# Patient Record
Sex: FEMALE | Race: WHITE | NOT HISPANIC OR LATINO | Employment: OTHER | RURAL
[De-identification: names, ages, dates, MRNs, and addresses within clinical notes are randomized per-mention and may not be internally consistent; named-entity substitution may affect disease eponyms.]

---

## 2017-05-16 ENCOUNTER — OFFICE VISIT (OUTPATIENT)
Dept: CARDIOLOGY | Facility: CLINIC | Age: 82
End: 2017-05-16

## 2017-05-16 VITALS
BODY MASS INDEX: 26.57 KG/M2 | WEIGHT: 144.4 LBS | SYSTOLIC BLOOD PRESSURE: 132 MMHG | DIASTOLIC BLOOD PRESSURE: 64 MMHG | HEIGHT: 62 IN | HEART RATE: 53 BPM

## 2017-05-16 DIAGNOSIS — I10 ESSENTIAL HYPERTENSION: Chronic | ICD-10-CM

## 2017-05-16 DIAGNOSIS — I27.20 PULMONARY HYPERTENSION (HCC): Chronic | ICD-10-CM

## 2017-05-16 DIAGNOSIS — R60.0 BILATERAL EDEMA OF LOWER EXTREMITY: ICD-10-CM

## 2017-05-16 DIAGNOSIS — I49.3 PVC'S (PREMATURE VENTRICULAR CONTRACTIONS): Primary | ICD-10-CM

## 2017-05-16 PROCEDURE — 93000 ELECTROCARDIOGRAM COMPLETE: CPT | Performed by: INTERNAL MEDICINE

## 2017-05-16 PROCEDURE — 99213 OFFICE O/P EST LOW 20 MIN: CPT | Performed by: INTERNAL MEDICINE

## 2017-06-12 RX ORDER — BISOPROLOL FUMARATE 5 MG/1
TABLET, FILM COATED ORAL
Qty: 90 TABLET | Refills: 3 | Status: SHIPPED | OUTPATIENT
Start: 2017-06-12 | End: 2018-01-01 | Stop reason: SDUPTHER

## 2017-07-20 ENCOUNTER — TRANSCRIBE ORDERS (OUTPATIENT)
Dept: ADMINISTRATIVE | Facility: HOSPITAL | Age: 82
End: 2017-07-20

## 2017-07-20 DIAGNOSIS — R19.7 DIARRHEA, UNSPECIFIED TYPE: Primary | ICD-10-CM

## 2017-08-02 ENCOUNTER — HOSPITAL ENCOUNTER (OUTPATIENT)
Dept: CT IMAGING | Facility: HOSPITAL | Age: 82
Discharge: HOME | End: 2017-08-02
Admitting: INTERNAL MEDICINE

## 2017-08-02 DIAGNOSIS — R19.7 DIARRHEA, UNSPECIFIED TYPE: ICD-10-CM

## 2017-08-02 PROCEDURE — 63710000001 BARIUM 0.1 % SUSPENSION: Performed by: INTERNAL MEDICINE

## 2017-08-02 PROCEDURE — 74176 CT ABD & PELVIS W/O CONTRAST: CPT

## 2017-08-02 PROCEDURE — 82565 ASSAY OF CREATININE: CPT

## 2017-08-02 PROCEDURE — A9270 NON-COVERED ITEM OR SERVICE: HCPCS | Performed by: INTERNAL MEDICINE

## 2017-08-02 RX ADMIN — BARIUM SULFATE 1350 ML: 1 SUSPENSION ORAL at 16:14

## 2017-08-03 LAB — CREAT BLDA-MCNC: 2.8 MG/DL (ref 0.6–1.3)

## 2017-12-19 ENCOUNTER — OFFICE VISIT (OUTPATIENT)
Dept: CARDIOLOGY | Facility: CLINIC | Age: 82
End: 2017-12-19

## 2017-12-19 VITALS
SYSTOLIC BLOOD PRESSURE: 138 MMHG | HEART RATE: 53 BPM | BODY MASS INDEX: 26.31 KG/M2 | WEIGHT: 143 LBS | DIASTOLIC BLOOD PRESSURE: 78 MMHG | HEIGHT: 62 IN

## 2017-12-19 DIAGNOSIS — I10 ESSENTIAL HYPERTENSION: Primary | Chronic | ICD-10-CM

## 2017-12-19 DIAGNOSIS — I49.3 PVC'S (PREMATURE VENTRICULAR CONTRACTIONS): ICD-10-CM

## 2017-12-19 DIAGNOSIS — I27.20 PULMONARY HYPERTENSION (HCC): Chronic | ICD-10-CM

## 2017-12-19 DIAGNOSIS — I49.1 PREMATURE ATRIAL CONTRACTIONS: ICD-10-CM

## 2017-12-19 PROCEDURE — 99214 OFFICE O/P EST MOD 30 MIN: CPT | Performed by: INTERNAL MEDICINE

## 2017-12-19 PROCEDURE — 93010 ELECTROCARDIOGRAM REPORT: CPT | Performed by: INTERNAL MEDICINE

## 2017-12-19 RX ORDER — DIPHENOXYLATE HYDROCHLORIDE AND ATROPINE SULFATE 2.5; .025 MG/1; MG/1
1 TABLET ORAL 2 TIMES DAILY PRN
COMMUNITY
End: 2019-01-01 | Stop reason: HOSPADM

## 2017-12-19 RX ORDER — CALCITRIOL 0.25 UG/1
0.25 CAPSULE, LIQUID FILLED ORAL DAILY
COMMUNITY

## 2017-12-19 RX ORDER — PROPAFENONE HYDROCHLORIDE 150 MG/1
150 TABLET, COATED ORAL EVERY 12 HOURS
Qty: 180 TABLET | Refills: 1 | Status: SHIPPED | OUTPATIENT
Start: 2017-12-19 | End: 2018-01-01 | Stop reason: SDUPTHER

## 2017-12-19 NOTE — PROGRESS NOTES
[facility] at [facility]  Office visit  Fabiola Pimentel  1934  [phone]    VISIT DATE:  12/19/2017    PCP: Ekaterina Leon, APRN  [address]  [facility] [address]    CC:  Chief Complaint   Patient presents with   • PVC's (premature ventricular contractions)   • Shortness of Breath   • Hypertension       PROBLEM LIST:  1. Palpitations/premature ventricular contractions:  a. Left heart catheterization, Dr. Miguel, 07/10/1995: EF 70% with essentially normal coronary arteries.  b. Event monitor, 08/17/2004: Occasional PACs with brief SVT, frequent PVCs with frequent bigeminy, no pauses. Events seem to correlate with bigeminy.  c. A 2D echocardiogram, 09/21/2004: Mild TR, trace of MR, EF 60%.  2. Premature atrial contractions, postoperative from incisional hernia repair:  a. A 2D echocardiogram 01/03/2008: Mild MR, mild to moderate TR, small pericardial effusion, EF 65%.  3. Hypertension.  4. Possible bradycardia:  a. Patient ordered 24-hour Holter monitor, 04/21/2016.  5. Chronic renal insufficiency status post nephrectomy:  a. Baseline creatinine of 2.0, 09/24/2013.  b. History of right renal cell carcinoma status post right nephrectomy, 2001.   6. Pulmonary hypertension:  a. Echocardiogram shows a normal LV systolic function wall motion, with mildly enlarged right ventricle, moderate TR and an RVSP of 62 mmHg.  b. Echocardiogram by Dr. Schwarz, 04/11/2015: Normal LV systolic function, wall motion with trace MR, moderate TR and moderate to severe pulmonary hypertension with an RVSP of 50 to 55 mmHg.  7. Lower extremity edema:  a. Lower extremity venous duplex, 09/23/2013: No evidence of deep venous thrombosis.   b. Negative VQ scan for pulmonary embolus, findings suggest congestive heart failure, 10/11/2013.  c. No evidence of deep venous thrombosis by bilateral duplex, 03/25/2015.  8. ITP.   9. History of uterine cancer:  a. Initial diagnosis, 2001, status post SALVATORE/BSO.  b. Recurrence  documented 2003, status post radiation therapy and implants.  10. Degenerative joint disease.  11. Recent hip and pelvic fracture.  12. Raynaud’s phenomenon involving the left upper extremity.   13. Diverticular disease.  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  15. Surgical history:  a. Cholecystectomy, 1958.  b. Appendectomy.  c. Cataract surgery with lens implantation.  d. Right nephrectomy, 2001.  e. Colon resection, October 2005, with associated splenectomy.  f. Incisional hernia repair with mesh, 01/02/2008.  g. D and C.  h. Left axilla sympathetectomy secondary to Raynaud’s phenomenon.  i. Splenectomy.  ASSESSMENT:   Diagnosis Plan   1. Essential hypertension     2. Pulmonary hypertension     3. Premature atrial contractions     4. PVC's (premature ventricular contractions)         PLAN:  Symptomatic premature atrial contractions and premature ventricular contractions: Currently well-controlled.  Continue per Pap unknown 225 mg by mouth twice a day    Pulmonary hypertension: Moderate.  Chronic lower extremity edema appears more consistent with venous insufficiency as opposed to chronic cor pulmonale.  Continue clinical follow-up.    Hypertension: Goal less than 130/80 mmHg.  Currently well-controlled.  Continue current medical therapy.    Venous insufficiency: Continue as needed diuresis, we'll likely need scheduled diuretics at least 2-3 times per week with close follow-up of renal function given history of nephrectomy.  She is compliant with compression stockings.  Discussed importance of a low sodium diet.    Subjective  83-year-old female reports stable functional capacity.  She describes generalized fatigue.  Denies chest pain.  Intermittent palpitations.  Blood pressures running less than 140/90 mmHg.  She is compliant with medical therapy.  Has a chronic bilateral lower extremity edema, right greater than left.  Recently worsen with developing Ronald requiring diuresis, also developed a secondary cellulitis  "which was treated with antibiotics.  Currently back to baseline.    PHYSICAL EXAMINATION:  Vitals:    12/19/17 1018   BP: 138/78   BP Location: Right arm   Patient Position: Sitting   Pulse: 53   Weight: 64.9 kg (143 lb)   Height: 156.2 cm (61.5\")     General Appearance:    Alert, cooperative, no distress, appears stated age   Head:    Normocephalic, without obvious abnormality, atraumatic   Eyes:    conjunctiva/corneas clear   Nose:   Nares normal, septum midline, mucosa normal, no drainage   Throat:   Lips, teeth and gums normal   Neck:   Supple, symmetrical, trachea midline, no carotid    bruit or JVD   Lungs:     Clear to auscultation bilaterally, respirations unlabored   Chest Wall:    No tenderness or deformity    Heart:    Regular rate and rhythm, S1 and S2 normal, no murmur, rub   or gallop, normal carotid impulse bilaterally without bruit.   Abdomen:     Soft, non-tender   Extremities:   Extremities normal, atraumatic, no cyanosis, 2-3+ right lower extremity edema, 1+ left lower extremity edema, compression stockings in place    Pulses:   2+ and symmetric all extremities   Skin:   Skin color, texture, turgor normal, no rashes or lesions       Diagnostic Data:    ECG 12 Lead  Date/Time: 12/19/2017 10:31 AM  Performed by: SONJA HEWITT III  Authorized by: SONJA HEWITT III   Previous ECG: no previous ECG available  Rhythm: sinus rhythm  Conduction: 1st degree  Clinical impression: abnormal ECG  Clinical impression comment: Cannot rule out old inferior wall myocardial infarction            No results found for: CHLPL, TRIG, HDL, LDLDIRECT  Lab Results   Component Value Date    GLUCOSE 160 (H) 09/19/2014    BUN 40 (H) 09/19/2014    CREATININE 2.80 (H) 08/02/2017     09/19/2014    K 4.3 09/19/2014     09/19/2014    CO2 22 09/19/2014     No results found for: HGBA1C  Lab Results   Component Value Date    WBC 10.30 09/21/2016    HGB 12.2 09/21/2016    HCT 36.7 09/21/2016     09/21/2016 "       Allergies  Allergies   Allergen Reactions   • Benadryl [Diphenhydramine]      Unknown reaction   • Diltiazem      Edema     • Micardis [Telmisartan]      Hyperkalemia     • Other      DIURETICS, worsening renal insufficiency.         Current Medications    Current Outpatient Prescriptions:   •  bisoprolol (ZEBeta) 5 MG tablet, TAKE 1 TABLET BY MOUTH EVERY NIGHT., Disp: 90 tablet, Rfl: 3  •  calcitriol (ROCALTROL) 0.25 MCG capsule, Take 0.25 mcg by mouth Daily., Disp: , Rfl:   •  Denosumab (PROLIA SC), Inject  under the skin Every 6 (Six) Months., Disp: , Rfl:   •  Diphenoxylate-Atropine (LOMOTIL PO), Take  by mouth Daily As Needed., Disp: , Rfl:   •  doxazosin (CARDURA) 2 MG tablet, Take 2 mg by mouth Daily., Disp: , Rfl:   •  furosemide (LASIX) 40 MG tablet, Take 40 mg by mouth As Needed. Every other day , Disp: , Rfl:   •  propafenone (RHTHYMOL) 150 MG tablet, Take 1 tablet by mouth Every 8 (Eight) Hours. (Patient taking differently: Take 150 mg by mouth 2 (Two) Times a Day.), Disp: 270 tablet, Rfl: 1          ROS  Review of Systems   Cardiovascular: Positive for irregular heartbeat and leg swelling. Negative for chest pain, near-syncope, orthopnea and syncope.   Respiratory: Negative for cough, shortness of breath and snoring.          SOCIAL HX  Social History     Social History   • Marital status:      Spouse name: N/A   • Number of children: N/A   • Years of education: N/A     Occupational History   • Not on file.     Social History Main Topics   • Smoking status: Never Smoker   • Smokeless tobacco: Never Used   • Alcohol use No   • Drug use: No   • Sexual activity: Defer     Other Topics Concern   • Not on file     Social History Narrative       FAMILY HX  Family History   Problem Relation Age of Onset   • Heart attack Mother    • No Known Problems Father              Malachi Maxwell III, MD, formerly [facility]

## 2018-01-01 ENCOUNTER — OFFICE VISIT (OUTPATIENT)
Dept: CARDIOLOGY | Facility: CLINIC | Age: 83
End: 2018-01-01

## 2018-01-01 VITALS
BODY MASS INDEX: 25.32 KG/M2 | WEIGHT: 137.6 LBS | SYSTOLIC BLOOD PRESSURE: 130 MMHG | HEART RATE: 58 BPM | DIASTOLIC BLOOD PRESSURE: 70 MMHG | HEIGHT: 62 IN

## 2018-01-01 DIAGNOSIS — I27.20 PULMONARY HYPERTENSION (HCC): Chronic | ICD-10-CM

## 2018-01-01 DIAGNOSIS — I49.1 PREMATURE ATRIAL CONTRACTIONS: ICD-10-CM

## 2018-01-01 DIAGNOSIS — I49.3 PVC'S (PREMATURE VENTRICULAR CONTRACTIONS): ICD-10-CM

## 2018-01-01 DIAGNOSIS — I10 ESSENTIAL HYPERTENSION: Primary | Chronic | ICD-10-CM

## 2018-01-01 PROCEDURE — 99214 OFFICE O/P EST MOD 30 MIN: CPT | Performed by: INTERNAL MEDICINE

## 2018-01-01 PROCEDURE — 93010 ELECTROCARDIOGRAM REPORT: CPT | Performed by: INTERNAL MEDICINE

## 2018-01-01 RX ORDER — FAMOTIDINE 20 MG/1
20 TABLET, FILM COATED ORAL
Status: CANCELLED | OUTPATIENT
Start: 2018-01-01

## 2018-01-01 RX ORDER — BISOPROLOL FUMARATE 5 MG/1
TABLET, FILM COATED ORAL
Qty: 30 TABLET | Refills: 11 | Status: SHIPPED | OUTPATIENT
Start: 2018-01-01 | End: 2019-01-01 | Stop reason: HOSPADM

## 2018-01-01 RX ORDER — PROPAFENONE HYDROCHLORIDE 150 MG/1
150 TABLET, COATED ORAL EVERY 12 HOURS
Qty: 180 TABLET | Refills: 1 | Status: SHIPPED | OUTPATIENT
Start: 2018-01-01 | End: 2019-01-01 | Stop reason: HOSPADM

## 2018-01-01 RX ORDER — SODIUM CHLORIDE, SODIUM LACTATE, POTASSIUM CHLORIDE, CALCIUM CHLORIDE 600; 310; 30; 20 MG/100ML; MG/100ML; MG/100ML; MG/100ML
9 INJECTION, SOLUTION INTRAVENOUS CONTINUOUS PRN
Status: CANCELLED | OUTPATIENT
Start: 2018-01-01

## 2018-01-01 RX ORDER — PROPAFENONE HYDROCHLORIDE 150 MG/1
150 TABLET, COATED ORAL EVERY 12 HOURS
Qty: 180 TABLET | Refills: 0 | Status: SHIPPED | OUTPATIENT
Start: 2018-01-01 | End: 2018-01-01 | Stop reason: SDUPTHER

## 2018-01-01 RX ORDER — HYDRALAZINE HYDROCHLORIDE 10 MG/1
10 TABLET, FILM COATED ORAL 2 TIMES DAILY
COMMUNITY

## 2018-07-12 NOTE — PROGRESS NOTES
[facility]  Office visit  Fabiola Pimentel  1934  [phone]    VISIT DATE:  12/19/2017    PCP: Ekaterina Leon, APRN  [address]  [facility] [address]    CC:  Chief Complaint   Patient presents with   • PVC's (premature ventricular contractions)   • Palpitations       PROBLEM LIST:  1. Palpitations/premature ventricular contractions:  a. Left heart catheterization, Dr. Miguel, 07/10/1995: EF 70% with essentially normal coronary arteries.  b. Event monitor, 08/17/2004: Occasional PACs with brief SVT, frequent PVCs with frequent bigeminy, no pauses. Events seem to correlate with bigeminy.  c. A 2D echocardiogram, 09/21/2004: Mild TR, trace of MR, EF 60%.  2. Premature atrial contractions, postoperative from incisional hernia repair:  a. A 2D echocardiogram 01/03/2008: Mild MR, mild to moderate TR, small pericardial effusion, EF 65%.  3. Hypertension.  4. Possible bradycardia:  a. Patient ordered 24-hour Holter monitor, 04/21/2016.  5. Chronic renal insufficiency status post nephrectomy:  a. Baseline creatinine of 2.0, 09/24/2013.  b. History of right renal cell carcinoma status post right nephrectomy, 2001.   6. Pulmonary hypertension:  a. Echocardiogram shows a normal LV systolic function wall motion, with mildly enlarged right ventricle, moderate TR and an RVSP of 62 mmHg.  b. Echocardiogram by Dr. Schwarz, 04/11/2015: Normal LV systolic function, wall motion with trace MR, moderate TR and moderate to severe pulmonary hypertension with an RVSP of 50 to 55 mmHg.  7. Lower extremity edema:  a. Lower extremity venous duplex, 09/23/2013: No evidence of deep venous thrombosis.   b. Negative VQ scan for pulmonary embolus, findings suggest congestive heart failure, 10/11/2013.  c. No evidence of deep venous thrombosis by bilateral duplex, 03/25/2015.  8. ITP.   9. History of uterine cancer:  a. Initial diagnosis, 2001, status post SALVATORE/BSO.  b. Recurrence documented 2003, status post  radiation therapy and implants.  10. Degenerative joint disease.  11. Recent hip and pelvic fracture.  12. Raynaud’s phenomenon involving the left upper extremity.   13. Diverticular disease.  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  15. Surgical history:  a. Cholecystectomy, 1958.  b. Appendectomy.  c. Cataract surgery with lens implantation.  d. Right nephrectomy, 2001.  e. Colon resection, October 2005, with associated splenectomy.  f. Incisional hernia repair with mesh, 01/02/2008.  g. D and C.  h. Left axilla sympathetectomy secondary to Raynaud’s phenomenon.  i. Splenectomy.  ASSESSMENT:   Diagnosis Plan   1. Essential hypertension     2. Pulmonary hypertension     3. PVC's (premature ventricular contractions)     4. Premature atrial contractions         PLAN:  Symptomatic premature atrial contractions and premature ventricular contractions: Currently well-controlled.  Continue propafenone 150 mg by mouth twice a day    Pulmonary hypertension: Moderate.  Chronic lower extremity edema appears more consistent with venous insufficiency as opposed to chronic cor pulmonale.  Continue clinical follow-up.    Hypertension: Goal less than 130/80 mmHg.  Currently well-controlled.  Continue current medical therapy.    Venous insufficiency: Continue diuresis as directed by nephrology. history of nephrectomy.  She is compliant with compression stockings.  Discussed importance of a low sodium diet.    Subjective  83-year-old female reports stable functional capacity.  She describes generalized fatigue.  Denies chest pain.  Intermittent palpitations.  Blood pressures running less than 140/90 mmHg.  She is compliant with medical therapy.  Has a chronic bilateral lower extremity edema, right greater than left which has worsened recently. She is now on Lasix every other day. Being followed closely by her nephrologist..      PHYSICAL EXAMINATION:  Vitals:    07/12/18 1356   BP: 130/70   BP Location: Left arm   Patient Position: Sitting  "  Pulse: 58   Weight: 62.4 kg (137 lb 9.6 oz)   Height: 156.2 cm (61.5\")     General Appearance:    Alert, cooperative, no distress, appears stated age   Head:    Normocephalic, without obvious abnormality, atraumatic   Eyes:    conjunctiva/corneas clear   Nose:   Nares normal, septum midline, mucosa normal, no drainage   Throat:   Lips, teeth and gums normal   Neck:   Supple, symmetrical, trachea midline, no carotid    bruit or JVD   Lungs:     Clear to auscultation bilaterally, respirations unlabored   Chest Wall:    No tenderness or deformity    Heart:    Regular rate and rhythm, S1 and S2 normal, no murmur, rub   or gallop, normal carotid impulse bilaterally without bruit.   Abdomen:     Soft, non-tender   Extremities:   Extremities normal, atraumatic, no cyanosis, 2-3+ right lower extremity edema, 1+ left lower extremity edema, compression stockings in place    Pulses:   2+ and symmetric all extremities   Skin:   Skin color, texture, turgor normal, no rashes or lesions       Diagnostic Data:    ECG 12 Lead  Date/Time: 7/12/2018 2:12 PM  Performed by: SONJA HEWITT III  Authorized by: SONJA HEWITT III   Comparison: compared with previous ECG   Rhythm: sinus rhythm  Conduction: 1st degree  Clinical impression: non-specific ECG          No results found for: CHLPL, TRIG, HDL, LDLDIRECT  Lab Results   Component Value Date    GLUCOSE 160 (H) 09/19/2014    BUN 40 (H) 09/19/2014    CREATININE 2.80 (H) 08/02/2017     09/19/2014    K 4.3 09/19/2014     09/19/2014    CO2 22 09/19/2014     No results found for: HGBA1C  Lab Results   Component Value Date    WBC 10.30 09/21/2016    HGB 12.2 09/21/2016    HCT 36.7 09/21/2016     09/21/2016       Allergies  Allergies   Allergen Reactions   • Benadryl [Diphenhydramine]      Unknown reaction   • Diltiazem      Edema     • Micardis [Telmisartan]      Hyperkalemia     • Other      DIURETICS, worsening renal insufficiency.         Current Medications    Current " Outpatient Prescriptions:   •  bisoprolol (ZEBeta) 5 MG tablet, TAKE 1 TABLET BY MOUTH EVERY NIGHT., Disp: 90 tablet, Rfl: 3  •  calcitriol (ROCALTROL) 0.25 MCG capsule, Take 0.25 mcg by mouth Daily., Disp: , Rfl:   •  Denosumab (PROLIA SC), Inject  under the skin Every 6 (Six) Months., Disp: , Rfl:   •  Diphenoxylate-Atropine (LOMOTIL PO), Take  by mouth Daily As Needed., Disp: , Rfl:   •  doxazosin (CARDURA) 2 MG tablet, Take 2 mg by mouth Daily., Disp: , Rfl:   •  furosemide (LASIX) 40 MG tablet, Take 40 mg by mouth Every Other Day. Every other day , Disp: , Rfl:   •  hydrALAZINE (APRESOLINE) 10 MG tablet, Take 10 mg by mouth 2 (Two) Times a Day., Disp: , Rfl:   •  propafenone (RYTHMOL) 150 MG tablet, TAKE 1 TABLET BY MOUTH EVERY 12 (TWELVE) HOURS., Disp: 180 tablet, Rfl: 0          ROS  Review of Systems   Cardiovascular: Positive for irregular heartbeat and leg swelling. Negative for chest pain, near-syncope, orthopnea and syncope.   Respiratory: Negative for cough, shortness of breath and snoring.          SOCIAL HX  Social History     Social History   • Marital status:      Spouse name: N/A   • Number of children: N/A   • Years of education: N/A     Occupational History   • Not on file.     Social History Main Topics   • Smoking status: Never Smoker   • Smokeless tobacco: Never Used   • Alcohol use No   • Drug use: No   • Sexual activity: Defer     Other Topics Concern   • Not on file     Social History Narrative   • No narrative on file       FAMILY HX  Family History   Problem Relation Age of Onset   • Heart attack Mother    • No Known Problems Father              Malachi Maxwell III, MD, [facility]

## 2019-01-01 ENCOUNTER — APPOINTMENT (OUTPATIENT)
Dept: GENERAL RADIOLOGY | Facility: HOSPITAL | Age: 84
End: 2019-01-01

## 2019-01-01 ENCOUNTER — APPOINTMENT (OUTPATIENT)
Dept: MRI IMAGING | Facility: HOSPITAL | Age: 84
End: 2019-01-01

## 2019-01-01 ENCOUNTER — APPOINTMENT (OUTPATIENT)
Dept: NEPHROLOGY | Facility: HOSPITAL | Age: 84
End: 2019-01-01

## 2019-01-01 ENCOUNTER — APPOINTMENT (OUTPATIENT)
Dept: NEPHROLOGY | Facility: HOSPITAL | Age: 84
End: 2019-01-01
Attending: INTERNAL MEDICINE

## 2019-01-01 ENCOUNTER — APPOINTMENT (OUTPATIENT)
Dept: CARDIOLOGY | Facility: HOSPITAL | Age: 84
End: 2019-01-01

## 2019-01-01 ENCOUNTER — HOSPITAL ENCOUNTER (OUTPATIENT)
Facility: HOSPITAL | Age: 84
Discharge: HOME | End: 2019-01-17
Attending: SURGERY | Admitting: SURGERY

## 2019-01-01 ENCOUNTER — HOSPITAL ENCOUNTER (INPATIENT)
Facility: HOSPITAL | Age: 84
LOS: 3 days | End: 2019-03-04
Attending: HOSPITALIST | Admitting: INTERNAL MEDICINE

## 2019-01-01 ENCOUNTER — ANCILLARY PROCEDURE (OUTPATIENT)
Dept: SPEECH THERAPY | Facility: HOSPITAL | Age: 84
End: 2019-01-01

## 2019-01-01 ENCOUNTER — ANESTHESIA (OUTPATIENT)
Dept: PERIOP | Facility: HOSPITAL | Age: 84
End: 2019-01-01

## 2019-01-01 ENCOUNTER — HOSPITAL ENCOUNTER (INPATIENT)
Facility: HOSPITAL | Age: 84
LOS: 15 days | Discharge: NURSING, REHAB OR LONG-TERM CARE | End: 2019-02-15
Attending: HOSPITALIST | Admitting: INTERNAL MEDICINE

## 2019-01-01 ENCOUNTER — ANESTHESIA EVENT (OUTPATIENT)
Dept: PERIOP | Facility: HOSPITAL | Age: 84
End: 2019-01-01

## 2019-01-01 ENCOUNTER — HOSPITAL ENCOUNTER (INPATIENT)
Facility: HOSPITAL | Age: 84
LOS: 8 days | Discharge: NURSING, REHAB OR LONG-TERM CARE | End: 2019-02-27
Attending: HOSPITALIST | Admitting: INTERNAL MEDICINE

## 2019-01-01 ENCOUNTER — OUTSIDE FACILITY SERVICE (OUTPATIENT)
Dept: CARDIOLOGY | Facility: CLINIC | Age: 84
End: 2019-01-01

## 2019-01-01 ENCOUNTER — APPOINTMENT (OUTPATIENT)
Dept: CT IMAGING | Facility: HOSPITAL | Age: 84
End: 2019-01-01

## 2019-01-01 VITALS
SYSTOLIC BLOOD PRESSURE: 128 MMHG | RESPIRATION RATE: 18 BRPM | HEIGHT: 61 IN | TEMPERATURE: 98.1 F | BODY MASS INDEX: 25.11 KG/M2 | WEIGHT: 133 LBS | HEART RATE: 67 BPM | DIASTOLIC BLOOD PRESSURE: 64 MMHG | OXYGEN SATURATION: 97 %

## 2019-01-01 VITALS
BODY MASS INDEX: 24.26 KG/M2 | DIASTOLIC BLOOD PRESSURE: 70 MMHG | WEIGHT: 128.5 LBS | OXYGEN SATURATION: 96 % | TEMPERATURE: 97.4 F | HEIGHT: 61 IN | SYSTOLIC BLOOD PRESSURE: 116 MMHG | RESPIRATION RATE: 16 BRPM | HEART RATE: 69 BPM

## 2019-01-01 VITALS
WEIGHT: 127.87 LBS | RESPIRATION RATE: 18 BRPM | OXYGEN SATURATION: 98 % | SYSTOLIC BLOOD PRESSURE: 108 MMHG | HEIGHT: 61 IN | HEART RATE: 91 BPM | TEMPERATURE: 98.4 F | DIASTOLIC BLOOD PRESSURE: 59 MMHG | BODY MASS INDEX: 24.14 KG/M2

## 2019-01-01 VITALS
HEART RATE: 55 BPM | OXYGEN SATURATION: 95 % | WEIGHT: 134 LBS | SYSTOLIC BLOOD PRESSURE: 108 MMHG | HEIGHT: 63 IN | DIASTOLIC BLOOD PRESSURE: 66 MMHG | RESPIRATION RATE: 16 BRPM | TEMPERATURE: 97.6 F | BODY MASS INDEX: 23.74 KG/M2

## 2019-01-01 DIAGNOSIS — Z78.9 IMPAIRED MOBILITY AND ADLS: ICD-10-CM

## 2019-01-01 DIAGNOSIS — R13.13 PHARYNGEAL DYSPHAGIA: ICD-10-CM

## 2019-01-01 DIAGNOSIS — Z74.09 IMPAIRED FUNCTIONAL MOBILITY, BALANCE, GAIT, AND ENDURANCE: Primary | ICD-10-CM

## 2019-01-01 DIAGNOSIS — Z78.9 IMPAIRED MOBILITY AND ADLS: Primary | ICD-10-CM

## 2019-01-01 DIAGNOSIS — I46.9 PEA (PULSELESS ELECTRICAL ACTIVITY) (HCC): ICD-10-CM

## 2019-01-01 DIAGNOSIS — Z74.09 IMPAIRED FUNCTIONAL MOBILITY, BALANCE, GAIT, AND ENDURANCE: ICD-10-CM

## 2019-01-01 DIAGNOSIS — Z74.09 IMPAIRED MOBILITY AND ADLS: ICD-10-CM

## 2019-01-01 DIAGNOSIS — Z74.09 IMPAIRED MOBILITY AND ADLS: Primary | ICD-10-CM

## 2019-01-01 LAB
ABO + RH BLD: NORMAL
ABO + RH BLD: NORMAL
ABO GROUP BLD: NORMAL
ACANTHOCYTES BLD QL SMEAR: ABNORMAL
ACANTHOCYTES BLD QL SMEAR: ABNORMAL
ALBUMIN SERPL-MCNC: 2.34 G/DL (ref 3.2–4.8)
ALBUMIN SERPL-MCNC: 2.54 G/DL (ref 3.2–4.8)
ALBUMIN SERPL-MCNC: 2.55 G/DL (ref 3.2–4.8)
ALBUMIN SERPL-MCNC: 2.67 G/DL (ref 3.2–4.8)
ALBUMIN SERPL-MCNC: 2.91 G/DL (ref 3.2–4.8)
ALBUMIN SERPL-MCNC: 2.95 G/DL (ref 3.2–4.8)
ALBUMIN SERPL-MCNC: 3.02 G/DL (ref 3.2–4.8)
ALBUMIN SERPL-MCNC: 3.11 G/DL (ref 3.2–4.8)
ALBUMIN SERPL-MCNC: 3.15 G/DL (ref 3.2–4.8)
ALBUMIN SERPL-MCNC: 3.54 G/DL (ref 3.2–4.8)
ALBUMIN SERPL-MCNC: 3.62 G/DL (ref 3.2–4.8)
ALBUMIN SERPL-MCNC: 3.67 G/DL (ref 3.2–4.8)
ALBUMIN SERPL-MCNC: 3.8 G/DL (ref 3.2–4.8)
ALBUMIN SERPL-MCNC: 3.91 G/DL (ref 3.2–4.8)
ALBUMIN SERPL-MCNC: 4.42 G/DL (ref 3.2–4.8)
ALBUMIN SERPL-MCNC: 4.67 G/DL (ref 3.2–4.8)
ALBUMIN/GLOB SERPL: 1.2 G/DL (ref 1.5–2.5)
ALBUMIN/GLOB SERPL: 1.3 G/DL (ref 1.5–2.5)
ALBUMIN/GLOB SERPL: 1.4 G/DL (ref 1.5–2.5)
ALBUMIN/GLOB SERPL: 1.5 G/DL (ref 1.5–2.5)
ALBUMIN/GLOB SERPL: 1.6 G/DL (ref 1.5–2.5)
ALBUMIN/GLOB SERPL: 1.7 G/DL (ref 1.5–2.5)
ALBUMIN/GLOB SERPL: 1.8 G/DL (ref 1.5–2.5)
ALBUMIN/GLOB SERPL: 1.8 G/DL (ref 1.5–2.5)
ALBUMIN/GLOB SERPL: 2.9 G/DL (ref 1.5–2.5)
ALP SERPL-CCNC: 46 U/L (ref 25–100)
ALP SERPL-CCNC: 52 U/L (ref 25–100)
ALP SERPL-CCNC: 53 U/L (ref 25–100)
ALP SERPL-CCNC: 66 U/L (ref 25–100)
ALP SERPL-CCNC: 69 U/L (ref 25–100)
ALP SERPL-CCNC: 70 U/L (ref 25–100)
ALP SERPL-CCNC: 89 U/L (ref 25–100)
ALP SERPL-CCNC: 91 U/L (ref 25–100)
ALP SERPL-CCNC: 99 U/L (ref 25–100)
ALT SERPL W P-5'-P-CCNC: 10 U/L (ref 7–40)
ALT SERPL W P-5'-P-CCNC: 11 U/L (ref 7–40)
ALT SERPL W P-5'-P-CCNC: 12 U/L (ref 7–40)
ALT SERPL W P-5'-P-CCNC: 14 U/L (ref 7–40)
ALT SERPL W P-5'-P-CCNC: 4 U/L (ref 7–40)
ALT SERPL W P-5'-P-CCNC: 7 U/L (ref 7–40)
ALT SERPL W P-5'-P-CCNC: 8 U/L (ref 7–40)
AMORPH URATE CRY URNS QL MICRO: NORMAL /HPF
ANION GAP SERPL CALCULATED.3IONS-SCNC: 10 MMOL/L (ref 3–11)
ANION GAP SERPL CALCULATED.3IONS-SCNC: 11 MMOL/L (ref 3–11)
ANION GAP SERPL CALCULATED.3IONS-SCNC: 12 MMOL/L (ref 3–11)
ANION GAP SERPL CALCULATED.3IONS-SCNC: 13 MMOL/L (ref 3–11)
ANION GAP SERPL CALCULATED.3IONS-SCNC: 14 MMOL/L (ref 3–11)
ANION GAP SERPL CALCULATED.3IONS-SCNC: 15 MMOL/L (ref 3–11)
ANION GAP SERPL CALCULATED.3IONS-SCNC: 16 MMOL/L (ref 3–11)
ANION GAP SERPL CALCULATED.3IONS-SCNC: 9 MMOL/L (ref 3–11)
ANISOCYTOSIS BLD QL: ABNORMAL
ARTICHOKE IGE QN: 25 MG/DL (ref 0–130)
AST SERPL-CCNC: 18 U/L (ref 0–33)
AST SERPL-CCNC: 18 U/L (ref 0–33)
AST SERPL-CCNC: 21 U/L (ref 0–33)
AST SERPL-CCNC: 22 U/L (ref 0–33)
AST SERPL-CCNC: 23 U/L (ref 0–33)
AST SERPL-CCNC: 23 U/L (ref 0–33)
AST SERPL-CCNC: 25 U/L (ref 0–33)
AST SERPL-CCNC: 31 U/L (ref 0–33)
AST SERPL-CCNC: 34 U/L (ref 0–33)
B PARAPERT DNA SPEC QL NAA+PROBE: NOT DETECTED
B PERT DNA SPEC QL NAA+PROBE: NOT DETECTED
BACTERIA SPEC AEROBE CULT: ABNORMAL
BACTERIA SPEC AEROBE CULT: NORMAL
BACTERIA UR QL AUTO: ABNORMAL /HPF
BACTERIA UR QL AUTO: NORMAL /HPF
BASOPHILS # BLD AUTO: 0.03 10*3/MM3 (ref 0–0.2)
BASOPHILS # BLD MANUAL: 0 10*3/MM3 (ref 0–0.2)
BASOPHILS # BLD MANUAL: 0.2 10*3/MM3 (ref 0–0.2)
BASOPHILS NFR BLD AUTO: 0 % (ref 0–1)
BASOPHILS NFR BLD AUTO: 0.3 % (ref 0–1)
BASOPHILS NFR BLD AUTO: 1 % (ref 0–1)
BH BB BLOOD EXPIRATION DATE: NORMAL
BH BB BLOOD EXPIRATION DATE: NORMAL
BH BB BLOOD TYPE BARCODE: 5100
BH BB BLOOD TYPE BARCODE: 5100
BH BB DISPENSE STATUS: NORMAL
BH BB DISPENSE STATUS: NORMAL
BH BB PRODUCT CODE: NORMAL
BH BB PRODUCT CODE: NORMAL
BH BB UNIT NUMBER: NORMAL
BH BB UNIT NUMBER: NORMAL
BH CV ECHO MEAS - AO MAX PG (FULL): 1.9 MMHG
BH CV ECHO MEAS - AO MAX PG: 7 MMHG
BH CV ECHO MEAS - AO ROOT AREA (BSA CORRECTED): 1.9
BH CV ECHO MEAS - AO ROOT AREA: 8.2 CM^2
BH CV ECHO MEAS - AO ROOT DIAM: 3.2 CM
BH CV ECHO MEAS - AO V2 MAX: 132 CM/SEC
BH CV ECHO MEAS - AVA(V,A): 2.4 CM^2
BH CV ECHO MEAS - AVA(V,D): 2.4 CM^2
BH CV ECHO MEAS - BSA(HAYCOCK): 1.7 M^2
BH CV ECHO MEAS - BSA(HAYCOCK): 1.8 M^2
BH CV ECHO MEAS - BSA: 1.7 M^2
BH CV ECHO MEAS - BSA: 1.7 M^2
BH CV ECHO MEAS - BZI_BMI: 24.5 KILOGRAMS/M^2
BH CV ECHO MEAS - BZI_BMI: 24.9 KILOGRAMS/M^2
BH CV ECHO MEAS - BZI_METRIC_HEIGHT: 162.6 CM
BH CV ECHO MEAS - BZI_METRIC_HEIGHT: 165.1 CM
BH CV ECHO MEAS - BZI_METRIC_WEIGHT: 65.8 KG
BH CV ECHO MEAS - BZI_METRIC_WEIGHT: 66.7 KG
BH CV ECHO MEAS - EDV(CUBED): 32.5 ML
BH CV ECHO MEAS - EDV(MOD-SP2): 69 ML
BH CV ECHO MEAS - EDV(MOD-SP4): 99 ML
BH CV ECHO MEAS - EDV(TEICH): 40.6 ML
BH CV ECHO MEAS - EF(CUBED): 64.2 %
BH CV ECHO MEAS - EF(MOD-BP): 54 %
BH CV ECHO MEAS - EF(MOD-SP2): 50.7 %
BH CV ECHO MEAS - EF(MOD-SP4): 60.6 %
BH CV ECHO MEAS - EF(TEICH): 57.1 %
BH CV ECHO MEAS - ESV(CUBED): 11.6 ML
BH CV ECHO MEAS - ESV(MOD-SP2): 34 ML
BH CV ECHO MEAS - ESV(MOD-SP4): 39 ML
BH CV ECHO MEAS - ESV(TEICH): 17.5 ML
BH CV ECHO MEAS - FS: 29 %
BH CV ECHO MEAS - IVS/LVPW: 0.88
BH CV ECHO MEAS - IVSD: 0.6 CM
BH CV ECHO MEAS - LA DIMENSION: 1.9 CM
BH CV ECHO MEAS - LA/AO: 0.58
BH CV ECHO MEAS - LAD MAJOR: 6.6 CM
BH CV ECHO MEAS - LAT PEAK E' VEL: 9.8 CM/SEC
BH CV ECHO MEAS - LATERAL E/E' RATIO: 11
BH CV ECHO MEAS - LV DIASTOLIC VOL/BSA (35-75): 57 ML/M^2
BH CV ECHO MEAS - LV MASS(C)D: 47.7 GRAMS
BH CV ECHO MEAS - LV MASS(C)DI: 27.5 GRAMS/M^2
BH CV ECHO MEAS - LV MAX PG: 5.1 MMHG
BH CV ECHO MEAS - LV MEAN PG: 2.5 MMHG
BH CV ECHO MEAS - LV SYSTOLIC VOL/BSA (12-30): 22.5 ML/M^2
BH CV ECHO MEAS - LV V1 MAX: 113 CM/SEC
BH CV ECHO MEAS - LV V1 MEAN: 74.2 CM/SEC
BH CV ECHO MEAS - LV V1 VTI: 24 CM
BH CV ECHO MEAS - LVIDD: 3.2 CM
BH CV ECHO MEAS - LVIDS: 2.3 CM
BH CV ECHO MEAS - LVLD AP2: 7.2 CM
BH CV ECHO MEAS - LVLD AP4: 7.2 CM
BH CV ECHO MEAS - LVLS AP2: 6.5 CM
BH CV ECHO MEAS - LVLS AP4: 5.9 CM
BH CV ECHO MEAS - LVOT AREA (M): 2.8 CM^2
BH CV ECHO MEAS - LVOT AREA: 2.8 CM^2
BH CV ECHO MEAS - LVOT DIAM: 1.9 CM
BH CV ECHO MEAS - LVPWD: 0.68 CM
BH CV ECHO MEAS - MED PEAK E' VEL: 7.6 CM/SEC
BH CV ECHO MEAS - MEDIAL E/E' RATIO: 14.2
BH CV ECHO MEAS - MV A MAX VEL: 133.3 CM/SEC
BH CV ECHO MEAS - MV DEC TIME: 0.33 SEC
BH CV ECHO MEAS - MV E MAX VEL: 109.1 CM/SEC
BH CV ECHO MEAS - MV E/A: 0.82
BH CV ECHO MEAS - MV MAX PG: 7 MMHG
BH CV ECHO MEAS - MV MEAN PG: 3.3 MMHG
BH CV ECHO MEAS - MV V2 MAX: 132 CM/SEC
BH CV ECHO MEAS - MV V2 MEAN: 87.9 CM/SEC
BH CV ECHO MEAS - MV V2 VTI: 34.1 CM
BH CV ECHO MEAS - MVA(VTI): 2 CM^2
BH CV ECHO MEAS - PA ACC SLOPE: 613.3 CM/SEC^2
BH CV ECHO MEAS - PA ACC TIME: 0.11 SEC
BH CV ECHO MEAS - PA MAX PG: 4.1 MMHG
BH CV ECHO MEAS - PA PR(ACCEL): 31.5 MMHG
BH CV ECHO MEAS - PA V2 MAX: 100.8 CM/SEC
BH CV ECHO MEAS - RAP SYSTOLE: 7 MMHG
BH CV ECHO MEAS - RVSP: 54 MMHG
BH CV ECHO MEAS - SI(CUBED): 12 ML/M^2
BH CV ECHO MEAS - SI(LVOT): 38.7 ML/M^2
BH CV ECHO MEAS - SI(MOD-SP2): 20.2 ML/M^2
BH CV ECHO MEAS - SI(MOD-SP4): 34.6 ML/M^2
BH CV ECHO MEAS - SI(TEICH): 13.4 ML/M^2
BH CV ECHO MEAS - SV(CUBED): 20.8 ML
BH CV ECHO MEAS - SV(LVOT): 67.2 ML
BH CV ECHO MEAS - SV(MOD-SP2): 35 ML
BH CV ECHO MEAS - SV(MOD-SP4): 60 ML
BH CV ECHO MEAS - SV(TEICH): 23.2 ML
BH CV ECHO MEAS - TAPSE (>1.6): 1.9 CM2
BH CV ECHO MEAS - TR MAX PG: 47 MMHG
BH CV ECHO MEAS - TR MAX VEL: 307.4 CM/SEC
BH CV ECHO MEASUREMENTS AVERAGE E/E' RATIO: 12.54
BH CV VAS BP LEFT ARM: NORMAL MMHG
BH CV XLRA - RV BASE: 3 CM
BH CV XLRA - RV LENGTH: 6.5 CM
BH CV XLRA - RV MID: 2.8 CM
BH CV XLRA - TDI S': 14.3 CM/SEC
BILIRUB SERPL-MCNC: 0.3 MG/DL (ref 0.3–1.2)
BILIRUB SERPL-MCNC: 0.3 MG/DL (ref 0.3–1.2)
BILIRUB SERPL-MCNC: 0.4 MG/DL (ref 0.3–1.2)
BILIRUB SERPL-MCNC: 0.5 MG/DL (ref 0.3–1.2)
BILIRUB SERPL-MCNC: 0.5 MG/DL (ref 0.3–1.2)
BILIRUB SERPL-MCNC: 0.8 MG/DL (ref 0.3–1.2)
BILIRUB SERPL-MCNC: 0.9 MG/DL (ref 0.3–1.2)
BILIRUB UR QL STRIP: NEGATIVE
BILIRUB UR QL STRIP: NEGATIVE
BLD GP AB SCN SERPL QL: NEGATIVE
BUN BLD-MCNC: 21 MG/DL (ref 9–23)
BUN BLD-MCNC: 22 MG/DL (ref 9–23)
BUN BLD-MCNC: 25 MG/DL (ref 9–23)
BUN BLD-MCNC: 26 MG/DL (ref 9–23)
BUN BLD-MCNC: 26 MG/DL (ref 9–23)
BUN BLD-MCNC: 31 MG/DL (ref 9–23)
BUN BLD-MCNC: 31 MG/DL (ref 9–23)
BUN BLD-MCNC: 32 MG/DL (ref 9–23)
BUN BLD-MCNC: 37 MG/DL (ref 9–23)
BUN BLD-MCNC: 39 MG/DL (ref 9–23)
BUN BLD-MCNC: 40 MG/DL (ref 9–23)
BUN BLD-MCNC: 41 MG/DL (ref 9–23)
BUN BLD-MCNC: 45 MG/DL (ref 9–23)
BUN BLD-MCNC: 46 MG/DL (ref 9–23)
BUN BLD-MCNC: 46 MG/DL (ref 9–23)
BUN BLD-MCNC: 47 MG/DL (ref 9–23)
BUN BLD-MCNC: 49 MG/DL (ref 9–23)
BUN BLD-MCNC: 52 MG/DL (ref 9–23)
BUN BLD-MCNC: 52 MG/DL (ref 9–23)
BUN BLD-MCNC: 53 MG/DL (ref 9–23)
BUN BLD-MCNC: 57 MG/DL (ref 9–23)
BUN BLD-MCNC: 57 MG/DL (ref 9–23)
BUN/CREAT SERPL: 10 (ref 7–25)
BUN/CREAT SERPL: 10.5 (ref 7–25)
BUN/CREAT SERPL: 10.9 (ref 7–25)
BUN/CREAT SERPL: 10.9 (ref 7–25)
BUN/CREAT SERPL: 11 (ref 7–25)
BUN/CREAT SERPL: 11 (ref 7–25)
BUN/CREAT SERPL: 11.2 (ref 7–25)
BUN/CREAT SERPL: 11.4 (ref 7–25)
BUN/CREAT SERPL: 11.7 (ref 7–25)
BUN/CREAT SERPL: 12.1 (ref 7–25)
BUN/CREAT SERPL: 12.2 (ref 7–25)
BUN/CREAT SERPL: 12.6 (ref 7–25)
BUN/CREAT SERPL: 12.8 (ref 7–25)
BUN/CREAT SERPL: 12.8 (ref 7–25)
BUN/CREAT SERPL: 13.2 (ref 7–25)
BUN/CREAT SERPL: 13.4 (ref 7–25)
BUN/CREAT SERPL: 13.7 (ref 7–25)
BUN/CREAT SERPL: 14.2 (ref 7–25)
BUN/CREAT SERPL: 15 (ref 7–25)
BUN/CREAT SERPL: 9.1 (ref 7–25)
BUN/CREAT SERPL: 9.3 (ref 7–25)
BUN/CREAT SERPL: 9.7 (ref 7–25)
BURR CELLS BLD QL SMEAR: ABNORMAL
C PNEUM DNA NPH QL NAA+NON-PROBE: NOT DETECTED
C3 FRG RBC-MCNC: ABNORMAL
CA-I SERPL ISE-MCNC: 1 MMOL/L (ref 1.12–1.32)
CALCIUM SPEC-SCNC: 6.1 MG/DL (ref 8.7–10.4)
CALCIUM SPEC-SCNC: 6.7 MG/DL (ref 8.7–10.4)
CALCIUM SPEC-SCNC: 6.7 MG/DL (ref 8.7–10.4)
CALCIUM SPEC-SCNC: 6.8 MG/DL (ref 8.7–10.4)
CALCIUM SPEC-SCNC: 6.9 MG/DL (ref 8.7–10.4)
CALCIUM SPEC-SCNC: 7.3 MG/DL (ref 8.7–10.4)
CALCIUM SPEC-SCNC: 7.8 MG/DL (ref 8.7–10.4)
CALCIUM SPEC-SCNC: 7.8 MG/DL (ref 8.7–10.4)
CALCIUM SPEC-SCNC: 8.2 MG/DL (ref 8.7–10.4)
CALCIUM SPEC-SCNC: 8.2 MG/DL (ref 8.7–10.4)
CALCIUM SPEC-SCNC: 8.4 MG/DL (ref 8.7–10.4)
CALCIUM SPEC-SCNC: 8.4 MG/DL (ref 8.7–10.4)
CALCIUM SPEC-SCNC: 8.6 MG/DL (ref 8.7–10.4)
CALCIUM SPEC-SCNC: 8.6 MG/DL (ref 8.7–10.4)
CALCIUM SPEC-SCNC: 8.7 MG/DL (ref 8.7–10.4)
CALCIUM SPEC-SCNC: 8.7 MG/DL (ref 8.7–10.4)
CALCIUM SPEC-SCNC: 8.8 MG/DL (ref 8.7–10.4)
CALCIUM SPEC-SCNC: 8.9 MG/DL (ref 8.7–10.4)
CALCIUM SPEC-SCNC: 9 MG/DL (ref 8.7–10.4)
CALCIUM SPEC-SCNC: 9 MG/DL (ref 8.7–10.4)
CALCIUM SPEC-SCNC: 9.2 MG/DL (ref 8.7–10.4)
CALCIUM SPEC-SCNC: 9.6 MG/DL (ref 8.7–10.4)
CHLORIDE SERPL-SCNC: 103 MMOL/L (ref 99–109)
CHLORIDE SERPL-SCNC: 104 MMOL/L (ref 99–109)
CHLORIDE SERPL-SCNC: 105 MMOL/L (ref 99–109)
CHLORIDE SERPL-SCNC: 105 MMOL/L (ref 99–109)
CHLORIDE SERPL-SCNC: 106 MMOL/L (ref 99–109)
CHLORIDE SERPL-SCNC: 107 MMOL/L (ref 99–109)
CHLORIDE SERPL-SCNC: 108 MMOL/L (ref 99–109)
CHLORIDE SERPL-SCNC: 109 MMOL/L (ref 99–109)
CHLORIDE SERPL-SCNC: 110 MMOL/L (ref 99–109)
CHLORIDE SERPL-SCNC: 110 MMOL/L (ref 99–109)
CHLORIDE SERPL-SCNC: 111 MMOL/L (ref 99–109)
CHLORIDE SERPL-SCNC: 112 MMOL/L (ref 99–109)
CHOLEST SERPL-MCNC: 70 MG/DL (ref 0–200)
CK SERPL-CCNC: 17 U/L (ref 26–174)
CK SERPL-CCNC: 18 U/L (ref 26–174)
CK SERPL-CCNC: 38 U/L (ref 26–174)
CLARITY UR: ABNORMAL
CLARITY UR: ABNORMAL
CO2 SERPL-SCNC: 16 MMOL/L (ref 20–31)
CO2 SERPL-SCNC: 18 MMOL/L (ref 20–31)
CO2 SERPL-SCNC: 18 MMOL/L (ref 20–31)
CO2 SERPL-SCNC: 19 MMOL/L (ref 20–31)
CO2 SERPL-SCNC: 20 MMOL/L (ref 20–31)
CO2 SERPL-SCNC: 20 MMOL/L (ref 20–31)
CO2 SERPL-SCNC: 22 MMOL/L (ref 20–31)
CO2 SERPL-SCNC: 23 MMOL/L (ref 20–31)
CO2 SERPL-SCNC: 23 MMOL/L (ref 20–31)
CO2 SERPL-SCNC: 24 MMOL/L (ref 20–31)
CO2 SERPL-SCNC: 24 MMOL/L (ref 20–31)
CO2 SERPL-SCNC: 25 MMOL/L (ref 20–31)
CO2 SERPL-SCNC: 26 MMOL/L (ref 20–31)
CO2 SERPL-SCNC: 27 MMOL/L (ref 20–31)
COLOR UR: YELLOW
COLOR UR: YELLOW
CREAT BLD-MCNC: 1.97 MG/DL (ref 0.6–1.3)
CREAT BLD-MCNC: 2.32 MG/DL (ref 0.6–1.3)
CREAT BLD-MCNC: 2.37 MG/DL (ref 0.6–1.3)
CREAT BLD-MCNC: 2.38 MG/DL (ref 0.6–1.3)
CREAT BLD-MCNC: 2.66 MG/DL (ref 0.6–1.3)
CREAT BLD-MCNC: 2.68 MG/DL (ref 0.6–1.3)
CREAT BLD-MCNC: 3.11 MG/DL (ref 0.6–1.3)
CREAT BLD-MCNC: 3.23 MG/DL (ref 0.6–1.3)
CREAT BLD-MCNC: 3.29 MG/DL (ref 0.6–1.3)
CREAT BLD-MCNC: 3.41 MG/DL (ref 0.6–1.3)
CREAT BLD-MCNC: 3.52 MG/DL (ref 0.6–1.3)
CREAT BLD-MCNC: 3.57 MG/DL (ref 0.6–1.3)
CREAT BLD-MCNC: 3.64 MG/DL (ref 0.6–1.3)
CREAT BLD-MCNC: 3.69 MG/DL (ref 0.6–1.3)
CREAT BLD-MCNC: 3.72 MG/DL (ref 0.6–1.3)
CREAT BLD-MCNC: 3.79 MG/DL (ref 0.6–1.3)
CREAT BLD-MCNC: 3.79 MG/DL (ref 0.6–1.3)
CREAT BLD-MCNC: 3.83 MG/DL (ref 0.6–1.3)
CREAT BLD-MCNC: 3.88 MG/DL (ref 0.6–1.3)
CREAT BLD-MCNC: 3.91 MG/DL (ref 0.6–1.3)
CREAT BLD-MCNC: 4.18 MG/DL (ref 0.6–1.3)
CREAT BLD-MCNC: 4.45 MG/DL (ref 0.6–1.3)
CROSSMATCH INTERPRETATION: NORMAL
CROSSMATCH INTERPRETATION: NORMAL
CYTOLOGIST CVX/VAG CYTO: NORMAL
D-LACTATE SERPL-SCNC: 0.7 MMOL/L (ref 0.5–2)
D-LACTATE SERPL-SCNC: 0.8 MMOL/L (ref 0.5–2)
D-LACTATE SERPL-SCNC: 1.1 MMOL/L (ref 0.5–2)
D-LACTATE SERPL-SCNC: 1.3 MMOL/L (ref 0.5–2)
D-LACTATE SERPL-SCNC: 1.4 MMOL/L (ref 0.5–2)
D-LACTATE SERPL-SCNC: 1.5 MMOL/L (ref 0.5–2)
D-LACTATE SERPL-SCNC: 1.8 MMOL/L (ref 0.5–2)
DACRYOCYTES BLD QL SMEAR: ABNORMAL
DACRYOCYTES BLD QL SMEAR: ABNORMAL
DEPRECATED RDW RBC AUTO: 53 FL (ref 37–54)
DEPRECATED RDW RBC AUTO: 53.8 FL (ref 37–54)
DEPRECATED RDW RBC AUTO: 54.7 FL (ref 37–54)
DEPRECATED RDW RBC AUTO: 60.3 FL (ref 37–54)
DEPRECATED RDW RBC AUTO: 64.3 FL (ref 37–54)
DEPRECATED RDW RBC AUTO: 67.4 FL (ref 37–54)
DEPRECATED RDW RBC AUTO: 67.6 FL (ref 37–54)
DEPRECATED RDW RBC AUTO: 71.2 FL (ref 37–54)
DEPRECATED RDW RBC AUTO: 71.6 FL (ref 37–54)
DEPRECATED RDW RBC AUTO: 72.9 FL (ref 37–54)
DEPRECATED RDW RBC AUTO: 73.2 FL (ref 37–54)
DEPRECATED RDW RBC AUTO: 73.6 FL (ref 37–54)
DEPRECATED RDW RBC AUTO: 74.9 FL (ref 37–54)
DEPRECATED RDW RBC AUTO: 75.5 FL (ref 37–54)
DEPRECATED RDW RBC AUTO: 77.5 FL (ref 37–54)
DEPRECATED RDW RBC AUTO: 79.5 FL (ref 37–54)
DEPRECATED RDW RBC AUTO: 80.3 FL (ref 37–54)
DEPRECATED RDW RBC AUTO: 85.9 FL (ref 37–54)
DEPRECATED RDW RBC AUTO: 89 FL (ref 37–54)
DEPRECATED RDW RBC AUTO: 90.6 FL (ref 37–54)
DEPRECATED RDW RBC AUTO: 91.5 FL (ref 37–54)
DEPRECATED RDW RBC AUTO: 98.3 FL (ref 37–54)
DEPRECATED RDW RBC AUTO: 98.9 FL (ref 37–54)
ELLIPTOCYTES BLD QL SMEAR: ABNORMAL
EOSINOPHIL # BLD AUTO: 0.66 10*3/MM3 (ref 0–0.3)
EOSINOPHIL # BLD MANUAL: 0 10*3/MM3 (ref 0.1–0.3)
EOSINOPHIL # BLD MANUAL: 0.38 10*3/MM3 (ref 0.1–0.3)
EOSINOPHIL # BLD MANUAL: 0.4 10*3/MM3 (ref 0.1–0.3)
EOSINOPHIL # BLD MANUAL: 0.41 10*3/MM3 (ref 0.1–0.3)
EOSINOPHIL # BLD MANUAL: 0.43 10*3/MM3 (ref 0.1–0.3)
EOSINOPHIL # BLD MANUAL: 0.57 10*3/MM3 (ref 0.1–0.3)
EOSINOPHIL # BLD MANUAL: 0.66 10*3/MM3 (ref 0.1–0.3)
EOSINOPHIL # BLD MANUAL: 0.66 10*3/MM3 (ref 0.1–0.3)
EOSINOPHIL # BLD MANUAL: 0.72 10*3/MM3 (ref 0.1–0.3)
EOSINOPHIL # BLD MANUAL: 0.89 10*3/MM3 (ref 0.1–0.3)
EOSINOPHIL # BLD MANUAL: 1.02 10*3/MM3 (ref 0.1–0.3)
EOSINOPHIL # BLD MANUAL: 1.09 10*3/MM3 (ref 0.1–0.3)
EOSINOPHIL NFR BLD AUTO: 5.7 % (ref 0–3)
EOSINOPHIL NFR BLD MANUAL: 0 % (ref 0–3)
EOSINOPHIL NFR BLD MANUAL: 2 % (ref 0–3)
EOSINOPHIL NFR BLD MANUAL: 3 % (ref 0–3)
EOSINOPHIL NFR BLD MANUAL: 3 % (ref 0–3)
EOSINOPHIL NFR BLD MANUAL: 4 % (ref 0–3)
EOSINOPHIL NFR BLD MANUAL: 5 % (ref 0–3)
EOSINOPHIL NFR BLD MANUAL: 5 % (ref 0–3)
EOSINOPHIL NFR BLD MANUAL: 7 % (ref 0–3)
ERYTHROCYTE [DISTWIDTH] IN BLOOD BY AUTOMATED COUNT: 15 % (ref 11.3–14.5)
ERYTHROCYTE [DISTWIDTH] IN BLOOD BY AUTOMATED COUNT: 15.6 % (ref 11.3–14.5)
ERYTHROCYTE [DISTWIDTH] IN BLOOD BY AUTOMATED COUNT: 16.5 % (ref 11.3–14.5)
ERYTHROCYTE [DISTWIDTH] IN BLOOD BY AUTOMATED COUNT: 17.7 % (ref 11.3–14.5)
ERYTHROCYTE [DISTWIDTH] IN BLOOD BY AUTOMATED COUNT: 20.7 % (ref 11.3–14.5)
ERYTHROCYTE [DISTWIDTH] IN BLOOD BY AUTOMATED COUNT: 21.1 % (ref 11.3–14.5)
ERYTHROCYTE [DISTWIDTH] IN BLOOD BY AUTOMATED COUNT: 21.1 % (ref 11.3–14.5)
ERYTHROCYTE [DISTWIDTH] IN BLOOD BY AUTOMATED COUNT: 21.7 % (ref 11.3–14.5)
ERYTHROCYTE [DISTWIDTH] IN BLOOD BY AUTOMATED COUNT: 21.9 % (ref 11.3–14.5)
ERYTHROCYTE [DISTWIDTH] IN BLOOD BY AUTOMATED COUNT: 22.3 % (ref 11.3–14.5)
ERYTHROCYTE [DISTWIDTH] IN BLOOD BY AUTOMATED COUNT: 22.5 % (ref 11.3–14.5)
ERYTHROCYTE [DISTWIDTH] IN BLOOD BY AUTOMATED COUNT: 22.8 % (ref 11.3–14.5)
ERYTHROCYTE [DISTWIDTH] IN BLOOD BY AUTOMATED COUNT: 22.9 % (ref 11.3–14.5)
ERYTHROCYTE [DISTWIDTH] IN BLOOD BY AUTOMATED COUNT: 23.2 % (ref 11.3–14.5)
ERYTHROCYTE [DISTWIDTH] IN BLOOD BY AUTOMATED COUNT: 23.3 % (ref 11.3–14.5)
ERYTHROCYTE [DISTWIDTH] IN BLOOD BY AUTOMATED COUNT: 23.6 % (ref 11.3–14.5)
ERYTHROCYTE [DISTWIDTH] IN BLOOD BY AUTOMATED COUNT: 24.3 % (ref 11.3–14.5)
ERYTHROCYTE [DISTWIDTH] IN BLOOD BY AUTOMATED COUNT: 24.8 % (ref 11.3–14.5)
ERYTHROCYTE [DISTWIDTH] IN BLOOD BY AUTOMATED COUNT: 25.2 % (ref 11.3–14.5)
ERYTHROCYTE [DISTWIDTH] IN BLOOD BY AUTOMATED COUNT: 25.5 % (ref 11.3–14.5)
ERYTHROCYTE [DISTWIDTH] IN BLOOD BY AUTOMATED COUNT: 26 % (ref 11.3–14.5)
ERYTHROCYTE [DISTWIDTH] IN BLOOD BY AUTOMATED COUNT: 26.4 % (ref 11.3–14.5)
ERYTHROCYTE [DISTWIDTH] IN BLOOD BY AUTOMATED COUNT: 26.8 % (ref 11.3–14.5)
FERRITIN SERPL-MCNC: 270 NG/ML (ref 10–291)
FLUAV H1 2009 PAND RNA NPH QL NAA+PROBE: NOT DETECTED
FLUAV H1 HA GENE NPH QL NAA+PROBE: NOT DETECTED
FLUAV H3 RNA NPH QL NAA+PROBE: NOT DETECTED
FLUAV SUBTYP SPEC NAA+PROBE: NOT DETECTED
FLUBV RNA ISLT QL NAA+PROBE: NOT DETECTED
GFR SERPL CREATININE-BSD FRML MDRD: 10 ML/MIN/1.73
GFR SERPL CREATININE-BSD FRML MDRD: 11 ML/MIN/1.73
GFR SERPL CREATININE-BSD FRML MDRD: 12 ML/MIN/1.73
GFR SERPL CREATININE-BSD FRML MDRD: 13 ML/MIN/1.73
GFR SERPL CREATININE-BSD FRML MDRD: 13 ML/MIN/1.73
GFR SERPL CREATININE-BSD FRML MDRD: 14 ML/MIN/1.73
GFR SERPL CREATININE-BSD FRML MDRD: 14 ML/MIN/1.73
GFR SERPL CREATININE-BSD FRML MDRD: 17 ML/MIN/1.73
GFR SERPL CREATININE-BSD FRML MDRD: 17 ML/MIN/1.73
GFR SERPL CREATININE-BSD FRML MDRD: 19 ML/MIN/1.73
GFR SERPL CREATININE-BSD FRML MDRD: 20 ML/MIN/1.73
GFR SERPL CREATININE-BSD FRML MDRD: 20 ML/MIN/1.73
GFR SERPL CREATININE-BSD FRML MDRD: 24 ML/MIN/1.73
GFR SERPL CREATININE-BSD FRML MDRD: 9 ML/MIN/1.73
GLOBULIN UR ELPH-MCNC: 1.6 GM/DL
GLOBULIN UR ELPH-MCNC: 1.8 GM/DL
GLOBULIN UR ELPH-MCNC: 1.9 GM/DL
GLOBULIN UR ELPH-MCNC: 1.9 GM/DL
GLOBULIN UR ELPH-MCNC: 2 GM/DL
GLOBULIN UR ELPH-MCNC: 2.2 GM/DL
GLOBULIN UR ELPH-MCNC: 2.3 GM/DL
GLOBULIN UR ELPH-MCNC: 2.5 GM/DL
GLOBULIN UR ELPH-MCNC: 2.5 GM/DL
GLUCOSE BLD-MCNC: 100 MG/DL (ref 70–100)
GLUCOSE BLD-MCNC: 104 MG/DL (ref 70–100)
GLUCOSE BLD-MCNC: 107 MG/DL (ref 70–100)
GLUCOSE BLD-MCNC: 111 MG/DL (ref 70–100)
GLUCOSE BLD-MCNC: 121 MG/DL (ref 70–100)
GLUCOSE BLD-MCNC: 127 MG/DL (ref 70–100)
GLUCOSE BLD-MCNC: 56 MG/DL (ref 70–100)
GLUCOSE BLD-MCNC: 58 MG/DL (ref 70–100)
GLUCOSE BLD-MCNC: 78 MG/DL (ref 70–100)
GLUCOSE BLD-MCNC: 78 MG/DL (ref 70–100)
GLUCOSE BLD-MCNC: 82 MG/DL (ref 70–100)
GLUCOSE BLD-MCNC: 84 MG/DL (ref 70–100)
GLUCOSE BLD-MCNC: 84 MG/DL (ref 70–100)
GLUCOSE BLD-MCNC: 86 MG/DL (ref 70–100)
GLUCOSE BLD-MCNC: 87 MG/DL (ref 70–100)
GLUCOSE BLD-MCNC: 88 MG/DL (ref 70–100)
GLUCOSE BLD-MCNC: 89 MG/DL (ref 70–100)
GLUCOSE BLD-MCNC: 90 MG/DL (ref 70–100)
GLUCOSE BLD-MCNC: 99 MG/DL (ref 70–100)
GLUCOSE BLDC GLUCOMTR-MCNC: 103 MG/DL (ref 70–130)
GLUCOSE BLDC GLUCOMTR-MCNC: 202 MG/DL (ref 70–130)
GLUCOSE BLDC GLUCOMTR-MCNC: 68 MG/DL (ref 70–130)
GLUCOSE BLDC GLUCOMTR-MCNC: 81 MG/DL (ref 70–130)
GLUCOSE UR STRIP-MCNC: NEGATIVE MG/DL
GLUCOSE UR STRIP-MCNC: NEGATIVE MG/DL
GRAM STN SPEC: ABNORMAL
GRAM STN SPEC: NORMAL
HADV DNA SPEC NAA+PROBE: NOT DETECTED
HAV IGM SERPL QL IA: NORMAL
HBA1C MFR BLD: 5.6 % (ref 4.8–5.6)
HBV CORE IGM SERPL QL IA: NORMAL
HBV SURFACE AG SERPL QL IA: NORMAL
HCOV 229E RNA SPEC QL NAA+PROBE: NOT DETECTED
HCOV HKU1 RNA SPEC QL NAA+PROBE: NOT DETECTED
HCOV NL63 RNA SPEC QL NAA+PROBE: NOT DETECTED
HCOV OC43 RNA SPEC QL NAA+PROBE: DETECTED
HCT VFR BLD AUTO: 19.7 % (ref 34.5–44)
HCT VFR BLD AUTO: 21.4 % (ref 34.5–44)
HCT VFR BLD AUTO: 22 % (ref 34.5–44)
HCT VFR BLD AUTO: 23.1 % (ref 34.5–44)
HCT VFR BLD AUTO: 23.5 % (ref 34.5–44)
HCT VFR BLD AUTO: 23.5 % (ref 34.5–44)
HCT VFR BLD AUTO: 24.1 % (ref 34.5–44)
HCT VFR BLD AUTO: 24.3 % (ref 34.5–44)
HCT VFR BLD AUTO: 24.3 % (ref 34.5–44)
HCT VFR BLD AUTO: 25.5 % (ref 34.5–44)
HCT VFR BLD AUTO: 26.3 % (ref 34.5–44)
HCT VFR BLD AUTO: 26.4 % (ref 34.5–44)
HCT VFR BLD AUTO: 26.9 % (ref 34.5–44)
HCT VFR BLD AUTO: 28.2 % (ref 34.5–44)
HCT VFR BLD AUTO: 30.9 % (ref 34.5–44)
HCT VFR BLD AUTO: 31 % (ref 34.5–44)
HCT VFR BLD AUTO: 31.3 % (ref 34.5–44)
HCT VFR BLD AUTO: 31.3 % (ref 34.5–44)
HCT VFR BLD AUTO: 32 % (ref 34.5–44)
HCT VFR BLD AUTO: 32.3 % (ref 34.5–44)
HCT VFR BLD AUTO: 32.4 % (ref 34.5–44)
HCT VFR BLD AUTO: 35.1 % (ref 34.5–44)
HCV AB SER DONR QL: NORMAL
HDLC SERPL-MCNC: 18 MG/DL (ref 40–60)
HGB BLD-MCNC: 10 G/DL (ref 11.5–15.5)
HGB BLD-MCNC: 10 G/DL (ref 11.5–15.5)
HGB BLD-MCNC: 10.1 G/DL (ref 11.5–15.5)
HGB BLD-MCNC: 10.3 G/DL (ref 11.5–15.5)
HGB BLD-MCNC: 11.3 G/DL (ref 11.5–15.5)
HGB BLD-MCNC: 6.4 G/DL (ref 11.5–15.5)
HGB BLD-MCNC: 6.9 G/DL (ref 11.5–15.5)
HGB BLD-MCNC: 7.1 G/DL (ref 11.5–15.5)
HGB BLD-MCNC: 7.2 G/DL (ref 11.5–15.5)
HGB BLD-MCNC: 7.3 G/DL (ref 11.5–15.5)
HGB BLD-MCNC: 7.3 G/DL (ref 11.5–15.5)
HGB BLD-MCNC: 7.4 G/DL (ref 11.5–15.5)
HGB BLD-MCNC: 7.6 G/DL (ref 11.5–15.5)
HGB BLD-MCNC: 7.8 G/DL (ref 11.5–15.5)
HGB BLD-MCNC: 7.8 G/DL (ref 11.5–15.5)
HGB BLD-MCNC: 7.9 G/DL (ref 11.5–15.5)
HGB BLD-MCNC: 8 G/DL (ref 11.5–15.5)
HGB BLD-MCNC: 8 G/DL (ref 11.5–15.5)
HGB BLD-MCNC: 8.2 G/DL (ref 11.5–15.5)
HGB BLD-MCNC: 8.2 G/DL (ref 11.5–15.5)
HGB BLD-MCNC: 8.9 G/DL (ref 11.5–15.5)
HGB BLD-MCNC: 9.4 G/DL (ref 11.5–15.5)
HGB BLD-MCNC: 9.9 G/DL (ref 11.5–15.5)
HGB BLD-MCNC: 9.9 G/DL (ref 11.5–15.5)
HGB UR QL STRIP.AUTO: ABNORMAL
HGB UR QL STRIP.AUTO: NEGATIVE
HMPV RNA NPH QL NAA+NON-PROBE: NOT DETECTED
HPIV1 RNA SPEC QL NAA+PROBE: NOT DETECTED
HPIV2 RNA SPEC QL NAA+PROBE: NOT DETECTED
HPIV3 RNA NPH QL NAA+PROBE: NOT DETECTED
HPIV4 P GENE NPH QL NAA+PROBE: NOT DETECTED
HYALINE CASTS UR QL AUTO: ABNORMAL /LPF
HYALINE CASTS UR QL AUTO: NORMAL /LPF
IMM GRANULOCYTES # BLD AUTO: 0.04 10*3/MM3 (ref 0–0.03)
IMM GRANULOCYTES NFR BLD AUTO: 0.3 % (ref 0–0.6)
IRON 24H UR-MRATE: 10 MCG/DL (ref 50–175)
IRON 24H UR-MRATE: 7 MCG/DL (ref 50–175)
IRON SATN MFR SERPL: 11 % (ref 15–50)
IRON SATN MFR SERPL: 4 % (ref 15–50)
KETONES UR QL STRIP: ABNORMAL
KETONES UR QL STRIP: ABNORMAL
LARGE PLATELETS: ABNORMAL
LEFT ATRIUM VOLUME INDEX: 56.5 ML/M^2
LEFT ATRIUM VOLUME: 98 ML
LEUKOCYTE ESTERASE UR QL STRIP.AUTO: ABNORMAL
LEUKOCYTE ESTERASE UR QL STRIP.AUTO: NEGATIVE
LYMPHOCYTES # BLD AUTO: 3.39 10*3/MM3 (ref 0.6–4.8)
LYMPHOCYTES # BLD MANUAL: 0.54 10*3/MM3 (ref 0.6–4.8)
LYMPHOCYTES # BLD MANUAL: 1.04 10*3/MM3 (ref 0.6–4.8)
LYMPHOCYTES # BLD MANUAL: 2.05 10*3/MM3 (ref 0.6–4.8)
LYMPHOCYTES # BLD MANUAL: 2.15 10*3/MM3 (ref 0.6–4.8)
LYMPHOCYTES # BLD MANUAL: 2.17 10*3/MM3 (ref 0.6–4.8)
LYMPHOCYTES # BLD MANUAL: 2.19 10*3/MM3 (ref 0.6–4.8)
LYMPHOCYTES # BLD MANUAL: 2.3 10*3/MM3 (ref 0.6–4.8)
LYMPHOCYTES # BLD MANUAL: 2.39 10*3/MM3 (ref 0.6–4.8)
LYMPHOCYTES # BLD MANUAL: 2.47 10*3/MM3 (ref 0.6–4.8)
LYMPHOCYTES # BLD MANUAL: 2.48 10*3/MM3 (ref 0.6–4.8)
LYMPHOCYTES # BLD MANUAL: 2.48 10*3/MM3 (ref 0.6–4.8)
LYMPHOCYTES # BLD MANUAL: 2.5 10*3/MM3 (ref 0.6–4.8)
LYMPHOCYTES # BLD MANUAL: 2.65 10*3/MM3 (ref 0.6–4.8)
LYMPHOCYTES # BLD MANUAL: 2.79 10*3/MM3 (ref 0.6–4.8)
LYMPHOCYTES # BLD MANUAL: 2.82 10*3/MM3 (ref 0.6–4.8)
LYMPHOCYTES # BLD MANUAL: 4.13 10*3/MM3 (ref 0.6–4.8)
LYMPHOCYTES NFR BLD AUTO: 29.2 % (ref 24–44)
LYMPHOCYTES NFR BLD MANUAL: 10 % (ref 0–12)
LYMPHOCYTES NFR BLD MANUAL: 10 % (ref 24–44)
LYMPHOCYTES NFR BLD MANUAL: 11 % (ref 0–12)
LYMPHOCYTES NFR BLD MANUAL: 12 % (ref 24–44)
LYMPHOCYTES NFR BLD MANUAL: 13 % (ref 0–12)
LYMPHOCYTES NFR BLD MANUAL: 13 % (ref 24–44)
LYMPHOCYTES NFR BLD MANUAL: 14 % (ref 24–44)
LYMPHOCYTES NFR BLD MANUAL: 17 % (ref 24–44)
LYMPHOCYTES NFR BLD MANUAL: 18 % (ref 24–44)
LYMPHOCYTES NFR BLD MANUAL: 2 % (ref 24–44)
LYMPHOCYTES NFR BLD MANUAL: 26 % (ref 24–44)
LYMPHOCYTES NFR BLD MANUAL: 3 % (ref 0–12)
LYMPHOCYTES NFR BLD MANUAL: 4 % (ref 0–12)
LYMPHOCYTES NFR BLD MANUAL: 4 % (ref 24–44)
LYMPHOCYTES NFR BLD MANUAL: 5 % (ref 0–12)
LYMPHOCYTES NFR BLD MANUAL: 6 % (ref 0–12)
LYMPHOCYTES NFR BLD MANUAL: 7 % (ref 0–12)
LYMPHOCYTES NFR BLD MANUAL: 7 % (ref 24–44)
LYMPHOCYTES NFR BLD MANUAL: 9 % (ref 0–12)
LYMPHOCYTES NFR BLD MANUAL: 9 % (ref 0–12)
LYMPHOCYTES NFR BLD MANUAL: 9 % (ref 24–44)
M PNEUMO IGG SER IA-ACNC: NOT DETECTED
MACROCYTES BLD QL SMEAR: ABNORMAL
MAGNESIUM SERPL-MCNC: 1.9 MG/DL (ref 1.3–2.7)
MAGNESIUM SERPL-MCNC: 2.1 MG/DL (ref 1.3–2.7)
MAGNESIUM SERPL-MCNC: 2.6 MG/DL (ref 1.3–2.7)
MAXIMAL PREDICTED HEART RATE: 136 BPM
MAXIMAL PREDICTED HEART RATE: 136 BPM
MCH RBC QN AUTO: 28.2 PG (ref 27–31)
MCH RBC QN AUTO: 28.6 PG (ref 27–31)
MCH RBC QN AUTO: 28.7 PG (ref 27–31)
MCH RBC QN AUTO: 28.8 PG (ref 27–31)
MCH RBC QN AUTO: 28.9 PG (ref 27–31)
MCH RBC QN AUTO: 29 PG (ref 27–31)
MCH RBC QN AUTO: 29.2 PG (ref 27–31)
MCH RBC QN AUTO: 29.4 PG (ref 27–31)
MCH RBC QN AUTO: 29.6 PG (ref 27–31)
MCH RBC QN AUTO: 29.7 PG (ref 27–31)
MCH RBC QN AUTO: 29.8 PG (ref 27–31)
MCH RBC QN AUTO: 30.6 PG (ref 27–31)
MCH RBC QN AUTO: 30.8 PG (ref 27–31)
MCH RBC QN AUTO: 30.9 PG (ref 27–31)
MCH RBC QN AUTO: 31 PG (ref 27–31)
MCH RBC QN AUTO: 31 PG (ref 27–31)
MCH RBC QN AUTO: 31.4 PG (ref 27–31)
MCH RBC QN AUTO: 31.5 PG (ref 27–31)
MCH RBC QN AUTO: 32 PG (ref 27–31)
MCHC RBC AUTO-ENTMCNC: 29 G/DL (ref 32–36)
MCHC RBC AUTO-ENTMCNC: 29.9 G/DL (ref 32–36)
MCHC RBC AUTO-ENTMCNC: 30 G/DL (ref 32–36)
MCHC RBC AUTO-ENTMCNC: 30.4 G/DL (ref 32–36)
MCHC RBC AUTO-ENTMCNC: 30.5 G/DL (ref 32–36)
MCHC RBC AUTO-ENTMCNC: 30.6 G/DL (ref 32–36)
MCHC RBC AUTO-ENTMCNC: 30.6 G/DL (ref 32–36)
MCHC RBC AUTO-ENTMCNC: 30.7 G/DL (ref 32–36)
MCHC RBC AUTO-ENTMCNC: 31.3 G/DL (ref 32–36)
MCHC RBC AUTO-ENTMCNC: 31.4 G/DL (ref 32–36)
MCHC RBC AUTO-ENTMCNC: 31.4 G/DL (ref 32–36)
MCHC RBC AUTO-ENTMCNC: 31.5 G/DL (ref 32–36)
MCHC RBC AUTO-ENTMCNC: 31.6 G/DL (ref 32–36)
MCHC RBC AUTO-ENTMCNC: 31.9 G/DL (ref 32–36)
MCHC RBC AUTO-ENTMCNC: 32.2 G/DL (ref 32–36)
MCHC RBC AUTO-ENTMCNC: 32.2 G/DL (ref 32–36)
MCHC RBC AUTO-ENTMCNC: 32.3 G/DL (ref 32–36)
MCHC RBC AUTO-ENTMCNC: 32.4 G/DL (ref 32–36)
MCHC RBC AUTO-ENTMCNC: 32.5 G/DL (ref 32–36)
MCHC RBC AUTO-ENTMCNC: 32.7 G/DL (ref 32–36)
MCHC RBC AUTO-ENTMCNC: 33.2 G/DL (ref 32–36)
MCV RBC AUTO: 100.5 FL (ref 80–99)
MCV RBC AUTO: 100.9 FL (ref 80–99)
MCV RBC AUTO: 102.3 FL (ref 80–99)
MCV RBC AUTO: 88.3 FL (ref 80–99)
MCV RBC AUTO: 88.3 FL (ref 80–99)
MCV RBC AUTO: 89.6 FL (ref 80–99)
MCV RBC AUTO: 89.9 FL (ref 80–99)
MCV RBC AUTO: 92.3 FL (ref 80–99)
MCV RBC AUTO: 92.4 FL (ref 80–99)
MCV RBC AUTO: 92.4 FL (ref 80–99)
MCV RBC AUTO: 93.5 FL (ref 80–99)
MCV RBC AUTO: 93.6 FL (ref 80–99)
MCV RBC AUTO: 94 FL (ref 80–99)
MCV RBC AUTO: 94.1 FL (ref 80–99)
MCV RBC AUTO: 96.3 FL (ref 80–99)
MCV RBC AUTO: 96.4 FL (ref 80–99)
MCV RBC AUTO: 96.6 FL (ref 80–99)
MCV RBC AUTO: 97.3 FL (ref 80–99)
MCV RBC AUTO: 97.8 FL (ref 80–99)
MCV RBC AUTO: 98.3 FL (ref 80–99)
MCV RBC AUTO: 99.2 FL (ref 80–99)
METAMYELOCYTES NFR BLD MANUAL: 1 % (ref 0–0)
METAMYELOCYTES NFR BLD MANUAL: 2 % (ref 0–0)
METAMYELOCYTES NFR BLD MANUAL: 3 % (ref 0–0)
METAMYELOCYTES NFR BLD MANUAL: 7 % (ref 0–0)
MONOCYTES # BLD AUTO: 0.62 10*3/MM3 (ref 0–1)
MONOCYTES # BLD AUTO: 0.64 10*3/MM3 (ref 0–1)
MONOCYTES # BLD AUTO: 0.8 10*3/MM3 (ref 0–1)
MONOCYTES # BLD AUTO: 1.09 10*3/MM3 (ref 0–1)
MONOCYTES # BLD AUTO: 1.1 10*3/MM3 (ref 0–1)
MONOCYTES # BLD AUTO: 1.11 10*3/MM3 (ref 0–1)
MONOCYTES # BLD AUTO: 1.23 10*3/MM3 (ref 0–1)
MONOCYTES # BLD AUTO: 1.28 10*3/MM3 (ref 0–1)
MONOCYTES # BLD AUTO: 1.29 10*3/MM3 (ref 0–1)
MONOCYTES # BLD AUTO: 1.31 10*3/MM3 (ref 0–1)
MONOCYTES # BLD AUTO: 1.56 10*3/MM3 (ref 0–1)
MONOCYTES # BLD AUTO: 1.79 10*3/MM3 (ref 0–1)
MONOCYTES # BLD AUTO: 1.8 10*3/MM3 (ref 0–1)
MONOCYTES # BLD AUTO: 1.84 10*3/MM3 (ref 0–1)
MONOCYTES # BLD AUTO: 1.9 10*3/MM3 (ref 0–1)
MONOCYTES # BLD AUTO: 2.09 10*3/MM3 (ref 0–1)
MONOCYTES # BLD AUTO: 2.82 10*3/MM3 (ref 0–1)
MONOCYTES NFR BLD AUTO: 9.5 % (ref 0–12)
MUCOUS THREADS URNS QL MICRO: ABNORMAL /HPF
MYELOCYTES NFR BLD MANUAL: 1 % (ref 0–0)
MYELOCYTES NFR BLD MANUAL: 3 % (ref 0–0)
NEUTROPHILS # BLD AUTO: 10.33 10*3/MM3 (ref 1.5–8.3)
NEUTROPHILS # BLD AUTO: 13.5 10*3/MM3 (ref 1.5–8.3)
NEUTROPHILS # BLD AUTO: 13.52 10*3/MM3 (ref 1.5–8.3)
NEUTROPHILS # BLD AUTO: 14.32 10*3/MM3 (ref 1.5–8.3)
NEUTROPHILS # BLD AUTO: 14.57 10*3/MM3 (ref 1.5–8.3)
NEUTROPHILS # BLD AUTO: 15.32 10*3/MM3 (ref 1.5–8.3)
NEUTROPHILS # BLD AUTO: 16.93 10*3/MM3 (ref 1.5–8.3)
NEUTROPHILS # BLD AUTO: 17.19 10*3/MM3 (ref 1.5–8.3)
NEUTROPHILS # BLD AUTO: 17.45 10*3/MM3 (ref 1.5–8.3)
NEUTROPHILS # BLD AUTO: 17.72 10*3/MM3 (ref 1.5–8.3)
NEUTROPHILS # BLD AUTO: 20.92 10*3/MM3 (ref 1.5–8.3)
NEUTROPHILS # BLD AUTO: 23.36 10*3/MM3 (ref 1.5–8.3)
NEUTROPHILS # BLD AUTO: 25.58 10*3/MM3 (ref 1.5–8.3)
NEUTROPHILS # BLD AUTO: 30.03 10*3/MM3 (ref 1.5–8.3)
NEUTROPHILS # BLD AUTO: 6.44 10*3/MM3 (ref 1.5–8.3)
NEUTROPHILS # BLD AUTO: 8.05 10*3/MM3 (ref 1.5–8.3)
NEUTROPHILS # BLD AUTO: 8.63 10*3/MM3 (ref 1.5–8.3)
NEUTROPHILS NFR BLD AUTO: 55.3 % (ref 41–71)
NEUTROPHILS NFR BLD MANUAL: 55 % (ref 41–71)
NEUTROPHILS NFR BLD MANUAL: 56 % (ref 41–71)
NEUTROPHILS NFR BLD MANUAL: 56 % (ref 41–71)
NEUTROPHILS NFR BLD MANUAL: 61 % (ref 41–71)
NEUTROPHILS NFR BLD MANUAL: 64 % (ref 41–71)
NEUTROPHILS NFR BLD MANUAL: 65 % (ref 41–71)
NEUTROPHILS NFR BLD MANUAL: 67 % (ref 41–71)
NEUTROPHILS NFR BLD MANUAL: 68 % (ref 41–71)
NEUTROPHILS NFR BLD MANUAL: 78 % (ref 41–71)
NEUTROPHILS NFR BLD MANUAL: 80 % (ref 41–71)
NEUTROPHILS NFR BLD MANUAL: 81 % (ref 41–71)
NEUTROPHILS NFR BLD MANUAL: 82 % (ref 41–71)
NEUTROPHILS NFR BLD MANUAL: 84 % (ref 41–71)
NEUTROPHILS NFR BLD MANUAL: 92 % (ref 41–71)
NEUTS BAND NFR BLD MANUAL: 1 % (ref 0–5)
NEUTS BAND NFR BLD MANUAL: 10 % (ref 0–5)
NEUTS BAND NFR BLD MANUAL: 2 % (ref 0–5)
NEUTS BAND NFR BLD MANUAL: 3 % (ref 0–5)
NEUTS BAND NFR BLD MANUAL: 3 % (ref 0–5)
NEUTS BAND NFR BLD MANUAL: 4 % (ref 0–5)
NEUTS BAND NFR BLD MANUAL: 5 % (ref 0–5)
NEUTS BAND NFR BLD MANUAL: 6 % (ref 0–5)
NEUTS BAND NFR BLD MANUAL: 7 % (ref 0–5)
NEUTS BAND NFR BLD MANUAL: 9 % (ref 0–5)
NITRITE UR QL STRIP: NEGATIVE
NITRITE UR QL STRIP: NEGATIVE
NRBC SPEC MANUAL: 13 /100 WBC (ref 0–0)
NRBC SPEC MANUAL: 17 /100 WBC (ref 0–0)
NRBC SPEC MANUAL: 18 /100 WBC (ref 0–0)
NRBC SPEC MANUAL: 2 /100 WBC (ref 0–0)
NRBC SPEC MANUAL: 2 /100 WBC (ref 0–0)
NRBC SPEC MANUAL: 3 /100 WBC (ref 0–0)
NRBC SPEC MANUAL: 33 /100 WBC (ref 0–0)
NRBC SPEC MANUAL: 39 /100 WBC (ref 0–0)
NRBC SPEC MANUAL: 4 /100 WBC (ref 0–0)
NRBC SPEC MANUAL: 5 /100 WBC (ref 0–0)
NRBC SPEC MANUAL: 56 /100 WBC (ref 0–0)
NRBC SPEC MANUAL: 6 /100 WBC (ref 0–0)
NRBC SPEC MANUAL: 7 /100 WBC (ref 0–0)
NRBC SPEC MANUAL: 9 /100 WBC (ref 0–0)
PATH INTERP BLD-IMP: NORMAL
PH UR STRIP.AUTO: 5.5 [PH] (ref 5–8)
PH UR STRIP.AUTO: 8 [PH] (ref 5–8)
PHOSPHATE SERPL-MCNC: 1.7 MG/DL (ref 2.4–5.1)
PHOSPHATE SERPL-MCNC: 2 MG/DL (ref 2.4–5.1)
PHOSPHATE SERPL-MCNC: 2.4 MG/DL (ref 2.4–5.1)
PHOSPHATE SERPL-MCNC: 2.6 MG/DL (ref 2.4–5.1)
PHOSPHATE SERPL-MCNC: 3.4 MG/DL (ref 2.4–5.1)
PHOSPHATE SERPL-MCNC: 3.5 MG/DL (ref 2.4–5.1)
PHOSPHATE SERPL-MCNC: 3.6 MG/DL (ref 2.4–5.1)
PHOSPHATE SERPL-MCNC: 4.2 MG/DL (ref 2.4–5.1)
PHOSPHATE SERPL-MCNC: 4.4 MG/DL (ref 2.4–5.1)
PHOSPHATE SERPL-MCNC: 4.6 MG/DL (ref 2.4–5.1)
PHOSPHATE SERPL-MCNC: 5 MG/DL (ref 2.4–5.1)
PHOSPHATE SERPL-MCNC: 5.1 MG/DL (ref 2.4–5.1)
PHOSPHATE SERPL-MCNC: 5.1 MG/DL (ref 2.4–5.1)
PLAT MORPH BLD: NORMAL
PLATELET # BLD AUTO: 107 10*3/MM3 (ref 150–450)
PLATELET # BLD AUTO: 124 10*3/MM3 (ref 150–450)
PLATELET # BLD AUTO: 132 10*3/MM3 (ref 150–450)
PLATELET # BLD AUTO: 140 10*3/MM3 (ref 150–450)
PLATELET # BLD AUTO: 158 10*3/MM3 (ref 150–450)
PLATELET # BLD AUTO: 162 10*3/MM3 (ref 150–450)
PLATELET # BLD AUTO: 175 10*3/MM3 (ref 150–450)
PLATELET # BLD AUTO: 185 10*3/MM3 (ref 150–450)
PLATELET # BLD AUTO: 204 10*3/MM3 (ref 150–450)
PLATELET # BLD AUTO: 213 10*3/MM3 (ref 150–450)
PLATELET # BLD AUTO: 220 10*3/MM3 (ref 150–450)
PLATELET # BLD AUTO: 240 10*3/MM3 (ref 150–450)
PLATELET # BLD AUTO: 263 10*3/MM3 (ref 150–450)
PLATELET # BLD AUTO: 271 10*3/MM3 (ref 150–450)
PLATELET # BLD AUTO: 290 10*3/MM3 (ref 150–450)
PLATELET # BLD AUTO: 299 10*3/MM3 (ref 150–450)
PLATELET # BLD AUTO: 313 10*3/MM3 (ref 150–450)
PLATELET # BLD AUTO: 337 10*3/MM3 (ref 150–450)
PLATELET # BLD AUTO: 346 10*3/MM3 (ref 150–450)
PLATELET # BLD AUTO: 351 10*3/MM3 (ref 150–450)
PLATELET # BLD AUTO: 399 10*3/MM3 (ref 150–450)
PLATELET # BLD AUTO: 403 10*3/MM3 (ref 150–450)
PLATELET # BLD AUTO: 429 10*3/MM3 (ref 150–450)
PMV BLD AUTO: 10 FL (ref 6–12)
PMV BLD AUTO: 10 FL (ref 6–12)
PMV BLD AUTO: 10.1 FL (ref 6–12)
PMV BLD AUTO: 10.2 FL (ref 6–12)
PMV BLD AUTO: 10.3 FL (ref 6–12)
PMV BLD AUTO: 10.4 FL (ref 6–12)
PMV BLD AUTO: 10.4 FL (ref 6–12)
PMV BLD AUTO: 10.5 FL (ref 6–12)
PMV BLD AUTO: 10.6 FL (ref 6–12)
PMV BLD AUTO: 11 FL (ref 6–12)
PMV BLD AUTO: 11.4 FL (ref 6–12)
PMV BLD AUTO: 9.3 FL (ref 6–12)
PMV BLD AUTO: 9.4 FL (ref 6–12)
PMV BLD AUTO: 9.6 FL (ref 6–12)
PMV BLD AUTO: 9.6 FL (ref 6–12)
PMV BLD AUTO: 9.7 FL (ref 6–12)
PMV BLD AUTO: 9.7 FL (ref 6–12)
PMV BLD AUTO: 9.9 FL (ref 6–12)
PMV BLD AUTO: 9.9 FL (ref 6–12)
POLYCHROMASIA BLD QL SMEAR: ABNORMAL
POTASSIUM BLD-SCNC: 3.6 MMOL/L (ref 3.5–5.5)
POTASSIUM BLD-SCNC: 3.7 MMOL/L (ref 3.5–5.5)
POTASSIUM BLD-SCNC: 3.8 MMOL/L (ref 3.5–5.5)
POTASSIUM BLD-SCNC: 3.8 MMOL/L (ref 3.5–5.5)
POTASSIUM BLD-SCNC: 3.9 MMOL/L (ref 3.5–5.5)
POTASSIUM BLD-SCNC: 4 MMOL/L (ref 3.5–5.5)
POTASSIUM BLD-SCNC: 4 MMOL/L (ref 3.5–5.5)
POTASSIUM BLD-SCNC: 4.1 MMOL/L (ref 3.5–5.5)
POTASSIUM BLD-SCNC: 4.2 MMOL/L (ref 3.5–5.5)
POTASSIUM BLD-SCNC: 4.2 MMOL/L (ref 3.5–5.5)
POTASSIUM BLD-SCNC: 4.3 MMOL/L (ref 3.5–5.5)
POTASSIUM BLD-SCNC: 4.3 MMOL/L (ref 3.5–5.5)
POTASSIUM BLD-SCNC: 4.4 MMOL/L (ref 3.5–5.5)
POTASSIUM BLD-SCNC: 4.5 MMOL/L (ref 3.5–5.5)
POTASSIUM BLD-SCNC: 4.6 MMOL/L (ref 3.5–5.5)
POTASSIUM BLD-SCNC: 5.2 MMOL/L (ref 3.5–5.5)
POTASSIUM BLD-SCNC: 5.2 MMOL/L (ref 3.5–5.5)
POTASSIUM BLDA-SCNC: 4.4 MMOL/L (ref 3.5–5.3)
PROCALCITONIN SERPL-MCNC: 0.56 NG/ML
PROCALCITONIN SERPL-MCNC: 0.69 NG/ML
PROCALCITONIN SERPL-MCNC: 0.78 NG/ML
PROCALCITONIN SERPL-MCNC: 1.08 NG/ML
PROCALCITONIN SERPL-MCNC: 1.61 NG/ML
PROCALCITONIN SERPL-MCNC: 1.62 NG/ML
PROCALCITONIN SERPL-MCNC: 1.68 NG/ML
PROMYELOCYTES NFR BLD MANUAL: 2 % (ref 0–0)
PROT SERPL-MCNC: 4.5 G/DL (ref 5.7–8.2)
PROT SERPL-MCNC: 4.6 G/DL (ref 5.7–8.2)
PROT SERPL-MCNC: 4.7 G/DL (ref 5.7–8.2)
PROT SERPL-MCNC: 4.8 G/DL (ref 5.7–8.2)
PROT SERPL-MCNC: 5.5 G/DL (ref 5.7–8.2)
PROT SERPL-MCNC: 6 G/DL (ref 5.7–8.2)
PROT SERPL-MCNC: 6.1 G/DL (ref 5.7–8.2)
PROT SERPL-MCNC: 6.3 G/DL (ref 5.7–8.2)
PROT SERPL-MCNC: 6.9 G/DL (ref 5.7–8.2)
PROT UR QL STRIP: ABNORMAL
PROT UR QL STRIP: ABNORMAL
RBC # BLD AUTO: 2.04 10*6/MM3 (ref 3.89–5.14)
RBC # BLD AUTO: 2.19 10*6/MM3 (ref 3.89–5.14)
RBC # BLD AUTO: 2.22 10*6/MM3 (ref 3.89–5.14)
RBC # BLD AUTO: 2.33 10*6/MM3 (ref 3.89–5.14)
RBC # BLD AUTO: 2.45 10*6/MM3 (ref 3.89–5.14)
RBC # BLD AUTO: 2.5 10*6/MM3 (ref 3.89–5.14)
RBC # BLD AUTO: 2.5 10*6/MM3 (ref 3.89–5.14)
RBC # BLD AUTO: 2.58 10*6/MM3 (ref 3.89–5.14)
RBC # BLD AUTO: 2.6 10*6/MM3 (ref 3.89–5.14)
RBC # BLD AUTO: 2.69 10*6/MM3 (ref 3.89–5.14)
RBC # BLD AUTO: 2.71 10*6/MM3 (ref 3.89–5.14)
RBC # BLD AUTO: 2.73 10*6/MM3 (ref 3.89–5.14)
RBC # BLD AUTO: 2.76 10*6/MM3 (ref 3.89–5.14)
RBC # BLD AUTO: 2.79 10*6/MM3 (ref 3.89–5.14)
RBC # BLD AUTO: 2.87 10*6/MM3 (ref 3.89–5.14)
RBC # BLD AUTO: 3.25 10*6/MM3 (ref 3.89–5.14)
RBC # BLD AUTO: 3.33 10*6/MM3 (ref 3.89–5.14)
RBC # BLD AUTO: 3.39 10*6/MM3 (ref 3.89–5.14)
RBC # BLD AUTO: 3.45 10*6/MM3 (ref 3.89–5.14)
RBC # BLD AUTO: 3.46 10*6/MM3 (ref 3.89–5.14)
RBC # BLD AUTO: 3.5 10*6/MM3 (ref 3.89–5.14)
RBC # BLD AUTO: 3.56 10*6/MM3 (ref 3.89–5.14)
RBC # BLD AUTO: 3.64 10*6/MM3 (ref 3.89–5.14)
RBC # UR: ABNORMAL /HPF
RBC # UR: NORMAL /HPF
RBC MORPH BLD: NORMAL
REF LAB TEST METHOD: ABNORMAL
REF LAB TEST METHOD: NORMAL
RH BLD: POSITIVE
RHINOVIRUS RNA SPEC NAA+PROBE: NOT DETECTED
RSV RNA NPH QL NAA+NON-PROBE: NOT DETECTED
SCAN SLIDE: NORMAL
SCHISTOCYTES BLD QL SMEAR: ABNORMAL
SODIUM BLD-SCNC: 138 MMOL/L (ref 132–146)
SODIUM BLD-SCNC: 139 MMOL/L (ref 132–146)
SODIUM BLD-SCNC: 140 MMOL/L (ref 132–146)
SODIUM BLD-SCNC: 141 MMOL/L (ref 132–146)
SODIUM BLD-SCNC: 141 MMOL/L (ref 132–146)
SODIUM BLD-SCNC: 142 MMOL/L (ref 132–146)
SODIUM BLD-SCNC: 144 MMOL/L (ref 132–146)
SODIUM BLD-SCNC: 146 MMOL/L (ref 132–146)
SODIUM BLD-SCNC: 146 MMOL/L (ref 132–146)
SODIUM BLD-SCNC: 147 MMOL/L (ref 132–146)
SODIUM BLD-SCNC: 147 MMOL/L (ref 132–146)
SP GR UR STRIP: 1.02 (ref 1–1.03)
SP GR UR STRIP: 1.02 (ref 1–1.03)
SQUAMOUS #/AREA URNS HPF: ABNORMAL /HPF
SQUAMOUS #/AREA URNS HPF: NORMAL /HPF
STRESS TARGET HR: 116 BPM
STRESS TARGET HR: 116 BPM
T&S EXPIRATION DATE: NORMAL
TARGETS BLD QL SMEAR: ABNORMAL
TIBC SERPL-MCNC: 168 MCG/DL (ref 250–450)
TIBC SERPL-MCNC: 87 MCG/DL (ref 250–450)
TOXIC GRANULATION: ABNORMAL
TRIGL SERPL-MCNC: 91 MG/DL (ref 0–150)
TROPONIN I SERPL-MCNC: 0.03 NG/ML
TSH SERPL DL<=0.05 MIU/L-ACNC: 1.85 MIU/ML (ref 0.35–5.35)
UNIT  ABO: NORMAL
UNIT  ABO: NORMAL
UNIT  RH: NORMAL
UNIT  RH: NORMAL
UROBILINOGEN UR QL STRIP: ABNORMAL
UROBILINOGEN UR QL STRIP: ABNORMAL
VANCOMYCIN SERPL-MCNC: 14.8 MCG/ML (ref 5–40)
VANCOMYCIN SERPL-MCNC: 18.7 MCG/ML (ref 5–40)
VANCOMYCIN SERPL-MCNC: 22.5 MCG/ML (ref 5–40)
VANCOMYCIN SERPL-MCNC: 30.2 MCG/ML (ref 5–40)
VANCOMYCIN SERPL-MCNC: 58.5 MCG/ML (ref 5–40)
VARIANT LYMPHS NFR BLD MANUAL: 1 % (ref 0–5)
VARIANT LYMPHS NFR BLD MANUAL: 1 % (ref 0–5)
VARIANT LYMPHS NFR BLD MANUAL: 2 % (ref 0–5)
WBC MORPH BLD: NORMAL
WBC NRBC COR # BLD: 11.62 10*3/MM3 (ref 3.5–10.8)
WBC NRBC COR # BLD: 12.78 10*3/MM3 (ref 3.5–10.8)
WBC NRBC COR # BLD: 13.27 10*3/MM3 (ref 3.5–10.8)
WBC NRBC COR # BLD: 13.98 10*3/MM3 (ref 3.5–10.8)
WBC NRBC COR # BLD: 15.9 10*3/MM3 (ref 3.5–10.8)
WBC NRBC COR # BLD: 17 10*3/MM3 (ref 3.5–10.8)
WBC NRBC COR # BLD: 18.13 10*3/MM3 (ref 3.5–10.8)
WBC NRBC COR # BLD: 18.67 10*3/MM3 (ref 3.5–10.8)
WBC NRBC COR # BLD: 19.02 10*3/MM3 (ref 3.5–10.8)
WBC NRBC COR # BLD: 19.04 10*3/MM3 (ref 3.5–10.8)
WBC NRBC COR # BLD: 19.96 10*3/MM3 (ref 3.5–10.8)
WBC NRBC COR # BLD: 20.42 10*3/MM3 (ref 3.5–10.8)
WBC NRBC COR # BLD: 20.46 10*3/MM3 (ref 3.5–10.8)
WBC NRBC COR # BLD: 20.65 10*3/MM3 (ref 3.5–10.8)
WBC NRBC COR # BLD: 21.54 10*3/MM3 (ref 3.5–10.8)
WBC NRBC COR # BLD: 21.7 10*3/MM3 (ref 3.5–10.8)
WBC NRBC COR # BLD: 21.88 10*3/MM3 (ref 3.5–10.8)
WBC NRBC COR # BLD: 24.59 10*3/MM3 (ref 3.5–10.8)
WBC NRBC COR # BLD: 25.51 10*3/MM3 (ref 3.5–10.8)
WBC NRBC COR # BLD: 25.95 10*3/MM3 (ref 3.5–10.8)
WBC NRBC COR # BLD: 26.64 10*3/MM3 (ref 3.5–10.8)
WBC NRBC COR # BLD: 27.21 10*3/MM3 (ref 3.5–10.8)
WBC NRBC COR # BLD: 35.75 10*3/MM3 (ref 3.5–10.8)
WBC UR QL AUTO: ABNORMAL /HPF
WBC UR QL AUTO: NORMAL /HPF
YEAST URNS QL MICRO: ABNORMAL /HPF

## 2019-01-01 PROCEDURE — 80202 ASSAY OF VANCOMYCIN: CPT

## 2019-01-01 PROCEDURE — P9047 ALBUMIN (HUMAN), 25%, 50ML: HCPCS | Performed by: INTERNAL MEDICINE

## 2019-01-01 PROCEDURE — A9270 NON-COVERED ITEM OR SERVICE: HCPCS | Performed by: NURSE PRACTITIONER

## 2019-01-01 PROCEDURE — 84132 ASSAY OF SERUM POTASSIUM: CPT | Performed by: SURGERY

## 2019-01-01 PROCEDURE — A9270 NON-COVERED ITEM OR SERVICE: HCPCS | Performed by: SURGERY

## 2019-01-01 PROCEDURE — 85025 COMPLETE CBC W/AUTO DIFF WBC: CPT | Performed by: INTERNAL MEDICINE

## 2019-01-01 PROCEDURE — 97530 THERAPEUTIC ACTIVITIES: CPT

## 2019-01-01 PROCEDURE — 25010000002 MEROPENEM

## 2019-01-01 PROCEDURE — 97610 LOW FREQUENCY NON-THERMAL US: CPT

## 2019-01-01 PROCEDURE — 82962 GLUCOSE BLOOD TEST: CPT

## 2019-01-01 PROCEDURE — 25010000002 FLUCONAZOLE PER 200 MG: Performed by: INTERNAL MEDICINE

## 2019-01-01 PROCEDURE — 99233 SBSQ HOSP IP/OBS HIGH 50: CPT | Performed by: INTERNAL MEDICINE

## 2019-01-01 PROCEDURE — 63510000001 EPOETIN ALFA PER 1000 UNITS: Performed by: NURSE PRACTITIONER

## 2019-01-01 PROCEDURE — 82550 ASSAY OF CK (CPK): CPT | Performed by: INTERNAL MEDICINE

## 2019-01-01 PROCEDURE — 25010000002 VANCOMYCIN: Performed by: NURSE PRACTITIONER

## 2019-01-01 PROCEDURE — 85027 COMPLETE CBC AUTOMATED: CPT | Performed by: NURSE PRACTITIONER

## 2019-01-01 PROCEDURE — 93325 DOPPLER ECHO COLOR FLOW MAPG: CPT

## 2019-01-01 PROCEDURE — 94799 UNLISTED PULMONARY SVC/PX: CPT

## 2019-01-01 PROCEDURE — 92526 ORAL FUNCTION THERAPY: CPT

## 2019-01-01 PROCEDURE — 85027 COMPLETE CBC AUTOMATED: CPT | Performed by: SURGERY

## 2019-01-01 PROCEDURE — 25010000002 HEPARIN (PORCINE) PER 1000 UNITS: Performed by: NURSE PRACTITIONER

## 2019-01-01 PROCEDURE — 87070 CULTURE OTHR SPECIMN AEROBIC: CPT | Performed by: INTERNAL MEDICINE

## 2019-01-01 PROCEDURE — 80069 RENAL FUNCTION PANEL: CPT | Performed by: INTERNAL MEDICINE

## 2019-01-01 PROCEDURE — 99291 CRITICAL CARE FIRST HOUR: CPT | Performed by: INTERNAL MEDICINE

## 2019-01-01 PROCEDURE — 80053 COMPREHEN METABOLIC PANEL: CPT | Performed by: NURSE PRACTITIONER

## 2019-01-01 PROCEDURE — 63510000001 EPOETIN ALFA PER 1000 UNITS: Performed by: INTERNAL MEDICINE

## 2019-01-01 PROCEDURE — 99239 HOSP IP/OBS DSCHRG MGMT >30: CPT | Performed by: INTERNAL MEDICINE

## 2019-01-01 PROCEDURE — 93005 ELECTROCARDIOGRAM TRACING: CPT | Performed by: INTERNAL MEDICINE

## 2019-01-01 PROCEDURE — 25010000002 ALTEPLASE 2 MG RECONSTITUTED SOLUTION: Performed by: INTERNAL MEDICINE

## 2019-01-01 PROCEDURE — 25010000002 HEPARIN (PORCINE) PER 1000 UNITS: Performed by: INTERNAL MEDICINE

## 2019-01-01 PROCEDURE — 97597 DBRDMT OPN WND 1ST 20 CM/<: CPT

## 2019-01-01 PROCEDURE — 73030 X-RAY EXAM OF SHOULDER: CPT

## 2019-01-01 PROCEDURE — 86920 COMPATIBILITY TEST SPIN: CPT

## 2019-01-01 PROCEDURE — 84145 PROCALCITONIN (PCT): CPT | Performed by: INTERNAL MEDICINE

## 2019-01-01 PROCEDURE — 85007 BL SMEAR W/DIFF WBC COUNT: CPT | Performed by: NURSE PRACTITIONER

## 2019-01-01 PROCEDURE — 97162 PT EVAL MOD COMPLEX 30 MIN: CPT

## 2019-01-01 PROCEDURE — 83735 ASSAY OF MAGNESIUM: CPT | Performed by: NURSE PRACTITIONER

## 2019-01-01 PROCEDURE — 84100 ASSAY OF PHOSPHORUS: CPT | Performed by: INTERNAL MEDICINE

## 2019-01-01 PROCEDURE — 83605 ASSAY OF LACTIC ACID: CPT | Performed by: INTERNAL MEDICINE

## 2019-01-01 PROCEDURE — 85007 BL SMEAR W/DIFF WBC COUNT: CPT | Performed by: INTERNAL MEDICINE

## 2019-01-01 PROCEDURE — 63710000001 FERROUS SULFATE 325 (65 FE) MG TABLET: Performed by: NURSE PRACTITIONER

## 2019-01-01 PROCEDURE — 99232 SBSQ HOSP IP/OBS MODERATE 35: CPT | Performed by: NURSE PRACTITIONER

## 2019-01-01 PROCEDURE — 80053 COMPREHEN METABOLIC PANEL: CPT | Performed by: INTERNAL MEDICINE

## 2019-01-01 PROCEDURE — 93010 ELECTROCARDIOGRAM REPORT: CPT | Performed by: INTERNAL MEDICINE

## 2019-01-01 PROCEDURE — 99223 1ST HOSP IP/OBS HIGH 75: CPT | Performed by: INTERNAL MEDICINE

## 2019-01-01 PROCEDURE — G0378 HOSPITAL OBSERVATION PER HR: HCPCS

## 2019-01-01 PROCEDURE — 97535 SELF CARE MNGMENT TRAINING: CPT

## 2019-01-01 PROCEDURE — 70551 MRI BRAIN STEM W/O DYE: CPT

## 2019-01-01 PROCEDURE — 82330 ASSAY OF CALCIUM: CPT | Performed by: INTERNAL MEDICINE

## 2019-01-01 PROCEDURE — 85014 HEMATOCRIT: CPT | Performed by: INTERNAL MEDICINE

## 2019-01-01 PROCEDURE — 99233 SBSQ HOSP IP/OBS HIGH 50: CPT | Performed by: NURSE PRACTITIONER

## 2019-01-01 PROCEDURE — 97110 THERAPEUTIC EXERCISES: CPT

## 2019-01-01 PROCEDURE — 25010000002 HEPARIN (PORCINE) PER 1000 UNITS: Performed by: SURGERY

## 2019-01-01 PROCEDURE — 71045 X-RAY EXAM CHEST 1 VIEW: CPT

## 2019-01-01 PROCEDURE — 87798 DETECT AGENT NOS DNA AMP: CPT | Performed by: INTERNAL MEDICINE

## 2019-01-01 PROCEDURE — 63710000001 TERAZOSIN 2 MG CAPSULE: Performed by: SURGERY

## 2019-01-01 PROCEDURE — 99232 SBSQ HOSP IP/OBS MODERATE 35: CPT | Performed by: INTERNAL MEDICINE

## 2019-01-01 PROCEDURE — P9016 RBC LEUKOCYTES REDUCED: HCPCS

## 2019-01-01 PROCEDURE — 87186 SC STD MICRODIL/AGAR DIL: CPT | Performed by: INTERNAL MEDICINE

## 2019-01-01 PROCEDURE — 80061 LIPID PANEL: CPT | Performed by: NURSE PRACTITIONER

## 2019-01-01 PROCEDURE — 25010000002 VANCOMYCIN 10 G RECONSTITUTED SOLUTION: Performed by: NURSE PRACTITIONER

## 2019-01-01 PROCEDURE — 84443 ASSAY THYROID STIM HORMONE: CPT | Performed by: NURSE PRACTITIONER

## 2019-01-01 PROCEDURE — 92610 EVALUATE SWALLOWING FUNCTION: CPT

## 2019-01-01 PROCEDURE — 85027 COMPLETE CBC AUTOMATED: CPT | Performed by: INTERNAL MEDICINE

## 2019-01-01 PROCEDURE — 63710000001 CALCITRIOL 0.25 MCG CAPSULE: Performed by: SURGERY

## 2019-01-01 PROCEDURE — 5A1D70Z PERFORMANCE OF URINARY FILTRATION, INTERMITTENT, LESS THAN 6 HOURS PER DAY: ICD-10-PCS | Performed by: INTERNAL MEDICINE

## 2019-01-01 PROCEDURE — 25010000002 ALBUMIN HUMAN 25% PER 50 ML: Performed by: INTERNAL MEDICINE

## 2019-01-01 PROCEDURE — 87040 BLOOD CULTURE FOR BACTERIA: CPT | Performed by: INTERNAL MEDICINE

## 2019-01-01 PROCEDURE — 63710000001 FUROSEMIDE 40 MG TABLET: Performed by: SURGERY

## 2019-01-01 PROCEDURE — 80048 BASIC METABOLIC PNL TOTAL CA: CPT | Performed by: INTERNAL MEDICINE

## 2019-01-01 PROCEDURE — 25010000002 NA FERRIC GLUC CPLX PER 12.5 MG: Performed by: INTERNAL MEDICINE

## 2019-01-01 PROCEDURE — 25010000002 PROPOFOL 1000 MG/ML EMULSION: Performed by: ANESTHESIOLOGY

## 2019-01-01 PROCEDURE — 25010000002 FLUCONAZOLE PER 200 MG

## 2019-01-01 PROCEDURE — 97164 PT RE-EVAL EST PLAN CARE: CPT

## 2019-01-01 PROCEDURE — 25010000002 VANCOMYCIN

## 2019-01-01 PROCEDURE — 25010000002 VANCOMYCIN PER 500 MG

## 2019-01-01 PROCEDURE — 81001 URINALYSIS AUTO W/SCOPE: CPT | Performed by: INTERNAL MEDICINE

## 2019-01-01 PROCEDURE — 0JH63XZ INSERTION OF TUNNELED VASCULAR ACCESS DEVICE INTO CHEST SUBCUTANEOUS TISSUE AND FASCIA, PERCUTANEOUS APPROACH: ICD-10-PCS | Performed by: SURGERY

## 2019-01-01 PROCEDURE — 85025 COMPLETE CBC W/AUTO DIFF WBC: CPT | Performed by: NURSE PRACTITIONER

## 2019-01-01 PROCEDURE — 87086 URINE CULTURE/COLONY COUNT: CPT | Performed by: INTERNAL MEDICINE

## 2019-01-01 PROCEDURE — 85018 HEMOGLOBIN: CPT | Performed by: INTERNAL MEDICINE

## 2019-01-01 PROCEDURE — 87040 BLOOD CULTURE FOR BACTERIA: CPT | Performed by: NURSE PRACTITIONER

## 2019-01-01 PROCEDURE — C1768 GRAFT, VASCULAR: HCPCS | Performed by: SURGERY

## 2019-01-01 PROCEDURE — 87147 CULTURE TYPE IMMUNOLOGIC: CPT | Performed by: INTERNAL MEDICINE

## 2019-01-01 PROCEDURE — 80048 BASIC METABOLIC PNL TOTAL CA: CPT | Performed by: SURGERY

## 2019-01-01 PROCEDURE — 25010000002 PIPERACILLIN SOD-TAZOBACTAM PER 1 G

## 2019-01-01 PROCEDURE — 87205 SMEAR GRAM STAIN: CPT | Performed by: INTERNAL MEDICINE

## 2019-01-01 PROCEDURE — 80074 ACUTE HEPATITIS PANEL: CPT | Performed by: INTERNAL MEDICINE

## 2019-01-01 PROCEDURE — 25010000002 ALTEPLASE 2 MG RECONSTITUTED SOLUTION 1 EACH VIAL: Performed by: INTERNAL MEDICINE

## 2019-01-01 PROCEDURE — 71250 CT THORAX DX C-: CPT

## 2019-01-01 PROCEDURE — 92612 ENDOSCOPY SWALLOW (FEES) VID: CPT

## 2019-01-01 PROCEDURE — 25010000002 PIPERACILLIN SOD-TAZOBACTAM PER 1 G: Performed by: INTERNAL MEDICINE

## 2019-01-01 PROCEDURE — 86900 BLOOD TYPING SEROLOGIC ABO: CPT

## 2019-01-01 PROCEDURE — 25010000002 SULFUR HEXAFLUORIDE MICROSPH 60.7-25 MG RECONSTITUTED SUSPENSION: Performed by: NURSE PRACTITIONER

## 2019-01-01 PROCEDURE — 87631 RESP VIRUS 3-5 TARGETS: CPT | Performed by: INTERNAL MEDICINE

## 2019-01-01 PROCEDURE — 83605 ASSAY OF LACTIC ACID: CPT | Performed by: NURSE PRACTITIONER

## 2019-01-01 PROCEDURE — 93306 TTE W/DOPPLER COMPLETE: CPT | Performed by: INTERNAL MEDICINE

## 2019-01-01 PROCEDURE — 84484 ASSAY OF TROPONIN QUANT: CPT | Performed by: NURSE PRACTITIONER

## 2019-01-01 PROCEDURE — 93325 DOPPLER ECHO COLOR FLOW MAPG: CPT | Performed by: INTERNAL MEDICINE

## 2019-01-01 PROCEDURE — 97162 PT EVAL MOD COMPLEX 30 MIN: CPT | Performed by: PHYSICAL THERAPIST

## 2019-01-01 PROCEDURE — 99220 PR INITIAL OBSERVATION CARE/DAY 70 MINUTES: CPT | Performed by: INTERNAL MEDICINE

## 2019-01-01 PROCEDURE — 74018 RADEX ABDOMEN 1 VIEW: CPT

## 2019-01-01 PROCEDURE — C1750 CATH, HEMODIALYSIS,LONG-TERM: HCPCS | Performed by: SURGERY

## 2019-01-01 PROCEDURE — 73522 X-RAY EXAM HIPS BI 3-4 VIEWS: CPT

## 2019-01-01 PROCEDURE — 93005 ELECTROCARDIOGRAM TRACING: CPT | Performed by: SURGERY

## 2019-01-01 PROCEDURE — 63710000001 FAMOTIDINE 20 MG TABLET: Performed by: ANESTHESIOLOGY

## 2019-01-01 PROCEDURE — 87077 CULTURE AEROBIC IDENTIFY: CPT | Performed by: INTERNAL MEDICINE

## 2019-01-01 PROCEDURE — 63710000001 PROPAFENONE 150 MG TABLET: Performed by: SURGERY

## 2019-01-01 PROCEDURE — 63710000001 BISOPROLOL 5 MG TABLET: Performed by: SURGERY

## 2019-01-01 PROCEDURE — 84100 ASSAY OF PHOSPHORUS: CPT | Performed by: NURSE PRACTITIONER

## 2019-01-01 PROCEDURE — 63710000001 HYDRALAZINE 10 MG TABLET: Performed by: SURGERY

## 2019-01-01 PROCEDURE — 99239 HOSP IP/OBS DSCHRG MGMT >30: CPT | Performed by: NURSE PRACTITIONER

## 2019-01-01 PROCEDURE — C1894 INTRO/SHEATH, NON-LASER: HCPCS | Performed by: SURGERY

## 2019-01-01 PROCEDURE — 83735 ASSAY OF MAGNESIUM: CPT | Performed by: INTERNAL MEDICINE

## 2019-01-01 PROCEDURE — 63710000001 MUPIROCIN 2 % OINTMENT: Performed by: SURGERY

## 2019-01-01 PROCEDURE — 25010000002 ONDANSETRON PER 1 MG: Performed by: NURSE PRACTITIONER

## 2019-01-01 PROCEDURE — 83540 ASSAY OF IRON: CPT | Performed by: INTERNAL MEDICINE

## 2019-01-01 PROCEDURE — 93306 TTE W/DOPPLER COMPLETE: CPT

## 2019-01-01 PROCEDURE — 93308 TTE F-UP OR LMTD: CPT | Performed by: INTERNAL MEDICINE

## 2019-01-01 PROCEDURE — 63710000001 DOCUSATE SODIUM 100 MG CAPSULE: Performed by: NURSE PRACTITIONER

## 2019-01-01 PROCEDURE — 25010000002 CEFTAZIDIME: Performed by: NURSE PRACTITIONER

## 2019-01-01 PROCEDURE — 86901 BLOOD TYPING SEROLOGIC RH(D): CPT | Performed by: NURSE PRACTITIONER

## 2019-01-01 PROCEDURE — 93005 ELECTROCARDIOGRAM TRACING: CPT | Performed by: NURSE PRACTITIONER

## 2019-01-01 PROCEDURE — 83550 IRON BINDING TEST: CPT | Performed by: INTERNAL MEDICINE

## 2019-01-01 PROCEDURE — 87581 M.PNEUMON DNA AMP PROBE: CPT | Performed by: INTERNAL MEDICINE

## 2019-01-01 PROCEDURE — 99232 SBSQ HOSP IP/OBS MODERATE 35: CPT | Performed by: PHYSICIAN ASSISTANT

## 2019-01-01 PROCEDURE — A9270 NON-COVERED ITEM OR SERVICE: HCPCS | Performed by: ANESTHESIOLOGY

## 2019-01-01 PROCEDURE — 25010000002 PROPOFOL 10 MG/ML EMULSION: Performed by: NURSE ANESTHETIST, CERTIFIED REGISTERED

## 2019-01-01 PROCEDURE — 80202 ASSAY OF VANCOMYCIN: CPT | Performed by: INTERNAL MEDICINE

## 2019-01-01 PROCEDURE — 86900 BLOOD TYPING SEROLOGIC ABO: CPT | Performed by: NURSE PRACTITIONER

## 2019-01-01 PROCEDURE — 25010000002 VANCOMYCIN: Performed by: INTERNAL MEDICINE

## 2019-01-01 PROCEDURE — 97530 THERAPEUTIC ACTIVITIES: CPT | Performed by: PHYSICAL THERAPIST

## 2019-01-01 PROCEDURE — 82728 ASSAY OF FERRITIN: CPT | Performed by: INTERNAL MEDICINE

## 2019-01-01 PROCEDURE — 25010000002 CEFTAZIDIME: Performed by: INTERNAL MEDICINE

## 2019-01-01 PROCEDURE — 97165 OT EVAL LOW COMPLEX 30 MIN: CPT

## 2019-01-01 PROCEDURE — 83036 HEMOGLOBIN GLYCOSYLATED A1C: CPT | Performed by: NURSE PRACTITIONER

## 2019-01-01 PROCEDURE — 85025 COMPLETE CBC W/AUTO DIFF WBC: CPT | Performed by: SURGERY

## 2019-01-01 PROCEDURE — 87486 CHLMYD PNEUM DNA AMP PROBE: CPT | Performed by: INTERNAL MEDICINE

## 2019-01-01 PROCEDURE — 85060 BLOOD SMEAR INTERPRETATION: CPT | Performed by: NURSE PRACTITIONER

## 2019-01-01 PROCEDURE — 93308 TTE F-UP OR LMTD: CPT

## 2019-01-01 PROCEDURE — 86850 RBC ANTIBODY SCREEN: CPT | Performed by: NURSE PRACTITIONER

## 2019-01-01 PROCEDURE — 0 DIATRIZOATE MEGLUMINE & SODIUM PER 1 ML

## 2019-01-01 PROCEDURE — 80048 BASIC METABOLIC PNL TOTAL CA: CPT | Performed by: NURSE PRACTITIONER

## 2019-01-01 PROCEDURE — 76000 FLUOROSCOPY <1 HR PHYS/QHP: CPT

## 2019-01-01 PROCEDURE — 87070 CULTURE OTHR SPECIMN AEROBIC: CPT | Performed by: NURSE PRACTITIONER

## 2019-01-01 PROCEDURE — 97166 OT EVAL MOD COMPLEX 45 MIN: CPT

## 2019-01-01 PROCEDURE — 63710000001: Performed by: NURSE PRACTITIONER

## 2019-01-01 PROCEDURE — 84145 PROCALCITONIN (PCT): CPT | Performed by: NURSE PRACTITIONER

## 2019-01-01 PROCEDURE — 02HV33Z INSERTION OF INFUSION DEVICE INTO SUPERIOR VENA CAVA, PERCUTANEOUS APPROACH: ICD-10-PCS | Performed by: SURGERY

## 2019-01-01 PROCEDURE — 80202 ASSAY OF VANCOMYCIN: CPT | Performed by: NURSE PRACTITIONER

## 2019-01-01 PROCEDURE — 25010000002 MEROPENEM: Performed by: INTERNAL MEDICINE

## 2019-01-01 PROCEDURE — 74176 CT ABD & PELVIS W/O CONTRAST: CPT

## 2019-01-01 PROCEDURE — 87205 SMEAR GRAM STAIN: CPT | Performed by: NURSE PRACTITIONER

## 2019-01-01 PROCEDURE — 92950 HEART/LUNG RESUSCITATION CPR: CPT

## 2019-01-01 PROCEDURE — 25010000003 CEFAZOLIN IN DEXTROSE 2-4 GM/100ML-% SOLUTION: Performed by: SURGERY

## 2019-01-01 DEVICE — GRFT VASC COLLGN 5MM 30CM: Type: IMPLANTABLE DEVICE | Site: ARM | Status: FUNCTIONAL

## 2019-01-01 RX ORDER — BISACODYL 10 MG
10 SUPPOSITORY, RECTAL RECTAL DAILY PRN
Status: DISCONTINUED | OUTPATIENT
Start: 2019-01-01 | End: 2019-01-01 | Stop reason: HOSPADM

## 2019-01-01 RX ORDER — SODIUM CHLORIDE 9 MG/ML
INJECTION, SOLUTION INTRAVENOUS AS NEEDED
Status: DISCONTINUED | OUTPATIENT
Start: 2019-01-01 | End: 2019-01-01 | Stop reason: HOSPADM

## 2019-01-01 RX ORDER — VANCOMYCIN HYDROCHLORIDE 1 G/200ML
1000 INJECTION, SOLUTION INTRAVENOUS ONCE
Status: COMPLETED | OUTPATIENT
Start: 2019-01-01 | End: 2019-01-01

## 2019-01-01 RX ORDER — ALBUMIN (HUMAN) 12.5 G/50ML
12.5 SOLUTION INTRAVENOUS AS NEEDED
Status: DISPENSED | OUTPATIENT
Start: 2019-01-01 | End: 2019-01-01

## 2019-01-01 RX ORDER — SENNA AND DOCUSATE SODIUM 50; 8.6 MG/1; MG/1
2 TABLET, FILM COATED ORAL 2 TIMES DAILY
Status: DISCONTINUED | OUTPATIENT
Start: 2019-01-01 | End: 2019-01-01 | Stop reason: HOSPADM

## 2019-01-01 RX ORDER — ONDANSETRON 2 MG/ML
4 INJECTION INTRAMUSCULAR; INTRAVENOUS EVERY 6 HOURS PRN
Status: DISCONTINUED | OUTPATIENT
Start: 2019-01-01 | End: 2019-01-01 | Stop reason: HOSPADM

## 2019-01-01 RX ORDER — MEPERIDINE HYDROCHLORIDE 25 MG/ML
12.5 INJECTION INTRAMUSCULAR; INTRAVENOUS; SUBCUTANEOUS
Status: DISCONTINUED | OUTPATIENT
Start: 2019-01-01 | End: 2019-01-01 | Stop reason: HOSPADM

## 2019-01-01 RX ORDER — ACETAMINOPHEN 325 MG/1
650 TABLET ORAL EVERY 6 HOURS PRN
Status: DISCONTINUED | OUTPATIENT
Start: 2019-01-01 | End: 2019-01-01 | Stop reason: HOSPADM

## 2019-01-01 RX ORDER — FLUCONAZOLE 2 MG/ML
200 INJECTION, SOLUTION INTRAVENOUS DAILY
Status: DISCONTINUED | OUTPATIENT
Start: 2019-01-01 | End: 2019-01-01

## 2019-01-01 RX ORDER — MIDODRINE HYDROCHLORIDE 5 MG/1
10 TABLET ORAL
Status: DISCONTINUED | OUTPATIENT
Start: 2019-01-01 | End: 2019-01-01 | Stop reason: HOSPADM

## 2019-01-01 RX ORDER — WATER 1000 ML/1000ML
INJECTION, SOLUTION INTRAVENOUS
Status: DISPENSED
Start: 2019-01-01 | End: 2019-01-01

## 2019-01-01 RX ORDER — ALBUMIN (HUMAN) 12.5 G/50ML
25 SOLUTION INTRAVENOUS AS NEEDED
Status: DISPENSED | OUTPATIENT
Start: 2019-01-01 | End: 2019-01-01

## 2019-01-01 RX ORDER — HYDROCODONE BITARTRATE AND ACETAMINOPHEN 5; 325 MG/1; MG/1
1 TABLET ORAL EVERY 6 HOURS PRN
Status: DISCONTINUED | OUTPATIENT
Start: 2019-01-01 | End: 2019-01-01 | Stop reason: HOSPADM

## 2019-01-01 RX ORDER — ALBUMIN (HUMAN) 12.5 G/50ML
12.5 SOLUTION INTRAVENOUS AS NEEDED
Status: CANCELLED | OUTPATIENT
Start: 2019-01-01 | End: 2019-01-01

## 2019-01-01 RX ORDER — PROMETHAZINE HYDROCHLORIDE 25 MG/ML
6.25 INJECTION, SOLUTION INTRAMUSCULAR; INTRAVENOUS ONCE AS NEEDED
Status: DISCONTINUED | OUTPATIENT
Start: 2019-01-01 | End: 2019-01-01 | Stop reason: HOSPADM

## 2019-01-01 RX ORDER — PROPAFENONE HYDROCHLORIDE 150 MG/1
150 TABLET, COATED ORAL EVERY 12 HOURS
Status: DISCONTINUED | OUTPATIENT
Start: 2019-01-01 | End: 2019-01-01 | Stop reason: HOSPADM

## 2019-01-01 RX ORDER — ALBUMIN (HUMAN) 12.5 G/50ML
12.5 SOLUTION INTRAVENOUS AS NEEDED
Status: ACTIVE | OUTPATIENT
Start: 2019-01-01 | End: 2019-01-01

## 2019-01-01 RX ORDER — CALCITRIOL 0.25 UG/1
0.25 CAPSULE, LIQUID FILLED ORAL DAILY
Status: DISCONTINUED | OUTPATIENT
Start: 2019-01-01 | End: 2019-01-01 | Stop reason: HOSPADM

## 2019-01-01 RX ORDER — CALCITRIOL 0.25 UG/1
0.25 CAPSULE, LIQUID FILLED ORAL DAILY
Status: DISCONTINUED | OUTPATIENT
Start: 2019-01-01 | End: 2019-01-01

## 2019-01-01 RX ORDER — MAGNESIUM OXIDE 400 MG/1
400 TABLET ORAL DAILY
Qty: 10 TABLET | Refills: 0
Start: 2019-01-01 | End: 2019-01-01 | Stop reason: HOSPADM

## 2019-01-01 RX ORDER — HEPARIN SODIUM 5000 [USP'U]/ML
INJECTION, SOLUTION INTRAVENOUS; SUBCUTANEOUS AS NEEDED
Status: DISCONTINUED | OUTPATIENT
Start: 2019-01-01 | End: 2019-01-01 | Stop reason: HOSPADM

## 2019-01-01 RX ORDER — ALBUMIN (HUMAN) 12.5 G/50ML
12.5 SOLUTION INTRAVENOUS AS NEEDED
Status: DISCONTINUED | OUTPATIENT
Start: 2019-01-01 | End: 2019-01-01

## 2019-01-01 RX ORDER — FOLIC ACID/VIT B COMPLEX AND C 0.8 MG
1 TABLET ORAL DAILY
Qty: 30 TABLET
Start: 2019-01-01

## 2019-01-01 RX ORDER — ALBUMIN (HUMAN) 12.5 G/50ML
25 SOLUTION INTRAVENOUS AS NEEDED
Status: ACTIVE | OUTPATIENT
Start: 2019-01-01 | End: 2019-01-01

## 2019-01-01 RX ORDER — ALBUMIN (HUMAN) 12.5 G/50ML
25 SOLUTION INTRAVENOUS AS NEEDED
Status: DISCONTINUED | OUTPATIENT
Start: 2019-01-01 | End: 2019-01-01 | Stop reason: HOSPADM

## 2019-01-01 RX ORDER — ACETAMINOPHEN 325 MG/1
650 TABLET ORAL EVERY 4 HOURS PRN
Status: DISCONTINUED | OUTPATIENT
Start: 2019-01-01 | End: 2019-01-01 | Stop reason: HOSPADM

## 2019-01-01 RX ORDER — FUROSEMIDE 40 MG/1
40 TABLET ORAL EVERY OTHER DAY
Status: DISCONTINUED | OUTPATIENT
Start: 2019-01-01 | End: 2019-01-01 | Stop reason: HOSPADM

## 2019-01-01 RX ORDER — FAMOTIDINE 20 MG/1
20 TABLET, FILM COATED ORAL 2 TIMES DAILY PRN
Start: 2019-01-01

## 2019-01-01 RX ORDER — MIDODRINE HYDROCHLORIDE 5 MG/1
5 TABLET ORAL
Status: DISCONTINUED | OUTPATIENT
Start: 2019-01-01 | End: 2019-01-01

## 2019-01-01 RX ORDER — SODIUM CHLORIDE 0.9 % (FLUSH) 0.9 %
3 SYRINGE (ML) INJECTION EVERY 12 HOURS SCHEDULED
Status: DISCONTINUED | OUTPATIENT
Start: 2019-01-01 | End: 2019-01-01 | Stop reason: HOSPADM

## 2019-01-01 RX ORDER — BISOPROLOL FUMARATE 5 MG/1
5 TABLET, FILM COATED ORAL NIGHTLY
Status: DISCONTINUED | OUTPATIENT
Start: 2019-01-01 | End: 2019-01-01

## 2019-01-01 RX ORDER — BISACODYL 10 MG
10 SUPPOSITORY, RECTAL RECTAL DAILY
Start: 2019-01-01

## 2019-01-01 RX ORDER — PANTOPRAZOLE SODIUM 40 MG/1
40 TABLET, DELAYED RELEASE ORAL
Status: DISCONTINUED | OUTPATIENT
Start: 2019-01-01 | End: 2019-01-01 | Stop reason: HOSPADM

## 2019-01-01 RX ORDER — CASTOR OIL AND BALSAM, PERU 788; 87 MG/G; MG/G
OINTMENT TOPICAL EVERY 12 HOURS SCHEDULED
Status: DISCONTINUED | OUTPATIENT
Start: 2019-01-01 | End: 2019-01-01 | Stop reason: HOSPADM

## 2019-01-01 RX ORDER — SODIUM CHLORIDE 0.9 % (FLUSH) 0.9 %
3-10 SYRINGE (ML) INJECTION AS NEEDED
Status: DISCONTINUED | OUTPATIENT
Start: 2019-01-01 | End: 2019-01-01 | Stop reason: HOSPADM

## 2019-01-01 RX ORDER — PROPAFENONE HYDROCHLORIDE 150 MG/1
150 TABLET, COATED ORAL EVERY 12 HOURS
Status: DISCONTINUED | OUTPATIENT
Start: 2019-01-01 | End: 2019-01-01

## 2019-01-01 RX ORDER — TERAZOSIN 2 MG/1
2 CAPSULE ORAL NIGHTLY
Status: DISCONTINUED | OUTPATIENT
Start: 2019-01-01 | End: 2019-01-01 | Stop reason: HOSPADM

## 2019-01-01 RX ORDER — FOLIC ACID/VIT B COMPLEX AND C 0.8 MG
1 TABLET ORAL DAILY
Status: DISCONTINUED | OUTPATIENT
Start: 2019-01-01 | End: 2019-01-01 | Stop reason: HOSPADM

## 2019-01-01 RX ORDER — FLUCONAZOLE 200 MG/1
200 TABLET ORAL EVERY 24 HOURS
Status: DISCONTINUED | OUTPATIENT
Start: 2019-01-01 | End: 2019-01-01

## 2019-01-01 RX ORDER — ONDANSETRON 2 MG/ML
4 INJECTION INTRAMUSCULAR; INTRAVENOUS ONCE AS NEEDED
Status: DISCONTINUED | OUTPATIENT
Start: 2019-01-01 | End: 2019-01-01 | Stop reason: HOSPADM

## 2019-01-01 RX ORDER — SENNOSIDES 8.6 MG
1 TABLET ORAL 2 TIMES DAILY
Status: DISCONTINUED | OUTPATIENT
Start: 2019-01-01 | End: 2019-01-01 | Stop reason: HOSPADM

## 2019-01-01 RX ORDER — FLUCONAZOLE 200 MG/1
200 TABLET ORAL EVERY 24 HOURS
Status: DISCONTINUED | OUTPATIENT
Start: 2019-01-01 | End: 2019-01-01 | Stop reason: HOSPADM

## 2019-01-01 RX ORDER — FENTANYL CITRATE 50 UG/ML
50 INJECTION, SOLUTION INTRAMUSCULAR; INTRAVENOUS
Status: DISCONTINUED | OUTPATIENT
Start: 2019-01-01 | End: 2019-01-01 | Stop reason: HOSPADM

## 2019-01-01 RX ORDER — PROPOFOL 10 MG/ML
VIAL (ML) INTRAVENOUS AS NEEDED
Status: DISCONTINUED | OUTPATIENT
Start: 2019-01-01 | End: 2019-01-01 | Stop reason: SURG

## 2019-01-01 RX ORDER — DOCUSATE SODIUM 100 MG/1
100 CAPSULE, LIQUID FILLED ORAL 2 TIMES DAILY
Status: DISCONTINUED | OUTPATIENT
Start: 2019-01-01 | End: 2019-01-01 | Stop reason: HOSPADM

## 2019-01-01 RX ORDER — SODIUM CHLORIDE, SODIUM LACTATE, POTASSIUM CHLORIDE, CALCIUM CHLORIDE 600; 310; 30; 20 MG/100ML; MG/100ML; MG/100ML; MG/100ML
9 INJECTION, SOLUTION INTRAVENOUS CONTINUOUS
Status: CANCELLED | OUTPATIENT
Start: 2019-01-01

## 2019-01-01 RX ORDER — HEPARIN SODIUM 5000 [USP'U]/ML
5000 INJECTION, SOLUTION INTRAVENOUS; SUBCUTANEOUS EVERY 12 HOURS SCHEDULED
Status: DISCONTINUED | OUTPATIENT
Start: 2019-01-01 | End: 2019-01-01 | Stop reason: HOSPADM

## 2019-01-01 RX ORDER — FLUCONAZOLE 200 MG/1
200 TABLET ORAL EVERY 24 HOURS
Status: COMPLETED | OUTPATIENT
Start: 2019-01-01 | End: 2019-01-01

## 2019-01-01 RX ORDER — BISACODYL 5 MG/1
5 TABLET, DELAYED RELEASE ORAL DAILY PRN
Status: DISCONTINUED | OUTPATIENT
Start: 2019-01-01 | End: 2019-01-01 | Stop reason: HOSPADM

## 2019-01-01 RX ORDER — BISOPROLOL FUMARATE 5 MG/1
2.5 TABLET, FILM COATED ORAL NIGHTLY
Start: 2019-01-01

## 2019-01-01 RX ORDER — PROPOFOL 10 MG/ML
VIAL (ML) INTRAVENOUS CONTINUOUS PRN
Status: DISCONTINUED | OUTPATIENT
Start: 2019-01-01 | End: 2019-01-01 | Stop reason: SURG

## 2019-01-01 RX ORDER — BACITRACIN ZINC 500 [USP'U]/G
OINTMENT TOPICAL AS NEEDED
Status: DISCONTINUED | OUTPATIENT
Start: 2019-01-01 | End: 2019-01-01 | Stop reason: HOSPADM

## 2019-01-01 RX ORDER — PROMETHAZINE HYDROCHLORIDE 25 MG/1
25 SUPPOSITORY RECTAL ONCE AS NEEDED
Status: DISCONTINUED | OUTPATIENT
Start: 2019-01-01 | End: 2019-01-01 | Stop reason: HOSPADM

## 2019-01-01 RX ORDER — FAMOTIDINE 10 MG/ML
20 INJECTION, SOLUTION INTRAVENOUS ONCE
Status: CANCELLED | OUTPATIENT
Start: 2019-01-01 | End: 2019-01-01

## 2019-01-01 RX ORDER — DEXTROSE MONOHYDRATE 25 G/50ML
25 INJECTION, SOLUTION INTRAVENOUS
Status: DISCONTINUED | OUTPATIENT
Start: 2019-01-01 | End: 2019-01-01 | Stop reason: HOSPADM

## 2019-01-01 RX ORDER — LIDOCAINE HYDROCHLORIDE 10 MG/ML
0.5 INJECTION, SOLUTION EPIDURAL; INFILTRATION; INTRACAUDAL; PERINEURAL ONCE AS NEEDED
Status: DISCONTINUED | OUTPATIENT
Start: 2019-01-01 | End: 2019-01-01 | Stop reason: HOSPADM

## 2019-01-01 RX ORDER — SODIUM CHLORIDE 0.9 % (FLUSH) 0.9 %
1-10 SYRINGE (ML) INJECTION AS NEEDED
Status: DISCONTINUED | OUTPATIENT
Start: 2019-01-01 | End: 2019-01-01 | Stop reason: HOSPADM

## 2019-01-01 RX ORDER — PANTOPRAZOLE SODIUM 40 MG/10ML
40 INJECTION, POWDER, LYOPHILIZED, FOR SOLUTION INTRAVENOUS EVERY 12 HOURS SCHEDULED
Status: DISCONTINUED | OUTPATIENT
Start: 2019-01-01 | End: 2019-01-01

## 2019-01-01 RX ORDER — HYDROMORPHONE HYDROCHLORIDE 1 MG/ML
0.5 INJECTION, SOLUTION INTRAMUSCULAR; INTRAVENOUS; SUBCUTANEOUS
Status: DISCONTINUED | OUTPATIENT
Start: 2019-01-01 | End: 2019-01-01 | Stop reason: HOSPADM

## 2019-01-01 RX ORDER — FERROUS SULFATE 325(65) MG
325 TABLET ORAL
Status: DISCONTINUED | OUTPATIENT
Start: 2019-01-01 | End: 2019-01-01 | Stop reason: HOSPADM

## 2019-01-01 RX ORDER — PROMETHAZINE HYDROCHLORIDE 25 MG/1
25 TABLET ORAL ONCE AS NEEDED
Status: DISCONTINUED | OUTPATIENT
Start: 2019-01-01 | End: 2019-01-01 | Stop reason: HOSPADM

## 2019-01-01 RX ORDER — CEFAZOLIN SODIUM 2 G/100ML
2 INJECTION, SOLUTION INTRAVENOUS ONCE
Status: COMPLETED | OUTPATIENT
Start: 2019-01-01 | End: 2019-01-01

## 2019-01-01 RX ORDER — AMINO ACIDS/PROTEIN HYDROLYS 15G-100/30
1 LIQUID (ML) ORAL 2 TIMES DAILY
Status: DISCONTINUED | OUTPATIENT
Start: 2019-01-01 | End: 2019-01-01 | Stop reason: HOSPADM

## 2019-01-01 RX ORDER — FLUCONAZOLE 2 MG/ML
200 INJECTION, SOLUTION INTRAVENOUS ONCE
Status: COMPLETED | OUTPATIENT
Start: 2019-01-01 | End: 2019-01-01

## 2019-01-01 RX ORDER — HYDROCODONE BITARTRATE AND ACETAMINOPHEN 5; 325 MG/1; MG/1
1 TABLET ORAL EVERY 4 HOURS PRN
Status: DISCONTINUED | OUTPATIENT
Start: 2019-01-01 | End: 2019-01-01 | Stop reason: HOSPADM

## 2019-01-01 RX ORDER — TRAMADOL HYDROCHLORIDE 50 MG/1
50 TABLET ORAL EVERY 8 HOURS PRN
Qty: 15 TABLET | Refills: 0 | Status: SHIPPED | OUTPATIENT
Start: 2019-01-01 | End: 2019-01-01

## 2019-01-01 RX ORDER — TRAMADOL HYDROCHLORIDE 50 MG/1
50 TABLET ORAL EVERY 8 HOURS PRN
Status: DISCONTINUED | OUTPATIENT
Start: 2019-01-01 | End: 2019-01-01 | Stop reason: HOSPADM

## 2019-01-01 RX ORDER — ACETAMINOPHEN 160 MG/5ML
650 SOLUTION ORAL EVERY 4 HOURS PRN
Status: DISCONTINUED | OUTPATIENT
Start: 2019-01-01 | End: 2019-01-01 | Stop reason: HOSPADM

## 2019-01-01 RX ORDER — ACETAMINOPHEN 325 MG/1
650 TABLET ORAL EVERY 6 HOURS PRN
Start: 2019-01-01

## 2019-01-01 RX ORDER — FAMOTIDINE 20 MG/1
20 TABLET, FILM COATED ORAL ONCE
Status: COMPLETED | OUTPATIENT
Start: 2019-01-01 | End: 2019-01-01

## 2019-01-01 RX ORDER — LIDOCAINE HYDROCHLORIDE 10 MG/ML
0.5 INJECTION, SOLUTION EPIDURAL; INFILTRATION; INTRACAUDAL; PERINEURAL ONCE AS NEEDED
Status: COMPLETED | OUTPATIENT
Start: 2019-01-01 | End: 2019-01-01

## 2019-01-01 RX ORDER — LIDOCAINE HYDROCHLORIDE 10 MG/ML
INJECTION, SOLUTION INFILTRATION; PERINEURAL AS NEEDED
Status: DISCONTINUED | OUTPATIENT
Start: 2019-01-01 | End: 2019-01-01 | Stop reason: SURG

## 2019-01-01 RX ORDER — HYDRALAZINE HYDROCHLORIDE 10 MG/1
10 TABLET, FILM COATED ORAL 2 TIMES DAILY
Status: DISCONTINUED | OUTPATIENT
Start: 2019-01-01 | End: 2019-01-01 | Stop reason: HOSPADM

## 2019-01-01 RX ORDER — FLUCONAZOLE 200 MG/1
200 TABLET ORAL EVERY 24 HOURS
Qty: 4 TABLET | Refills: 0
Start: 2019-01-01 | End: 2019-01-01 | Stop reason: HOSPADM

## 2019-01-01 RX ORDER — HYDRALAZINE HYDROCHLORIDE 25 MG/1
25 TABLET, FILM COATED ORAL EVERY 8 HOURS SCHEDULED
Status: DISCONTINUED | OUTPATIENT
Start: 2019-01-01 | End: 2019-01-01 | Stop reason: HOSPADM

## 2019-01-01 RX ORDER — LACTULOSE 10 G/15ML
30 SOLUTION ORAL 2 TIMES DAILY
Status: DISCONTINUED | OUTPATIENT
Start: 2019-01-01 | End: 2019-01-01

## 2019-01-01 RX ORDER — BISOPROLOL FUMARATE 5 MG/1
2.5 TABLET, FILM COATED ORAL NIGHTLY
Status: DISCONTINUED | OUTPATIENT
Start: 2019-01-01 | End: 2019-01-01 | Stop reason: HOSPADM

## 2019-01-01 RX ORDER — BISACODYL 10 MG
10 SUPPOSITORY, RECTAL RECTAL DAILY
Status: DISCONTINUED | OUTPATIENT
Start: 2019-01-01 | End: 2019-01-01 | Stop reason: HOSPADM

## 2019-01-01 RX ORDER — FAMOTIDINE 20 MG/1
20 TABLET, FILM COATED ORAL 2 TIMES DAILY PRN
Status: DISCONTINUED | OUTPATIENT
Start: 2019-01-01 | End: 2019-01-01 | Stop reason: HOSPADM

## 2019-01-01 RX ORDER — BISOPROLOL FUMARATE 5 MG/1
5 TABLET, FILM COATED ORAL NIGHTLY
Status: DISCONTINUED | OUTPATIENT
Start: 2019-01-01 | End: 2019-01-01 | Stop reason: HOSPADM

## 2019-01-01 RX ORDER — WATER 1000 ML/1000ML
INJECTION, SOLUTION INTRAVENOUS
Status: COMPLETED
Start: 2019-01-01 | End: 2019-01-01

## 2019-01-01 RX ORDER — AMINO ACIDS/PROTEIN HYDROLYS 15G-100/30
1 LIQUID (ML) ORAL 2 TIMES DAILY
Start: 2019-01-01

## 2019-01-01 RX ORDER — HEPARIN SODIUM 5000 [USP'U]/ML
5000 INJECTION, SOLUTION INTRAVENOUS; SUBCUTANEOUS EVERY 8 HOURS SCHEDULED
Status: DISCONTINUED | OUTPATIENT
Start: 2019-01-01 | End: 2019-01-01 | Stop reason: HOSPADM

## 2019-01-01 RX ORDER — ONDANSETRON 4 MG/1
4 TABLET, FILM COATED ORAL EVERY 6 HOURS PRN
Status: DISCONTINUED | OUTPATIENT
Start: 2019-01-01 | End: 2019-01-01 | Stop reason: HOSPADM

## 2019-01-01 RX ORDER — FERROUS SULFATE 325(65) MG
325 TABLET ORAL
Start: 2019-01-01

## 2019-01-01 RX ORDER — SODIUM CHLORIDE 9 MG/ML
9 INJECTION, SOLUTION INTRAVENOUS CONTINUOUS
Status: DISCONTINUED | OUTPATIENT
Start: 2019-01-01 | End: 2019-01-01 | Stop reason: HOSPADM

## 2019-01-01 RX ORDER — DOCUSATE SODIUM 100 MG/1
100 CAPSULE, LIQUID FILLED ORAL 2 TIMES DAILY
Status: DISCONTINUED | OUTPATIENT
Start: 2019-01-01 | End: 2019-01-01

## 2019-01-01 RX ORDER — FOLIC ACID/VIT B COMPLEX AND C 0.8 MG
1 TABLET ORAL DAILY
Status: DISCONTINUED | OUTPATIENT
Start: 2019-01-01 | End: 2019-01-01

## 2019-01-01 RX ORDER — DIPHENOXYLATE HYDROCHLORIDE AND ATROPINE SULFATE 2.5; .025 MG/1; MG/1
1 TABLET ORAL 4 TIMES DAILY PRN
Status: DISCONTINUED | OUTPATIENT
Start: 2019-01-01 | End: 2019-01-01 | Stop reason: HOSPADM

## 2019-01-01 RX ORDER — HEPARIN SODIUM 1000 [USP'U]/ML
1900 INJECTION, SOLUTION INTRAVENOUS; SUBCUTANEOUS AS NEEDED
Status: DISCONTINUED | OUTPATIENT
Start: 2019-01-01 | End: 2019-01-01 | Stop reason: HOSPADM

## 2019-01-01 RX ORDER — SENNA AND DOCUSATE SODIUM 50; 8.6 MG/1; MG/1
2 TABLET, FILM COATED ORAL 2 TIMES DAILY
Start: 2019-01-01

## 2019-01-01 RX ADMIN — SODIUM CHLORIDE, PRESERVATIVE FREE 3 ML: 5 INJECTION INTRAVENOUS at 08:19

## 2019-01-01 RX ADMIN — SODIUM CHLORIDE, PRESERVATIVE FREE 3 ML: 5 INJECTION INTRAVENOUS at 20:22

## 2019-01-01 RX ADMIN — CALCITRIOL 0.25 MCG: 0.25 CAPSULE ORAL at 18:12

## 2019-01-01 RX ADMIN — SENNOSIDES 8.6 MG: 8.6 TABLET, FILM COATED ORAL at 20:07

## 2019-01-01 RX ADMIN — ACETAMINOPHEN 650 MG: 325 TABLET ORAL at 09:14

## 2019-01-01 RX ADMIN — MEROPENEM 500 MG: 500 INJECTION, POWDER, FOR SOLUTION INTRAVENOUS at 14:41

## 2019-01-01 RX ADMIN — Medication: at 09:14

## 2019-01-01 RX ADMIN — FLUCONAZOLE 200 MG: 200 TABLET ORAL at 12:14

## 2019-01-01 RX ADMIN — Medication 325 MG: at 17:13

## 2019-01-01 RX ADMIN — Medication 1 TABLET: at 14:40

## 2019-01-01 RX ADMIN — FUROSEMIDE 40 MG: 40 TABLET ORAL at 09:27

## 2019-01-01 RX ADMIN — TAZOBACTAM SODIUM AND PIPERACILLIN SODIUM 3.38 G: 375; 3 INJECTION, SOLUTION INTRAVENOUS at 21:10

## 2019-01-01 RX ADMIN — Medication 325 MG: at 13:34

## 2019-01-01 RX ADMIN — SODIUM CHLORIDE, PRESERVATIVE FREE 3 ML: 5 INJECTION INTRAVENOUS at 08:54

## 2019-01-01 RX ADMIN — HEPARIN SODIUM 5000 UNITS: 5000 INJECTION INTRAVENOUS; SUBCUTANEOUS at 08:44

## 2019-01-01 RX ADMIN — ALBUMIN (HUMAN) 25 G: 12.5 SOLUTION INTRAVENOUS at 08:05

## 2019-01-01 RX ADMIN — Medication 1 TABLET: at 08:44

## 2019-01-01 RX ADMIN — Medication 5 MG: at 21:49

## 2019-01-01 RX ADMIN — ACETAMINOPHEN 650 MG: 325 TABLET ORAL at 21:15

## 2019-01-01 RX ADMIN — HEPARIN SODIUM 5000 UNITS: 5000 INJECTION INTRAVENOUS; SUBCUTANEOUS at 06:12

## 2019-01-01 RX ADMIN — DOCUSATE SODIUM 100 MG: 100 CAPSULE, LIQUID FILLED ORAL at 20:07

## 2019-01-01 RX ADMIN — SODIUM CHLORIDE, PRESERVATIVE FREE 3 ML: 5 INJECTION INTRAVENOUS at 20:39

## 2019-01-01 RX ADMIN — HEPARIN SODIUM 5000 UNITS: 5000 INJECTION INTRAVENOUS; SUBCUTANEOUS at 14:09

## 2019-01-01 RX ADMIN — HEPARIN SODIUM 5000 UNITS: 5000 INJECTION INTRAVENOUS; SUBCUTANEOUS at 05:56

## 2019-01-01 RX ADMIN — CEFTAZIDIME 1 G: 1 INJECTION, POWDER, FOR SOLUTION INTRAMUSCULAR; INTRAVENOUS at 15:33

## 2019-01-01 RX ADMIN — Medication 1 PACKET: at 21:38

## 2019-01-01 RX ADMIN — Medication 325 MG: at 14:40

## 2019-01-01 RX ADMIN — FLUCONAZOLE 200 MG: 200 INJECTION, SOLUTION INTRAVENOUS at 11:54

## 2019-01-01 RX ADMIN — SODIUM CHLORIDE, PRESERVATIVE FREE 3 ML: 5 INJECTION INTRAVENOUS at 21:30

## 2019-01-01 RX ADMIN — Medication: at 21:47

## 2019-01-01 RX ADMIN — HEPARIN SODIUM 5000 UNITS: 5000 INJECTION INTRAVENOUS; SUBCUTANEOUS at 21:12

## 2019-01-01 RX ADMIN — HYDROCODONE BITARTRATE AND ACETAMINOPHEN 1 TABLET: 5; 325 TABLET ORAL at 04:27

## 2019-01-01 RX ADMIN — ACETAMINOPHEN 650 MG: 325 TABLET ORAL at 01:22

## 2019-01-01 RX ADMIN — CALCITRIOL 0.25 MCG: 0.25 CAPSULE ORAL at 08:44

## 2019-01-01 RX ADMIN — SENNOSIDES 8.6 MG: 8.6 TABLET, FILM COATED ORAL at 21:12

## 2019-01-01 RX ADMIN — HEPARIN SODIUM 5000 UNITS: 5000 INJECTION INTRAVENOUS; SUBCUTANEOUS at 23:17

## 2019-01-01 RX ADMIN — Medication 325 MG: at 10:26

## 2019-01-01 RX ADMIN — SODIUM CHLORIDE, PRESERVATIVE FREE 3 ML: 5 INJECTION INTRAVENOUS at 20:07

## 2019-01-01 RX ADMIN — Medication: at 17:29

## 2019-01-01 RX ADMIN — TAZOBACTAM SODIUM AND PIPERACILLIN SODIUM 3.38 G: 375; 3 INJECTION, SOLUTION INTRAVENOUS at 20:07

## 2019-01-01 RX ADMIN — SODIUM CHLORIDE 250 ML: 9 INJECTION, SOLUTION INTRAVENOUS at 12:09

## 2019-01-01 RX ADMIN — Medication: at 21:06

## 2019-01-01 RX ADMIN — Medication: at 21:13

## 2019-01-01 RX ADMIN — HEPARIN SODIUM 5000 UNITS: 5000 INJECTION INTRAVENOUS; SUBCUTANEOUS at 13:52

## 2019-01-01 RX ADMIN — BISOPROLOL FUMARATE 2.5 MG: 5 TABLET ORAL at 20:31

## 2019-01-01 RX ADMIN — BISOPROLOL FUMARATE 2.5 MG: 5 TABLET ORAL at 21:12

## 2019-01-01 RX ADMIN — CALCITRIOL 0.25 MCG: 0.25 CAPSULE ORAL at 08:11

## 2019-01-01 RX ADMIN — CASTOR OIL AND BALSAM, PERU: 788; 87 OINTMENT TOPICAL at 09:46

## 2019-01-01 RX ADMIN — FLUCONAZOLE 200 MG: 200 INJECTION, SOLUTION INTRAVENOUS at 12:10

## 2019-01-01 RX ADMIN — SODIUM CHLORIDE 500 ML: 9 INJECTION, SOLUTION INTRAVENOUS at 23:54

## 2019-01-01 RX ADMIN — ONDANSETRON 4 MG: 2 INJECTION INTRAMUSCULAR; INTRAVENOUS at 20:36

## 2019-01-01 RX ADMIN — Medication 400 MG: at 08:44

## 2019-01-01 RX ADMIN — Medication 325 MG: at 09:01

## 2019-01-01 RX ADMIN — Medication: at 12:00

## 2019-01-01 RX ADMIN — Medication: at 20:35

## 2019-01-01 RX ADMIN — HEPARIN SODIUM 5000 UNITS: 5000 INJECTION INTRAVENOUS; SUBCUTANEOUS at 21:33

## 2019-01-01 RX ADMIN — Medication 5 MG: at 20:07

## 2019-01-01 RX ADMIN — PROPOFOL 20 MG: 10 INJECTION, EMULSION INTRAVENOUS at 13:15

## 2019-01-01 RX ADMIN — Medication: at 19:00

## 2019-01-01 RX ADMIN — TAZOBACTAM SODIUM AND PIPERACILLIN SODIUM 3.38 G: 375; 3 INJECTION, SOLUTION INTRAVENOUS at 20:26

## 2019-01-01 RX ADMIN — PANTOPRAZOLE SODIUM 40 MG: 40 INJECTION, POWDER, FOR SOLUTION INTRAVENOUS at 08:18

## 2019-01-01 RX ADMIN — Medication 5 MG: at 20:40

## 2019-01-01 RX ADMIN — ALTEPLASE: 2.2 INJECTION, POWDER, LYOPHILIZED, FOR SOLUTION INTRAVENOUS at 06:33

## 2019-01-01 RX ADMIN — HEPARIN SODIUM 5000 UNITS: 5000 INJECTION INTRAVENOUS; SUBCUTANEOUS at 09:14

## 2019-01-01 RX ADMIN — PANTOPRAZOLE SODIUM 40 MG: 40 TABLET, DELAYED RELEASE ORAL at 05:56

## 2019-01-01 RX ADMIN — HEPARIN SODIUM 5000 UNITS: 5000 INJECTION INTRAVENOUS; SUBCUTANEOUS at 08:14

## 2019-01-01 RX ADMIN — CASTOR OIL AND BALSAM, PERU: 788; 87 OINTMENT TOPICAL at 08:36

## 2019-01-01 RX ADMIN — HEPARIN SODIUM 5000 UNITS: 5000 INJECTION INTRAVENOUS; SUBCUTANEOUS at 06:08

## 2019-01-01 RX ADMIN — ALTEPLASE: 2.2 INJECTION, POWDER, LYOPHILIZED, FOR SOLUTION INTRAVENOUS at 09:42

## 2019-01-01 RX ADMIN — Medication 400 MG: at 09:02

## 2019-01-01 RX ADMIN — DOCUSATE SODIUM 100 MG: 100 CAPSULE, LIQUID FILLED ORAL at 20:44

## 2019-01-01 RX ADMIN — Medication 325 MG: at 20:45

## 2019-01-01 RX ADMIN — HYDRALAZINE HYDROCHLORIDE 10 MG: 10 TABLET ORAL at 20:27

## 2019-01-01 RX ADMIN — DOCUSATE SODIUM 100 MG: 100 CAPSULE, LIQUID FILLED ORAL at 09:09

## 2019-01-01 RX ADMIN — MEROPENEM 500 MG: 500 INJECTION, POWDER, FOR SOLUTION INTRAVENOUS at 12:08

## 2019-01-01 RX ADMIN — FLUCONAZOLE 200 MG: 200 TABLET ORAL at 08:44

## 2019-01-01 RX ADMIN — SENNOSIDES 8.6 MG: 8.6 TABLET, FILM COATED ORAL at 21:27

## 2019-01-01 RX ADMIN — HEPARIN SODIUM 5000 UNITS: 5000 INJECTION INTRAVENOUS; SUBCUTANEOUS at 22:08

## 2019-01-01 RX ADMIN — HEPARIN SODIUM 5000 UNITS: 5000 INJECTION INTRAVENOUS; SUBCUTANEOUS at 20:38

## 2019-01-01 RX ADMIN — HYDRALAZINE HYDROCHLORIDE 25 MG: 25 TABLET ORAL at 05:40

## 2019-01-01 RX ADMIN — SODIUM CHLORIDE 125 MG: 9 INJECTION, SOLUTION INTRAVENOUS at 09:05

## 2019-01-01 RX ADMIN — Medication 1 PACKET: at 20:41

## 2019-01-01 RX ADMIN — SODIUM CHLORIDE, PRESERVATIVE FREE 3 ML: 5 INJECTION INTRAVENOUS at 08:45

## 2019-01-01 RX ADMIN — HEPARIN SODIUM 5000 UNITS: 5000 INJECTION INTRAVENOUS; SUBCUTANEOUS at 22:01

## 2019-01-01 RX ADMIN — ALBUMIN (HUMAN) 12.5 G: 12.5 SOLUTION INTRAVENOUS at 11:51

## 2019-01-01 RX ADMIN — HYDRALAZINE HYDROCHLORIDE 10 MG: 10 TABLET ORAL at 20:31

## 2019-01-01 RX ADMIN — FLUCONAZOLE 200 MG: 200 INJECTION, SOLUTION INTRAVENOUS at 12:22

## 2019-01-01 RX ADMIN — SODIUM CHLORIDE, PRESERVATIVE FREE 3 ML: 5 INJECTION INTRAVENOUS at 21:28

## 2019-01-01 RX ADMIN — Medication: at 20:39

## 2019-01-01 RX ADMIN — CASTOR OIL AND BALSAM, PERU: 788; 87 OINTMENT TOPICAL at 22:01

## 2019-01-01 RX ADMIN — WATER: 1 INJECTION INTRAMUSCULAR; INTRAVENOUS; SUBCUTANEOUS at 09:18

## 2019-01-01 RX ADMIN — CALCITRIOL 0.25 MCG: 0.25 CAPSULE ORAL at 08:16

## 2019-01-01 RX ADMIN — SENNOSIDES 8.6 MG: 8.6 TABLET, FILM COATED ORAL at 12:21

## 2019-01-01 RX ADMIN — HEPARIN SODIUM 5000 UNITS: 5000 INJECTION INTRAVENOUS; SUBCUTANEOUS at 20:26

## 2019-01-01 RX ADMIN — FLUCONAZOLE 200 MG: 200 TABLET ORAL at 10:26

## 2019-01-01 RX ADMIN — ACETAMINOPHEN 650 MG: 325 TABLET ORAL at 20:16

## 2019-01-01 RX ADMIN — Medication 325 MG: at 13:14

## 2019-01-01 RX ADMIN — VANCOMYCIN HYDROCHLORIDE 1750 MG: 10 INJECTION, POWDER, LYOPHILIZED, FOR SOLUTION INTRAVENOUS at 18:12

## 2019-01-01 RX ADMIN — Medication 325 MG: at 17:23

## 2019-01-01 RX ADMIN — BISOPROLOL FUMARATE 5 MG: 5 TABLET ORAL at 20:07

## 2019-01-01 RX ADMIN — SODIUM CHLORIDE 9 ML/HR: 9 INJECTION, SOLUTION INTRAVENOUS at 11:05

## 2019-01-01 RX ADMIN — ALTEPLASE: 2.2 INJECTION, POWDER, LYOPHILIZED, FOR SOLUTION INTRAVENOUS at 10:47

## 2019-01-01 RX ADMIN — HEPARIN SODIUM 1900 UNITS: 1000 INJECTION, SOLUTION INTRAVENOUS; SUBCUTANEOUS at 10:20

## 2019-01-01 RX ADMIN — HEPARIN SODIUM 5000 UNITS: 5000 INJECTION INTRAVENOUS; SUBCUTANEOUS at 21:13

## 2019-01-01 RX ADMIN — CASTOR OIL AND BALSAM, PERU: 788; 87 OINTMENT TOPICAL at 20:16

## 2019-01-01 RX ADMIN — CASTOR OIL AND BALSAM, PERU: 788; 87 OINTMENT TOPICAL at 08:18

## 2019-01-01 RX ADMIN — SODIUM CHLORIDE, PRESERVATIVE FREE 3 ML: 5 INJECTION INTRAVENOUS at 12:22

## 2019-01-01 RX ADMIN — Medication 1 PACKET: at 16:00

## 2019-01-01 RX ADMIN — ALBUMIN HUMAN 50 G: 0.25 SOLUTION INTRAVENOUS at 06:48

## 2019-01-01 RX ADMIN — FLUCONAZOLE 200 MG: 200 TABLET ORAL at 09:07

## 2019-01-01 RX ADMIN — CASTOR OIL AND BALSAM, PERU: 788; 87 OINTMENT TOPICAL at 11:16

## 2019-01-01 RX ADMIN — ALBUMIN HUMAN 50 G: 0.25 SOLUTION INTRAVENOUS at 06:50

## 2019-01-01 RX ADMIN — HYDRALAZINE HYDROCHLORIDE 25 MG: 25 TABLET ORAL at 17:02

## 2019-01-01 RX ADMIN — SODIUM CHLORIDE, PRESERVATIVE FREE 3 ML: 5 INJECTION INTRAVENOUS at 09:07

## 2019-01-01 RX ADMIN — Medication 325 MG: at 17:30

## 2019-01-01 RX ADMIN — ERYTHROPOIETIN 10000 UNITS: 10000 INJECTION, SOLUTION INTRAVENOUS; SUBCUTANEOUS at 10:40

## 2019-01-01 RX ADMIN — ALTEPLASE: 2.2 INJECTION, POWDER, LYOPHILIZED, FOR SOLUTION INTRAVENOUS at 10:13

## 2019-01-01 RX ADMIN — HYDRALAZINE HYDROCHLORIDE 10 MG: 10 TABLET ORAL at 13:14

## 2019-01-01 RX ADMIN — ALTEPLASE: 2.2 INJECTION, POWDER, LYOPHILIZED, FOR SOLUTION INTRAVENOUS at 06:34

## 2019-01-01 RX ADMIN — DOCUSATE SODIUM 100 MG: 100 CAPSULE, LIQUID FILLED ORAL at 10:14

## 2019-01-01 RX ADMIN — Medication 400 MG: at 10:26

## 2019-01-01 RX ADMIN — SODIUM CHLORIDE, PRESERVATIVE FREE 3 ML: 5 INJECTION INTRAVENOUS at 09:31

## 2019-01-01 RX ADMIN — CASTOR OIL AND BALSAM, PERU: 788; 87 OINTMENT TOPICAL at 09:53

## 2019-01-01 RX ADMIN — MEROPENEM 500 MG: 500 INJECTION, POWDER, FOR SOLUTION INTRAVENOUS at 15:26

## 2019-01-01 RX ADMIN — HEPARIN SODIUM 5000 UNITS: 5000 INJECTION INTRAVENOUS; SUBCUTANEOUS at 05:40

## 2019-01-01 RX ADMIN — Medication: at 20:54

## 2019-01-01 RX ADMIN — CASTOR OIL AND BALSAM, PERU: 788; 87 OINTMENT TOPICAL at 20:10

## 2019-01-01 RX ADMIN — CASTOR OIL AND BALSAM, PERU: 788; 87 OINTMENT TOPICAL at 08:27

## 2019-01-01 RX ADMIN — BISOPROLOL FUMARATE 5 MG: 5 TABLET ORAL at 20:24

## 2019-01-01 RX ADMIN — CASTOR OIL AND BALSAM, PERU: 788; 87 OINTMENT TOPICAL at 11:59

## 2019-01-01 RX ADMIN — ALBUMIN (HUMAN) 50 G: 12.5 SOLUTION INTRAVENOUS at 08:02

## 2019-01-01 RX ADMIN — SODIUM CHLORIDE, PRESERVATIVE FREE 3 ML: 5 INJECTION INTRAVENOUS at 20:13

## 2019-01-01 RX ADMIN — VANCOMYCIN HYDROCHLORIDE 1000 MG: 1 INJECTION, SOLUTION INTRAVENOUS at 13:49

## 2019-01-01 RX ADMIN — Medication: at 10:29

## 2019-01-01 RX ADMIN — CASTOR OIL AND BALSAM, PERU: 788; 87 OINTMENT TOPICAL at 22:29

## 2019-01-01 RX ADMIN — CALCITRIOL 0.25 MCG: 0.25 CAPSULE ORAL at 08:13

## 2019-01-01 RX ADMIN — CASTOR OIL AND BALSAM, PERU: 788; 87 OINTMENT TOPICAL at 08:28

## 2019-01-01 RX ADMIN — Medication 5 MG: at 21:33

## 2019-01-01 RX ADMIN — VANCOMYCIN HYDROCHLORIDE 500 MG: 500 INJECTION, POWDER, LYOPHILIZED, FOR SOLUTION INTRAVENOUS at 14:19

## 2019-01-01 RX ADMIN — Medication 5 MG: at 20:00

## 2019-01-01 RX ADMIN — PANTOPRAZOLE SODIUM 40 MG: 40 INJECTION, POWDER, FOR SOLUTION INTRAVENOUS at 08:22

## 2019-01-01 RX ADMIN — PROPOFOL 20 MG: 10 INJECTION, EMULSION INTRAVENOUS at 12:37

## 2019-01-01 RX ADMIN — Medication 325 MG: at 09:09

## 2019-01-01 RX ADMIN — Medication 325 MG: at 13:22

## 2019-01-01 RX ADMIN — Medication 5 MG: at 20:16

## 2019-01-01 RX ADMIN — HEPARIN SODIUM 5000 UNITS: 5000 INJECTION INTRAVENOUS; SUBCUTANEOUS at 14:41

## 2019-01-01 RX ADMIN — Medication: at 14:23

## 2019-01-01 RX ADMIN — HEPARIN SODIUM 5000 UNITS: 5000 INJECTION INTRAVENOUS; SUBCUTANEOUS at 17:02

## 2019-01-01 RX ADMIN — PANTOPRAZOLE SODIUM 40 MG: 40 TABLET, DELAYED RELEASE ORAL at 06:12

## 2019-01-01 RX ADMIN — WATER: 1 INJECTION INTRAMUSCULAR; INTRAVENOUS; SUBCUTANEOUS at 10:05

## 2019-01-01 RX ADMIN — HYDRALAZINE HYDROCHLORIDE 25 MG: 25 TABLET ORAL at 21:12

## 2019-01-01 RX ADMIN — FLUCONAZOLE 200 MG: 200 TABLET ORAL at 12:09

## 2019-01-01 RX ADMIN — BISACODYL 10 MG: 10 SUPPOSITORY RECTAL at 17:23

## 2019-01-01 RX ADMIN — SODIUM CHLORIDE, PRESERVATIVE FREE 3 ML: 5 INJECTION INTRAVENOUS at 21:05

## 2019-01-01 RX ADMIN — CASTOR OIL AND BALSAM, PERU: 788; 87 OINTMENT TOPICAL at 09:30

## 2019-01-01 RX ADMIN — HYDRALAZINE HYDROCHLORIDE 10 MG: 10 TABLET ORAL at 20:35

## 2019-01-01 RX ADMIN — SODIUM CHLORIDE, PRESERVATIVE FREE 3 ML: 5 INJECTION INTRAVENOUS at 21:11

## 2019-01-01 RX ADMIN — HEPARIN SODIUM 5000 UNITS: 5000 INJECTION INTRAVENOUS; SUBCUTANEOUS at 10:26

## 2019-01-01 RX ADMIN — SODIUM CHLORIDE, PRESERVATIVE FREE 3 ML: 5 INJECTION INTRAVENOUS at 08:17

## 2019-01-01 RX ADMIN — HEPARIN SODIUM 5000 UNITS: 5000 INJECTION INTRAVENOUS; SUBCUTANEOUS at 21:52

## 2019-01-01 RX ADMIN — SENNOSIDES 8.6 MG: 8.6 TABLET, FILM COATED ORAL at 08:22

## 2019-01-01 RX ADMIN — HYDRALAZINE HYDROCHLORIDE 10 MG: 10 TABLET ORAL at 21:38

## 2019-01-01 RX ADMIN — CALCITRIOL 0.25 MCG: 0.25 CAPSULE ORAL at 10:22

## 2019-01-01 RX ADMIN — MEROPENEM 500 MG: 500 INJECTION, POWDER, FOR SOLUTION INTRAVENOUS at 14:30

## 2019-01-01 RX ADMIN — SODIUM CHLORIDE, PRESERVATIVE FREE 3 ML: 5 INJECTION INTRAVENOUS at 09:21

## 2019-01-01 RX ADMIN — CASTOR OIL AND BALSAM, PERU: 788; 87 OINTMENT TOPICAL at 19:36

## 2019-01-01 RX ADMIN — VANCOMYCIN HYDROCHLORIDE 500 MG: 500 INJECTION, POWDER, LYOPHILIZED, FOR SOLUTION INTRAVENOUS at 09:20

## 2019-01-01 RX ADMIN — HYDRALAZINE HYDROCHLORIDE 10 MG: 10 TABLET ORAL at 22:50

## 2019-01-01 RX ADMIN — HYDRALAZINE HYDROCHLORIDE 25 MG: 25 TABLET ORAL at 14:59

## 2019-01-01 RX ADMIN — TAZOBACTAM SODIUM AND PIPERACILLIN SODIUM 3.38 G: 375; 3 INJECTION, SOLUTION INTRAVENOUS at 08:48

## 2019-01-01 RX ADMIN — FLUCONAZOLE 200 MG: 200 INJECTION, SOLUTION INTRAVENOUS at 10:52

## 2019-01-01 RX ADMIN — BISOPROLOL FUMARATE 5 MG: 5 TABLET ORAL at 20:21

## 2019-01-01 RX ADMIN — HEPARIN SODIUM 5000 UNITS: 5000 INJECTION INTRAVENOUS; SUBCUTANEOUS at 21:22

## 2019-01-01 RX ADMIN — HYDRALAZINE HYDROCHLORIDE 25 MG: 25 TABLET ORAL at 13:56

## 2019-01-01 RX ADMIN — FLUCONAZOLE 200 MG: 200 TABLET ORAL at 10:11

## 2019-01-01 RX ADMIN — CEFTAZIDIME 1 G: 1 INJECTION, POWDER, FOR SOLUTION INTRAMUSCULAR; INTRAVENOUS at 13:34

## 2019-01-01 RX ADMIN — PROPAFENONE HYDROCHLORIDE 150 MG: 150 TABLET, FILM COATED ORAL at 21:10

## 2019-01-01 RX ADMIN — PROPOFOL 20 MG: 10 INJECTION, EMULSION INTRAVENOUS at 12:22

## 2019-01-01 RX ADMIN — ALBUMIN HUMAN 25 G: 0.25 SOLUTION INTRAVENOUS at 10:59

## 2019-01-01 RX ADMIN — CALCITRIOL 0.25 MCG: 0.25 CAPSULE ORAL at 09:02

## 2019-01-01 RX ADMIN — Medication 1 TABLET: at 10:26

## 2019-01-01 RX ADMIN — CALCITRIOL 0.25 MCG: 0.25 CAPSULE ORAL at 11:15

## 2019-01-01 RX ADMIN — ERYTHROPOIETIN 10000 UNITS: 10000 INJECTION, SOLUTION INTRAVENOUS; SUBCUTANEOUS at 09:41

## 2019-01-01 RX ADMIN — CASTOR OIL AND BALSAM, PERU: 788; 87 OINTMENT TOPICAL at 20:02

## 2019-01-01 RX ADMIN — HYDRALAZINE HYDROCHLORIDE 10 MG: 10 TABLET ORAL at 21:13

## 2019-01-01 RX ADMIN — ALBUMIN (HUMAN) 12.5 G: 12.5 SOLUTION INTRAVENOUS at 10:53

## 2019-01-01 RX ADMIN — Medication 325 MG: at 17:25

## 2019-01-01 RX ADMIN — MEROPENEM 500 MG: 500 INJECTION, POWDER, FOR SOLUTION INTRAVENOUS at 14:46

## 2019-01-01 RX ADMIN — ALTEPLASE: 2.2 INJECTION, POWDER, LYOPHILIZED, FOR SOLUTION INTRAVENOUS at 10:45

## 2019-01-01 RX ADMIN — ALBUMIN (HUMAN) 25 G: 12.5 SOLUTION INTRAVENOUS at 10:00

## 2019-01-01 RX ADMIN — HYDRALAZINE HYDROCHLORIDE 10 MG: 10 TABLET ORAL at 08:13

## 2019-01-01 RX ADMIN — BISOPROLOL FUMARATE 2.5 MG: 5 TABLET ORAL at 22:45

## 2019-01-01 RX ADMIN — HEPARIN SODIUM 5000 UNITS: 5000 INJECTION INTRAVENOUS; SUBCUTANEOUS at 09:41

## 2019-01-01 RX ADMIN — HEPARIN SODIUM 5000 UNITS: 5000 INJECTION INTRAVENOUS; SUBCUTANEOUS at 14:45

## 2019-01-01 RX ADMIN — ALBUMIN (HUMAN) 25 G: 12.5 SOLUTION INTRAVENOUS at 08:07

## 2019-01-01 RX ADMIN — Medication: at 20:12

## 2019-01-01 RX ADMIN — SODIUM CHLORIDE, PRESERVATIVE FREE 3 ML: 5 INJECTION INTRAVENOUS at 20:27

## 2019-01-01 RX ADMIN — Medication 325 MG: at 10:11

## 2019-01-01 RX ADMIN — HEPARIN SODIUM 5000 UNITS: 5000 INJECTION INTRAVENOUS; SUBCUTANEOUS at 13:48

## 2019-01-01 RX ADMIN — DOCUSATE SODIUM 100 MG: 100 CAPSULE, LIQUID FILLED ORAL at 20:16

## 2019-01-01 RX ADMIN — WATER 10 ML: 1 INJECTION INTRAMUSCULAR; INTRAVENOUS; SUBCUTANEOUS at 08:54

## 2019-01-01 RX ADMIN — CALCITRIOL 0.25 MCG: 0.25 CAPSULE ORAL at 12:21

## 2019-01-01 RX ADMIN — HYDRALAZINE HYDROCHLORIDE 25 MG: 25 TABLET ORAL at 23:00

## 2019-01-01 RX ADMIN — Medication 5 MG: at 00:41

## 2019-01-01 RX ADMIN — CASTOR OIL AND BALSAM, PERU: 788; 87 OINTMENT TOPICAL at 23:34

## 2019-01-01 RX ADMIN — BISOPROLOL FUMARATE 2.5 MG: 5 TABLET ORAL at 20:26

## 2019-01-01 RX ADMIN — SODIUM CHLORIDE, PRESERVATIVE FREE 3 ML: 5 INJECTION INTRAVENOUS at 20:47

## 2019-01-01 RX ADMIN — SODIUM CHLORIDE, PRESERVATIVE FREE 3 ML: 5 INJECTION INTRAVENOUS at 20:06

## 2019-01-01 RX ADMIN — CEFTAZIDIME 1 G: 1 INJECTION, POWDER, FOR SOLUTION INTRAMUSCULAR; INTRAVENOUS at 14:25

## 2019-01-01 RX ADMIN — Medication: at 08:16

## 2019-01-01 RX ADMIN — FLUCONAZOLE 200 MG: 200 TABLET ORAL at 08:13

## 2019-01-01 RX ADMIN — CASTOR OIL AND BALSAM, PERU: 788; 87 OINTMENT TOPICAL at 20:00

## 2019-01-01 RX ADMIN — SENNOSIDES 8.6 MG: 8.6 TABLET, FILM COATED ORAL at 08:05

## 2019-01-01 RX ADMIN — PROPOFOL 20 MG: 10 INJECTION, EMULSION INTRAVENOUS at 12:45

## 2019-01-01 RX ADMIN — SODIUM CHLORIDE, PRESERVATIVE FREE 3 ML: 5 INJECTION INTRAVENOUS at 20:01

## 2019-01-01 RX ADMIN — TAZOBACTAM SODIUM AND PIPERACILLIN SODIUM 3.38 G: 375; 3 INJECTION, SOLUTION INTRAVENOUS at 10:13

## 2019-01-01 RX ADMIN — CALCITRIOL 0.25 MCG: 0.25 CAPSULE ORAL at 08:49

## 2019-01-01 RX ADMIN — Medication 325 MG: at 08:13

## 2019-01-01 RX ADMIN — SODIUM CHLORIDE, PRESERVATIVE FREE 3 ML: 5 INJECTION INTRAVENOUS at 20:24

## 2019-01-01 RX ADMIN — FLUCONAZOLE 200 MG: 200 TABLET ORAL at 13:34

## 2019-01-01 RX ADMIN — Medication 1 TABLET: at 09:02

## 2019-01-01 RX ADMIN — PANTOPRAZOLE SODIUM 40 MG: 40 TABLET, DELAYED RELEASE ORAL at 05:40

## 2019-01-01 RX ADMIN — DIATRIZOATE MEGLUMINE AND DIATRIZOATE SODIUM 15 ML: 660; 100 LIQUID ORAL; RECTAL at 19:17

## 2019-01-01 RX ADMIN — Medication 325 MG: at 12:38

## 2019-01-01 RX ADMIN — Medication 5 MG: at 20:44

## 2019-01-01 RX ADMIN — HYDRALAZINE HYDROCHLORIDE 10 MG: 10 TABLET ORAL at 21:10

## 2019-01-01 RX ADMIN — MIDODRINE HYDROCHLORIDE 5 MG: 5 TABLET ORAL at 06:07

## 2019-01-01 RX ADMIN — TAZOBACTAM SODIUM AND PIPERACILLIN SODIUM 3.38 G: 375; 3 INJECTION, SOLUTION INTRAVENOUS at 20:21

## 2019-01-01 RX ADMIN — Medication: at 21:33

## 2019-01-01 RX ADMIN — PANTOPRAZOLE SODIUM 40 MG: 40 INJECTION, POWDER, FOR SOLUTION INTRAVENOUS at 20:03

## 2019-01-01 RX ADMIN — Medication 5 MG: at 20:32

## 2019-01-01 RX ADMIN — CASTOR OIL AND BALSAM, PERU: 788; 87 OINTMENT TOPICAL at 12:21

## 2019-01-01 RX ADMIN — HEPARIN SODIUM 5000 UNITS: 5000 INJECTION INTRAVENOUS; SUBCUTANEOUS at 22:45

## 2019-01-01 RX ADMIN — SODIUM CHLORIDE, PRESERVATIVE FREE 3 ML: 5 INJECTION INTRAVENOUS at 09:10

## 2019-01-01 RX ADMIN — WATER: 1 INJECTION INTRAMUSCULAR; INTRAVENOUS; SUBCUTANEOUS at 09:19

## 2019-01-01 RX ADMIN — FLUCONAZOLE 200 MG: 200 INJECTION, SOLUTION INTRAVENOUS at 13:48

## 2019-01-01 RX ADMIN — TAZOBACTAM SODIUM AND PIPERACILLIN SODIUM 3.38 G: 375; 3 INJECTION, SOLUTION INTRAVENOUS at 10:04

## 2019-01-01 RX ADMIN — Medication 325 MG: at 17:24

## 2019-01-01 RX ADMIN — WATER: 1 INJECTION INTRAMUSCULAR; INTRAVENOUS; SUBCUTANEOUS at 10:04

## 2019-01-01 RX ADMIN — ERYTHROPOIETIN 10000 UNITS: 10000 INJECTION, SOLUTION INTRAVENOUS; SUBCUTANEOUS at 09:18

## 2019-01-01 RX ADMIN — HYDRALAZINE HYDROCHLORIDE 10 MG: 10 TABLET ORAL at 14:24

## 2019-01-01 RX ADMIN — ERYTHROPOIETIN 10000 UNITS: 10000 INJECTION, SOLUTION INTRAVENOUS; SUBCUTANEOUS at 10:27

## 2019-01-01 RX ADMIN — CALCITRIOL 0.25 MCG: 0.25 CAPSULE ORAL at 10:11

## 2019-01-01 RX ADMIN — SODIUM CHLORIDE, PRESERVATIVE FREE 3 ML: 5 INJECTION INTRAVENOUS at 08:36

## 2019-01-01 RX ADMIN — SODIUM CHLORIDE, PRESERVATIVE FREE 3 ML: 5 INJECTION INTRAVENOUS at 08:58

## 2019-01-01 RX ADMIN — SODIUM CHLORIDE, PRESERVATIVE FREE 3 ML: 5 INJECTION INTRAVENOUS at 20:16

## 2019-01-01 RX ADMIN — SENNOSIDES 8.6 MG: 8.6 TABLET, FILM COATED ORAL at 20:47

## 2019-01-01 RX ADMIN — CALCITRIOL 0.25 MCG: 0.25 CAPSULE ORAL at 08:22

## 2019-01-01 RX ADMIN — ALTEPLASE: 2.2 INJECTION, POWDER, LYOPHILIZED, FOR SOLUTION INTRAVENOUS at 12:48

## 2019-01-01 RX ADMIN — BISOPROLOL FUMARATE 2.5 MG: 5 TABLET ORAL at 21:33

## 2019-01-01 RX ADMIN — HEPARIN SODIUM 5000 UNITS: 5000 INJECTION INTRAVENOUS; SUBCUTANEOUS at 10:10

## 2019-01-01 RX ADMIN — TERAZOSIN HYDROCHLORIDE 2 MG: 2 CAPSULE ORAL at 21:10

## 2019-01-01 RX ADMIN — SODIUM BICARBONATE 75 ML/HR: 84 INJECTION, SOLUTION INTRAVENOUS at 15:31

## 2019-01-01 RX ADMIN — Medication 5 MG: at 20:51

## 2019-01-01 RX ADMIN — CEFTAZIDIME 1 G: 1 INJECTION, POWDER, FOR SOLUTION INTRAMUSCULAR; INTRAVENOUS at 13:16

## 2019-01-01 RX ADMIN — Medication 300 ML: at 14:33

## 2019-01-01 RX ADMIN — Medication 5 MG: at 23:08

## 2019-01-01 RX ADMIN — HEPARIN SODIUM 1900 UNITS: 1000 INJECTION, SOLUTION INTRAVENOUS; SUBCUTANEOUS at 10:29

## 2019-01-01 RX ADMIN — CALCITRIOL 0.25 MCG: 0.25 CAPSULE ORAL at 21:21

## 2019-01-01 RX ADMIN — CALCITRIOL 0.25 MCG: 0.25 CAPSULE ORAL at 09:07

## 2019-01-01 RX ADMIN — HEPARIN SODIUM 5000 UNITS: 5000 INJECTION INTRAVENOUS; SUBCUTANEOUS at 20:39

## 2019-01-01 RX ADMIN — TAZOBACTAM SODIUM AND PIPERACILLIN SODIUM 3.38 G: 375; 3 INJECTION, SOLUTION INTRAVENOUS at 08:57

## 2019-01-01 RX ADMIN — SODIUM CHLORIDE, PRESERVATIVE FREE 3 ML: 5 INJECTION INTRAVENOUS at 20:40

## 2019-01-01 RX ADMIN — Medication: at 13:35

## 2019-01-01 RX ADMIN — BISOPROLOL FUMARATE 5 MG: 5 TABLET ORAL at 21:10

## 2019-01-01 RX ADMIN — ERYTHROPOIETIN 10000 UNITS: 10000 INJECTION, SOLUTION INTRAVENOUS; SUBCUTANEOUS at 09:38

## 2019-01-01 RX ADMIN — LIDOCAINE HYDROCHLORIDE 50 MG: 10 INJECTION, SOLUTION INFILTRATION; PERINEURAL at 12:22

## 2019-01-01 RX ADMIN — HEPARIN SODIUM 5000 UNITS: 5000 INJECTION INTRAVENOUS; SUBCUTANEOUS at 08:28

## 2019-01-01 RX ADMIN — SODIUM CHLORIDE, PRESERVATIVE FREE 3 ML: 5 INJECTION INTRAVENOUS at 21:48

## 2019-01-01 RX ADMIN — SENNOSIDES 8.6 MG: 8.6 TABLET, FILM COATED ORAL at 08:34

## 2019-01-01 RX ADMIN — ALBUMIN (HUMAN) 12.5 G: 12.5 SOLUTION INTRAVENOUS at 11:31

## 2019-01-01 RX ADMIN — CASTOR OIL AND BALSAM, PERU: 788; 87 OINTMENT TOPICAL at 21:47

## 2019-01-01 RX ADMIN — SODIUM CHLORIDE 125 MG: 9 INJECTION, SOLUTION INTRAVENOUS at 13:12

## 2019-01-01 RX ADMIN — Medication: at 09:03

## 2019-01-01 RX ADMIN — BISOPROLOL FUMARATE 5 MG: 5 TABLET ORAL at 20:02

## 2019-01-01 RX ADMIN — Medication 1 TABLET: at 08:13

## 2019-01-01 RX ADMIN — BISOPROLOL FUMARATE 5 MG: 5 TABLET ORAL at 20:16

## 2019-01-01 RX ADMIN — HEPARIN SODIUM 5000 UNITS: 5000 INJECTION INTRAVENOUS; SUBCUTANEOUS at 09:02

## 2019-01-01 RX ADMIN — CASTOR OIL AND BALSAM, PERU: 788; 87 OINTMENT TOPICAL at 10:23

## 2019-01-01 RX ADMIN — ACETAMINOPHEN 650 MG: 325 TABLET ORAL at 14:40

## 2019-01-01 RX ADMIN — CALCITRIOL 0.25 MCG: 0.25 CAPSULE ORAL at 09:27

## 2019-01-01 RX ADMIN — BISOPROLOL FUMARATE 5 MG: 5 TABLET ORAL at 20:47

## 2019-01-01 RX ADMIN — SULFUR HEXAFLUORIDE 2 ML: KIT at 09:30

## 2019-01-01 RX ADMIN — ALTEPLASE: 2.2 INJECTION, POWDER, LYOPHILIZED, FOR SOLUTION INTRAVENOUS at 12:47

## 2019-01-01 RX ADMIN — FLUCONAZOLE 200 MG: 200 TABLET ORAL at 11:15

## 2019-01-01 RX ADMIN — HYDRALAZINE HYDROCHLORIDE 25 MG: 25 TABLET ORAL at 21:14

## 2019-01-01 RX ADMIN — HEPARIN SODIUM 5000 UNITS: 5000 INJECTION INTRAVENOUS; SUBCUTANEOUS at 20:16

## 2019-01-01 RX ADMIN — ALBUMIN HUMAN 25 G: 0.25 SOLUTION INTRAVENOUS at 08:32

## 2019-01-01 RX ADMIN — TAZOBACTAM SODIUM AND PIPERACILLIN SODIUM 3.38 G: 375; 3 INJECTION, SOLUTION INTRAVENOUS at 20:16

## 2019-01-01 RX ADMIN — MUPIROCIN: 20 OINTMENT TOPICAL at 11:30

## 2019-01-01 RX ADMIN — Medication: at 22:19

## 2019-01-01 RX ADMIN — FLUCONAZOLE 200 MG: 200 TABLET ORAL at 09:01

## 2019-01-01 RX ADMIN — Medication 1 PACKET: at 09:02

## 2019-01-01 RX ADMIN — CASTOR OIL AND BALSAM, PERU: 788; 87 OINTMENT TOPICAL at 08:17

## 2019-01-01 RX ADMIN — POTASSIUM & SODIUM PHOSPHATES POWDER PACK 280-160-250 MG 1 PACKET: 280-160-250 PACK at 12:15

## 2019-01-01 RX ADMIN — HEPARIN SODIUM 5000 UNITS: 5000 INJECTION INTRAVENOUS; SUBCUTANEOUS at 09:09

## 2019-01-01 RX ADMIN — ERYTHROPOIETIN 10000 UNITS: 10000 INJECTION, SOLUTION INTRAVENOUS; SUBCUTANEOUS at 10:02

## 2019-01-01 RX ADMIN — Medication 325 MG: at 19:17

## 2019-01-01 RX ADMIN — HEPARIN SODIUM 5000 UNITS: 5000 INJECTION INTRAVENOUS; SUBCUTANEOUS at 13:56

## 2019-01-01 RX ADMIN — HEPARIN SODIUM 5000 UNITS: 5000 INJECTION INTRAVENOUS; SUBCUTANEOUS at 20:32

## 2019-01-01 RX ADMIN — HEPARIN SODIUM 5000 UNITS: 5000 INJECTION INTRAVENOUS; SUBCUTANEOUS at 21:04

## 2019-01-01 RX ADMIN — HEPARIN SODIUM 5000 UNITS: 5000 INJECTION INTRAVENOUS; SUBCUTANEOUS at 14:52

## 2019-01-01 RX ADMIN — HYDRALAZINE HYDROCHLORIDE 25 MG: 25 TABLET ORAL at 15:11

## 2019-01-01 RX ADMIN — Medication: at 20:41

## 2019-01-01 RX ADMIN — HEPARIN SODIUM 5000 UNITS: 5000 INJECTION INTRAVENOUS; SUBCUTANEOUS at 21:46

## 2019-01-01 RX ADMIN — VANCOMYCIN HYDROCHLORIDE 500 MG: 500 INJECTION, POWDER, LYOPHILIZED, FOR SOLUTION INTRAVENOUS at 14:30

## 2019-01-01 RX ADMIN — HEPARIN SODIUM 5000 UNITS: 5000 INJECTION INTRAVENOUS; SUBCUTANEOUS at 05:51

## 2019-01-01 RX ADMIN — HEPARIN SODIUM 5000 UNITS: 5000 INJECTION INTRAVENOUS; SUBCUTANEOUS at 21:27

## 2019-01-01 RX ADMIN — Medication: at 06:22

## 2019-01-01 RX ADMIN — Medication: at 21:30

## 2019-01-01 RX ADMIN — SODIUM CHLORIDE, PRESERVATIVE FREE 3 ML: 5 INJECTION INTRAVENOUS at 21:14

## 2019-01-01 RX ADMIN — MEROPENEM 500 MG: 500 INJECTION, POWDER, FOR SOLUTION INTRAVENOUS at 15:30

## 2019-01-01 RX ADMIN — DEXTROSE MONOHYDRATE 25 ML: 25 INJECTION, SOLUTION INTRAVENOUS at 08:02

## 2019-01-01 RX ADMIN — HEPARIN SODIUM 5000 UNITS: 5000 INJECTION INTRAVENOUS; SUBCUTANEOUS at 09:07

## 2019-01-01 RX ADMIN — CASTOR OIL AND BALSAM, PERU: 788; 87 OINTMENT TOPICAL at 20:24

## 2019-01-01 RX ADMIN — ALBUMIN HUMAN 50 G: 0.25 SOLUTION INTRAVENOUS at 07:50

## 2019-01-01 RX ADMIN — ALBUMIN (HUMAN) 25 G: 12.5 SOLUTION INTRAVENOUS at 10:06

## 2019-01-01 RX ADMIN — BISOPROLOL FUMARATE 2.5 MG: 5 TABLET ORAL at 20:36

## 2019-01-01 RX ADMIN — CASTOR OIL AND BALSAM, PERU: 788; 87 OINTMENT TOPICAL at 20:12

## 2019-01-01 RX ADMIN — HEPARIN SODIUM 5000 UNITS: 5000 INJECTION INTRAVENOUS; SUBCUTANEOUS at 15:10

## 2019-01-01 RX ADMIN — VANCOMYCIN HYDROCHLORIDE 500 MG: 500 INJECTION, POWDER, LYOPHILIZED, FOR SOLUTION INTRAVENOUS at 15:12

## 2019-01-01 RX ADMIN — ERYTHROPOIETIN 10000 UNITS: 10000 INJECTION, SOLUTION INTRAVENOUS; SUBCUTANEOUS at 21:33

## 2019-01-01 RX ADMIN — ALBUMIN HUMAN 50 G: 0.25 SOLUTION INTRAVENOUS at 07:15

## 2019-01-01 RX ADMIN — CALCITRIOL 0.25 MCG: 0.25 CAPSULE ORAL at 09:09

## 2019-01-01 RX ADMIN — MIDODRINE HYDROCHLORIDE 5 MG: 5 TABLET ORAL at 17:25

## 2019-01-01 RX ADMIN — CALCITRIOL 0.25 MCG: 0.25 CAPSULE ORAL at 09:30

## 2019-01-01 RX ADMIN — Medication: at 10:22

## 2019-01-01 RX ADMIN — FAMOTIDINE 20 MG: 20 TABLET, FILM COATED ORAL at 08:39

## 2019-01-01 RX ADMIN — CASTOR OIL AND BALSAM, PERU: 788; 87 OINTMENT TOPICAL at 21:11

## 2019-01-01 RX ADMIN — CASTOR OIL AND BALSAM, PERU: 788; 87 OINTMENT TOPICAL at 08:52

## 2019-01-01 RX ADMIN — BISOPROLOL FUMARATE 2.5 MG: 5 TABLET ORAL at 21:27

## 2019-01-01 RX ADMIN — HEPARIN SODIUM 5000 UNITS: 5000 INJECTION INTRAVENOUS; SUBCUTANEOUS at 07:27

## 2019-01-01 RX ADMIN — FAMOTIDINE 20 MG: 20 TABLET ORAL at 11:07

## 2019-01-01 RX ADMIN — MIDODRINE HYDROCHLORIDE 5 MG: 5 TABLET ORAL at 17:24

## 2019-01-01 RX ADMIN — Medication 1 TABLET: at 10:10

## 2019-01-01 RX ADMIN — PANTOPRAZOLE SODIUM 40 MG: 40 TABLET, DELAYED RELEASE ORAL at 06:21

## 2019-01-01 RX ADMIN — CASTOR OIL AND BALSAM, PERU: 788; 87 OINTMENT TOPICAL at 10:14

## 2019-01-01 RX ADMIN — PANTOPRAZOLE SODIUM 40 MG: 40 INJECTION, POWDER, FOR SOLUTION INTRAVENOUS at 22:01

## 2019-01-01 RX ADMIN — MEROPENEM 500 MG: 500 INJECTION, POWDER, FOR SOLUTION INTRAVENOUS at 19:24

## 2019-01-01 RX ADMIN — PROPOFOL 20 MG: 10 INJECTION, EMULSION INTRAVENOUS at 12:33

## 2019-01-01 RX ADMIN — BISOPROLOL FUMARATE 5 MG: 5 TABLET ORAL at 21:29

## 2019-01-01 RX ADMIN — HEPARIN SODIUM 5000 UNITS: 5000 INJECTION INTRAVENOUS; SUBCUTANEOUS at 20:44

## 2019-01-01 RX ADMIN — Medication: at 08:40

## 2019-01-01 RX ADMIN — CASTOR OIL AND BALSAM, PERU: 788; 87 OINTMENT TOPICAL at 20:07

## 2019-01-01 RX ADMIN — CALCITRIOL 0.25 MCG: 0.25 CAPSULE ORAL at 08:18

## 2019-01-01 RX ADMIN — Medication 400 MG: at 09:07

## 2019-01-01 RX ADMIN — Medication: at 20:40

## 2019-01-01 RX ADMIN — ACETAMINOPHEN 650 MG: 325 TABLET ORAL at 10:28

## 2019-01-01 RX ADMIN — Medication 1 TABLET: at 09:09

## 2019-01-01 RX ADMIN — SODIUM CHLORIDE, PRESERVATIVE FREE 3 ML: 5 INJECTION INTRAVENOUS at 22:19

## 2019-01-01 RX ADMIN — BISOPROLOL FUMARATE 2.5 MG: 5 TABLET ORAL at 20:39

## 2019-01-01 RX ADMIN — Medication 1 PACKET: at 08:16

## 2019-01-01 RX ADMIN — ALTEPLASE: 2.2 INJECTION, POWDER, LYOPHILIZED, FOR SOLUTION INTRAVENOUS at 09:39

## 2019-01-01 RX ADMIN — HYDRALAZINE HYDROCHLORIDE 10 MG: 10 TABLET ORAL at 08:40

## 2019-01-01 RX ADMIN — TAZOBACTAM SODIUM AND PIPERACILLIN SODIUM 3.38 G: 375; 3 INJECTION, SOLUTION INTRAVENOUS at 09:52

## 2019-01-01 RX ADMIN — ERYTHROPOIETIN 10000 UNITS: 10000 INJECTION, SOLUTION INTRAVENOUS; SUBCUTANEOUS at 09:37

## 2019-01-01 RX ADMIN — HYDRALAZINE HYDROCHLORIDE 10 MG: 10 TABLET ORAL at 09:02

## 2019-01-01 RX ADMIN — Medication 5 MG: at 21:12

## 2019-01-01 RX ADMIN — SENNOSIDES AND DOCUSATE SODIUM 2 TABLET: 8.6; 5 TABLET ORAL at 20:39

## 2019-01-01 RX ADMIN — ACETAMINOPHEN 650 MG: 325 TABLET ORAL at 13:30

## 2019-01-01 RX ADMIN — PROPOFOL 50 MCG/KG/MIN: 10 INJECTION, EMULSION INTRAVENOUS at 12:30

## 2019-01-01 RX ADMIN — BISOPROLOL FUMARATE 2.5 MG: 5 TABLET ORAL at 20:40

## 2019-01-01 RX ADMIN — ALBUMIN HUMAN 25 G: 0.25 SOLUTION INTRAVENOUS at 08:30

## 2019-01-01 RX ADMIN — ACETAMINOPHEN 650 MG: 325 TABLET ORAL at 16:35

## 2019-01-01 RX ADMIN — PROPOFOL 25 MCG/KG/MIN: 10 INJECTION, EMULSION INTRAVENOUS at 18:05

## 2019-01-01 RX ADMIN — CEFTAZIDIME 1 G: 1 INJECTION, POWDER, FOR SOLUTION INTRAMUSCULAR; INTRAVENOUS at 20:41

## 2019-01-01 RX ADMIN — ALTEPLASE: 2.2 INJECTION, POWDER, LYOPHILIZED, FOR SOLUTION INTRAVENOUS at 13:27

## 2019-01-01 RX ADMIN — ALBUMIN HUMAN 25 G: 0.25 SOLUTION INTRAVENOUS at 09:45

## 2019-01-01 RX ADMIN — VANCOMYCIN HYDROCHLORIDE 500 MG: 500 INJECTION, POWDER, LYOPHILIZED, FOR SOLUTION INTRAVENOUS at 17:39

## 2019-01-01 RX ADMIN — CASTOR OIL AND BALSAM, PERU: 788; 87 OINTMENT TOPICAL at 21:07

## 2019-01-01 RX ADMIN — CASTOR OIL AND BALSAM, PERU: 788; 87 OINTMENT TOPICAL at 21:28

## 2019-01-01 RX ADMIN — ACETAMINOPHEN 650 MG: 325 TABLET ORAL at 21:50

## 2019-01-01 RX ADMIN — Medication 400 MG: at 08:13

## 2019-01-01 RX ADMIN — Medication 325 MG: at 08:44

## 2019-01-01 RX ADMIN — Medication 325 MG: at 12:09

## 2019-01-01 RX ADMIN — Medication: at 17:41

## 2019-01-01 RX ADMIN — PIPERACILLIN SODIUM, TAZOBACTAM SODIUM 2.25 G: 4; .5 INJECTION, POWDER, LYOPHILIZED, FOR SOLUTION INTRAVENOUS at 20:58

## 2019-01-01 RX ADMIN — CALCITRIOL 0.25 MCG: 0.25 CAPSULE ORAL at 08:34

## 2019-01-01 RX ADMIN — FLUCONAZOLE 200 MG: 200 INJECTION, SOLUTION INTRAVENOUS at 11:14

## 2019-01-01 RX ADMIN — SENNOSIDES 8.6 MG: 8.6 TABLET, FILM COATED ORAL at 11:15

## 2019-01-01 RX ADMIN — ERYTHROPOIETIN 10000 UNITS: 10000 INJECTION, SOLUTION INTRAVENOUS; SUBCUTANEOUS at 10:30

## 2019-01-01 RX ADMIN — HYDRALAZINE HYDROCHLORIDE 25 MG: 25 TABLET ORAL at 05:56

## 2019-01-01 RX ADMIN — MEROPENEM 500 MG: 500 INJECTION, POWDER, FOR SOLUTION INTRAVENOUS at 14:54

## 2019-01-01 RX ADMIN — PROPAFENONE HYDROCHLORIDE 150 MG: 150 TABLET, FILM COATED ORAL at 09:26

## 2019-01-01 RX ADMIN — CALCITRIOL 0.25 MCG: 0.25 CAPSULE ORAL at 09:52

## 2019-01-01 RX ADMIN — HYDRALAZINE HYDROCHLORIDE 10 MG: 10 TABLET ORAL at 08:44

## 2019-01-01 RX ADMIN — CASTOR OIL AND BALSAM, PERU: 788; 87 OINTMENT TOPICAL at 22:18

## 2019-01-01 RX ADMIN — SODIUM CHLORIDE: 9 INJECTION, SOLUTION INTRAVENOUS at 12:10

## 2019-01-01 RX ADMIN — CALCITRIOL 0.25 MCG: 0.25 CAPSULE ORAL at 10:14

## 2019-01-01 RX ADMIN — SODIUM CHLORIDE, PRESERVATIVE FREE 3 ML: 5 INJECTION INTRAVENOUS at 08:23

## 2019-01-01 RX ADMIN — HEPARIN SODIUM 5000 UNITS: 5000 INJECTION INTRAVENOUS; SUBCUTANEOUS at 06:21

## 2019-01-01 RX ADMIN — FLUCONAZOLE 200 MG: 200 TABLET ORAL at 14:52

## 2019-01-01 RX ADMIN — Medication: at 05:56

## 2019-01-01 RX ADMIN — PANTOPRAZOLE SODIUM 40 MG: 40 INJECTION, POWDER, FOR SOLUTION INTRAVENOUS at 12:21

## 2019-01-01 RX ADMIN — HYDRALAZINE HYDROCHLORIDE 10 MG: 10 TABLET ORAL at 09:07

## 2019-01-01 RX ADMIN — CASTOR OIL AND BALSAM, PERU: 788; 87 OINTMENT TOPICAL at 08:06

## 2019-01-01 RX ADMIN — ACETAMINOPHEN 650 MG: 325 TABLET ORAL at 20:36

## 2019-01-01 RX ADMIN — HYDRALAZINE HYDROCHLORIDE 25 MG: 25 TABLET ORAL at 08:22

## 2019-01-01 RX ADMIN — MEROPENEM 500 MG: 500 INJECTION, POWDER, FOR SOLUTION INTRAVENOUS at 13:52

## 2019-01-01 RX ADMIN — CASTOR OIL AND BALSAM, PERU: 788; 87 OINTMENT TOPICAL at 08:58

## 2019-01-01 RX ADMIN — CASTOR OIL AND BALSAM, PERU: 788; 87 OINTMENT TOPICAL at 20:47

## 2019-01-01 RX ADMIN — ERYTHROPOIETIN 10000 UNITS: 10000 INJECTION, SOLUTION INTRAVENOUS; SUBCUTANEOUS at 12:54

## 2019-01-01 RX ADMIN — HYDRALAZINE HYDROCHLORIDE 10 MG: 10 TABLET ORAL at 20:40

## 2019-01-01 RX ADMIN — CALCITRIOL 0.25 MCG: 0.25 CAPSULE ORAL at 08:06

## 2019-01-01 RX ADMIN — LIDOCAINE HYDROCHLORIDE 0.5 ML: 10 INJECTION, SOLUTION EPIDURAL; INFILTRATION; INTRACAUDAL; PERINEURAL at 11:05

## 2019-01-01 RX ADMIN — ALTEPLASE: 2.2 INJECTION, POWDER, LYOPHILIZED, FOR SOLUTION INTRAVENOUS at 08:54

## 2019-01-01 RX ADMIN — VANCOMYCIN HYDROCHLORIDE 500 MG: 500 INJECTION, POWDER, LYOPHILIZED, FOR SOLUTION INTRAVENOUS at 21:32

## 2019-01-01 RX ADMIN — ACETAMINOPHEN 650 MG: 325 TABLET ORAL at 06:57

## 2019-01-01 RX ADMIN — Medication 5 MG: at 20:26

## 2019-01-01 RX ADMIN — SODIUM CHLORIDE, PRESERVATIVE FREE 3 ML: 5 INJECTION INTRAVENOUS at 20:59

## 2019-01-01 RX ADMIN — CEFAZOLIN SODIUM 2 G: 2 INJECTION, SOLUTION INTRAVENOUS at 12:14

## 2019-01-01 RX ADMIN — HYDRALAZINE HYDROCHLORIDE 25 MG: 25 TABLET ORAL at 14:08

## 2019-01-16 NOTE — H&P
Pre-Op H&P  Fabiola Pimentel  [id number]  1934    Chief complaint: Chronic renal failure    HPI:    Patient is a 84 y.o.female who presents for dialysis access secondary to stage 4 chronic kidney disease. She agrees to proceed with surgery to include a left upper extremity arteriovenous fistula creation, brachiocephalic shunt with artegraft.    Review of Systems:  General ROS: negative for chills, fever or skin lesions;  No changes since last office visit  Cardiovascular ROS: no chest pain or dyspnea on exertion; +HTN, history of premature atrial and ventricular contractions  Respiratory ROS: no cough, shortness of breath, or wheezing; +pulmonary HTN      Allergies:   Allergies   Allergen Reactions   • Benadryl [Diphenhydramine]      Unknown reaction   • Diltiazem      Edema     • Micardis [Telmisartan]      Hyperkalemia     • Other      DIURETICS, worsening renal insufficiency.         Home Meds:    No current facility-administered medications on file prior to encounter.      Current Outpatient Medications on File Prior to Encounter   Medication Sig Dispense Refill   • bisoprolol (ZEBeta) 5 MG tablet TAKE 1 TABLET BY MOUTH EVERY NIGHT. 30 tablet 11   • doxazosin (CARDURA) 2 MG tablet Take 2 mg by mouth Daily.     • calcitriol (ROCALTROL) 0.25 MCG capsule Take 0.25 mcg by mouth Daily.     • Denosumab (PROLIA SC) Inject  under the skin Every 6 (Six) Months.     • Diphenoxylate-Atropine (LOMOTIL PO) Take  by mouth Daily As Needed.     • furosemide (LASIX) 40 MG tablet Take 40 mg by mouth Every Other Day. Every other day      • hydrALAZINE (APRESOLINE) 10 MG tablet Take 10 mg by mouth 2 (Two) Times a Day.     • propafenone (RYTHMOL) 150 MG tablet Take 1 tablet by mouth Every 12 (Twelve) Hours. 180 tablet 1       PMH:   Past Medical History:   Diagnosis Date   • Chronic ITP (idiopathic thrombocytopenia) (CMS/HCC)    • Chronic renal insufficiency    • CKD (chronic kidney disease)    • Diverticular disease    •  Dizziness     Dizziness with increase of propafenone to t.i.d.; however, palpitations improved, patient wishes to stay on current dose.   • DJD (degenerative joint disease)    • Hip fracture (CMS/HCC)     Recent hip and pelvic fracture.   • History of uterine cancer     Initial diagnosis, 2001, status post SALVATORE/BSO. Recurrence documented 2003, status post radiation therapy and implants.   • Hypertension    • Lower extremity edema     Lower extremity venous duplex, 09/23/2013:  No evidence of deep venous thrombosis. Negative VQ scan for pulmonary embolus, findings suggest congestive heart failure, 10/11/2013.No evidence of deep venous thrombosis by bilateral duplex, 03/25/2015.Mild   • Palpitations    • Pelvic fracture (CMS/HCC)     Recent hip and pelvic fracture.   • Premature atrial contractions     Premature atrial contractions, postoperative from incisional hernia repair:A 2D echocardiogram 01/03/2008:  Mild MR, mild to moderate TR, small pericardial effusion, EF 65%.   • Pulmonary hypertension    • Raynaud's phenomenon (secondary)     Raynaud’s phenomenon involving the left upper extremity.     • Stroke (CMS/HCC)     Right optic nerve stroke.     PSH:    Past Surgical History:   Procedure Laterality Date   • APPENDECTOMY     • CATARACT EXTRACTION WITH INTRAOCULAR LENS IMPLANT      Cataract surgery with lens implantation.   • CHOLECYSTECTOMY  1958   • COLON RESECTION  10/2005    Colon resection, October 2005, with associated splenectomy.   • DILATATION AND CURETTAGE     • INCISIONAL HERNIA REPAIR  01/02/2008    With mesh   • NEPHRECTOMY Right 2001   • OTHER SURGICAL HISTORY      Recurrence documented 2003, status post radiation therapy and implants.   • SPLENECTOMY     • SYMPATHECTOMY Left     Left axilla sympathetectomy secondary to Raynaud’s phenomenon.   • TOTAL ABDOMINAL HYSTERECTOMY WITH SALPINGO OOPHORECTOMY  2001         Social History:   Tobacco:   Social History     Tobacco Use   Smoking Status Never  "Smoker   Smokeless Tobacco Never Used      Alcohol:     Social History     Substance and Sexual Activity   Alcohol Use No       Vitals:  BP (!) 181/80 (BP Location: Right arm, Patient Position: Lying)   Pulse 51   Temp 97.7 °F (36.5 °C) (Temporal)   Resp 18   Ht 158.8 cm (62.5\")   Wt 60.8 kg (134 lb)   SpO2 97%   Breastfeeding? No   BMI 24.12 kg/m²     Physical Exam:  General Appearance:    Alert, cooperative, no distress, appears stated age   Head:    Normocephalic, without obvious abnormality, atraumatic   Lungs:     Clear to auscultation bilaterally, diminished lung sounds, respirations unlabored    Heart:   Regular rate and rhythm, S1 and S2 normal, no murmur, rub    or gallop    Abdomen:    Soft, non-tender, +bowel sounds   Breast Exam:    deferred   Genitalia:    deferred   Extremities:   Extremities normal, atraumatic, no cyanosis or edema   Skin:   Skin color, texture, turgor normal, no rashes or lesions   Neurologic:   Grossly intact   Results Review  I have reviewed the patient's recent clinical results.  *Serum K+ drawn in pre-op pending    Cancer Staging (if applicable)  Cancer Patient: __ yes _X_no __unknown; If yes, clinical stage T:__ N:__M:__, stage group or __N/A    Impression: Stage 4 chronic kidney disease with need for dialysis access    Plan:  1. Left upper extremity arteriovenous fistula creation, brachiocephalic shunt with artegraft  2. Anesthesia aware of elevated BP. Will recheck BP in pre-op and will continue to monitor closely.      Tanesha Leonard, APRN   1/16/2019   11:10 AM  "

## 2019-01-16 NOTE — ANESTHESIA POSTPROCEDURE EVALUATION
Patient: Fabiola Pimentel    Procedure Summary     Date:  01/16/19 Room / Location:   [facility] /  [facility]    Anesthesia Start:  1210 Anesthesia Stop:      Procedure:  LEFT UPPER EXTREMITY ARTERIOVENOUS FISTULA FORMATION, BRACHIO-AXILLARY SHUNT WITH ARTEGRAFT (Left ) Diagnosis:      Surgeon:  Dale Ramirez MD Provider:  Juan Chavez MD    Anesthesia Type:  general with block ASA Status:  4          Anesthesia Type: general with block  Last vitals  BP   (!) 181/80 (01/16/19 1128)   Temp   97.7 °F (36.5 °C) (01/16/19 1128)   Pulse   51 (01/16/19 1128)   Resp   18 (01/16/19 1128)     SpO2   97 % (01/16/19 1128)     Post Anesthesia Care and Evaluation    Patient location during evaluation: PACU  Patient participation: complete - patient participated  Level of consciousness: awake and alert  Pain score: 0  Pain management: adequate  Airway patency: patent  Anesthetic complications: No anesthetic complications  PONV Status: none  Cardiovascular status: hemodynamically stable and acceptable  Respiratory status: nonlabored ventilation, acceptable and nasal cannula  Hydration status: acceptable    Comments: 115/68 53 16 97.8F 100%

## 2019-01-16 NOTE — OP NOTE
General Surgery Operative Note    Fabiola Pimentel  [id number]  1934    Date of Surgery:  1/16/2019 2:40 PM    Pre-op Diagnosis: Chronic Renal failure    Post-op Diagnosis: Chronic Renal failure    Procedure: Arteriovenous fistula creation, brachial axillary with 5 mm Artegraft    Procedure(s):  LEFT UPPER EXTREMITY ARTERIOVENOUS FISTULA FORMATION, BRACHIO-AXILLARY SHUNT WITH ARTEGRAFT    Surgeon: Dale Ramirez MD    Assistant: None    Anesthesia: Monitor Anesthesia Care with left upper extremity regional anesthetic    Fluids: 900 mL of crystalloid    Estimated Blood Loss: Less than 25 mL    Urine Voided: Not recorded     Drains: None    Implant: 5 mm Artegraft    Findings: The brachial artery was small with minimal calcification.                   The axillary outflow vein was adequate.                   There was a noted thrill in the outflow vein.    Complications: None apparent.    History:   84-year-old lady with chronic renal failure requires long-term dialysis access.      I had a discussion regarding the risks and benefits of arteriovenous fistula creation, including but not limited to: bleeding, infection, injury to adjacent viscera (vessels and nerves), permanent neurovascular or neuromuscular deficits, a steal syndrome, non-maturation, chronic pain, need for re-intervention, distal arterial embolization, and medical issues from a cardiopulmonary and deep venous thrombosis standpoint.  All their questions were answered and they wish to proceed.     Procedure:      After informed consent the patient was taken to the operating room and placed in the supine position on the operating table.  Adequate cardiopulmonary monitoring was placed by anesthesia, the left upper extremity was prepped and draped in the standard sterile fashion, and a timeout was observed.     A longitudinal incision directly over the brachial artery pulsation above the antecubital crease was created.  I dissected through the  subcutaneous tissues down to the brachial artery which was freed from its vein. The artery was dissected circumferentially from the surrounding tissue and encircled with a vessel loop.  The artery was mildly calcified and of  adequate size for AV fistula creation.  I then created a longitudinal incision over the axillary area.  I dissected down through the subcutaneous elements into the axillary sheath.  The axillary vein was of adequate size for AV fistula creation.  I encircled the axillary vein with a vessel loop.  A subcutaneous tunnel was then created with a [place] tunneler.  Secondary to the arterial size I chose a 5 mm Artegraft.  This was tunneled in standard fashion maintaining orientation.  I began with the venous anastomosis first.  The vein was clamped proximally and distally, this was opened then with an 11 blade scalpel, and extended appropriately with Fofana scissors.  The Artegraft had been cut to an adequate length to allow a tension-free venous anastomosis.  In a running fashion using 6-0 Prolene an end-to-side anastomosis was performed in the standard fashion.  Appropriate de-airing maneuvers were performed, all clamps removed, and the anastomosis was tied down under full systemic venous pressure.  Meticulous hemostasis was obtained.  Then flushed the graft with heparinized saline and clamped this distally.  I turned my attention to the arterial anastomosis at this point, the artery was then clamped proximally and distally, and arteriotomy made with an 11 blade scalpel, and the arteriotomy was extended with Fofana scissors.  In a end to side fashion using a running 6-0 Prolene the anastomosis was performed.  Appropriate de-airing maneuvers were performed, all clamps were removed, and the arterial anastomosis was tied down under full systemic arterial pressure.  Meticulous hemostasis was obtained.  I instilled 5 mL of FloSeal around the arterial and venous anastomosis.  Both anastomoses were then  irrigated until clear using orthopedic irrigation.  I closed the subcutaneous elements with interrupted 3-0 Vicryl.  The skin was reapproximating using a stapler.  Xeroform and covered arms were used to dress the incisions.      All lap and needle counts were correct at the end of the procedure ×2.  The patient was then transferred to the recovery room in stable condition.    Dale Ramirez MD     Date: 1/16/2019  Time: 2:40 PM

## 2019-01-17 NOTE — DISCHARGE SUMMARY
General Surgery Discharge Note (Dr. HEATHER Ramirez)    Edward, NBA Chirinos    Patient Name:  Fabiola Pimentel  Admission Date:  1/16/2019  Discharge Date:  1/7/2019    Diagnosis/Problems:   Chronic renal failure    History & Hospital Course: 84-year-old lady with chronic renal failure requires long-term dialysis access.  She underwent left upper extremity brachial axillary arteriovenous fistula creation with 5 mm Artegraft.  Postoperatively she has done well.  She is moving her arm and her left upper extremity regional anesthetic is wearing off.  Her pain is under control and she is tolerating a regular diet.  She is ready to be discharged home.    Temp:  [97.6 °F (36.4 °C)-98.6 °F (37 °C)] 97.6 °F (36.4 °C)  Heart Rate:  [50-76] 55  Resp:  [16-18] 16  BP: (108-181)/(60-88) 108/66    Physical: The left upper extremity incisions are dressed and clean, there is a good thrill in the fistula.    Assessment:  Doing well may DC home.    Plan:    DC Home  Diet: Regular renal diet as tolerated  Restrictions: No heavy lifting greater than 10 lbs.  May shower/sponge bathe as needed, no tub bathes.  Do not submerge the incisions underwater.    Prescriptions: Tramadol 50 mg tablets #15  May resume all prior home medications.  No current facility-administered medications on file prior to encounter.      Current Outpatient Medications on File Prior to Encounter   Medication Sig Dispense Refill   • bisoprolol (ZEBeta) 5 MG tablet TAKE 1 TABLET BY MOUTH EVERY NIGHT. 30 tablet 11   • calcitriol (ROCALTROL) 0.25 MCG capsule Take 0.25 mcg by mouth Daily.     • Diphenoxylate-Atropine (LOMOTIL PO) Take  by mouth Daily As Needed.     • doxazosin (CARDURA) 2 MG tablet Take 2 mg by mouth Daily.     • hydrALAZINE (APRESOLINE) 10 MG tablet Take 10 mg by mouth 2 (Two) Times a Day.     • propafenone (RYTHMOL) 150 MG tablet Take 1 tablet by mouth Every 12 (Twelve) Hours. 180 tablet 1   • Denosumab (PROLIA SC) Inject  under the skin Every 6  (Six) Months.     • furosemide (LASIX) 40 MG tablet Take 40 mg by mouth As Needed. Every other day           Follow up in 2-3 weeks at [facility], [phone].    Dale Ramirez MD,  1/17/2019 - 8:51 AM

## 2019-01-17 NOTE — PLAN OF CARE
Problem: Patient Care Overview  Goal: Plan of Care Review  Outcome: Ongoing (interventions implemented as appropriate)   01/17/19 0432   Coping/Psychosocial   Plan of Care Reviewed With patient   Plan of Care Review   Progress improving   OTHER   Outcome Summary BP elevated 170s at times. Denies pain. Heavy 2-3 person assist to BSC. Very unsteady. c/o itching. Rested well.        Problem: Fall Risk (Adult)  Goal: Identify Related Risk Factors and Signs and Symptoms  Outcome: Ongoing (interventions implemented as appropriate)    Goal: Absence of Fall  Outcome: Ongoing (interventions implemented as appropriate)      Problem: Wound (Includes Pressure Injury) (Adult)  Goal: Signs and Symptoms of Listed Potential Problems Will be Absent, Minimized or Managed (Wound)  Outcome: Ongoing (interventions implemented as appropriate)

## 2019-01-31 PROBLEM — D63.1 ANEMIA DUE TO CHRONIC KIDNEY DISEASE: Chronic | Status: ACTIVE | Noted: 2019-01-01

## 2019-01-31 PROBLEM — R29.6 FALLS FREQUENTLY: Chronic | Status: ACTIVE | Noted: 2019-01-01

## 2019-01-31 PROBLEM — N18.9 ANEMIA DUE TO CHRONIC KIDNEY DISEASE: Chronic | Status: ACTIVE | Noted: 2019-01-01

## 2019-01-31 PROBLEM — R53.81 DEBILITY: Chronic | Status: ACTIVE | Noted: 2019-01-01

## 2019-01-31 PROBLEM — N17.9 ACUTE RENAL FAILURE (ARF) (HCC): Status: ACTIVE | Noted: 2019-01-01

## 2019-01-31 PROBLEM — D72.829 LEUKOCYTOSIS: Status: ACTIVE | Noted: 2019-01-01

## 2019-01-31 PROBLEM — N18.6 ESRD (END STAGE RENAL DISEASE) (HCC): Status: ACTIVE | Noted: 2019-01-01

## 2019-01-31 PROBLEM — Z85.528 HX OF RENAL CELL CARCINOMA: Status: ACTIVE | Noted: 2019-01-01

## 2019-01-31 PROBLEM — I50.32 CHRONIC DIASTOLIC CHF (CONGESTIVE HEART FAILURE) (HCC): Chronic | Status: ACTIVE | Noted: 2019-01-01

## 2019-02-01 PROBLEM — I46.9 PEA (PULSELESS ELECTRICAL ACTIVITY) (HCC): Status: ACTIVE | Noted: 2019-01-01

## 2019-02-01 PROBLEM — D64.9 ANEMIA: Status: ACTIVE | Noted: 2019-01-01

## 2019-02-01 PROBLEM — K44.9 HIATAL HERNIA: Status: ACTIVE | Noted: 2019-01-01

## 2019-02-01 NOTE — NURSING NOTE
At 0710 pt was unresponsive, dialysis stopped. Code Blue initiated. See code sheet. Dr. Adler notified and Dr. Nicole paged. At 0740, Dr. Nicole at bedside and orders received, 1/2 amp glucose given, EKG done, Chest Xray complete. Report to ICU RN and family notified via MD. PT stable on transfer.

## 2019-02-01 NOTE — PROGRESS NOTES
Discharge Planning Assessment  Psychiatric     Patient Name: Fabiola Pimentel  MRN: [id number]  Today's Date: 2/1/2019    Admit Date: 1/31/2019    Discharge Needs Assessment     Row Name 02/01/19 1526       Living Environment    Lives With  alone    Current Living Arrangements  home/apartment/condo    Primary Care Provided by  self    Provides Primary Care For  no one, unable/limited ability to care for self    Family Caregiver if Needed  child(sadie), adult    Quality of Family Relationships  supportive       Resource/Environmental Concerns    Resource/Environmental Concerns  none    Transportation Concerns  car, none       Transition Planning    Patient/Family Anticipates Transition to  other (see comments) SNF    Patient/Family Anticipated Services at Transition      Transportation Anticipated  family or friend will provide       Discharge Needs Assessment    Readmission Within the Last 30 Days  no previous admission in last 30 days    Concerns to be Addressed  discharge planning;adjustment to diagnosis/illness    Equipment Currently Used at Home  bath bench;cane, quad;walker, rolling    Anticipated Changes Related to Illness  inability to care for self    Equipment Needed After Discharge  none        Discharge Plan     Row Name 02/01/19 1527       Plan    Plan  SNF    Patient/Family in Agreement with Plan  yes    Plan Comments  Spoke with son over the phone.  Patient resides in [facility] and had been living alone.  Prior to admission in [place] patient was independent up until several weeks prior when she fell twice, dislocated her shoulder and became weaker.  Son states he had her PCP see her and admitted her to [facility].  Plan was to transfer to [facility].  Patient has a bath bench, cane and walker.  Spoke with Willie with [facility] at [phone].  She states they are planning on accepting patient and to please fax updates to her on Monday 2/4/19 at  [phone].  Initial referral faxed today.  Consult received to arrange outpatient dialysis with [facility] [place].  Record of dialysis with [facility] in patient's transfer record.  Spoke with Ирина with [facility] at [phone], patient is not current with them.  Ирина suggested CM call [facility] at [phone].  Spoke with Rani and states that patient is not current with them but they do inpatient dialysis for [place].  Spoke with Stephanie with [facility] at [phone] and started process.  Stephanie to fax checklist to CM at [phone]. Stephanie states that coordinator will be contacting CM regarding chair time.  CM will continue to follow.        Destination      No service coordination in this encounter.      Durable Medical Equipment      No service coordination in this encounter.      Dialysis/Infusion      No service coordination in this encounter.      Home Medical Care      No service coordination in this encounter.      Community Resources      No service coordination in this encounter.          Demographic Summary     Row Name 02/01/19 1526       General Information    Admission Type  inpatient    Arrived From  hospital transferred from [facility]    Referral Source  admission list    Reason for Consult  discharge planning    Preferred Language  English       Contact Information    Permission Granted to Share Info With          Functional Status    No documentation.       Psychosocial    No documentation.       Abuse/Neglect    No documentation.       Legal    No documentation.       Substance Abuse    No documentation.       Patient Forms    No documentation.           Thea Singleton RN

## 2019-02-01 NOTE — PLAN OF CARE
Problem: Patient Care Overview  Goal: Plan of Care Review  Outcome: Ongoing (interventions implemented as appropriate)   02/01/19 0798   Coping/Psychosocial   Plan of Care Reviewed With patient;daughter;family   Plan of Care Review   Progress no change   OTHER   Outcome Summary Received pt s/p Code (PEA). Pt alert and oriented. Pt follows commands. Complains of mild back pain. DTI on coccyx and left heel. To OR for tunneled catheter by [place]. Code Status no vent. 1UPRBC       Problem: Fall Risk (Adult)  Goal: Identify Related Risk Factors and Signs and Symptoms  Outcome: Ongoing (interventions implemented as appropriate)    Goal: Absence of Fall  Outcome: Ongoing (interventions implemented as appropriate)      Problem: Skin Injury Risk (Adult)  Goal: Identify Related Risk Factors and Signs and Symptoms  Outcome: Outcome(s) achieved Date Met: 02/01/19    Goal: Skin Health and Integrity  Outcome: Ongoing (interventions implemented as appropriate)      Problem: Wound (Includes Pressure Injury) (Adult)  Goal: Signs and Symptoms of Listed Potential Problems Will be Absent, Minimized or Managed (Wound)  Outcome: Ongoing (interventions implemented as appropriate)

## 2019-02-01 NOTE — PROGRESS NOTES
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       S/p PEA (Pulseless electrical activity) (CMS/HCC)  Skin: unstageable pressure ulcer to coccyx  Anemia   Huge hiatal hernia with intrathoracic stomach    Applicable PMH:  ESRD       (1/16/19) Temporary dialysis cath placed  S/p right nephrectomy   Hx of renal cell carcinoma  Uterine cancer  S/p SALVATORE, BSO  ITP (idiopathic thrombocytopenic purpura)  Pulmonary hypertension (CMS/HCC)  PVC's (On Propafanone)  Anemia due to chronic kidney disease  Debility  Falls frequently  Chronic diastolic CHF (congestive heart failure) (CMS/HCC)  Colectomy     Reported/Observed/Food/Nutrition Related History:         Plans for tunneled dailysis catheter today. Pt on oxygen mask at time of RD visit, pt states she has not been eating well, cannot quantify time or amount she has been consuming, states.\"I eat when I get hungry\". RD interview ended due to visit by cardiologist. Intensivist states plans to keep NPO for now due to abdomen appearance per chest XR today. KUB ordered.       Anthropometrics     Height: 163.8 cm (64.5\")  Last filed wt: Weight: 67 kg (147 lb 9.6 oz) (02/01/19 0300)  Weight Method: Bed scale    BMI: BMI (Calculated): 25  Overweight: 25.0-29.9kg/m2     Ideal Body Weight (IBW) (kg): 56.15  Admission wt:  Method obtained:    Last 15 Recorded Weights  Weight Weight (kg) Weight (lbs) Weight Method   2/1/2019 66.951 kg 147 lb 9.6 oz -   1/31/2019 69.99 kg 154 lb 4.8 oz Bed scale   1/16/2019 60.782 kg 134 lb Stated   7/12/2018 62.415 kg 137 lb " 9.6 oz -   12/19/2017 64.864 kg 143 lb -   5/16/2017 65.499 kg 144 lb 6.4 oz -   10/21/2016 69.174 kg 152 lb 8 oz -   9/21/2016 68.04 kg 150 lb -       Weight Change   UBW: 150lb   Weight change: 3 lb    % wt change:  2%  Time frame of weight loss: unknown amount of time      Labs reviewed     Results from last 7 days   Lab Units 02/01/19  0640 02/01/19  0441 01/31/19  1758   GLUCOSE mg/dL  --  56* 78   BUN mg/dL  --  57* 47*   CREATININE mg/dL  --  3.79* 3.57*   SODIUM mmol/L  --  140 139   CHLORIDE mmol/L  --  107 107   POTASSIUM mmol/L  --  4.6 4.4   PHOSPHORUS mg/dL  --  5.1 4.4   MAGNESIUM mg/dL 1.9  --  1.9   ALT (SGPT) U/L  --   --  10     Results from last 7 days   Lab Units 02/01/19  0441 01/31/19  1758   ALBUMIN g/dL 2.34* 2.67*   CHOLESTEROL mg/dL 70  --    TRIGLYCERIDES mg/dL 91  --          Results from last 7 days   Lab Units 02/01/19  0745   GLUCOSE mg/dL 68*     Lab Results   Lab Value Date/Time    HGBA1C 5.60 01/31/2019 1758         Medications reviewed   Pertinent:  ABX      Intake/Ouptut 24 hrs (7:00AM - 6:59 AM)     Intake & Output (last day)       01/31 0701 - 02/01 0700 02/01 0701 - 02/02 0700    IV Piggyback 100 50    Total Intake(mL/kg) 100 (1.5) 50 (0.7)    Urine (mL/kg/hr) 150     Total Output 150     Net -50 +50                Current Nutrition Prescription     PO: NPO Diet           Nutrition Diagnosis     2/1  Problem Inadequate oral intake   Etiology GI status/procedures   Signs/Symptoms NPO r/t abdominal appearance via XR chest today, NPO planned for tunneled dialysis catheter      Problem Increased kcal and protein needs   Etiology Skin integrity    Signs/Symptoms Unstageable pressure ulcer to coccyx      Nutrition Intervention   1.  Follow treatment progress, Care plan reviewed  2. Additional nutrition intervention recommendations per clinical status     Goal:   General: Nutrition support treatment  PO: Advace diet as medically feasible/appropriate      Monitoring/Evaluation:   Per  protocol, I&O, GI status      Will Continue to follow per protocol      Anna Macario RDN, LD  Time Spent: 30min

## 2019-02-01 NOTE — PROGRESS NOTES
"  CRITICAL CARE ADMISSION NOTE    Chief Complaint     PEA (Pulseless electrical activity) (CMS/[facility])    History of Present Illness     84-year-old white female moved to the intensive care unit this morning following a brief PEA arrest in dialysis.  Apparently soon after initiating hemodialysis, she became pulseless.  She apparently had a \"disorganized\" rhythm and received about 3 minutes of CPR before resumption of a pulse and rhythm.  The patient did not require intubation.    She's currently awake and alert.  Not requiring any vasopressor support  She is not complaining of chest pain.    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  She's been dialyzed through a temporary dialysis catheter and had a AV fistula placed earlier this year    Problem List, Surgical History, Family, Social History, and ROS     Patient Active Problem List   Diagnosis   • ITP (idiopathic thrombocytopenic purpura)   • Palpitations   • Premature atrial contractions   • Hypertension   • Chronic renal insufficiency   • Pulmonary hypertension (CMS/HCC)   • DJD (degenerative joint disease)   • Raynaud's phenomenon (secondary)   • Diverticular disease   • CKD (chronic kidney disease) stage 5, GFR less than 15 ml/min (CMS/HCC)   • PVC's (On Propafanone)   • Leukocytosis   • Anemia due to chronic kidney disease   • Debility   • Hx of renal cell carcinoma   • Chronic diastolic CHF (congestive heart failure) (CMS/HCC)   • Falls frequently   • Acute renal failure (ARF) (CMS/HCC)   • PEA (Pulseless electrical activity) (CMS/[facility])   • Anemia   • Huge hiatal hernia with intrathoracic stomach     Past Surgical History:   Procedure Laterality Date   • APPENDECTOMY     • ARTERIOVENOUS FISTULA/SHUNT SURGERY Left 1/16/2019    Procedure: LEFT UPPER EXTREMITY ARTERIOVENOUS FISTULA FORMATION, BRACHIO-AXILLARY SHUNT WITH ARTEGRAFT;  Surgeon: Dale Ramirez MD;  Location: "  [facility];  Service: Vascular   • CATARACT EXTRACTION WITH INTRAOCULAR LENS IMPLANT      Cataract surgery with lens implantation.   • CHOLECYSTECTOMY  1958   • COLON RESECTION  10/2005    Colon resection, October 2005, with associated splenectomy.   • DILATATION AND CURETTAGE     • INCISIONAL HERNIA REPAIR  01/02/2008    With mesh   • NEPHRECTOMY Right 2001   • OTHER SURGICAL HISTORY      Recurrence documented 2003, status post radiation therapy and implants.   • SPLENECTOMY     • SYMPATHECTOMY Left     Left axilla sympathetectomy secondary to Raynaud’s phenomenon.   • TOTAL ABDOMINAL HYSTERECTOMY WITH SALPINGO OOPHORECTOMY  2001       Allergies   Allergen Reactions   • Benadryl [Diphenhydramine]      Unknown reaction   • Diltiazem      Edema     • Micardis [Telmisartan]      Hyperkalemia     • Other      DIURETICS, worsening renal insufficiency.       No current facility-administered medications on file prior to encounter.      Current Outpatient Medications on File Prior to Encounter   Medication Sig   • bisoprolol (ZEBeta) 5 MG tablet TAKE 1 TABLET BY MOUTH EVERY NIGHT.   • calcitriol (ROCALTROL) 0.25 MCG capsule Take 0.25 mcg by mouth Daily.   • Denosumab (PROLIA SC) Inject  under the skin Every 6 (Six) Months.   • Diphenoxylate-Atropine (LOMOTIL PO) Take  by mouth Daily As Needed.   • doxazosin (CARDURA) 2 MG tablet Take 2 mg by mouth Daily.   • furosemide (LASIX) 40 MG tablet Take 40 mg by mouth As Needed. Every other day    • hydrALAZINE (APRESOLINE) 10 MG tablet Take 10 mg by mouth 2 (Two) Times a Day.   • propafenone (RYTHMOL) 150 MG tablet Take 1 tablet by mouth Every 12 (Twelve) Hours.     MEDICATION LIST AND ALLERGIES REVIEWED.    Family History   Problem Relation Age of Onset   • Heart attack Mother    • No Known Problems Father      Social History     Tobacco Use   • Smoking status: Never Smoker   • Smokeless tobacco: Never Used   Substance Use Topics   • Alcohol use: No   • Drug use: No     Social  "History     Social History Narrative   • Not on file     FAMILY AND SOCIAL HISTORY REVIEWED.    Review of Systems  AS ABOVE OR ALL OTHER SYSTEMS REVIEWED AND ARE NEGATIVE.    Physical Exam and Clinical Information   /59   Pulse 67   Temp 98 °F (36.7 °C) (Axillary)   Resp 19   Ht 163.8 cm (64.5\")   Wt 67 kg (147 lb 9.6 oz)   SpO2 93%   BMI 24.94 kg/m²   Physical Exam:   GENERAL: Awake, no distress   HEENT: No adenopathy or thyromegaly.  Dialysis catheter site without erythema or drainage   LUNGS: Basilar crackles, no wheezes   HEART: Irregular rate and rhythm with a grade 2/6 systolic murmur   GI: Soft, nontender   EXTREMITIES: Trace edema.  Palpable pulses.  Good thrill from AV fistula   NEURO/PSYCH: Awake and alert.  Follows simple commands.  No overt deficit    Results from last 7 days   Lab Units 02/01/19  0441 01/31/19  1758   WBC 10*3/mm3 21.88* 25.51*   HEMOGLOBIN g/dL 6.9* 7.1*   PLATELETS 10*3/mm3 175 185     Results from last 7 days   Lab Units 02/01/19  0640 02/01/19  0441 01/31/19  1758   SODIUM mmol/L  --  140 139   POTASSIUM mmol/L  --  4.6 4.4   CO2 mmol/L  --  19.0* 20.0   BUN mg/dL  --  57* 47*   CREATININE mg/dL  --  3.79* 3.57*   MAGNESIUM mg/dL 1.9  --  1.9   PHOSPHORUS mg/dL  --  5.1 4.4   GLUCOSE mg/dL  --  56* 78     Estimated Creatinine Clearance: 11.7 mL/min (A) (by C-G formula based on SCr of 3.79 mg/dL (H)).  Results from last 7 days   Lab Units 01/31/19  1758   HEMOGLOBIN A1C % 5.60         Lab Results   Component Value Date    LACTATE 0.8 01/31/2019        IMAGES: Chest x-ray reveals elevation of the right hemidiaphragm and a huge hiatal hernia/dilated stomach.  Cardiomegaly    I reviewed the patient's results and images.     Assesment     Active Hospital Problems    Diagnosis   • **PEA (Pulseless electrical activity) (CMS/HCC)     S/p CPR 2/1/19 morning     • Huge hiatal hernia with intrathoracic stomach   • PVC's (On Propafanone)   • Pulmonary hypertension (CMS/HCC)     " a. Echocardiogram shows a normal LV systolic function wall motion, with mildly enlarged right ventricle, moderate TR and an RVSP of 62 mmHg.  b. Echocardiogram by Dr. Schwarz, 04/11/2015: Normal LV systolic function, wall motion with trace MR, moderate TR and moderate to severe pulmonary hypertension with an RVSP of 50 to 55 mmHg.       • CKD (chronic kidney disease) stage 5, GFR less than 15 ml/min (CMS/[facility])   • Anemia   • Leukocytosis   • Anemia due to chronic kidney disease   • Debility   • Hx of renal cell carcinoma   • Chronic diastolic CHF (congestive heart failure) (CMS/[facility])   • Falls frequently   • Acute renal failure (ARF) (CMS/[facility])   • ITP (idiopathic thrombocytopenic purpura)     Plan/Recommendations     Monitor in the intensive care unit  Consult cardiology.  Consider discontinuing propafenone  Serial cardiac enzymes  Transthoracic echocardiogram  Continue empiric vancomycin  Next dialysis in the intensive care unit    Talked with patient and her family about resuscitation. She DOES NOT want intubation or vent support in the event of a respiratory arrest but WOULD WANT to be shocked at least once for a cardiac arrest.    Critical Care time spent in direct patient care: 35 minutes (excluding procedure time, if applicable) including high complexity decision making to assess, manipulate, and support vital organ system failure in this individual who has impairment of one or more vital organ systems such that there is a high probability of imminent or life threatening deterioration in the patient’s condition.    UMBERTO Stephens MD  Pulmonary and Critical Care Medicine     CC: Otilio Smith DO

## 2019-02-01 NOTE — PLAN OF CARE
Problem: Patient Care Overview  Goal: Plan of Care Review  Outcome: Ongoing (interventions implemented as appropriate)   02/01/19 1152   Coping/Psychosocial   Plan of Care Reviewed With patient   Plan of Care Review   Progress no change   OTHER   Outcome Summary Patient consult for pressure injury to coccyx (POA)-area appears as DTPI that is possibly opening-serosanguineous drainage noted on dressing-Venelex ordered-PT Wound Care consulted for MIST therapy. Patient also has small DTPI on left heel (0.5 cm x 0.5 cm)-should resolve with off-loading-black heel boots in place-foam dressing applied to heel. Right heel pink/slight red/boggy but blanching. Patient on IsoLibrium bed. All interventions in place and implemented-WOC will continue to follow. Patient will need specialty bed upon leaving ICU. Please contact WOC for questions or concerns.        Problem: Wound (Includes Pressure Injury) (Adult)  Goal: Signs and Symptoms of Listed Potential Problems Will be Absent, Minimized or Managed (Wound)  Outcome: Ongoing (interventions implemented as appropriate)

## 2019-02-01 NOTE — DISCHARGE PLACEMENT REQUEST
"Fabiola Pimentel (84 y.o. Female)   Please call back to [phone].  Thea Singleton RN  PT/OT notes to be faxed when available.    Date of Birth Social Security Number Address Home Phone MRN    [id number]  [id number]  [place] [address] [phone] [id number]    Mormonism Marital Status          Nazarene        Admission Date Admission Type Admitting Provider Attending Provider Department, Room/Bed    1/31/19 [facility] Thompson, John Randall, MD [facility] 2A ICU, N212/1    Discharge Date Discharge Disposition Discharge Destination                       Attending Provider:  Malachi Stephens MD    Allergies:  Benadryl [Diphenhydramine], Diltiazem, Micardis [Telmisartan], Other    Isolation:  None   Infection:  None   Code Status:  CPR    Ht:  163.8 cm (64.5\")   Wt:  63.4 kg (139 lb 12.4 oz)    Admission Cmt:  None   Principal Problem:  PEA (Pulseless electrical activity) (CMS/HCC) [I46.9] More...                 Active Insurance as of 1/31/2019     Primary Coverage     Payor Plan Insurance Group Employer/Plan Group    [place] [employer] MED[place]ARE [place]ACEMENT [place] [id number]     Payor Plan Address Payor Plan Phone Number Payor Plan Fax Number Effective Dates    [address] [address]   1/1/2016 - None Entered    [facility] [id number]       Subscriber Name Subscriber Birth Date Member ID       FABIOLA PIMENTEL 1934 [id number]                 Emergency Contacts      (Rel.) Home Phone Work Phone Mobile Phone    Ang Pimentel (Son) [phone]            Insurance Information                [employer] REPL[employer]EMENT/[employer] Phone:     Subscriber: Fabiola Pimentel Subscriber#: [id number]    Group#: [id number] Precert#: [id number]          Problem List           Codes Noted - Resolved       Hospital    * (Principal) PEA (Pulseless electrical activity) (CMS/HCC) ICD-10-CM: I46.9  ICD-9-CM: 427.5 2/1/2019 - " Present    Anemia ICD-10-CM: D64.9  ICD-9-CM: 285.9 2/1/2019 - Present    Huge hiatal hernia with intrathoracic stomach ICD-10-CM: K44.9  ICD-9-CM: 553.3 2/1/2019 - Present    Leukocytosis ICD-10-CM: D72.829  ICD-9-CM: 288.60 1/31/2019 - Present    Anemia due to chronic kidney disease (Chronic) ICD-10-CM: N18.9, D63.1  ICD-9-CM: 285.21 1/31/2019 - Present    Debility (Chronic) ICD-10-CM: R53.81  ICD-9-CM: 799.3 1/31/2019 - Present    Hx of renal cell carcinoma ICD-10-CM: Z85.528  ICD-9-CM: V10.52 1/31/2019 - Present    Chronic diastolic CHF (congestive heart failure) (CMS/[facility]) (Chronic) ICD-10-CM: I50.32  ICD-9-CM: 428.32, 428.0 1/31/2019 - Present    Falls frequently (Chronic) ICD-10-CM: R29.6  ICD-9-CM: V15.88 1/31/2019 - Present    Acute renal failure (ARF) (CMS/[facility]) ICD-10-CM: N17.9  ICD-9-CM: 584.9 1/31/2019 - Present    PVC's (On Propafanone) ICD-10-CM: I49.3  ICD-9-CM: 427.69 5/16/2017 - Present    Pulmonary hypertension (CMS/[facility]) (Chronic) ICD-10-CM: I27.20  ICD-9-CM: 416.8 Unknown - Present    CKD (chronic kidney disease) stage 5, GFR less than 15 ml/min (CMS/[facility]) ICD-10-CM: N18.5  ICD-9-CM: 585.5 Unknown - Present    ITP (idiopathic thrombocytopenic purpura) (Chronic) ICD-10-CM: D69.3  ICD-9-CM: 287.31 9/21/2016 - Present       Non-Hospital    Palpitations ICD-10-CM: R00.2  ICD-9-CM: 785.1 Unknown - Present    Premature atrial contractions ICD-10-CM: I49.1  ICD-9-CM: 427.61 Unknown - Present    Hypertension (Chronic) ICD-10-CM: I10  ICD-9-CM: 401.9 Unknown - Present    Chronic renal insufficiency (Chronic) ICD-10-CM: N18.9  ICD-9-CM: 585.9 Unknown - Present    DJD (degenerative joint disease) (Chronic) ICD-10-CM: M19.90  ICD-9-CM: 715.90 Unknown - Present    Raynaud's phenomenon (secondary) (Chronic) ICD-10-CM: I73.00  ICD-9-CM: 443.0 Unknown - Present    Diverticular disease (Chronic) ICD-10-CM: K57.90  ICD-9-CM: 562.10 Unknown - Present          Hospital Medications (active)       Dose Frequency Start  End    albumin human 25 % IV SOLN 25 g 25 g As Needed 2/1/2019 2/2/2019    Sig - Route: Infuse 100 mL into a venous catheter As Needed (Hypotension During Dialysis). - Intravenous    bisoprolol (ZEBeta) tablet 5 mg 5 mg Nightly 1/31/2019     Sig - Route: Take 1 tablet by mouth Every Night. - Oral    calcitriol (ROCALTROL) capsule 0.25 mcg 0.25 mcg Daily 1/31/2019     Sig - Route: Take 1 capsule by mouth Daily. - Oral    castor oil-balsam peru (VENELEX) ointment  Every 12 Hours Scheduled 2/1/2019     Sig - Route: Apply  topically to the appropriate area as directed Every 12 (Twelve) Hours. - Topical    Cosign for Ordering: Accepted by Malachi Stephens MD on 2/1/2019  3:41 PM    dextrose (D50W) 25 g/ 50mL Intravenous Solution 25 mL 25 mL Every 1 Hour PRN 2/1/2019     Sig - Route: Infuse 25 mL into a venous catheter Every 1 (One) Hour As Needed for Low Blood Sugar. - Intravenous    Pharmacy to dose vancomycin  Continuous PRN 1/31/2019 2/6/2019    Sig - Route: Continuous As Needed for Consult. - Does not apply    piperacillin-tazobactam (ZOSYN) 3.375 g in iso-osmotic dextrose 50 ml (premix) 3.375 g Every 12 Hours Scheduled 2/1/2019 2/6/2019    Sig - Route: Infuse 50 mL into a venous catheter Every 12 (Twelve) Hours. - Intravenous    sodium chloride 0.9 % flush 3 mL 3 mL Every 12 Hours Scheduled 1/31/2019     Sig - Route: Infuse 3 mL into a venous catheter Every 12 (Twelve) Hours. - Intravenous    sodium chloride 0.9 % flush 3-10 mL 3-10 mL As Needed 1/31/2019     Sig - Route: Infuse 3-10 mL into a venous catheter As Needed for Line Care. - Intravenous    Sulfur Hexafluoride Microsph 60.7-25 MG reconstituted suspension 2 mL 2 mL Once 2/1/2019 2/1/2019    Sig - Route: Infuse 2 mL into a venous catheter 1 (One) Time. - Intravenous    vancomycin (dosing per levels)  Daily 2/1/2019 2/8/2019    Sig - Route: Daily. - Does not apply    vancomycin 1750 mg/500 mL 0.9% NS IVPB ([facility]) 25 mg/kg × 70 kg Once 1/31/2019 1/31/2019     Sig - Route: Infuse 500 mL into a venous catheter 1 (One) Time. - Intravenous    Pharmacy Consult - Pharmacy to dose (Discontinued)  Continuous PRN 1/31/2019 2/1/2019    Sig - Route: Continuous As Needed for Consult. - Does not apply    piperacillin-tazobactam (ZOSYN) 2.25 g in sodium chloride 0.9 % 100 mL IVPB (Discontinued) 2.25 g Every 6 Hours 1/31/2019 1/31/2019    Sig - Route: Infuse 2.25 g into a venous catheter Every 6 (Six) Hours. - Intravenous    Notes to Pharmacy: Please renally dose    Reason for Discontinue: Duplicate order    piperacillin-tazobactam (ZOSYN) 2.25 g in sodium chloride 0.9 % 100 mL IVPB (Discontinued) 2.25 g Every 12 Hours 1/31/2019 2/1/2019    Sig - Route: Infuse 2.25 g into a venous catheter Every 12 (Twelve) Hours. - Intravenous    piperacillin-tazobactam (ZOSYN) in iso-osmotic dextrose IVPB 2.25 g (premix) (Discontinued) 2.25 g Every 12 Hours 1/31/2019 1/31/2019    Sig - Route: Infuse 50 mL into a venous catheter Every 12 (Twelve) Hours. - Intravenous    Reason for Discontinue: Duplicate order    propafenone (RYTHMOL) tablet 150 mg (Discontinued) 150 mg Every 12 Hours 1/31/2019 2/1/2019    Sig - Route: Take 1 tablet by mouth Every 12 (Twelve) Hours. - Oral             Physician Progress Notes (last 24 hours) (Notes from 1/31/2019  3:43 PM through 2/1/2019  3:43 PM)      Isaac Proctor MD at 2/1/2019 12:48 PM             LOS: 1 day    Patient Care Team:  Otilio Smith DO as PCP - General (Family Medicine)        Subjective     Interval History:     Patient had an episode of PEA earlier this morning on dialysis. She is now in ICU on venti mask.     Review of Systems:    Review of systems could not be obtained due to  patient nonverbal.    Objective     Vital Sign Min/Max for last 24 hours  Temp  Min: 97.4 °F (36.3 °C)  Max: 98.2 °F (36.8 °C)   BP  Min: 73/35  Max: 132/75   Pulse  Min: 65  Max: 77   Resp  Min: 16  Max: 26   SpO2  Min: 92 %  Max: 96 %   No Data Recorded  "  Weight  Min: 63.4 kg (139 lb 12.4 oz)  Max: 70 kg (154 lb 4.8 oz)     Flowsheet Rows      First Filed Value   Admission Height  163.8 cm (64.5\") Documented at 02/01/2019 0300   Admission Weight  70 kg (154 lb 4.8 oz) Documented at 01/31/2019 1607          I/O this shift:  In: 234.4 [Blood:156.7; IV Piggyback:77.7]  Out: -   I/O last 3 completed shifts:  In: 100 [IV Piggyback:100]  Out: 150 [Urine:150]    Physical Exam:     General Appearance:    Alert, cooperative, in no acute distress   Head:    Normocephalic, without obvious abnormality, atraumatic               Neck:   No adenopathy, supple, trachea midline, no thyromegaly, no     carotid bruit, no JVD       Lungs:     Diminished air entry,respirations regular, even and                   unlabored    Heart:    Regular rhythm and normal rate, normal S1 and S2, no            murmur, no gallop, no rub, no click       Abdomen:     Normal bowel sounds, no masses, no organomegaly, soft        non-tender, non-distended, no guarding, no rebound                 tenderness       Extremities:   Moves all extremities well, no edema, no cyanosis, no              redness               Neurologic:   No focal deficit noted grossly.        WBC WBC   Date Value Ref Range Status   02/01/2019 21.88 (H) 3.50 - 10.80 10*3/mm3 Final     Comment:     WBC corrected for presence of NRBC's   01/31/2019 25.51 (H) 3.50 - 10.80 10*3/mm3 Final     Comment:     WBC corrected for presence of NRBC's      HGB Hemoglobin   Date Value Ref Range Status   02/01/2019 6.9 (L) 11.5 - 15.5 g/dL Final   01/31/2019 7.1 (L) 11.5 - 15.5 g/dL Final      HCT Hematocrit   Date Value Ref Range Status   02/01/2019 22.0 (L) 34.5 - 44.0 % Final   01/31/2019 21.4 (L) 34.5 - 44.0 % Final      Platlets No results found for: LABPLAT   MCV MCV   Date Value Ref Range Status   02/01/2019 100.5 (H) 80.0 - 99.0 fL Final   01/31/2019 96.4 80.0 - 99.0 fL Final          Sodium Sodium   Date Value Ref Range Status   02/01/2019 " 140 132 - 146 mmol/L Final   01/31/2019 139 132 - 146 mmol/L Final      Potassium Potassium   Date Value Ref Range Status   02/01/2019 4.6 3.5 - 5.5 mmol/L Final   01/31/2019 4.4 3.5 - 5.5 mmol/L Final      Chloride Chloride   Date Value Ref Range Status   02/01/2019 107 99 - 109 mmol/L Final   01/31/2019 107 99 - 109 mmol/L Final      CO2 CO2   Date Value Ref Range Status   02/01/2019 19.0 (L) 20.0 - 31.0 mmol/L Final   01/31/2019 20.0 20.0 - 31.0 mmol/L Final      BUN BUN   Date Value Ref Range Status   02/01/2019 57 (H) 9 - 23 mg/dL Final   01/31/2019 47 (H) 9 - 23 mg/dL Final      Creatinine Creatinine   Date Value Ref Range Status   02/01/2019 3.79 (H) 0.60 - 1.30 mg/dL Final   01/31/2019 3.57 (H) 0.60 - 1.30 mg/dL Final      Calcium Calcium   Date Value Ref Range Status   02/01/2019 6.1 (L) 8.7 - 10.4 mg/dL Final   01/31/2019 6.7 (L) 8.7 - 10.4 mg/dL Final      PO4 No results found for: CAPO4   Albumin Albumin   Date Value Ref Range Status   02/01/2019 2.34 (L) 3.20 - 4.80 g/dL Final   01/31/2019 2.67 (L) 3.20 - 4.80 g/dL Final      Magnesium Magnesium   Date Value Ref Range Status   02/01/2019 1.9 1.3 - 2.7 mg/dL Final   01/31/2019 1.9 1.3 - 2.7 mg/dL Final      Uric Acid No results found for: URICACID        Results Review:     I reviewed the patient's new clinical results.      bisoprolol 5 mg Oral Nightly   calcitriol 0.25 mcg Oral Daily   piperacillin-tazobactam 3.375 g Intravenous Q12H   sodium chloride 3 mL Intravenous Q12H   vancomycin (dosing per levels)  Does not apply Daily       Pharmacy Consult - Pharmacy to dose    Pharmacy to dose vancomycin        Medication Review:     Assessment/Plan       PEA (Pulseless electrical activity) (CMS/[facility])    ITP (idiopathic thrombocytopenic purpura)    Pulmonary hypertension (CMS/HCC)    CKD (chronic kidney disease) stage 5, GFR less than 15 ml/min (CMS/HCC)    PVC's (On Propafanone)    Leukocytosis    Anemia due to chronic kidney disease    Debility    Hx of  "renal cell carcinoma    Chronic diastolic CHF (congestive heart failure) (CMS/HCC)    Falls frequently    Acute renal failure (ARF) (CMS/HCC)    Anemia    Huge hiatal hernia with intrathoracic stomach      1- 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   4- Anemia of chronic disease.   5- Dialysis access -needs tunneled catheter. AVF 2 week old.     Plan:  - HD terminated in the morning. Will hold HD today.   - HD tomorrow.   - Vascular surgery consult for tunneled catheter.   - Renal diet  - Epo with HD.   - Agree with blood transfusion     Discussed with MAXWELL Proctor MD  02/01/19  12:49 PM        Electronically signed by Isaac Proctor MD at 2/1/2019 12:53 PM     Malachi Stephens MD at 2/1/2019 11:44 AM            CRITICAL CARE ADMISSION NOTE    Chief Complaint     PEA (Pulseless electrical activity) (CMS/HCC)    History of Present Illness     84-year-old white female moved to the intensive care unit this morning following a brief PEA arrest in dialysis.  Apparently soon after initiating hemodialysis, she became pulseless.  She apparently had a \"disorganized\" rhythm and received about 3 minutes of CPR before resumption of a pulse and rhythm.  The patient did not require intubation.    She's currently awake and alert.  Not requiring any vasopressor support  She is not complaining of chest pain.    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  She's been dialyzed through a temporary dialysis catheter and had a AV fistula placed earlier this year    Problem List, Surgical History, Family, Social History, and ROS     Patient Active Problem List   Diagnosis   • ITP (idiopathic thrombocytopenic purpura)   • Palpitations   • Premature atrial contractions   • Hypertension   • Chronic renal insufficiency   • Pulmonary hypertension (CMS/HCC)   • DJD " (degenerative joint disease)   • Raynaud's phenomenon (secondary)   • Diverticular disease   • CKD (chronic kidney disease) stage 5, GFR less than 15 ml/min (CMS/[facility])   • PVC's (On Propafanone)   • Leukocytosis   • Anemia due to chronic kidney disease   • Debility   • Hx of renal cell carcinoma   • Chronic diastolic CHF (congestive heart failure) (CMS/[facility])   • Falls frequently   • Acute renal failure (ARF) (CMS/[facility])   • PEA (Pulseless electrical activity) (CMS/[facility])   • Anemia   • Huge hiatal hernia with intrathoracic stomach     Past Surgical History:   Procedure Laterality Date   • APPENDECTOMY     • ARTERIOVENOUS FISTULA/SHUNT SURGERY Left 1/16/2019    Procedure: LEFT UPPER EXTREMITY ARTERIOVENOUS FISTULA FORMATION, BRACHIO-AXILLARY SHUNT WITH ARTEGRAFT;  Surgeon: Dale Ramirez MD;  Location: [facility];  Service: Vascular   • CATARACT EXTRACTION WITH INTRAOCULAR LENS IMPLANT      Cataract surgery with lens implantation.   • CHOLECYSTECTOMY  1958   • COLON RESECTION  10/2005    Colon resection, October 2005, with associated splenectomy.   • DILATATION AND CURETTAGE     • INCISIONAL HERNIA REPAIR  01/02/2008    With mesh   • NEPHRECTOMY Right 2001   • OTHER SURGICAL HISTORY      Recurrence documented 2003, status post radiation therapy and implants.   • SPLENECTOMY     • SYMPATHECTOMY Left     Left axilla sympathetectomy secondary to Raynaud’s phenomenon.   • TOTAL ABDOMINAL HYSTERECTOMY WITH SALPINGO OOPHORECTOMY  2001       Allergies   Allergen Reactions   • Benadryl [Diphenhydramine]      Unknown reaction   • Diltiazem      Edema     • Micardis [Telmisartan]      Hyperkalemia     • Other      DIURETICS, worsening renal insufficiency.       No current facility-administered medications on file prior to encounter.      Current Outpatient Medications on File Prior to Encounter   Medication Sig   • bisoprolol (ZEBeta) 5 MG tablet TAKE 1 TABLET BY MOUTH EVERY NIGHT.   • calcitriol (ROCALTROL) 0.25 MCG capsule  "Take 0.25 mcg by mouth Daily.   • Denosumab (PROLIA SC) Inject  under the skin Every 6 (Six) Months.   • Diphenoxylate-Atropine (LOMOTIL PO) Take  by mouth Daily As Needed.   • doxazosin (CARDURA) 2 MG tablet Take 2 mg by mouth Daily.   • furosemide (LASIX) 40 MG tablet Take 40 mg by mouth As Needed. Every other day    • hydrALAZINE (APRESOLINE) 10 MG tablet Take 10 mg by mouth 2 (Two) Times a Day.   • propafenone (RYTHMOL) 150 MG tablet Take 1 tablet by mouth Every 12 (Twelve) Hours.     MEDICATION LIST AND ALLERGIES REVIEWED.    Family History   Problem Relation Age of Onset   • Heart attack Mother    • No Known Problems Father      Social History     Tobacco Use   • Smoking status: Never Smoker   • Smokeless tobacco: Never Used   Substance Use Topics   • Alcohol use: No   • Drug use: No     Social History     Social History Narrative   • Not on file     FAMILY AND SOCIAL HISTORY REVIEWED.    Review of Systems  AS ABOVE OR ALL OTHER SYSTEMS REVIEWED AND ARE NEGATIVE.    Physical Exam and Clinical Information   /59   Pulse 67   Temp 98 °F (36.7 °C) (Axillary)   Resp 19   Ht 163.8 cm (64.5\")   Wt 67 kg (147 lb 9.6 oz)   SpO2 93%   BMI 24.94 kg/m²    Physical Exam:   GENERAL: Awake, no distress   HEENT: No adenopathy or thyromegaly.  Dialysis catheter site without erythema or drainage   LUNGS: Basilar crackles, no wheezes   HEART: Irregular rate and rhythm with a grade 2/6 systolic murmur   GI: Soft, nontender   EXTREMITIES: Trace edema.  Palpable pulses.  Good thrill from AV fistula   NEURO/PSYCH: Awake and alert.  Follows simple commands.  No overt deficit    Results from last 7 days   Lab Units 02/01/19  0441 01/31/19  1758   WBC 10*3/mm3 21.88* 25.51*   HEMOGLOBIN g/dL 6.9* 7.1*   PLATELETS 10*3/mm3 175 185     Results from last 7 days   Lab Units 02/01/19  0640 02/01/19  0441 01/31/19  1758   SODIUM mmol/L  --  140 139   POTASSIUM mmol/L  --  4.6 4.4   CO2 mmol/L  --  19.0* 20.0   BUN mg/dL  --  57* " 47*   CREATININE mg/dL  --  3.79* 3.57*   MAGNESIUM mg/dL 1.9  --  1.9   PHOSPHORUS mg/dL  --  5.1 4.4   GLUCOSE mg/dL  --  56* 78     Estimated Creatinine Clearance: 11.7 mL/min (A) (by C-G formula based on SCr of 3.79 mg/dL (H)).  Results from last 7 days   Lab Units 01/31/19  1758   HEMOGLOBIN A1C % 5.60         Lab Results   Component Value Date    LACTATE 0.8 01/31/2019        IMAGES: Chest x-ray reveals elevation of the right hemidiaphragm and a huge hiatal hernia/dilated stomach.  Cardiomegaly    I reviewed the patient's results and images.     Assesment     Active Hospital Problems    Diagnosis   • **PEA (Pulseless electrical activity) (CMS/[facility])     S/p CPR 2/1/19 morning     • Huge hiatal hernia with intrathoracic stomach   • PVC's (On Propafanone)   • Pulmonary hypertension (CMS/[facility])     a. Echocardiogram shows a normal LV systolic function wall motion, with mildly enlarged right ventricle, moderate TR and an RVSP of 62 mmHg.  b. Echocardiogram by Dr. Schwarz, 04/11/2015: Normal LV systolic function, wall motion with trace MR, moderate TR and moderate to severe pulmonary hypertension with an RVSP of 50 to 55 mmHg.       • CKD (chronic kidney disease) stage 5, GFR less than 15 ml/min (CMS/[facility])   • Anemia   • Leukocytosis   • Anemia due to chronic kidney disease   • Debility   • Hx of renal cell carcinoma   • Chronic diastolic CHF (congestive heart failure) (CMS/[facility])   • Falls frequently   • Acute renal failure (ARF) (CMS/[facility])   • ITP (idiopathic thrombocytopenic purpura)     Plan/Recommendations     Monitor in the intensive care unit  Consult cardiology.  Consider discontinuing propafenone  Serial cardiac enzymes  Transthoracic echocardiogram  Continue empiric vancomycin  Next dialysis in the intensive care unit    Critical Care time spent in direct patient care: 35 minutes (excluding procedure time, if applicable) including high complexity decision making to assess, manipulate, and support vital  organ system failure in this individual who has impairment of one or more vital organ systems such that there is a high probability of imminent or life threatening deterioration in the patient’s condition.    UMBERTO Stephens MD  Pulmonary and Critical Care Medicine     CC: Otilio Smith DO    Electronically signed by Malachi Stephens MD at 2019 11:47 AM     Raymond Nicole MD at 2019  7:59 AM              [facility] Medicine Services  PROGRESS NOTE    Patient Name: Fabiola Pimentel  : 1934  MRN: [id number]    Date of Admission: 2019  Length of Stay: 1  Primary Care Physician: Otilio Smith DO    Subjective   Subjective     CC:  Esrd, pea code    HPI:  Currently patient had oxygen mask, alert, oriented x 3. Good bilateral breath sounds and normal respiratory effort. Repeat sbp 120's. Denies dyspnea or pain which is quite surprising    Review of Systems  No headache    Otherwise ROS is negative except as mentioned in the HPI.    Objective   Objective     Vital Signs:   Temp:  [97.4 °F (36.3 °C)-98.2 °F (36.8 °C)] 98 °F (36.7 °C)  Heart Rate:  [68-72] 69  Resp:  [16] 16  BP: ()/(35-71) 73/35        Physical Exam:  Alert, frail, oriented to person, year, month, place  Oxygen mask in place, normal respiratory effort  Rrr, distant  Slightly decreased air movement bilaterally at bases, otherwise clear and normal effort  abd soft, nontender  No cce  No extremity rash    Results Reviewed:  I have personally reviewed current lab, radiology, and data and agree.    Results from last 7 days   Lab Units 19  0441 19  1758   WBC 10*3/mm3 21.88* 25.51*   HEMOGLOBIN g/dL 6.9* 7.1*   HEMATOCRIT % 22.0* 21.4*   PLATELETS 10*3/mm3 175 185     Results from last 7 days   Lab Units 19  0441 19  1758   SODIUM mmol/L 140 139   POTASSIUM mmol/L 4.6 4.4   CHLORIDE mmol/L 107 107   CO2 mmol/L 19.0* 20.0   BUN mg/dL 57* 47*   CREATININE mg/dL  3.79* 3.57*   GLUCOSE mg/dL 56* 78   CALCIUM mg/dL 6.1* 6.7*   ALT (SGPT) U/L  --  10   AST (SGOT) U/L  --  31     Estimated Creatinine Clearance: 11.7 mL/min (A) (by C-G formula based on SCr of 3.79 mg/dL (H)).    No results found for: BNP    Microbiology Results Abnormal     None          Imaging Results (last 24 hours)     Procedure Component Value Units Date/Time    XR Shoulder 2+ View Right [[id number]] Updated:  01/31/19 2252    XR Hips Bilateral With or Without Pelvis 3-4 View [[id number]] Updated:  01/31/19 2252    XR Chest 1 View [[id number]] Updated:  01/31/19 2251               I have reviewed the medications:    Current Facility-Administered Medications:   •  albumin human 25 % IV SOLN 25 g, 25 g, Intravenous, PRN, Isaac Proctor MD  •  bisoprolol (ZEBeta) tablet 5 mg, 5 mg, Oral, Nightly, Tammi Brown APRN  •  calcitriol (ROCALTROL) capsule 0.25 mcg, 0.25 mcg, Oral, Daily, Tammi Brown APRN, 0.25 mcg at 01/31/19 1812  •  dextrose (D50W) 25 g/ 50mL Intravenous Solution 25 mL, 25 mL, Intravenous, Q1H PRN, Raymond Nicole MD  •  Pharmacy Consult - Pharmacy to dose, , Does not apply, Continuous PRN, Tammi Brown APRN  •  Pharmacy to dose vancomycin, , Does not apply, Continuous PRN, Tammi Brown APRN  •  piperacillin-tazobactam (ZOSYN) 2.25 g in sodium chloride 0.9 % 100 mL IVPB, 2.25 g, Intravenous, Q12H, Mita Greenberg MD, 2.25 g at 01/31/19 2058  •  propafenone (RYTHMOL) tablet 150 mg, 150 mg, Oral, Q12H, Tammi Brown APRN  •  sodium chloride 0.9 % flush 3 mL, 3 mL, Intravenous, Q12H, Tammi Brown APRN, 3 mL at 01/31/19 2059  •  sodium chloride 0.9 % flush 3-10 mL, 3-10 mL, Intravenous, PRN, Tammi Brown APRN  •  vancomycin (dosing per levels), , Does not apply, Daily, Tammi Brown APRN      Assessment/Plan   Assessment / Plan     Active Hospital Problems    Diagnosis Date Noted   • Leukocytosis [D72.829] 01/31/2019   • Anemia due to chronic kidney disease [N18.9, D63.1]  01/31/2019   • Debility [R53.81] 01/31/2019   • Hx of renal cell carcinoma [Z85.528] 01/31/2019   • Chronic diastolic CHF (congestive heart failure) (CMS/[facility]) [I50.32] 01/31/2019   • Falls frequently [R29.6] 01/31/2019   • Acute renal failure (ARF) (CMS/[facility]) [N17.9] 01/31/2019   • Pulmonary hypertension (CMS/[facility]) [I27.20]      c. Echocardiogram shows a normal LV systolic function wall motion, with mildly enlarged right ventricle, moderate TR and an RVSP of 62 mmHg.  d. Echocardiogram by Dr. Schwarz, 04/11/2015: Normal LV systolic function, wall motion with trace MR, moderate TR and moderate to severe pulmonary hypertension with an RVSP of 50 to 55 mmHg.       • CKD (chronic kidney disease) stage 5, GFR less than 15 ml/min (CMS/[facility]) [N18.5]    • ITP (idiopathic thrombocytopenic purpura) [D69.3] 09/21/2016          Brief Hospital Course to date:  Fabiola Pimentel is a 84 y.o. female s/p prior nephrectomy for renal cancer, progressive renal failure, recent left arm fistula (1/16/19 by dr. Ramirez), pulm htn, prior pac's & palpitations (seen by [facility] dr. Schwarz in past) who presented to [facility] with confusion and falls. Apparently had dislocated right shoulder reduced, was found significantly uremic w/ creatinine >9 so was transferred to [facility] for nephrology evaluation/dialysis. Temporary Right IJ dialysis cath was placed. Had Leukocytosis of 30 K and was placed on vanc & zosyn but records unclear whether source identified. Apparently after initiating dialysis did have rapid response called at outside hostpital after developing dyspnea but stabilized. Ultimately patient was transferred to [facility] evening of 1/31/19 for higher level of care. Dr. Adler saw patient shorly after admission evening of 1/31/19 and dialysis was set up for morning of 2/1/19. Shortly after initiation of dialysis on 2/1/19 patient became unresponsive, w/ scattered cardiac activity (no organized rhythm)  on telemetry and chest compressions initiated & did receive bag/mask ventilation before regaining consciousness w/ palpable pulse. Saline and albumin was administered, dialysis stopped and patient regained organized palpable pulse.      *s/p PEA code (while on dialysis 2/1/19 morning)  *Anemia  *Sepsis, poa (data deficit)   -currently on vanc/zosyn empirically  *leukocytosis  *Renal failure, w/ progression to ESRD    -s/p  Left upper extremity fistula 1/16/19 previously by Dr. Ramirez currently maturing   -currently w/ temporary dialysis catheter  *Hx nephrectomy 2ndary to Renal cell cancer  *Hx ITP  *Hx severe pulm htn  *Hx pac's/palpitations (seen by [facility])  *recent fall w/ dislocated right shoulder s/p reduction at [facility] (at outside facility prior to this admission)  *left hip pain  *Hx remote uterine cancer (s/p tracie&bso 2001, recurrence 2003 s/p xrt)    --------------------------------------------------------------    Plan:  -transfuse 1 unit prbc as pre-dialysis hgb was 7.1 (current hgb pending, may require more than 1 unit prbc)  -echo ordered and pending  -cxr pending, follow up  -add mag level, follow up  -transferring to ICU (discussed w/ dr. Herbert) to monitor closely in icu after chest compressions/code  -consider cards consult prn, has previously seen dr. Schwarz in clinic    -continue vanc/zosyn for now  -add procalcitonin for morning  -+/-ID consultation    -right shoulder & bilateral hip/pelvis xrays pending    -I called and updated son who was appreciative of the call    *40 min total spent at bedside/on unit reviewing data and providing/coordinating critical care.     DVT Prophylaxis:  scd currently        CODE STATUS:   Code Status and Medical Interventions:   Ordered at: 01/31/19 0919     Level Of Support Discussed With:    Patient     Code Status:    CPR     Medical Interventions (Level of Support Prior to Arrest):    Full         Electronically signed by Raymond  HEATHER Nicole MD, 02/01/19, 8:00 AM.        Electronically signed by Raymond Nicole MD at 2/1/2019  8:28 AM

## 2019-02-01 NOTE — CODE DOCUMENTATION
After 3 min of compressions patient regained consciousness, and pulse was present. Paging doctor for new orders, [facility] will continue to monitor.

## 2019-02-01 NOTE — OP NOTE
VASCULAR SURGERY OPERATIVE NOTE     Fabiola Pimentel  02/01/19    Preop Diagnosis  Acute renal failure    Postop Diagnosis  Same     Procedure  1.  Placement of left internal jugular tunneled hemodialysis catheter  2.  Fluoroscopic guidance    Surgeon  Raymond Elise M.D.    Anesthesia  Local with MAC      INDICATIONS:  This 84-year-old female with history of chronic renal insufficiency and multiple medical problems has developed acute renal failure.  Due to the immediate need for hemodialysis access, a tunneled catheter was requested     FINDINGS/INTERPRETATION:  1.  Fluoroscopic guidance.  0.018 and 0.035 wires were placed into the central venous system using fluoroscopy.  Placement of the sheath and catheter also performed with fluoroscopic guidance.  Final position of the catheter tip at the cavoatrial junction was confirmed with fluoroscopic imaging.  This represents appropriate positioning of a tunneled hemodialysis catheter     PROCEDURE:  The patient's left neck and chest were prepped and draped in sterile fashion.  1% lidocaine was infiltrated over the course of the internal jugular vein low in the neck using ultrasound guidance.  A 21-gauge needle was then used to cannulate the vein.  An 0.18 wire was placed into the central venous system and location confirmed with fluoroscopy.  A 14.5 Israeli x 23 cm Palindrome catheter was then tunneled from the anterior chest to the neck access site, which had been opened further with a scalpel.  A 5 Israeli dilator was placed over the 0.018 wire which was exchanged for an 0.038 J-wire.  Serial dilatations were then performed over the wire followed by placement of the introducer sheath.  The wire and dilator were removed and then the catheter was then placed within the sheath, which was peeled away.  Each port of the catheter aspirated and flushed easily.  Each port was then packed with 5000 unit/mL heparin in the appropriate quantity.  The neck access site was closed  with interrupted inverted 3-0 plain gut subcuticular suture.  Mastisol, Steri-Strips, and a Covaderm dressing were applied.  The catheter was secured to the chest using 2-0 nylon suture.  Bacitracin ointment and a Covaderm dressing were placed in the exit site.  The patient tolerated the procedure well and was returned to recovery room in satisfactory condition.      NOTE:  The patient's skin was very thin and some of the epidermis at the exit site came off with removal of the Ioban drape.  This area was dressed with bacitracin ointment and a Xeroform dressing with gauze.    Raymond Elise MD  02/01/19  6:51 PM

## 2019-02-01 NOTE — PLAN OF CARE
Problem: Patient Care Overview  Goal: Plan of Care Review  Outcome: Ongoing (interventions implemented as appropriate)   02/01/19 2637   Coping/Psychosocial   Plan of Care Reviewed With patient   Plan of Care Review   Progress improving   OTHER   Outcome Summary VSS. 2L per nasal cannula. Pt has rested well throughout shift. RUE edematous. Totally dependent with care. Mepilex to coccyx. straight cath for UA/Cx completed. Blood consent to be signed upon family arrival. Will continue to mx.

## 2019-02-01 NOTE — PROGRESS NOTES
"Pharmacy Consult-Vancomycin Dosing  Fabiola Pimentel is a  84 y.o. female receiving vancomycin therapy. Of note, patient has ESRD and receives HD as an outpatient. Patient had a dialysis fistula placed in left forearm by Dr Ramirez on 1/16/2019. Right IJ dialysis catheter was also placed recently, and is site for current dialysis access.     Indication: Sepsis   Consulting Provider: NBA Lawton Consult: no   Goal Trough: 15-20mcg/mL    Current Antimicrobial Therapy  PTD Vancomycin (per levels)    Allergies  Allergies as of 01/31/2019 - Reviewed 01/31/2019   Allergen Reaction Noted   • Benadryl [diphenhydramine]  10/12/2016   • Diltiazem  10/12/2016   • Micardis [telmisartan]  10/12/2016   • Other  10/12/2016       Labs  Results from last 7 days   Lab Units 02/01/19  0441 01/31/19  1758   BUN mg/dL 57* 47*   CREATININE mg/dL 3.79* 3.57*     Results from last 7 days   Lab Units 02/01/19  0441 01/31/19  1758   WBC 10*3/mm3 21.88* 25.51*       Evaluation of Dosing  Last Dose Received in the ED/Outside Facility: 1750mg x1 (assessment/plan below)  Ht - 163.8 cm (64.5\")  Wt - 67 kg (147 lb 9.6 oz)  Estimated Creatinine Clearance: 11.7 mL/min (A) (by C-G formula based on SCr of 3.79 mg/dL (H)).    Intake & Output (last 3 days)         01/29 0701 - 01/30 0700 01/30 0701 - 01/31 0700 01/31 0701 - 02/01 0700 02/01 0701 - 02/02 0700    IV Piggyback   100 50    Total Intake(mL/kg)   100 (1.5) 50 (0.7)    Urine (mL/kg/hr)   150     Total Output   150     Net   -50 +50                  Microbiology and Radiology  Microbiology Results (last 10 days)       ** No results found for the last 240 hours. **          Evaluation of Level  Lab Results   Component Value Date    VANCORANDOM 58.50 (C) 02/01/2019     Assessment/Plan:  1. Pharmacy to dose vancomycin for indication of sepsis. 1x dose of 1750mg was given here at [facility] after transfer from OSH. Goal trough 15-20mcg/mL  2. Random level was drawn today which was " supratherapeutic at 58.5mcg/mL.   3. Will hold vancomycin doses today and dose per levels.   4. Another level will be drawn following the next dialysis session. Will follow daily for dialysis schedule, as patient coded during today's hemodialysis session.   5. Creatinine 3.79, WBC 21.88, afebrile   6. Pharmacy will continue to follow.    Thanks,  Luis Antonio Linares, PharmD  Pharmacy Resident  2/1/2019  10:33 AM

## 2019-02-01 NOTE — PROGRESS NOTES
[facility] Medicine Services  PROGRESS NOTE    Patient Name: Fabiola Pimentel  : 1934  MRN: [id number]    Date of Admission: 2019  Length of Stay: 1  Primary Care Physician: Otilio Smith DO    Subjective   Subjective     CC:  Esrd, pea code    HPI:  Currently patient had oxygen mask, alert, oriented x 3. Good bilateral breath sounds and normal respiratory effort. Repeat sbp 120's. Denies dyspnea or pain which is quite surprising    Review of Systems  No headache    Otherwise ROS is negative except as mentioned in the HPI.    Objective   Objective     Vital Signs:   Temp:  [97.4 °F (36.3 °C)-98.2 °F (36.8 °C)] 98 °F (36.7 °C)  Heart Rate:  [68-72] 69  Resp:  [16] 16  BP: ()/(35-71) 73/35        Physical Exam:  Alert, frail, oriented to person, year, month, place  Oxygen mask in place, normal respiratory effort  Rrr, distant  Slightly decreased air movement bilaterally at bases, otherwise clear and normal effort  abd soft, nontender  No cce  No extremity rash    Results Reviewed:  I have personally reviewed current lab, radiology, and data and agree.    Results from last 7 days   Lab Units 19  0441 19  1758   WBC 10*3/mm3 21.88* 25.51*   HEMOGLOBIN g/dL 6.9* 7.1*   HEMATOCRIT % 22.0* 21.4*   PLATELETS 10*3/mm3 175 185     Results from last 7 days   Lab Units 19  0441 19  1758   SODIUM mmol/L 140 139   POTASSIUM mmol/L 4.6 4.4   CHLORIDE mmol/L 107 107   CO2 mmol/L 19.0* 20.0   BUN mg/dL 57* 47*   CREATININE mg/dL 3.79* 3.57*   GLUCOSE mg/dL 56* 78   CALCIUM mg/dL 6.1* 6.7*   ALT (SGPT) U/L  --  10   AST (SGOT) U/L  --  31     Estimated Creatinine Clearance: 11.7 mL/min (A) (by C-G formula based on SCr of 3.79 mg/dL (H)).    No results found for: BNP    Microbiology Results Abnormal     None          Imaging Results (last 24 hours)     Procedure Component Value Units Date/Time    XR Shoulder 2+ View Right [[id number]] Updated:  19 7321     XR Hips Bilateral With or Without Pelvis 3-4 View [[id number]] Updated:  01/31/19 2252    XR Chest 1 View [[id number]] Updated:  01/31/19 2251               I have reviewed the medications:    Current Facility-Administered Medications:   •  albumin human 25 % IV SOLN 25 g, 25 g, Intravenous, PRN, Isaac Proctor MD  •  bisoprolol (ZEBeta) tablet 5 mg, 5 mg, Oral, Nightly, Tammi Brown APRN  •  calcitriol (ROCALTROL) capsule 0.25 mcg, 0.25 mcg, Oral, Daily, Tammi Brown APRN, 0.25 mcg at 01/31/19 1812  •  dextrose (D50W) 25 g/ 50mL Intravenous Solution 25 mL, 25 mL, Intravenous, Q1H PRN, Raymond Nicole MD  •  Pharmacy Consult - Pharmacy to dose, , Does not apply, Continuous PRN, Tmami Brown APRN  •  Pharmacy to dose vancomycin, , Does not apply, Continuous PRN, Tammi Brown APRN  •  piperacillin-tazobactam (ZOSYN) 2.25 g in sodium chloride 0.9 % 100 mL IVPB, 2.25 g, Intravenous, Q12H, Mita Greenberg MD, 2.25 g at 01/31/19 2058  •  propafenone (RYTHMOL) tablet 150 mg, 150 mg, Oral, Q12H, Tammi Brown APRN  •  sodium chloride 0.9 % flush 3 mL, 3 mL, Intravenous, Q12H, Tammi Brown APRN, 3 mL at 01/31/19 2059  •  sodium chloride 0.9 % flush 3-10 mL, 3-10 mL, Intravenous, PRN, Tammi Brown APRCARLOS  •  vancomycin (dosing per levels), , Does not apply, Daily, Tammi Brown APRN      Assessment/Plan   Assessment / Plan     Active Hospital Problems    Diagnosis Date Noted   • Leukocytosis [D72.829] 01/31/2019   • Anemia due to chronic kidney disease [N18.9, D63.1] 01/31/2019   • Debility [R53.81] 01/31/2019   • Hx of renal cell carcinoma [Z85.528] 01/31/2019   • Chronic diastolic CHF (congestive heart failure) (CMS/[facility]) [I50.32] 01/31/2019   • Falls frequently [R29.6] 01/31/2019   • Acute renal failure (ARF) (CMS/[facility]) [N17.9] 01/31/2019   • Pulmonary hypertension (CMS/[facility]) [I27.20]      a. Echocardiogram shows a normal LV systolic function wall motion, with mildly enlarged right ventricle, moderate TR  and an RVSP of 62 mmHg.  b. Echocardiogram by Dr. Schwarz, 04/11/2015: Normal LV systolic function, wall motion with trace MR, moderate TR and moderate to severe pulmonary hypertension with an RVSP of 50 to 55 mmHg.       • CKD (chronic kidney disease) stage 5, GFR less than 15 ml/min (CMS/[facility]) [N18.5]    • ITP (idiopathic thrombocytopenic purpura) [D69.3] 09/21/2016          Brief Hospital Course to date:  Fabiola Pimentel is a 84 y.o. female s/p prior nephrectomy for renal cancer, progressive renal failure, recent left arm fistula (1/16/19 by dr. Ramirez), pulm htn, prior pac's & palpitations (seen by [facility] dr. Schwarz in past) who presented to [facility] with confusion and falls. Apparently had dislocated right shoulder reduced, was found significantly uremic w/ creatinine >9 so was transferred to [facility] for nephrology evaluation/dialysis. Temporary Right IJ dialysis cath was placed. Had Leukocytosis of 30 K and was placed on vanc & zosyn but records unclear whether source identified. Apparently after initiating dialysis did have rapid response called at outside hostpital after developing dyspnea but stabilized. Ultimately patient was transferred to [facility] evening of 1/31/19 for higher level of care. Dr. Adler saw patient shorly after admission evening of 1/31/19 and dialysis was set up for morning of 2/1/19. Shortly after initiation of dialysis on 2/1/19 patient became unresponsive, w/ scattered cardiac activity (no organized rhythm) on telemetry and chest compressions initiated & did receive bag/mask ventilation before regaining consciousness w/ palpable pulse. Saline and albumin was administered, dialysis stopped and patient regained organized palpable pulse.      *s/p PEA code (while on dialysis 2/1/19 morning)  *Anemia  *Sepsis, poa (data deficit)   -currently on vanc/zosyn empirically  *leukocytosis  *Renal failure, w/ progression to ESRD    -s/p  Left upper  extremity fistula 1/16/19 previously by Dr. Ramirez currently maturing   -currently w/ temporary dialysis catheter  *Hx nephrectomy 2ndary to Renal cell cancer  *Hx ITP  *Hx severe pulm htn  *Hx pac's/palpitations (seen by [facility])  *recent fall w/ dislocated right shoulder s/p reduction at [facility] (at outside facility prior to this admission)  *left hip pain  *Hx remote uterine cancer (s/p tracie&bso 2001, recurrence 2003 s/p xrt)    --------------------------------------------------------------    Plan:  -transfuse 1 unit prbc as pre-dialysis hgb was 7.1 (current hgb pending, may require more than 1 unit prbc)  -echo ordered and pending  -cxr pending, follow up  -add mag level, follow up  -transferring to ICU (discussed w/ dr. Herbert) to monitor closely in icu after chest compressions/code  -consider cards consult prn, has previously seen dr. Schwarz in clinic    -continue vanc/zosyn for now  -add procalcitonin for morning  -+/-ID consultation    -right shoulder & bilateral hip/pelvis xrays pending    -I called and updated son who was appreciative of the call    *40 min total spent at bedside/on unit reviewing data and providing/coordinating critical care.     DVT Prophylaxis:  scd currently        CODE STATUS:   Code Status and Medical Interventions:   Ordered at: 01/31/19 1901     Level Of Support Discussed With:    Patient     Code Status:    CPR     Medical Interventions (Level of Support Prior to Arrest):    Full         Electronically signed by Raymond Nicole MD, 02/01/19, 8:00 AM.

## 2019-02-01 NOTE — PROGRESS NOTES
"   LOS: 1 day    Patient Care Team:  Otilio Smith DO as PCP - General (Family Medicine)        Subjective     Interval History:     Patient had an episode of PEA earlier this morning on dialysis. She is now in ICU on venti mask.     Review of Systems:    Review of systems could not be obtained due to  patient nonverbal.    Objective     Vital Sign Min/Max for last 24 hours  Temp  Min: 97.4 °F (36.3 °C)  Max: 98.2 °F (36.8 °C)   BP  Min: 73/35  Max: 132/75   Pulse  Min: 65  Max: 77   Resp  Min: 16  Max: 26   SpO2  Min: 92 %  Max: 96 %   No Data Recorded   Weight  Min: 63.4 kg (139 lb 12.4 oz)  Max: 70 kg (154 lb 4.8 oz)     Flowsheet Rows      First Filed Value   Admission Height  163.8 cm (64.5\") Documented at 02/01/2019 0300   Admission Weight  70 kg (154 lb 4.8 oz) Documented at 01/31/2019 1607          I/O this shift:  In: 234.4 [Blood:156.7; IV Piggyback:77.7]  Out: -   I/O last 3 completed shifts:  In: 100 [IV Piggyback:100]  Out: 150 [Urine:150]    Physical Exam:     General Appearance:    Alert, cooperative, in no acute distress   Head:    Normocephalic, without obvious abnormality, atraumatic               Neck:   No adenopathy, supple, trachea midline, no thyromegaly, no     carotid bruit, no JVD       Lungs:     Diminished air entry,respirations regular, even and                   unlabored    Heart:    Regular rhythm and normal rate, normal S1 and S2, no            murmur, no gallop, no rub, no click       Abdomen:     Normal bowel sounds, no masses, no organomegaly, soft        non-tender, non-distended, no guarding, no rebound                 tenderness       Extremities:   Moves all extremities well, no edema, no cyanosis, no              redness               Neurologic:   No focal deficit noted grossly.        WBC WBC   Date Value Ref Range Status   02/01/2019 21.88 (H) 3.50 - 10.80 10*3/mm3 Final     Comment:     WBC corrected for presence of NRBC's   01/31/2019 25.51 (H) 3.50 - 10.80 " 10*3/mm3 Final     Comment:     WBC corrected for presence of NRBC's      HGB Hemoglobin   Date Value Ref Range Status   02/01/2019 6.9 (L) 11.5 - 15.5 g/dL Final   01/31/2019 7.1 (L) 11.5 - 15.5 g/dL Final      HCT Hematocrit   Date Value Ref Range Status   02/01/2019 22.0 (L) 34.5 - 44.0 % Final   01/31/2019 21.4 (L) 34.5 - 44.0 % Final      Platlets No results found for: LABPLAT   MCV MCV   Date Value Ref Range Status   02/01/2019 100.5 (H) 80.0 - 99.0 fL Final   01/31/2019 96.4 80.0 - 99.0 fL Final          Sodium Sodium   Date Value Ref Range Status   02/01/2019 140 132 - 146 mmol/L Final   01/31/2019 139 132 - 146 mmol/L Final      Potassium Potassium   Date Value Ref Range Status   02/01/2019 4.6 3.5 - 5.5 mmol/L Final   01/31/2019 4.4 3.5 - 5.5 mmol/L Final      Chloride Chloride   Date Value Ref Range Status   02/01/2019 107 99 - 109 mmol/L Final   01/31/2019 107 99 - 109 mmol/L Final      CO2 CO2   Date Value Ref Range Status   02/01/2019 19.0 (L) 20.0 - 31.0 mmol/L Final   01/31/2019 20.0 20.0 - 31.0 mmol/L Final      BUN BUN   Date Value Ref Range Status   02/01/2019 57 (H) 9 - 23 mg/dL Final   01/31/2019 47 (H) 9 - 23 mg/dL Final      Creatinine Creatinine   Date Value Ref Range Status   02/01/2019 3.79 (H) 0.60 - 1.30 mg/dL Final   01/31/2019 3.57 (H) 0.60 - 1.30 mg/dL Final      Calcium Calcium   Date Value Ref Range Status   02/01/2019 6.1 (L) 8.7 - 10.4 mg/dL Final   01/31/2019 6.7 (L) 8.7 - 10.4 mg/dL Final      PO4 No results found for: CAPO4   Albumin Albumin   Date Value Ref Range Status   02/01/2019 2.34 (L) 3.20 - 4.80 g/dL Final   01/31/2019 2.67 (L) 3.20 - 4.80 g/dL Final      Magnesium Magnesium   Date Value Ref Range Status   02/01/2019 1.9 1.3 - 2.7 mg/dL Final   01/31/2019 1.9 1.3 - 2.7 mg/dL Final      Uric Acid No results found for: URICACID        Results Review:     I reviewed the patient's new clinical results.      bisoprolol 5 mg Oral Nightly   calcitriol 0.25 mcg Oral Daily    piperacillin-tazobactam 3.375 g Intravenous Q12H   sodium chloride 3 mL Intravenous Q12H   vancomycin (dosing per levels)  Does not apply Daily       Pharmacy Consult - Pharmacy to dose    Pharmacy to dose vancomycin        Medication Review:     Assessment/Plan       PEA (Pulseless electrical activity) (CMS/[facility])    ITP (idiopathic thrombocytopenic purpura)    Pulmonary hypertension (CMS/[facility])    CKD (chronic kidney disease) stage 5, GFR less than 15 ml/min (CMS/[facility])    PVC's (On Propafanone)    Leukocytosis    Anemia due to chronic kidney disease    Debility    Hx of renal cell carcinoma    Chronic diastolic CHF (congestive heart failure) (CMS/[facility])    Falls frequently    Acute renal failure (ARF) (CMS/[facility])    Anemia    Huge hiatal hernia with intrathoracic stomach      1- 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   4- Anemia of chronic disease.   5- Dialysis access -needs tunneled catheter. AVF 2 week old.     Plan:  - HD terminated in the morning. Will hold HD today.   - HD tomorrow.   - Vascular surgery consult for tunneled catheter.   - Renal diet  - Epo with HD.   - Agree with blood transfusion     Discussed with MAXWELL Proctor MD  02/01/19  12:49 PM

## 2019-02-01 NOTE — BRIEF OP NOTE
BRIEF OPERATIVE NOTE     Fabiola Pimentel  [id number]  1934  Date of Service: 2/1/2019    Pre-op Diagnosis:   Acute renal failure with history of chronic renal insufficiency    Post-op Diagnosis:     Same    Procedure:  1.  Placement of left internal jugular tunneled dialysis catheter  2.  Fluoroscopic guidance    Anesthesia: Local with Monitor Anesthesia Care    Surgeon: Raymond Elise M.D.    Assistant:     Staff:   Radiology Technologist: Mckayla You  Scrub Person: Lesvia Andrade  Nursing Assistant: Paul Rachel    Estimated Blood Loss:  < 10 ml    Findings:  Tip of catheter in right atrium    Complications:  None      Raymond Elise MD   2/1/2019  6:53 PM

## 2019-02-01 NOTE — CONSULTS
"Cardiology Consult    Fabiola Pimentel  1934    There is no work phone number on file.    02/01/19    DATE OF ADMISSION: 1/31/2019  [facility] 2A ICU    Otilio Smith, DO  [address] [facility] [address] [place] / [place] [id number]    Reason for consult: PEA arrest        History of Present Illness:   84 y.o. female with history of pulmonary HTN, essential HTN, and PACs/PVCs followed by Dr. Maxwell also s/p prior nephrectomy for renal cancer, progressive renal failure, recent left arm fistula (1/16/19 by dr. Ramirez), who presented to [facility] with confusion and falls earlier this week. Apparently had dislocated right shoulder reduced, was found significantly uremic w/ creatinine >9 so was transferred to [facility] for nephrology evaluation/dialysis. Temporary Right IJ dialysis cath was placed. Had Leukocytosis of 30 K and was placed on vanc & zosyn but records unclear whether source identified. Apparently after initiating dialysis did have rapid response called for respiratory distress. Ultimately patient was transferred to [facility] evening of 1/31/19 for higher level of care. Dr. Adler saw patient shorly after admission evening of 1/31/19 and dialysis was set up for morning of 2/1/19. Shortly after initiation of dialysis on 2/1/19 patient became unresponsive, w/ scattered cardiac activity (no organized rhythm) on telemetry and chest compressions initiated & did receive bag/mask ventilation before regaining consciousness w/ palpable pulse. Saline and albumin was administered, dialysis stopped and patient regained organized palpable pulse. It is unclear whether she became hypotensive or bradycardic prior to this. Currently, she is stable in her bed in the ICU in [facility] in the 60s. She complains of her back hurting and \"freezing to death\". Currently denies SOB but is on a non re- breather mask. Work up in progress for cause of PEA arrest.           Allergies   Allergen Reactions   • Benadryl " [Diphenhydramine]      Unknown reaction   • Diltiazem      Edema     • Micardis [Telmisartan]      Hyperkalemia     • Other      DIURETICS, worsening renal insufficiency.         Prior to Admission Medications     Prescriptions Last Dose Informant Patient Reported? Taking?    bisoprolol (ZEBeta) 5 MG tablet   No No    TAKE 1 TABLET BY MOUTH EVERY NIGHT.    calcitriol (ROCALTROL) 0.25 MCG capsule   Yes No    Take 0.25 mcg by mouth Daily.    Denosumab (PROLIA SC)   Yes No    Inject  under the skin Every 6 (Six) Months.    Diphenoxylate-Atropine (LOMOTIL PO)   Yes No    Take  by mouth Daily As Needed.    doxazosin (CARDURA) 2 MG tablet   Yes No    Take 2 mg by mouth Daily.    furosemide (LASIX) 40 MG tablet   Yes No    Take 40 mg by mouth As Needed. Every other day     hydrALAZINE (APRESOLINE) 10 MG tablet   Yes No    Take 10 mg by mouth 2 (Two) Times a Day.    propafenone (RYTHMOL) 150 MG tablet   No No    Take 1 tablet by mouth Every 12 (Twelve) Hours.            Current Facility-Administered Medications:   •  albumin human 25 % IV SOLN 25 g, 25 g, Intravenous, PRN, Malachi Stephens MD  •  bisoprolol (ZEBeta) tablet 5 mg, 5 mg, Oral, Nightly, Tammi Brown APRN  •  calcitriol (ROCALTROL) capsule 0.25 mcg, 0.25 mcg, Oral, Daily, Tammi Brown APRN, 0.25 mcg at 01/31/19 1812  •  dextrose (D50W) 25 g/ 50mL Intravenous Solution 25 mL, 25 mL, Intravenous, Q1H PRN, Raymond Nicole MD, 25 mL at 02/01/19 0802  •  Pharmacy Consult - Pharmacy to dose, , Does not apply, Continuous PRN, Tammi Brown APRN  •  Pharmacy to dose vancomycin, , Does not apply, Continuous PRN, Tammi Brown APRN  •  piperacillin-tazobactam (ZOSYN) 3.375 g in iso-osmotic dextrose 50 ml (premix), 3.375 g, Intravenous, Q12H, Nelson Herman, [facility], 3.375 g at 02/01/19 1004  •  propafenone (RYTHMOL) tablet 150 mg, 150 mg, Oral, Q12H, Tammi Brown, NBA  •  sodium chloride 0.9 % flush 3 mL, 3 mL, Intravenous, Q12H, Tammi Brown APRN, 3 mL  at 02/01/19 0854  •  sodium chloride 0.9 % flush 3-10 mL, 3-10 mL, Intravenous, PRN, Tammi Brown APRN  •  vancomycin (dosing per levels), , Does not apply, Daily, Tammi Brown APRN    Social History     Socioeconomic History   • Marital status:      Spouse name: Not on file   • Number of children: Not on file   • Years of education: Not on file   • Highest education level: Not on file   Tobacco Use   • Smoking status: Never Smoker   • Smokeless tobacco: Never Used   Substance and Sexual Activity   • Alcohol use: No   • Drug use: No   • Sexual activity: Defer       Family History   Problem Relation Age of Onset   • Heart attack Mother    • No Known Problems Father        REVIEW OF SYSTEMS:   CONST:  No weight loss, fever, chills, weakness or fatigue.   HEENT:  No visual loss, blurred vision, double vision, yellow sclerae.                   No hearing loss, congestion, sore throat.   SKIN:      No rashes, urticaria, ulcers, sores.     RESP:     No shortness of breath, hemoptysis, cough, sputum.   GI:           No anorexia, nausea, vomiting, diarrhea. No abdominal pain, melena.   :         No burning on urination, hematuria or increased frequency.  ENDO:    No diaphoresis, cold or heat intolerance. No polyuria or polydipsia.   NEURO:  No headache, dizziness, syncope, paralysis, ataxia, or parasthesias.                  No change in bowel or bladder control. No history of CVA/TIA  MUSC:    No muscle, back pain, joint pain or stiffness.   HEME:    No anemia, bleeding, bruising. No history of DVT/PE.  PSYCH:  No history of depression, anxiety    Vitals:    02/01/19 0930 02/01/19 0945 02/01/19 1000 02/01/19 1006   BP: 115/68 116/66 115/61 115/61   BP Location:       Patient Position:       Pulse: 67 67 70 71   Resp:    19   Temp:    98 °F (36.7 °C)   TempSrc:    Axillary   SpO2: 93% 96% 95% 95%   Weight:       Height:             Vital Sign Min/Max for last 24 hours  Temp  Min: 97.4 °F (36.3 °C)  Max: 98.2 °F  (36.8 °C)   BP  Min: 73/35  Max: 132/75   Pulse  Min: 67  Max: 77   Resp  Min: 16  Max: 19   SpO2  Min: 93 %  Max: 96 %   Flow (L/min)  Min: 2  Max: 4      Intake/Output Summary (Last 24 hours) at 2/1/2019 1040  Last data filed at 2/1/2019 0802  Gross per 24 hour   Intake 150 ml   Output 150 ml   Net 0 ml             Physical Exam:  GEN: alert, cooperative, responsive.   HEENT: Normocephalic, Atraumatic, PERRLA, moist mucous membranes  NECK: supple, NO JVD, no thyromegaly, no lymphadenopathy  CARD: S1S2  RRR no murmur, gallop, rub  LUNGS: Clear to auscultataion, normal respiratory effort  ABDOMEN: Soft, nontender, normal bowel sounds  EXTREMITIES:No gross deformities,  No clubbing, cyanosis, or edema. + tender to palpation  SKIN: Warm, dry,intact. Bruising on pretibial areas.   NEURO: No focal deficits  PSYCHIATRIC: Normal affect and mood      I personally viewed and interpreted the patient's EKG/Telemetry/lab data    Data:   Results from last 7 days   Lab Units 02/01/19  0441 01/31/19  1758   WBC 10*3/mm3 21.88* 25.51*   HEMOGLOBIN g/dL 6.9* 7.1*   HEMATOCRIT % 22.0* 21.4*   PLATELETS 10*3/mm3 175 185     Results from last 7 days   Lab Units 02/01/19  0441 01/31/19  1758   SODIUM mmol/L 140 139   POTASSIUM mmol/L 4.6 4.4   CHLORIDE mmol/L 107 107   CO2 mmol/L 19.0* 20.0   BUN mg/dL 57* 47*   CREATININE mg/dL 3.79* 3.57*   GLUCOSE mg/dL 56* 78      Results from last 7 days   Lab Units 01/31/19  1758   HEMOGLOBIN A1C % 5.60     Results from last 7 days   Lab Units 01/31/19  1758   TSH mIU/mL 1.851                 Results from last 7 days   Lab Units 02/01/19  0441   CHOLESTEROL mg/dL 70   TRIGLYCERIDES mg/dL 91   HDL CHOL mg/dL 18*   LDL CHOL mg/dL 25           Intake/Output Summary (Last 24 hours) at 2/1/2019 1040  Last data filed at 2/1/2019 0802  Gross per 24 hour   Intake 150 ml   Output 150 ml   Net 0 ml       Chest X-Ray:  Imaging Results (last 24 hours)     Procedure Component Value Units Date/Time    XR  Shoulder 2+ View Right [[id number]] Collected:  02/01/19 0955     Updated:  02/01/19 1002    Narrative:       EXAMINATION: XR SHOULDER 2+ VW RIGHT- 01/31/2019     INDICATION: recent dislocation/arm swelling     TECHNIQUE:  3 views right shoulder     COMPARISONS:  None.     FINDINGS:  No acute fracture. Mild AC arthrosis. The humeral head is  high riding relative to the glenoid, perhaps related to rotator cuff  pathology. There is no evidence of anterior-inferior glenohumeral  dislocation. Right subclavian central line noted. Small right pleural  effusion noted.       Impression:       No acute finding. Possible rotator pathology.     D:  02/01/2019  E:  02/01/2019     This report was finalized on 2/1/2019 10:00 AM by Jose Juan Mckeon.       XR Hips Bilateral With or Without Pelvis 3-4 View [[id number]] Collected:  02/01/19 0956     Updated:  02/01/19 1002    Narrative:       EXAMINATION: XR HIPS BILATERAL W OR WO PELVIS 3-4 VIEW- 01/31/2019     INDICATION: bilateral hip pain/unable to walk     TECHNIQUE:  Frontal view pelvis and lateral views both hips     COMPARISONS:  CT abdomen/pelvis 08/02/2017     FINDINGS:  No acute fracture, dislocation or malalignment. Old inferior  left pubic ramus and acetabular fractures. There is abnormal sclerosis  on both sides of the sacroiliac joint with irregularity of the joint  spaces. Sacral arcuate lines are obscured. Extensive atherosclerosis  noted.       Impression:       Old left-sided pelvis fracture. No acute finding.  Extensive irregularities on both sides of the sacroiliac joints are also  stable.     D:  02/01/2019  E:  02/01/2019     This report was finalized on 2/1/2019 9:59 AM by Jose Juan Mckeon.       XR Chest 1 View [[id number]] Collected:  02/01/19 0957     Updated:  02/01/19 1001    Narrative:       EXAMINATION: XR CHEST 1 VW- 01/31/2019     INDICATION: CHF     TECHNIQUE:  Single view frontal chest.     COMPARISONS: 01/01/2008     FINDINGS:  Right subclavian central line  terminates in the right atrium.  Bilateral pleural effusions, cardiomegaly and interstitial edema.  Reversed left glenohumeral replacement. Surgical clips along the left  paratracheal stripe. Large hiatal hernia.       Impression:       1. Large hiatal hernia.  2. Interstitial edema and effusions.     D:  02/01/2019  E:  02/01/2019     This report was finalized on 2/1/2019 9:59 AM by Jose Juan Mckeon.       XR Chest 1 View [[id number]] Collected:  02/01/19 0827     Updated:  02/01/19 0931    Narrative:       EXAMINATION: XR CHEST 1 VW-      INDICATION: Status post code.     TECHNIQUE:  Single view frontal chest.     COMPARISONS: 01/31/2019.     FINDINGS:  Unchanged support and operative hardware. Large hiatal  hernia. Trace effusions and edema. Cardiomegaly. No pneumothorax.       Impression:       No significant interval change in trace edema and  effusions.     NOTE: Discussed with requesting clinician at 8:17 AM on 02/01/2019.     D:  02/01/2019  E:  02/01/2019     This report was finalized on 2/1/2019 9:29 AM by Jose Juan Mckeon.       XR Abdomen KUB [[id number]] Updated:  02/01/19 0927          Telemetry: NSR 60s currently  EKG:  Accelerated Junctional rhythm 75 bpm         Assessment and Plan:     1. PEA Arrest:  - unknown cause at this time, work up pending. Possibly related to low BP with dialysis.   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     2. Sepsis:  - per intensivists  - on IV antibiotics    3. Renal Failure/ESRD:  - dialysis, followed by nephrology  - currently with maturing fistula and temp dialysis catheter.     4. Pulmonary HTN:      5. History of  PACs/PVCs:  - will stop Rythmol in setting of potential cardiac arrythmia      Scribed for Malachi Maxwell MD by Dayanara Velasquez PA-C. 2/1/2019  10:58 AM     I,Malachi Maxwell M.D., personally performed the services described in this documentation as scribed by the above named individual in my presence, and it is both accurate and complete.  Status post PVA rest of unknown etiology.  Currently no clinical suspicion for acute congestive heart failure, lung fields are clear and no evidence of pulmonary edema on chest x-ray.  Potentially related to a transient hypotensive episode with dialysis initiation.  Cannot exclude transient arrhythmia however there are no rhythm strips available for review.  Discontinuing class IC antiarrhythmic.  Continue supportive care, transfer arrhythmia on telemetry.  2/1/2019  11:27 AM

## 2019-02-01 NOTE — NURSING NOTE
At 0710 pt was unresponsive, dialysis stopped. Code Blue initiated. See code sheet. Dr. Adler notified and Dr. Nicole

## 2019-02-02 PROBLEM — I05.8 MITRAL VALVE MASS: Status: ACTIVE | Noted: 2019-01-01

## 2019-02-02 NOTE — PROGRESS NOTES
[facility]  Cardiology Progress Note      Chief Complaint/Reason for service:    · PEA/Cardiac Arrest in setting of initiation of hemodialysis and sepsis.  · Mitral valve mass         Patient was admitted on 1/31/2019 for higher level of care after presenting to [facility] with confusion and falls and was found to be significantly uremic with a creatinine greater than 9.  A temporary right IJ dialysis catheter was placed and Dr. Adler evaluated patient and arrange for hemodialysis to start yesterday morning.  Shortly after initiation of dialysis she became unresponsive suffering cardiac arrest and PDA on telemetry monitor.  Go cardiogram shows a normal LVEF and a echodense mitral valve mass present in which vegetation could not be excluded. She is lying in bed hemodynamically stable at this time.  Hemodialysis completed this am with creatinine decreased from >9.0 to 3.79. Remains anemic but received PRBC yesterday. She is oriented and has no complaints but appears somewhat lethargic. She take Rhythmol for frequent PVC's and it was d/c'ed yesterday.  Telemetry shows NSR. Nurse reports occasional PVC's overnight but no arrhythmias or events.    Past medical, surgical, social and family history reviewed in the patient's electronic medical record.           Vital Sign Min/Max for last 24 hours  Temp  Min: 97.4 °F (36.3 °C)  Max: 98.7 °F (37.1 °C)   BP  Min: 72/22  Max: 163/84   Pulse  Min: 57  Max: 78   Resp  Min: 16  Max: 28   SpO2  Min: 89 %  Max: 99 %   Flow (L/min)  Min: 1  Max: 6      Intake/Output Summary (Last 24 hours) at 2/2/2019 1110  Last data filed at 2/2/2019 1057  Gross per 24 hour   Intake 942.4 ml   Output 1390 ml   Net -447.6 ml           Physical Exam   Constitutional: She is oriented to person, place, and time. She appears well-developed and well-nourished.   HENT:   Head: Normocephalic.   Neck: No JVD present. Carotid bruit is not present.    Cardiovascular: Normal rate, regular rhythm, normal heart sounds and intact distal pulses. Exam reveals no gallop and no friction rub.   No murmur heard.  Pulmonary/Chest: Effort normal and breath sounds normal.   Abdominal: Soft.   Musculoskeletal: She exhibits edema.   Neurological: She is alert and oriented to person, place, and time.   Skin: Skin is warm and dry. No cyanosis. Nails show no clubbing.   Psychiatric: She has a normal mood and affect. Her behavior is normal.       Tele:  Normal sinus rhythm    Results Review (reviewed the patient's recent labs in the electronic medical record):     EKG:  Accelerated junctional rhythm 2/1/2019         Results from last 7 days   Lab Units 02/02/19  0743 02/01/19  0640 02/01/19 0441 01/31/19  1758   SODIUM mmol/L 141  --  140 139   POTASSIUM mmol/L 4.3  --  4.6 4.4   CHLORIDE mmol/L 108  --  107 107   BUN mg/dL 52*  --  57* 47*   CREATININE mg/dL 3.79*  --  3.79* 3.57*   MAGNESIUM mg/dL 1.9 1.9  --  1.9         Results from last 7 days   Lab Units 02/02/19  0743 02/01/19  2018 02/01/19 0441 01/31/19  1758   WBC 10*3/mm3 18.67*  --  21.88* 25.51*   HEMOGLOBIN g/dL 7.8* 8.2* 6.9* 7.1*   HEMATOCRIT % 24.1* 25.5* 22.0* 21.4*   PLATELETS 10*3/mm3 132*  --  175 185       Lab Results   Component Value Date    HGBA1C 5.60 01/31/2019       Lab Results   Component Value Date    CHOL 70 02/01/2019    TRIG 91 02/01/2019    HDL 18 (L) 02/01/2019    LDL 25 02/01/2019                    Active Hospital Problems    Diagnosis Date Noted   • **PEA (Pulseless electrical activity) (CMS/[facility]) [I46.9] 02/01/2019     Priority: High     · S/p CPR 2/1/19 morning in setting of initiation of hemodialysis  · unknown cause at this time, work up pending. Possibly related to low BP with dialysis. Low suspicion for ACS.  · Echo (02/01/2019): Normal EF. 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 present on the chordal apparatus of the anterior leaflet - but likely not cause of her arrest or source for her sepsis     • Huge hiatal hernia with intrathoracic stomach [K44.9] 02/01/2019     Priority: High   • Chronic diastolic CHF (congestive heart failure) (CMS/[facility]) [I50.32] 01/31/2019     Priority: High   • Acute renal failure (ARF) (CMS/[facility]) [N17.9] 01/31/2019     Priority: High   • PVC's (On Propafanone) [I49.3] 05/16/2017     Priority: High   • CKD (chronic kidney disease) stage 5, GFR less than 15 ml/min (CMS/[facility]) [N18.5]      Priority: High   • ITP (idiopathic thrombocytopenic purpura) [D69.3] 09/21/2016     Priority: High   • Leukocytosis [D72.829] 01/31/2019     Priority: Medium   • Anemia due to chronic kidney disease [N18.9, D63.1] 01/31/2019     Priority: Medium   • Debility [R53.81] 01/31/2019     Priority: Medium   • Hx of renal cell carcinoma [Z85.528] 01/31/2019     Priority: Medium   • Falls frequently [R29.6] 01/31/2019     Priority: Medium   • Pulmonary hypertension (CMS/[facility]) [I27.20]      Priority: Medium     · Echo (04/11/2015): Normal LV systolic function, wall motion with trace MR, moderate TR and moderate to severe pulmonary hypertension with an RVSP of 50 to 55 mmHg  · Echo (02/01/2018): Will LVEF.  Moderate TR RVSP 45-55 mmHg.       • Chronic renal insufficiency [N18.9]      Priority: Medium     1. Chronic renal insufficiency status post nephrectomy:  a. Baseline creatinine of 2.0, 09/24/2013.  b. History of right renal cell carcinoma status post right nephrectomy, 2001.                    · Continue bisoprolol  · Continue supportive care  · May need LEONIDES next week to further evaluate mitral mass    Mora Mei, NBA  2/2/2019

## 2019-02-02 NOTE — PLAN OF CARE
Problem: Patient Care Overview  Goal: Plan of Care Review  Outcome: Ongoing (interventions implemented as appropriate)   02/02/19 2400   Coping/Psychosocial   Plan of Care Reviewed With patient;family   Plan of Care Review   Progress no change   OTHER   Outcome Summary Dialysis today. VSS. PT/OT evals. Pt rested well today. Mist therapy to wound on coccyx. 1UPRBC given during dialysis.        Problem: Fall Risk (Adult)  Goal: Absence of Fall  Outcome: Ongoing (interventions implemented as appropriate)      Problem: Skin Injury Risk (Adult)  Goal: Skin Health and Integrity  Outcome: Ongoing (interventions implemented as appropriate)      Problem: Wound (Includes Pressure Injury) (Adult)  Goal: Signs and Symptoms of Listed Potential Problems Will be Absent, Minimized or Managed (Wound)  Outcome: Ongoing (interventions implemented as appropriate)

## 2019-02-02 NOTE — PLAN OF CARE
Problem: Patient Care Overview  Goal: Plan of Care Review  Outcome: Ongoing (interventions implemented as appropriate)   02/02/19 3819   Coping/Psychosocial   Plan of Care Reviewed With patient   OTHER   Outcome Summary PT re-evaluation (initial eval for PT mobility) completed. Pt demonstrates generalized weakness and decreased indep re: functional mobility, warranting further skilled PT services to promote PLOF. Limited today by fatigue and generalized pain; able to sit EOB CGA and perform STS x1 (max x2). Recommend OT consult; recommend SNF placement at d/c.

## 2019-02-02 NOTE — PROGRESS NOTES
"Intensive Care Follow-up      LOS: 2 days     Ms. Fabiola Pimentel, 84 y.o. female is followed for: PEA (Pulseless electrical activity) (CMS/HCC)     Subjective - Interval History     84-year-old white female moved to the intensive care unit 2/1 following a brief PEA arrest in dialysis.  Apparently soon after initiating hemodialysis, she became pulseless.  She apparently had a \"disorganized\" rhythm and received about 3 minutes of CPR before resumption of a pulse and rhythm.  The patient did not require intubation.   Patient awake and alert and currently on dialysis.  She denies any new complaints.        The patient's relevant past medical, surgical and social history were reviewed and updated in Epic as appropriate.     Objective     Infusions:    Pharmacy to dose vancomycin      Medications:    bisoprolol 5 mg Oral Nightly   calcitriol 0.25 mcg Oral Daily   castor oil-balsam peru  Topical Q12H   piperacillin-tazobactam 3.375 g Intravenous Q12H   sodium chloride 3 mL Intravenous Q12H   vancomycin (dosing per levels)  Does not apply Daily       Vital Sign Min/Max for last 24 hours  Temp  Min: 97.4 °F (36.3 °C)  Max: 98.7 °F (37.1 °C)   BP  Min: 72/22  Max: 163/84   Pulse  Min: 57  Max: 78   Resp  Min: 16  Max: 28   SpO2  Min: 89 %  Max: 99 %   No Data Recorded      Intake/Output Summary (Last 24 hours) at 2/2/2019 1037  Last data filed at 2/2/2019 0941  Gross per 24 hour   Intake 942.4 ml   Output --   Net 942.4 ml           Physical Exam:   GENERAL: Awake and alert   HEENT: Normocephalic   LUNGS: Few rales at bases   HEART: 2/6 systolic murmur irregular   ABDOMEN: Soft   EXTREMITIES: No edema   NEURO/PSYCH: No focal deficits    Results from last 7 days   Lab Units 02/02/19  0743 02/01/19  2018 02/01/19  0441 01/31/19  1758   WBC 10*3/mm3 18.67*  --  21.88* 25.51*   HEMOGLOBIN g/dL 7.8* 8.2* 6.9* 7.1*   PLATELETS 10*3/mm3 132*  --  175 185     Results from last 7 days   Lab Units 02/02/19  0743 02/01/19  0640 " 02/01/19 0441 01/31/19  1758   SODIUM mmol/L 141  --  140 139   POTASSIUM mmol/L 4.3  --  4.6 4.4   CO2 mmol/L 19.0*  --  19.0* 20.0   BUN mg/dL 52*  --  57* 47*   CREATININE mg/dL 3.79*  --  3.79* 3.57*   MAGNESIUM mg/dL 1.9 1.9  --  1.9   PHOSPHORUS mg/dL 5.0  --  5.1 4.4   GLUCOSE mg/dL 58*  --  56* 78     Estimated Creatinine Clearance: 11.5 mL/min (A) (by C-G formula based on SCr of 3.79 mg/dL (H)).    Results from last 7 days   Lab Units 01/31/19  1758   HEMOGLOBIN A1C % 5.60           Lab Results   Component Value Date    LACTATE 0.7 02/02/2019          Images: Chest x-rays same may have a small left pleural effusion has a very large hiatal hernia    I reviewed the patient's results and images.     Impression      Patient Active Problem List   Diagnosis Code   • ITP (idiopathic thrombocytopenic purpura) D69.3   • Palpitations R00.2   • Premature atrial contractions I49.1   • Hypertension I10   • Chronic renal insufficiency N18.9   • Pulmonary hypertension (CMS/HCC) I27.20   • DJD (degenerative joint disease) M19.90   • Raynaud's phenomenon (secondary) I73.00   • Diverticular disease K57.90   • CKD (chronic kidney disease) stage 5, GFR less than 15 ml/min (CMS/HCC) N18.5   • PVC's (On Propafanone) I49.3   • Leukocytosis D72.829   • Anemia due to chronic kidney disease N18.9, D63.1   • Debility R53.81   • Hx of renal cell carcinoma Z85.528   • Chronic diastolic CHF (congestive heart failure) (CMS/HCC) I50.32   • Falls frequently R29.6   • Acute renal failure (ARF) (CMS/HCC) N17.9   • PEA (Pulseless electrical activity) (CMS/HCC) I46.9   • Anemia D64.9   • Huge hiatal hernia with intrathoracic stomach K44.9            Plan        Patient doing fairly well and tolerating dialysis today.  We will give her another unit of blood.  Continue other supportive measures.  Plan as per renal and cardiology.  She may need another line for vascular access.     Plan of care and goals reviewed with nurse at daily rounds.   I  discussed the patient's findings and my recommendations with patient and nursing staff       Malachi Garcia MD  Pulmonary and Critical Care Medicine  02/02/19 10:37 AM

## 2019-02-02 NOTE — PLAN OF CARE
Problem: Patient Care Overview  Goal: Plan of Care Review  Outcome: Ongoing (interventions implemented as appropriate)   02/02/19 0607   Coping/Psychosocial   Plan of Care Reviewed With patient;family   Plan of Care Review   Progress no change   OTHER   Outcome Summary VSS throughout night. Peripheral IV access limited & has required multiple unsuccessful attempts to obtain peripheral IV. Pt c/o pain in coccyx at DTI site occasionally. Diet advanced to clear liquids. Pt is to have HD this morning; AM labs will be drawn with dialysis.        Problem: Fall Risk (Adult)  Goal: Identify Related Risk Factors and Signs and Symptoms  Outcome: Outcome(s) achieved Date Met: 02/02/19    Goal: Absence of Fall  Outcome: Ongoing (interventions implemented as appropriate)      Problem: Skin Injury Risk (Adult)  Goal: Skin Health and Integrity  Outcome: Ongoing (interventions implemented as appropriate)      Problem: Wound (Includes Pressure Injury) (Adult)  Goal: Signs and Symptoms of Listed Potential Problems Will be Absent, Minimized or Managed (Wound)  Outcome: Ongoing (interventions implemented as appropriate)      Problem: Hemodialysis (Adult)  Goal: Signs and Symptoms of Listed Potential Problems Will be Absent, Minimized or Managed (Hemodialysis)  Outcome: Ongoing (interventions implemented as appropriate)

## 2019-02-02 NOTE — THERAPY EVALUATION
Acute Care - Wound/Debridement Initial Evaluation  [facility]     Patient Name: Fabiola Pimentel  : 1934  MRN: [id number]  Today's Date: 2019  Onset of Illness/Injury or Date of Surgery: 19  Date of Referral to PT: 19   Referring Physician: Dr. Stephens      Admit Date: 2019    Visit Dx:  No diagnosis found.    Patient Active Problem List   Diagnosis   • ITP (idiopathic thrombocytopenic purpura)   • Palpitations   • Premature atrial contractions   • Hypertension   • Chronic renal insufficiency   • Pulmonary hypertension (CMS/HCC)   • DJD (degenerative joint disease)   • Raynaud's phenomenon (secondary)   • Diverticular disease   • CKD (chronic kidney disease) stage 5, GFR less than 15 ml/min (CMS/HCC)   • PVC's (On Propafanone)   • Leukocytosis   • Anemia due to chronic kidney disease   • Debility   • Hx of renal cell carcinoma   • Chronic diastolic CHF (congestive heart failure) (CMS/HCC)   • Falls frequently   • Acute renal failure (ARF) (CMS/HCC)   • PEA (Pulseless electrical activity) (CMS/HCC)   • Huge hiatal hernia with intrathoracic stomach   • Mitral valve mass        Past Medical History:   Diagnosis Date   • Chronic ITP (idiopathic thrombocytopenia) (CMS/HCC)    • Chronic renal insufficiency    • CKD (chronic kidney disease)    • Diverticular disease    • Dizziness     Dizziness with increase of propafenone to t.i.d.; however, palpitations improved, patient wishes to stay on current dose.   • DJD (degenerative joint disease)    • Hip fracture (CMS/HCC)     Recent hip and pelvic fracture.   • History of uterine cancer     Initial diagnosis, , status post SALVATORE/BSO. Recurrence documented , status post radiation therapy and implants.   • Hypertension    • Lower extremity edema     Lower extremity venous duplex, 2013:  No evidence of deep venous thrombosis. Negative VQ scan for pulmonary embolus, findings suggest congestive heart failure, 10/11/2013.No evidence of deep  venous thrombosis by bilateral duplex, 03/25/2015.Mild   • Palpitations    • Pelvic fracture (CMS/HCC)     Recent hip and pelvic fracture.   • Premature atrial contractions     Premature atrial contractions, postoperative from incisional hernia repair:A 2D echocardiogram 01/03/2008:  Mild MR, mild to moderate TR, small pericardial effusion, EF 65%.   • Pulmonary hypertension (CMS/HCC)    • Raynaud's phenomenon (secondary)     Raynaud’s phenomenon involving the left upper extremity.     • Stroke (CMS/HCC)     Right optic nerve stroke.        Past Surgical History:   Procedure Laterality Date   • APPENDECTOMY     • ARTERIOVENOUS FISTULA/SHUNT SURGERY Left 1/16/2019    Procedure: LEFT UPPER EXTREMITY ARTERIOVENOUS FISTULA FORMATION, BRACHIO-AXILLARY SHUNT WITH ARTEGRAFT;  Surgeon: Dale Ramirez MD;  Location: [facility];  Service: Vascular   • CATARACT EXTRACTION WITH INTRAOCULAR LENS IMPLANT      Cataract surgery with lens implantation.   • CHOLECYSTECTOMY  1958   • COLON RESECTION  10/2005    Colon resection, October 2005, with associated splenectomy.   • DILATATION AND CURETTAGE     • INCISIONAL HERNIA REPAIR  01/02/2008    With mesh   • NEPHRECTOMY Right 2001   • OTHER SURGICAL HISTORY      Recurrence documented 2003, status post radiation therapy and implants.   • SPLENECTOMY     • SYMPATHECTOMY Left     Left axilla sympathetectomy secondary to Raynaud’s phenomenon.   • TOTAL ABDOMINAL HYSTERECTOMY WITH SALPINGO OOPHORECTOMY  2001           Wound 01/16/19 1429 Left arm incision (Active)   Dressing Appearance open to air 2/2/2019  2:00 PM   Closure [place] 2/2/2019  2:00 PM   Drainage Amount none 2/2/2019  2:00 PM       Wound Right anterior groin puncture (Active)   Dressing Appearance open to air 2/2/2019  2:00 PM   Closure None 2/1/2019  8:00 PM   Base moist 2/1/2019  8:00 PM   Dressing Care, Wound open to air 2/1/2019  8:00 PM       Wound 02/01/19 0827 Bilateral coccyx (Active)   Wound Image   2/2/2019   2:00 PM   Dressing Appearance intact;dry;moist drainage 2/2/2019  2:05 PM   Closure None 2/1/2019  8:00 PM   Base moist;maroon/purple;red/granulating;gray;necrotic 2/2/2019  2:05 PM   Periwound intact;redness;non-blanchable 2/2/2019  2:05 PM   Periwound Temperature warm 2/2/2019  2:05 PM   Periwound Skin Turgor soft 2/2/2019  2:05 PM   Edges irregular;open 2/2/2019  2:05 PM   Wound Length (cm) 4 cm 2/2/2019  2:05 PM   Wound Width (cm) 4 cm 2/2/2019  2:05 PM   Drainage Characteristics/Odor serosanguineous 2/2/2019  2:05 PM   Drainage Amount small 2/2/2019  2:05 PM   Care, Wound ultrasound therapy, non contact low frequency;debrided 2/2/2019  2:05 PM   Dressing Care, Wound dressing applied;low-adherent;foam 2/2/2019  2:05 PM   Periwound Care, Wound barrier ointment applied 2/2/2019  2:05 PM       Wound 02/01/19 0827 Left heel pressure injury (Active)   Dressing Appearance dry;intact 2/2/2019  2:00 PM   Base dressing in place, unable to visualize 2/2/2019  2:00 PM   Periwound dry;intact;pink;blanchable 2/2/2019  4:00 AM   Drainage Amount none 2/1/2019  8:00 PM   Dressing Care, Wound low-adherent 2/2/2019  8:00 AM       Wound 02/01/19 1825 Other (See comments) chest incision (Active)   Dressing Appearance dry;intact 2/2/2019  2:00 PM   Closure BARRY 2/1/2019  8:00 PM   Base dressing in place, unable to visualize 2/2/2019  2:00 PM   Dressing Care, Wound gauze 2/1/2019  8:00 PM         WOUND DEBRIDEMENT  Total area of Debridement: 6 cmsq  Debridement Site 1  Location- Site 1: sacrum  Selective Debridement- Site 1: Wound Surface <20cmsq  Instruments- Site 1: tweezers  Excised Tissue Description- Site 1: moderate, slough, other (comment)(loose non-viable tissues)  Bleeding- Site 1: scant                  PT ASSESSMENT (last 12 hours)      Physical Therapy Evaluation     Row Name 02/02/19 1400          PT Evaluation Time/Intention    Subjective Information  complains of;pain  -JM     Document Type  wound care;evaluation  -JM      Mode of Treatment  physical therapy;individual therapy  -     Patient Effort  good  -     Symptoms Noted During/After Treatment  increased pain  -     Row Name 02/02/19 1400          General Information    Patient Profile Reviewed?  yes  -     Onset of Illness/Injury or Date of Surgery  01/31/19  -     Referring Physician  Dr. Stephens  -     Patient Observations  alert;cooperative  -     General Observations of Patient  Pt with RUE edema and weeping, ecchymosis and abrasion noted to L great toe, very small DTPI L heel, dressing in place sacrum   -     Pertinent History of Current Functional Problem  Pt to [facility] for falls x2 with R shoulder dislocation reduced at [facility].  Pt recently started dialysis for stage 5 CKD, dialysis access not working, so pt transfered to [facility], then to [facility] for higher LOC.  -     Existing Precautions/Restrictions  other (see comments);fall;oxygen therapy device and L/min recent R shoulder dislocation  -     Risks Reviewed  patient:;increased discomfort;change in vital signs;lines disloged  -     Benefits Reviewed  patient:;improve skin integrity  -     Barriers to Rehab  medically complex  -     Row Name 02/02/19 1400          Cognitive Assessment/Intervention- PT/OT    Orientation Status (Cognition)  oriented x 3  -     Follows Commands (Cognition)  WFL  -     Row Name 02/02/19 1400          Bed Mobility Assessment/Treatment    Bed Mobility Assessment/Treatment  rolling left;rolling right  -     Rolling Left [place] (Bed Mobility)  dependent (less than 25% patient effort)  -     Rolling Right [place] (Bed Mobility)  dependent (less than 25% patient effort)  -     Comment (Bed Mobility)  LSL for MIST tx  -     Row Name 02/02/19 1400          Pain Assessment    Additional Documentation  Pain Scale: FACES Pre/Post-Treatment (Group)  -     Row Name 02/02/19 1400          Pain Scale: Numbers Pre/Post-Treatment    Pain  Location - Side  Left  -JM     Pain Location  hip also L neck and L great toe  -JM     Pain Intervention(s)  Repositioned  -JM     Row Name 02/02/19 1400          Pain Scale: FACES Pre/Post-Treatment    Pain: FACES Scale, Pretreatment  2-->hurts little bit  -JM     Pain: FACES Scale, Post-Treatment  2-->hurts little bit  -JM     Row Name 02/02/19 1405 02/02/19 1400       Wound 02/01/19 0827 Bilateral coccyx    Wound - Properties Group Date first assessed: 02/01/19  -AS Time first assessed: 0827  -AS Present On Admission : yes  -AS Side: Bilateral  -AS Location: coccyx  -AS Stage, Pressure Injury: unstageable  -AS Additional Comments: --  -MR, probable DTPI     Wound Image  --  Images linked: 1  -JM    Dressing Appearance  intact;dry;moist drainage  -  --    Base  moist;maroon/purple;red/granulating;gray;necrotic  -  --    Periwound  intact;redness;non-blanchable  -  --    Periwound Temperature  warm  -  --    Periwound Skin Turgor  soft  -  --    Edges  irregular;open  -JM  --    Wound Length (cm)  4 cm  -JM  --    Wound Width (cm)  4 cm  -JM  --    Drainage Characteristics/Odor  serosanguineous  -  --    Drainage Amount  small  -JM  --    Care, Wound  ultrasound therapy, non contact low frequency;debrided MIST 8min  -  --    Dressing Care, Wound  dressing applied;low-adherent;foam  -  --    Periwound Care, Wound  barrier ointment applied venelex  -JM  --    Row Name             Wound 02/01/19 0827 Left heel pressure injury    Wound - Properties Group Date first assessed: 02/01/19  -AS Time first assessed: 0827  -AS Side: Left  -AS Location: heel  -AS Type: pressure injury  -AS Stage, Pressure Injury: deep tissue injury  -AS    Row Name             Wound 02/01/19 1825 Other (See comments) chest incision    Wound - Properties Group Date first assessed: 02/01/19  -KS Time first assessed: 1825  -KS Side: Other (See comments)  -KS Location: chest  -KS Type: incision  -KS    Row Name             Wound  01/16/19 1429 Left arm incision    Wound - Properties Group Date first assessed: 01/16/19  -JOSHUA Time first assessed: 1429  -JOSHUA Side: Left  -JOSHUA Location: arm  -JOSHUA Type: incision  -JOSHUA    Row Name 02/02/19 1405          Plan of Care Review    Plan of Care Reviewed With  patient  -     Row Name 02/02/19 1405          Physical Therapy Clinical Impression    Date of Referral to PT  02/01/19  -     PT Diagnosis (PT Clinical Impression)  impaired skin integrity  -     Criteria for Skilled Interventions Met (PT Clinical Impression)  yes;treatment indicated  -     Row Name 02/02/19 1405          Vital Signs    Pre Systolic BP Rehab  -- nursing present monitoring vitals during tx  -     Row Name 02/02/19 1405          Physical Therapy Goals    Wound Care Goal Selection (PT)  wound care, PT goal 1  -     Additional Documentation  Wound Care Goal Selection (PT) (Row)  -     Row Name 02/02/19 1401          Wound Care Goal 1 (PT)    Wound Care Goal 1 (PT)  Decreased wound area by 50% as evidence of wound healing.  -     Time Frame (Wound Care Goal 1, PT)  long term goal (LTG);10 days  -     Row Name 02/02/19 1405          Positioning and Restraints    Pre-Treatment Position  in bed  -     Post Treatment Position  bed  -     In Bed  supine;call light within reach;encouraged to call for assist;with nsg;RUE elevated;LUE elevated;waffle boots/both  -       User Key  (r) = Recorded By, (t) = Taken By, (c) = Cosigned By    Initials Name Provider Type    JOSHUANilam Villafuerte RN Registered Nurse    MR LaytonFrance RN Registered Nurse    Leonela Quezada RN Registered Nurse    Lorri Lamb RN Registered Nurse    Ebony Davey, PT Physical Therapist            Recommendation and Plan     Plan of Care Reviewed With: patient           Outcome Summary: PT wound care initiated, performed MIST x8min to sacral/coccyx DTPI, selective debridement of non-viable tissues, venelx applied and sacral  optifoam.  Pt will benefit from continued MIST and debridement as indicated to promote wound healing.  Plan of Care Reviewed With: patient            Time Calculation  PT Charges     Row Name 02/02/19 1405             Time Calculation    Start Time  1405  -      PT Goal Re-Cert Due Date  02/12/19  -SEFERINO        User Key  (r) = Recorded By, (t) = Taken By, (c) = Cosigned By    Initials Name Provider Type    Ebony Davey, PT Physical Therapist        Therapy Suggested Charges     Code   Minutes Charges    None             Therapy Charges for Today     Code Description Service Date Service Provider Modifiers Qty    84179207364 HC PAM DEBRIDE OPEN WOUND UP TO 20CM 2/2/2019 Ebony Amin, PT GP 1    26461797375 HC PT NLFU MIST 2/2/2019 Ebony Amin, PT GP 1    57365840974 HC PT EVAL MOD COMPLEXITY 4 2/2/2019 Ebony Amin, PT GP 1            PT G-Codes  AM-PAC 6 Clicks Score: 8       Ebony Amin PT  2/2/2019

## 2019-02-02 NOTE — PLAN OF CARE
Problem: Patient Care Overview  Goal: Plan of Care Review  Outcome: Ongoing (interventions implemented as appropriate)   02/02/19 0791   Coping/Psychosocial   Plan of Care Reviewed With patient   OTHER   Outcome Summary PT wound care initiated, performed MIST x8min to sacral/coccyx DTPI, selective debridement of non-viable tissues, venelx applied and sacral optifoam. Pt will benefit from continued MIST and debridement as indicated to promote wound healing.

## 2019-02-02 NOTE — PROGRESS NOTES
"   LOS: 2 days    Patient Care Team:  Otilio Smith DO as PCP - General (Family Medicine)        Subjective     Interval History:     No acute events overnight. No new complaints. States feeling weak.     Review of Systems:   No CP or SOA    Objective     Vital Sign Min/Max for last 24 hours  Temp  Min: 97.4 °F (36.3 °C)  Max: 98.7 °F (37.1 °C)   BP  Min: 72/22  Max: 163/84   Pulse  Min: 59  Max: 78   Resp  Min: 16  Max: 28   SpO2  Min: 89 %  Max: 99 %   No Data Recorded   Weight  Min: 63.4 kg (139 lb 12.4 oz)  Max: 66.1 kg (145 lb 11.6 oz)     Flowsheet Rows      First Filed Value   Admission Height  163.8 cm (64.5\") Documented at 02/01/2019 0300   Admission Weight  70 kg (154 lb 4.8 oz) Documented at 01/31/2019 1607          I/O this shift:  In: 360 [Blood:360]  Out: -   I/O last 3 completed shifts:  In: 732.4 [P.O.:120; I.V.:58.3; Blood:302.5; IV Piggyback:251.6]  Out: 150 [Urine:150]    Physical Exam:     General Appearance:    Alert, cooperative, in no acute distress   Head:    Normocephalic, without obvious abnormality, atraumatic               Neck:   No adenopathy, supple, trachea midline, no thyromegaly, no     carotid bruit, no JVD       Lungs:     Diminished air entry,respirations regular, even and                   unlabored    Heart:    Regular rhythm and normal rate, normal S1 and S2, no            murmur, no gallop, no rub, no click       Abdomen:     Normal bowel sounds, no masses, no organomegaly, soft        non-tender, non-distended, no guarding, no rebound                 tenderness       Extremities:   Moves all extremities well, no edema, no cyanosis, no              redness               Neurologic:   No focal deficit noted grossly.        WBC WBC   Date Value Ref Range Status   02/02/2019 18.67 (H) 3.50 - 10.80 10*3/mm3 Final   02/01/2019 21.88 (H) 3.50 - 10.80 10*3/mm3 Final     Comment:     WBC corrected for presence of NRBC's   01/31/2019 25.51 (H) 3.50 - 10.80 10*3/mm3 Final     " Comment:     WBC corrected for presence of NRBC's      HGB Hemoglobin   Date Value Ref Range Status   02/02/2019 7.8 (L) 11.5 - 15.5 g/dL Final   02/01/2019 8.2 (L) 11.5 - 15.5 g/dL Final   02/01/2019 6.9 (L) 11.5 - 15.5 g/dL Final   01/31/2019 7.1 (L) 11.5 - 15.5 g/dL Final      HCT Hematocrit   Date Value Ref Range Status   02/02/2019 24.1 (L) 34.5 - 44.0 % Final   02/01/2019 25.5 (L) 34.5 - 44.0 % Final   02/01/2019 22.0 (L) 34.5 - 44.0 % Final   01/31/2019 21.4 (L) 34.5 - 44.0 % Final      Platlets No results found for: LABPLAT   MCV MCV   Date Value Ref Range Status   02/02/2019 88.3 80.0 - 99.0 fL Final   02/01/2019 100.5 (H) 80.0 - 99.0 fL Final   01/31/2019 96.4 80.0 - 99.0 fL Final          Sodium Sodium   Date Value Ref Range Status   02/02/2019 141 132 - 146 mmol/L Final   02/01/2019 140 132 - 146 mmol/L Final   01/31/2019 139 132 - 146 mmol/L Final      Potassium Potassium   Date Value Ref Range Status   02/02/2019 4.3 3.5 - 5.5 mmol/L Final   02/01/2019 4.6 3.5 - 5.5 mmol/L Final   01/31/2019 4.4 3.5 - 5.5 mmol/L Final      Chloride Chloride   Date Value Ref Range Status   02/02/2019 108 99 - 109 mmol/L Final   02/01/2019 107 99 - 109 mmol/L Final   01/31/2019 107 99 - 109 mmol/L Final      CO2 CO2   Date Value Ref Range Status   02/02/2019 19.0 (L) 20.0 - 31.0 mmol/L Final   02/01/2019 19.0 (L) 20.0 - 31.0 mmol/L Final   01/31/2019 20.0 20.0 - 31.0 mmol/L Final      BUN BUN   Date Value Ref Range Status   02/02/2019 52 (H) 9 - 23 mg/dL Final   02/01/2019 57 (H) 9 - 23 mg/dL Final   01/31/2019 47 (H) 9 - 23 mg/dL Final      Creatinine Creatinine   Date Value Ref Range Status   02/02/2019 3.79 (H) 0.60 - 1.30 mg/dL Final   02/01/2019 3.79 (H) 0.60 - 1.30 mg/dL Final   01/31/2019 3.57 (H) 0.60 - 1.30 mg/dL Final      Calcium Calcium   Date Value Ref Range Status   02/02/2019 6.7 (L) 8.7 - 10.4 mg/dL Final   02/01/2019 6.1 (L) 8.7 - 10.4 mg/dL Final   01/31/2019 6.7 (L) 8.7 - 10.4 mg/dL Final      PO4 No  results found for: CAPO4   Albumin Albumin   Date Value Ref Range Status   02/02/2019 2.91 (L) 3.20 - 4.80 g/dL Final   02/01/2019 2.34 (L) 3.20 - 4.80 g/dL Final   01/31/2019 2.67 (L) 3.20 - 4.80 g/dL Final      Magnesium Magnesium   Date Value Ref Range Status   02/02/2019 1.9 1.3 - 2.7 mg/dL Final   02/01/2019 1.9 1.3 - 2.7 mg/dL Final   01/31/2019 1.9 1.3 - 2.7 mg/dL Final      Uric Acid No results found for: URICACID        Results Review:     I reviewed the patient's new clinical results.      bisoprolol 5 mg Oral Nightly   calcitriol 0.25 mcg Oral Daily   castor oil-balsam peru  Topical Q12H   piperacillin-tazobactam 3.375 g Intravenous Q12H   sodium chloride 3 mL Intravenous Q12H   vancomycin (dosing per levels)  Does not apply Daily       Pharmacy to dose vancomycin        Medication Review:     Assessment/Plan       PEA (Pulseless electrical activity) (CMS/HCC)    ITP (idiopathic thrombocytopenic purpura)    Pulmonary hypertension (CMS/HCC)    CKD (chronic kidney disease) stage 5, GFR less than 15 ml/min (CMS/HCC)    PVC's (On Propafanone)    Leukocytosis    Anemia due to chronic kidney disease    Debility    Hx of renal cell carcinoma    Chronic diastolic CHF (congestive heart failure) (CMS/HCC)    Falls frequently    Acute renal failure (ARF) (CMS/HCC)    Anemia    Huge hiatal hernia with intrathoracic stomach      1- 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   4- Anemia of chronic disease.   5- Dialysis access - S/p tunneled catheter. AVF 2 week old.     Plan:  - Patient seen on dialysis.   - Renal diet  - Epo with HD.     Discussed with MAXWELL Proctor MD  02/02/19  10:09 AM

## 2019-02-02 NOTE — THERAPY RE-EVALUATION
Acute Care - Physical Therapy Re-Evaluation  [facility]     Patient Name: Fabiola Pimentel  : 1934  MRN: [id number]  Today's Date: 2019   Onset of Illness/Injury or Date of Surgery: 19  Date of Referral to PT: 19  Referring Physician: MD Radha      Admit Date: 2019    Visit Dx:     ICD-10-CM ICD-9-CM   1. Impaired functional mobility, balance, gait, and endurance Z74.09 V49.89     Patient Active Problem List   Diagnosis   • ITP (idiopathic thrombocytopenic purpura)   • Palpitations   • Premature atrial contractions   • Hypertension   • Chronic renal insufficiency   • Pulmonary hypertension (CMS/HCC)   • DJD (degenerative joint disease)   • Raynaud's phenomenon (secondary)   • Diverticular disease   • CKD (chronic kidney disease) stage 5, GFR less than 15 ml/min (CMS/HCC)   • PVC's (On Propafanone)   • Leukocytosis   • Anemia due to chronic kidney disease   • Debility   • Hx of renal cell carcinoma   • Chronic diastolic CHF (congestive heart failure) (CMS/HCC)   • Falls frequently   • Acute renal failure (ARF) (CMS/HCC)   • PEA (Pulseless electrical activity) (CMS/HCC)   • Huge hiatal hernia with intrathoracic stomach   • Mitral valve mass     Past Medical History:   Diagnosis Date   • Chronic ITP (idiopathic thrombocytopenia) (CMS/HCC)    • Chronic renal insufficiency    • CKD (chronic kidney disease)    • Diverticular disease    • Dizziness     Dizziness with increase of propafenone to t.i.d.; however, palpitations improved, patient wishes to stay on current dose.   • DJD (degenerative joint disease)    • Hip fracture (CMS/HCC)     Recent hip and pelvic fracture.   • History of uterine cancer     Initial diagnosis, , status post SALVATORE/BSO. Recurrence documented , status post radiation therapy and implants.   • Hypertension    • Lower extremity edema     Lower extremity venous duplex, 2013:  No evidence of deep venous thrombosis. Negative VQ scan for pulmonary embolus,  findings suggest congestive heart failure, 10/11/2013.No evidence of deep venous thrombosis by bilateral duplex, 03/25/2015.Mild   • Palpitations    • Pelvic fracture (CMS/HCC)     Recent hip and pelvic fracture.   • Premature atrial contractions     Premature atrial contractions, postoperative from incisional hernia repair:A 2D echocardiogram 01/03/2008:  Mild MR, mild to moderate TR, small pericardial effusion, EF 65%.   • Pulmonary hypertension (CMS/HCC)    • Raynaud's phenomenon (secondary)     Raynaud’s phenomenon involving the left upper extremity.     • Stroke (CMS/HCC)     Right optic nerve stroke.     Past Surgical History:   Procedure Laterality Date   • APPENDECTOMY     • ARTERIOVENOUS FISTULA/SHUNT SURGERY Left 1/16/2019    Procedure: LEFT UPPER EXTREMITY ARTERIOVENOUS FISTULA FORMATION, BRACHIO-AXILLARY SHUNT WITH ARTEGRAFT;  Surgeon: Dale Ramirez MD;  Location: [facility];  Service: Vascular   • CATARACT EXTRACTION WITH INTRAOCULAR LENS IMPLANT      Cataract surgery with lens implantation.   • CHOLECYSTECTOMY  1958   • COLON RESECTION  10/2005    Colon resection, October 2005, with associated splenectomy.   • DILATATION AND CURETTAGE     • INCISIONAL HERNIA REPAIR  01/02/2008    With mesh   • NEPHRECTOMY Right 2001   • OTHER SURGICAL HISTORY      Recurrence documented 2003, status post radiation therapy and implants.   • SPLENECTOMY     • SYMPATHECTOMY Left     Left axilla sympathetectomy secondary to Raynaud’s phenomenon.   • TOTAL ABDOMINAL HYSTERECTOMY WITH SALPINGO OOPHORECTOMY  2001        PT ASSESSMENT (last 12 hours)      Physical Therapy Evaluation     Row Name 02/02/19 1448 02/02/19 1400       PT Evaluation Time/Intention    Subjective Information  complains of;fatigue  -LS  complains of;pain  -    Document Type  re-evaluation  -LS  wound care;evaluation  -    Mode of Treatment  physical therapy  -LS  physical therapy;individual therapy  -JM    Patient Effort  good  -LS  good   -    Symptoms Noted During/After Treatment  fatigue;increased pain  -  increased pain  -    Row Name 02/02/19 1448 02/02/19 1400       General Information    Patient Profile Reviewed?  yes  -LS  yes  -    Onset of Illness/Injury or Date of Surgery  01/31/19  -  01/31/19  -    Referring Physician  MD Radha  -  Dr. Stephens  -    Patient Observations  alert;cooperative;agree to therapy  -  alert;cooperative  -    General Observations of Patient  --  -  Pt with RUE edema and weeping, ecchymosis and abrasion noted to L great toe, very small DTPI L heel, dressing in place sacrum   -    Prior Level of Function  independent:;gait;transfer;ADL's;bathing;dressing  -  --    Pertinent History of Current Functional Problem  To OSH s/p fall at home; R shoulder reduced. transferred to [place], then [facility] with RUE fistula not functioning. Developed PEA arrest in dialysis; transferred to ICU. New PT mobility orders received. Imaging (-) for acute fractures (Pelvis XR showed old L fx).   -  Pt to [facility] for falls x2 with R shoulder dislocation reduced at [facility].  Pt recently started dialysis for stage 5 CKD, dialysis access not working, so pt transfered to [place], then to [facility] for higher LOC.  -    Existing Precautions/Restrictions  fall;oxygen therapy device and L/min;other (see comments) recent R shoulder dislocation; sore L great toe  -  other (see comments);fall;oxygen therapy device and L/min recent R shoulder dislocation  -    Risks Reviewed  patient:;LOB;dizziness;increased discomfort;change in vital signs  -  patient:;increased discomfort;change in vital signs;lines disloged  -    Benefits Reviewed  patient:;improve function;increase independence;increase strength;increase balance;increase knowledge  -  patient:;improve skin integrity  -    Barriers to Rehab  medically complex  -  medically complex  -    Row Name 02/02/19 1448          Relationship/Environment     Lives With  alone  -     Row Name 02/02/19 1448          Resource/Environmental Concerns    Current Living Arrangements  home/apartment/condo  -     Row Name 02/02/19 1448          Home Main Entrance    Number of Stairs, Main Entrance  two at front; 12 at back of home  -     Row Name 02/02/19 1448          Stairs Within Home, Primary    Stairs, Within Home, Primary  -- pt's BR on main level of home  -     Row Name 02/02/19 1448          Cognitive Assessment/Interventions    Additional Documentation  Cognitive Assessment/Intervention (Group)  -     Row Name 02/02/19 1448 02/02/19 1400       Cognitive Assessment/Intervention- PT/OT    Affect/Mental Status (Cognitive)  WFL  -LS  --    Orientation Status (Cognition)  oriented x 4  -LS  oriented x 3  -JM    Follows Commands (Cognition)  follows one step commands;over 90% accuracy;verbal cues/prompting required  -LS  WFL  -    Cognitive Function (Cognitive)  safety deficit  -LS  --    Safety Deficit (Cognitive)  mild deficit;insight into deficits/self awareness;awareness of need for assistance;safety precautions awareness  -LS  --    Row Name 02/02/19 1448          Safety Issues, Functional Mobility    Impairments Affecting Function (Mobility)  balance;coordination;endurance/activity tolerance;pain;shortness of breath;strength  -     Row Name 02/02/19 1448 02/02/19 1400       Bed Mobility Assessment/Treatment    Bed Mobility Assessment/Treatment  supine-sit;sit-supine  -LS  rolling left;rolling right  -JM    Rolling Left [place] (Bed Mobility)  maximum assist (25% patient effort);2 person assist;verbal cues  -LS  dependent (less than 25% patient effort)  -JM    Rolling Right [place] (Bed Mobility)  maximum assist (25% patient effort);2 person assist;verbal cues  -LS  dependent (less than 25% patient effort)  -JM    Assistive Device (Bed Mobility)  bed rails;draw sheet;head of bed elevated  -LS  --    Comment (Bed Mobility)  --  LSL for MIST tx  -JM     Row Name 02/02/19 1448          Transfer Assessment/Treatment    Transfer Assessment/Treatment  sit-stand transfer;stand-sit transfer  -     Comment (Transfers)  PT/tech on eiether side of pt for BUE support.   -LS     Sit-Stand [place] (Transfers)  maximum assist (25% patient effort);2 person assist;verbal cues  -     Stand-Sit [place] (Transfers)  maximum assist (25% patient effort);2 person assist;verbal cues  -     Row Name 02/02/19 1448          Gait/Stairs Assessment/Training    [place] Level (Gait)  unable to assess  -     Row Name 02/02/19 1448          General ROM    GENERAL ROM COMMENTS  BLE WFL  -     Row Name 02/02/19 1448          MMT (Manual Muscle Testing)    General MMT Comments  BLE grossly 3+/5  -     Row Name 02/02/19 1448          Motor Assessment/Intervention    Additional Documentation  Balance (Group);Therapeutic Exercise (Group)  -     Row Name 02/02/19 1448          Therapeutic Exercise    Therapeutic Exercise  supine, lower extremities  -     Additional Documentation  Therapeutic Exercise ([facility])  -     12294 - PT Therapeutic Exercise Minutes  2  -     17709 - PT Therapeutic Activity Minutes  21  -     Row Name 02/02/19 1448          Lower Extremity Supine Therapeutic Exercise    Performed, Supine Lower Extremity (Therapeutic Exercise)  hip flexion/extension;hip abduction/adduction;ankle pumps  -     Exercise Type, Supine Lower Extremity (Therapeutic Exercise)  AAROM (active assistive range of motion)  -     Sets/Reps Detail, Supine Lower Extremity (Therapeutic Exercise)  1/10 BLE  -     Row Name 02/02/19 1448          Balance    Balance  static sitting balance;static standing balance  -     Row Name 02/02/19 1448          Static Sitting Balance    Level of [place] (Unsupported Sitting, Static Balance)  contact guard assist  -     Sitting Position (Unsupported Sitting, Static Balance)  sitting on edge of bed  -     Row Name 02/02/19  1448          Static Standing Balance    Level of [place] (Supported Standing, Static Balance)  maximal assist, 25 to 49% patient effort;2 person assist  -LS     Time Able to Maintain Position (Supported Standing, Static Balance)  less than 15 seconds  -     Row Name 02/02/19 1448          Sensory Assessment/Intervention    Sensory General Assessment  no sensation deficits identified BLE  -     Row Name 02/02/19 1400          Pain Assessment    Additional Documentation  Pain Scale: FACES Pre/Post-Treatment (Group)  -     Row Name 02/02/19 1448 02/02/19 1400       Pain Scale: Numbers Pre/Post-Treatment    Pain Location - Side  --  Left  -    Pain Location - Orientation  generalized  -LS  --    Pain Location  --  hip also L neck and L great toe  -    Pain Intervention(s)  Repositioned  -LS  Repositioned  -    Row Name 02/02/19 1448 02/02/19 1400       Pain Scale: FACES Pre/Post-Treatment    Pain: FACES Scale, Pretreatment  2-->hurts little bit  -LS  2-->hurts little bit  -    Pain: FACES Scale, Post-Treatment  4-->hurts little more  -LS  2-->hurts little bit  -    Pre/Post Treatment Pain Comment  tolerated; notified RN  -LS  --    Row Name 02/02/19 1405 02/02/19 1400       Wound 02/01/19 0827 Bilateral coccyx    Wound - Properties Group Date first assessed: 02/01/19  -AS Time first assessed: 0827  -AS Present On Admission : yes  -AS Side: Bilateral  -AS Location: coccyx  -AS Stage, Pressure Injury: unstageable  -AS Additional Comments: --  -MR, probable DTPI     Wound Image  --  Images linked: 1  -JM    Dressing Appearance  intact;dry;moist drainage  -  --    Base  moist;maroon/purple;red/granulating;gray;necrotic  -  --    Periwound  intact;redness;non-blanchable  -JM  --    Periwound Temperature  warm  -JM  --    Periwound Skin Turgor  soft  -  --    Edges  irregular;open  -JM  --    Wound Length (cm)  4 cm  -JM  --    Wound Width (cm)  4 cm  -JM  --    Drainage Characteristics/Odor   serosanguineous  -  --    Drainage Amount  small  -  --    Care, Wound  ultrasound therapy, non contact low frequency;debrided MIST 8min  -  --    Dressing Care, Wound  dressing applied;low-adherent;foam  -  --    Periwound Care, Wound  barrier ointment applied venelex  -  --    Row Name             Wound 02/01/19 0827 Left heel pressure injury    Wound - Properties Group Date first assessed: 02/01/19  -AS Time first assessed: 0827  -AS Side: Left  -AS Location: heel  -AS Type: pressure injury  -AS Stage, Pressure Injury: deep tissue injury  -AS    Row Name             Wound 02/01/19 1825 Other (See comments) chest incision    Wound - Properties Group Date first assessed: 02/01/19  -KS Time first assessed: 1825  -KS Side: Other (See comments)  -KS Location: chest  -KS Type: incision  -KS    Row Name             Wound 01/16/19 1429 Left arm incision    Wound - Properties Group Date first assessed: 01/16/19  -JOSHUA Time first assessed: 1429  -JOSHUA Side: Left  -JOSHUA Location: arm  -JOSHUA Type: incision  -JOSHUA    Row Name 02/02/19 1448 02/02/19 1405       Plan of Care Review    Plan of Care Reviewed With  patient  -LS  patient  -    Row Name 02/02/19 1448 02/02/19 1405       Physical Therapy Clinical Impression    Date of Referral to PT  02/02/19  -LS  02/01/19  -    PT Diagnosis (PT Clinical Impression)  impaired functional mobility, balance, gait  -LS  impaired skin integrity  -    Patient/Family Goals Statement (PT Clinical Impression)  return to [facility]  -  --    Criteria for Skilled Interventions Met (PT Clinical Impression)  yes;treatment indicated  -  yes;treatment indicated  -    Rehab Potential (PT Clinical Summary)  good, to achieve stated therapy goals  -  --    Care Plan Review (PT)  evaluation/treatment results reviewed  -  --    Referral Needed to Another Service (PT)  occupational therapy  -  --    Row Name 02/02/19 1448 02/02/19 1405       Vital Signs    Pre Systolic BP Rehab  154  -  --  nursing present monitoring vitals during tx  -    Pre Treatment Diastolic BP  72  -LS  --    Post Systolic BP Rehab  144  -LS  --    Post Treatment Diastolic BP  72  -LS  --    Pretreatment Heart Rate (beats/min)  64  -LS  --    Posttreatment Heart Rate (beats/min)  64  -LS  --    Pre SpO2 (%)  94  -LS  --    O2 Delivery Pre Treatment  supplemental O2  -LS  --    Post SpO2 (%)  92  -LS  --    O2 Delivery Post Treatment  supplemental O2  -LS  --    Pre Patient Position  Supine  -LS  --    Intra Patient Position  Standing  -LS  --    Post Patient Position  Supine  -LS  --    Row Name 02/02/19 1448 02/02/19 1405       Physical Therapy Goals    Bed Mobility Goal Selection (PT)  bed mobility, PT goal 1  -LS  --    Transfer Goal Selection (PT)  transfer, PT goal 1  -LS  --    Gait Training Goal Selection (PT)  gait training, PT goal 1  -  --    Wound Care Goal Selection (PT)  --  wound care, PT goal 1  -    Additional Documentation  --  Wound Care Goal Selection (PT) (Row)  -    Row Name 02/02/19 1448          Bed Mobility Goal 1 (PT)    Activity/Assistive Device (Bed Mobility Goal 1, PT)  sit to supine/supine to sit  -LS     [place] Level/Cues Needed (Bed Mobility Goal 1, PT)  minimum assist (75% or more patient effort)  -LS     Time Frame (Bed Mobility Goal 1, PT)  2 weeks  -LS     Progress/Outcomes (Bed Mobility Goal 1, PT)  goal ongoing  -     Row Name 02/02/19 1448          Transfer Goal 1 (PT)    Activity/Assistive Device (Transfer Goal 1, PT)  sit-to-stand/stand-to-sit;walker, rolling  -LS     [place] Level/Cues Needed (Transfer Goal 1, PT)  minimum assist (75% or more patient effort)  -LS     Time Frame (Transfer Goal 1, PT)  2 weeks  -LS     Progress/Outcome (Transfer Goal 1, PT)  goal ongoing  -     Row Name 02/02/19 1448          Gait Training Goal 1 (PT)    Activity/Assistive Device (Gait Training Goal 1, PT)  gait (walking locomotion);walker, rolling  -LS     [place] Level (Gait  Training Goal 1, PT)  moderate assist (50-74% patient effort);verbal cues required  -     Distance (Gait Goal 1, PT)  25  -LS     Time Frame (Gait Training Goal 1, PT)  2 weeks  -     Progress/Outcome (Gait Training Goal 1, PT)  goal ongoing  -     Row Name 02/02/19 1405          Wound Care Goal 1 (PT)    Wound Care Goal 1 (PT)  Decreased wound area by 50% as evidence of wound healing.  -     Time Frame (Wound Care Goal 1, PT)  long term goal (LTG);10 days  -     Row Name 02/02/19 1448 02/02/19 1405       Positioning and Restraints    Pre-Treatment Position  in bed  -  in bed  -    Post Treatment Position  bed  -  bed  -    In Bed  notified nsg;fowlers;call light within reach;encouraged to call for assist;exit alarm on;SCD pump applied;waffle boots/both;heels elevated;legs elevated;LUE elevated;RUE elevated  -  supine;call light within reach;encouraged to call for assist;with nsg;RUE elevated;LUE elevated;waffle boots/both  -    Row Name 02/02/19 1448          Living Environment    Home Accessibility  stairs to enter home;stairs within home;tub/shower is not walk in  -       User Key  (r) = Recorded By, (t) = Taken By, (c) = Cosigned By    Initials Name Provider Type    Tanesha Rodney, PT Physical Therapist    Nilam Martinez RN Registered Nurse    France Britt RN Registered Nurse    Leonela Quezada RN Registered Nurse    Lorri Lamb RN Registered Nurse    Ebony Davey, PT Physical Therapist        Physical Therapy Education     Title: PT OT SLP Therapies (In Progress)     Topic: Physical Therapy (In Progress)     Point: Mobility training (In Progress)     Learning Progress Summary           Patient Acceptance, E,D, NR by SANTA at 2/2/2019  2:48 PM                   Point: Home exercise program (In Progress)     Learning Progress Summary           Patient Acceptance, E,D, NR by SANTA at 2/2/2019  2:48 PM                   Point: Body mechanics (In Progress)      Learning Progress Summary           Patient Acceptance, E,D, NR by  at 2/2/2019  2:48 PM                   Point: Precautions (In Progress)     Learning Progress Summary           Patient Acceptance, E,D, NR by  at 2/2/2019  2:48 PM                               User Key     Initials Effective Dates Name Provider Type Discipline     06/19/15 -  Tanesha Castro, PT Physical Therapist PT              PT Recommendation and Plan  Anticipated Discharge Disposition (PT): skilled nursing facility  Planned Therapy Interventions (PT Eval): bed mobility training, balance training, gait training, home exercise program, patient/family education, strengthening, transfer training  Therapy Frequency (PT Clinical Impression): daily  Outcome Summary/Treatment Plan (PT)  Anticipated Discharge Disposition (PT): skilled nursing facility  Referral Needed to Another Service (PT): occupational therapy  Plan of Care Reviewed With: patient  Outcome Summary: PT re-evaluation (initial eval for PT mobility) completed. Pt demonstrates generalized weakness and decreased indep re: functional mobility, warranting further skilled PT services to promote PLOF. Limited today by fatigue and generalized pain; able to sit EOB CGA and perform STS x1 (max x2). Recommend OT consult; recommend SNF placement at d/c.   Outcome Measures     Row Name 02/02/19 1448             How much help from another person do you currently need...    Turning from your back to your side while in flat bed without using bedrails?  2  -LS      Moving from lying on back to sitting on the side of a flat bed without bedrails?  2  -LS      Moving to and from a bed to a chair (including a wheelchair)?  1  -LS      Standing up from a chair using your arms (e.g., wheelchair, bedside chair)?  2  -LS      Climbing 3-5 steps with a railing?  1  -LS      To walk in hospital room?  1  -LS      AM-PAC 6 Clicks Score  9  -LS         Functional Assessment    Outcome Measure Options   AM-PAC 6 Clicks Basic Mobility (PT)  -        User Key  (r) = Recorded By, (t) = Taken By, (c) = Cosigned By    Initials Name Provider Type    Tanesha Rodney, PT Physical Therapist         Time Calculation:   PT Charges     Row Name 02/02/19 1448 02/02/19 1405          Time Calculation    Start Time  1448  -LS  1405  -     PT Received On  02/02/19  -  --     PT Goal Re-Cert Due Date  02/12/19  -LS  02/12/19  -        Time Calculation- PT    Total Timed Code Minutes- PT  23 minute(s)  -LS  --        Timed Charges    71063 - PT Therapeutic Exercise Minutes  2  -LS  --     69807 - PT Therapeutic Activity Minutes  21  -LS  --       User Key  (r) = Recorded By, (t) = Taken By, (c) = Cosigned By    Initials Name Provider Type    Tanesha Rodney, PT Physical Therapist    Ebony Davey, PT Physical Therapist        Therapy Suggested Charges     Code   Minutes Charges    31638 (CPT®) Hc Pt Neuromusc Re Education Ea 15 Min      60720 (CPT®) Hc Pt Ther Proc Ea 15 Min 2     82025 (CPT®) Hc Gait Training Ea 15 Min      34243 (CPT®) Hc Pt Therapeutic Act Ea 15 Min 21 2    21919 (CPT®) Hc Pt Manual Therapy Ea 15 Min      11013 (CPT®) Hc Pt Iontophoresis Ea 15 Min      84984 (CPT®) Hc Pt Elec Stim Ea-Per 15 Min      46966 (CPT®) Hc Pt Ultrasound Ea 15 Min      17979 (CPT®) Hc Pt Self Care/Mgmt/Train Ea 15 Min      08397 (CPT®) Hc Pt Prosthetic (S) Train Initial Encounter, Each 15 Min      66535 (CPT®) Hc Pt Orthotic(S)/Prosthetic(S) Encounter, Each 15 Min      81166 (CPT®) Hc Orthotic(S) Mgmt/Train Initial Encounter, Each 15min      Total  23 2        Therapy Charges for Today     Code Description Service Date Service Provider Modifiers Qty    13870312401 HC PT THERAPEUTIC ACT EA 15 MIN 2/2/2019 Tanesha Castro, PT GP 2    07740138591 HC PT THER SUPP EA 15 MIN 2/2/2019 Tanesha Castro, PT GP 2          PT G-Codes  Outcome Measure Options: AM-PAC 6 Clicks Basic Mobility (PT)  AM-PAC 6 Clicks Score: 9      Tanesha SHIPMAN  Matthew, PT  2/2/2019

## 2019-02-02 NOTE — PROGRESS NOTES
Clinical Nutrition   Food Prefs    Time: 20min  Patient Name: Fabiola Pimentel  Date of Encounter: 02/02/19 4:14 PM  MRN: [id number]  Admission date: 1/31/2019     Pt resting in bed, reports tolerating clear liquids, wants solid food    Current diet: Diet Clear Liquid  Orders Placed This Encounter      Dietary Nutrition Supplements Boost Breeze (Clear Liquid)  Resource Breeze 3x/day    Intervention:  Follow treatment plan  Care plan reviewed  Supplement provided    Follow up:   Per protocol      Nora Gomez RD  4:14 PM

## 2019-02-03 PROBLEM — N17.9 ACUTE RENAL FAILURE (ARF) (HCC): Status: RESOLVED | Noted: 2019-01-01 | Resolved: 2019-01-01

## 2019-02-03 PROBLEM — I49.3 PVC'S (PREMATURE VENTRICULAR CONTRACTIONS): Status: RESOLVED | Noted: 2017-05-16 | Resolved: 2019-01-01

## 2019-02-03 NOTE — PROGRESS NOTES
"Intensive Care Follow-up      LOS: 3 days     Ms. Fabiola Pimentel, 84 y.o. female is followed for: PEA (Pulseless electrical activity) (CMS/HCC)     Subjective - Interval History   84-year-old white female moved to the intensive care unit 2/1 following a brief PEA arrest in dialysis.  Apparently soon after initiating hemodialysis, she became pulseless.  She apparently had a \"disorganized\" rhythm and received about 3 minutes of CPR before resumption of a pulse and rhythm.  The patient did not require intubation  Patient is awake and alert.  She is complaining of sore ribs but otherwise no new complaints.    The patient's relevant past medical, surgical and social history were reviewed and updated in Epic as appropriate.     Objective     Infusions:    Pharmacy to dose vancomycin      Medications:    bisoprolol 5 mg Oral Nightly   calcitriol 0.25 mcg Oral Daily   castor oil-balsam peru  Topical Q12H   piperacillin-tazobactam 3.375 g Intravenous Q12H   sodium chloride 3 mL Intravenous Q12H   vancomycin (dosing per levels)  Does not apply Daily       Vital Sign Min/Max for last 24 hours  Temp  Min: 97.4 °F (36.3 °C)  Max: 99.9 °F (37.7 °C)   BP  Min: 120/68  Max: 168/72   Pulse  Min: 57  Max: 68   Resp  Min: 18  Max: 26   SpO2  Min: 93 %  Max: 99 %   No Data Recorded      Intake/Output Summary (Last 24 hours) at 2/3/2019 0955  Last data filed at 2/3/2019 0800  Gross per 24 hour   Intake 232.8 ml   Output 1390 ml   Net -1157.2 ml           Physical Exam:   GENERAL: Awake and alert   HEENT: Normocephalic   LUNGS: A few slight rales at the bases   HEART: 2/6 systolic murmur   ABDOMEN: Soft   EXTREMITIES: No edema   NEURO/PSYCH: No focal deficits    Results from last 7 days   Lab Units 02/03/19  0459 02/02/19  0743 02/01/19 2018 02/01/19  0441   WBC 10*3/mm3 17.00* 18.67*  --  21.88*   HEMOGLOBIN g/dL 10.1* 7.8* 8.2* 6.9*   PLATELETS 10*3/mm3 124* 132*  --  175     Results from last 7 days   Lab Units 02/03/19  0459 " 02/02/19  0743 02/01/19  0640 02/01/19  0441 01/31/19  1758   SODIUM mmol/L 141 141  --  140 139   POTASSIUM mmol/L 3.9 4.3  --  4.6 4.4   CO2 mmol/L 22.0 19.0*  --  19.0* 20.0   BUN mg/dL 31* 52*  --  57* 47*   CREATININE mg/dL 2.66* 3.79*  --  3.79* 3.57*   MAGNESIUM mg/dL  --  1.9 1.9  --  1.9   PHOSPHORUS mg/dL 3.4 5.0  --  5.1 4.4   GLUCOSE mg/dL 111* 58*  --  56* 78     Estimated Creatinine Clearance: 16.4 mL/min (A) (by C-G formula based on SCr of 2.66 mg/dL (H)).    Results from last 7 days   Lab Units 01/31/19  1758   HEMOGLOBIN A1C % 5.60           Lab Results   Component Value Date    LACTATE 0.7 02/02/2019          Images: No new images    I reviewed the patient's results and images.     Impression      Patient Active Problem List   Diagnosis Code   • ITP (idiopathic thrombocytopenic purpura) D69.3   • Palpitations R00.2   • Premature atrial contractions I49.1   • Hypertension I10   • Pulmonary hypertension (CMS/HCC) I27.20   • DJD (degenerative joint disease) M19.90   • Raynaud's phenomenon (secondary) I73.00   • Diverticular disease K57.90   • CKD (chronic kidney disease) stage 5, GFR less than 15 ml/min (CMS/HCC) N18.5   • Leukocytosis D72.829   • Anemia due to chronic kidney disease N18.9, D63.1   • Debility R53.81   • Hx of renal cell carcinoma Z85.528   • Chronic diastolic CHF (congestive heart failure) (CMS/HCC) I50.32   • Falls frequently R29.6   • PEA (Pulseless electrical activity) (CMS/HCC) I46.9   • 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  Cardiology and renal are following.  She is to continue on current measures.  We'll be reassessed tomorrow.  May be able to transfer to telemetry seen.     Plan of care and goals reviewed with nurse at daily rounds.   I discussed the patient's findings and my recommendations with patient and nursing staff       Malachi Garcia MD  Pulmonary and Critical Care  Medicine  02/03/19 9:55 AM

## 2019-02-03 NOTE — PROGRESS NOTES
"   LOS: 3 days    Patient Care Team:  Otilio Smith DO as PCP - General (Family Medicine)        Subjective     Interval History:     No acute events overnight. No new complaints.      Review of Systems:   No CP or SOA    Objective     Vital Sign Min/Max for last 24 hours  Temp  Min: 97.4 °F (36.3 °C)  Max: 99.9 °F (37.7 °C)   BP  Min: 120/68  Max: 168/72   Pulse  Min: 57  Max: 68   Resp  Min: 18  Max: 26   SpO2  Min: 93 %  Max: 99 %   No Data Recorded   No Data Recorded     Flowsheet Rows      First Filed Value   Admission Height  163.8 cm (64.5\") Documented at 02/01/2019 0300   Admission Weight  70 kg (154 lb 4.8 oz) Documented at 01/31/2019 1607          I/O this shift:  In: 75 [P.O.:75]  Out: -   I/O last 3 completed shifts:  In: 730.8 [P.O.:120; I.V.:86.8; Blood:360; IV Piggyback:164]  Out: 1390 [Other:1390]    Physical Exam:     General Appearance:    Alert, cooperative, in no acute distress   Head:    Normocephalic, without obvious abnormality, atraumatic               Neck:   No adenopathy, supple, trachea midline, no thyromegaly, no     carotid bruit, no JVD       Lungs:     Diminished air entry,respirations regular, even and                   unlabored    Heart:    Regular rhythm and normal rate, normal S1 and S2, no            murmur, no gallop, no rub, no click       Abdomen:     Normal bowel sounds, no masses, no organomegaly, soft        non-tender, non-distended, no guarding, no rebound                 tenderness       Extremities:   Moves all extremities well, no edema, no cyanosis, no              redness               Neurologic:   No focal deficit noted grossly.        WBC WBC   Date Value Ref Range Status   02/03/2019 17.00 (H) 3.50 - 10.80 10*3/mm3 Final   02/02/2019 18.67 (H) 3.50 - 10.80 10*3/mm3 Final   02/01/2019 21.88 (H) 3.50 - 10.80 10*3/mm3 Final     Comment:     WBC corrected for presence of NRBC's   01/31/2019 25.51 (H) 3.50 - 10.80 10*3/mm3 Final     Comment:     WBC corrected " for presence of NRBC's      HGB Hemoglobin   Date Value Ref Range Status   02/03/2019 10.1 (L) 11.5 - 15.5 g/dL Final   02/02/2019 7.8 (L) 11.5 - 15.5 g/dL Final   02/01/2019 8.2 (L) 11.5 - 15.5 g/dL Final   02/01/2019 6.9 (L) 11.5 - 15.5 g/dL Final   01/31/2019 7.1 (L) 11.5 - 15.5 g/dL Final      HCT Hematocrit   Date Value Ref Range Status   02/03/2019 30.9 (L) 34.5 - 44.0 % Final   02/02/2019 24.1 (L) 34.5 - 44.0 % Final   02/01/2019 25.5 (L) 34.5 - 44.0 % Final   02/01/2019 22.0 (L) 34.5 - 44.0 % Final   01/31/2019 21.4 (L) 34.5 - 44.0 % Final      Platlets No results found for: LABPLAT   MCV MCV   Date Value Ref Range Status   02/03/2019 88.3 80.0 - 99.0 fL Final   02/02/2019 88.3 80.0 - 99.0 fL Final   02/01/2019 100.5 (H) 80.0 - 99.0 fL Final   01/31/2019 96.4 80.0 - 99.0 fL Final          Sodium Sodium   Date Value Ref Range Status   02/03/2019 141 132 - 146 mmol/L Final   02/02/2019 141 132 - 146 mmol/L Final   02/01/2019 140 132 - 146 mmol/L Final   01/31/2019 139 132 - 146 mmol/L Final      Potassium Potassium   Date Value Ref Range Status   02/03/2019 3.9 3.5 - 5.5 mmol/L Final   02/02/2019 4.3 3.5 - 5.5 mmol/L Final   02/01/2019 4.6 3.5 - 5.5 mmol/L Final   01/31/2019 4.4 3.5 - 5.5 mmol/L Final      Chloride Chloride   Date Value Ref Range Status   02/03/2019 108 99 - 109 mmol/L Final   02/02/2019 108 99 - 109 mmol/L Final   02/01/2019 107 99 - 109 mmol/L Final   01/31/2019 107 99 - 109 mmol/L Final      CO2 CO2   Date Value Ref Range Status   02/03/2019 22.0 20.0 - 31.0 mmol/L Final   02/02/2019 19.0 (L) 20.0 - 31.0 mmol/L Final   02/01/2019 19.0 (L) 20.0 - 31.0 mmol/L Final   01/31/2019 20.0 20.0 - 31.0 mmol/L Final      BUN BUN   Date Value Ref Range Status   02/03/2019 31 (H) 9 - 23 mg/dL Final   02/02/2019 52 (H) 9 - 23 mg/dL Final   02/01/2019 57 (H) 9 - 23 mg/dL Final   01/31/2019 47 (H) 9 - 23 mg/dL Final      Creatinine Creatinine   Date Value Ref Range Status   02/03/2019 2.66 (H) 0.60 - 1.30  mg/dL Final   02/02/2019 3.79 (H) 0.60 - 1.30 mg/dL Final   02/01/2019 3.79 (H) 0.60 - 1.30 mg/dL Final   01/31/2019 3.57 (H) 0.60 - 1.30 mg/dL Final      Calcium Calcium   Date Value Ref Range Status   02/03/2019 6.9 (L) 8.7 - 10.4 mg/dL Final   02/02/2019 6.7 (L) 8.7 - 10.4 mg/dL Final   02/01/2019 6.1 (L) 8.7 - 10.4 mg/dL Final   01/31/2019 6.7 (L) 8.7 - 10.4 mg/dL Final      PO4 No results found for: CAPO4   Albumin Albumin   Date Value Ref Range Status   02/03/2019 2.54 (L) 3.20 - 4.80 g/dL Final   02/02/2019 2.91 (L) 3.20 - 4.80 g/dL Final   02/01/2019 2.34 (L) 3.20 - 4.80 g/dL Final   01/31/2019 2.67 (L) 3.20 - 4.80 g/dL Final      Magnesium Magnesium   Date Value Ref Range Status   02/02/2019 1.9 1.3 - 2.7 mg/dL Final   02/01/2019 1.9 1.3 - 2.7 mg/dL Final   01/31/2019 1.9 1.3 - 2.7 mg/dL Final      Uric Acid No results found for: URICACID        Results Review:     I reviewed the patient's new clinical results.      bisoprolol 5 mg Oral Nightly   calcitriol 0.25 mcg Oral Daily   castor oil-balsam peru  Topical Q12H   piperacillin-tazobactam 3.375 g Intravenous Q12H   sodium chloride 3 mL Intravenous Q12H   vancomycin (dosing per levels)  Does not apply Daily       Pharmacy to dose vancomycin        Medication Review:     Assessment/Plan       PEA (Pulseless electrical activity) (CMS/HCC)    ITP (idiopathic thrombocytopenic purpura)    Pulmonary hypertension (CMS/HCC)    CKD (chronic kidney disease) stage 5, GFR less than 15 ml/min (CMS/HCC)    Leukocytosis    Anemia due to chronic kidney disease    Debility    Hx of renal cell carcinoma    Chronic diastolic CHF (congestive heart failure) (CMS/HCC)    Falls frequently    Huge hiatal hernia with intrathoracic stomach    Mitral valve mass      1- 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   4- Anemia of chronic disease.   5- Dialysis access - S/p tunneled catheter. AVF 2 week old.     Plan:  - Next Dialysis  on Tuesday.   - Renal diet  - Epo with HD.     Discussed with MAXWELL Proctor MD  02/03/19  9:36 AM

## 2019-02-03 NOTE — PLAN OF CARE
Problem: Patient Care Overview  Goal: Plan of Care Review  Outcome: Ongoing (interventions implemented as appropriate)   02/03/19 0815   Coping/Psychosocial   Plan of Care Reviewed With patient   Plan of Care Review   Progress improving   OTHER   Outcome Summary PT continuing MIST and debridement for sacral/coccyx wound. Loose overlying necrotic tissues debrided, central base with adherent yellolw/gray slough that will require ongoing debridement. Venelex and sacral optifoam applied.

## 2019-02-03 NOTE — PROGRESS NOTES
[facility] at [facility]  Cardiology Progress Note      Chief Complaint/Reason for service:    · PEA/Cardiac Arrest in setting of initiation of hemodialysis and sepsis.  · Mitral valve mass         Patient was admitted on 1/31/2019 for higher level of care after presenting to [facility] with confusion and falls and was found to be significantly uremic with a creatinine greater than 9.  A temporary right IJ dialysis catheter was placed and Dr. Adler evaluated patient and arrange for hemodialysis to start yesterday morning.  Shortly after initiation of dialysis she became unresponsive suffering cardiac arrest and PDA on telemetry monitor.  Go cardiogram shows a normal LVEF and a echodense mitral valve mass present in which vegetation could not be excluded. She is lying in bed hemodynamically stable at this time.  Hemodialysis completed this am with creatinine decreased from >9.0 to 3.79. Remains anemic but received PRBC yesterday. She is oriented and has no complaints but appears somewhat lethargic. She take Rhythmol for frequent PVC's and it was d/c'ed yesterday.  Telemetry shows NSR. Nurse reports occasional PVC's overnight but no arrhythmias or events.    Past medical, surgical, social and family history reviewed in the patient's electronic medical record.           Vital Sign Min/Max for last 24 hours  Temp  Min: 97.4 °F (36.3 °C)  Max: 99.9 °F (37.7 °C)   BP  Min: 120/68  Max: 168/72   Pulse  Min: 57  Max: 68   Resp  Min: 18  Max: 28   SpO2  Min: 93 %  Max: 99 %   Flow (L/min)  Min: 1  Max: 1      Intake/Output Summary (Last 24 hours) at 2/3/2019 0911  Last data filed at 2/3/2019 0800  Gross per 24 hour   Intake 592.8 ml   Output 1390 ml   Net -797.2 ml           Physical Exam   Constitutional: She appears well-developed and well-nourished.   HENT:   Head: Normocephalic and atraumatic.   Cardiovascular: Normal rate and regular rhythm.   No murmur heard.  Pulmonary/Chest: Effort  normal.   Abdominal: Soft.   Skin: Skin is warm and dry.       Tele:  Sinus rhythm    Results Review (reviewed the patient's recent labs in the electronic medical record):     EKG (2/1/2019):  Accelerated junctional rhythm         Results from last 7 days   Lab Units 02/03/19 0459 02/02/19  0743 02/01/19  0640 02/01/19 0441 01/31/19  1758   SODIUM mmol/L 141 141  --  140 139   POTASSIUM mmol/L 3.9 4.3  --  4.6 4.4   CHLORIDE mmol/L 108 108  --  107 107   BUN mg/dL 31* 52*  --  57* 47*   CREATININE mg/dL 2.66* 3.79*  --  3.79* 3.57*   MAGNESIUM mg/dL  --  1.9 1.9  --  1.9         Results from last 7 days   Lab Units 02/03/19 0459 02/02/19  0743 02/01/19 2018 02/01/19  0441   WBC 10*3/mm3 17.00* 18.67*  --  21.88*   HEMOGLOBIN g/dL 10.1* 7.8* 8.2* 6.9*   HEMATOCRIT % 30.9* 24.1* 25.5* 22.0*   PLATELETS 10*3/mm3 124* 132*  --  175       Lab Results   Component Value Date    HGBA1C 5.60 01/31/2019       Lab Results   Component Value Date    CHOL 70 02/01/2019    TRIG 91 02/01/2019    HDL 18 (L) 02/01/2019    LDL 25 02/01/2019              Active Hospital Problems    Diagnosis Date Noted   • **PEA (Pulseless electrical activity) (CMS/[facility]) [I46.9] 02/01/2019     Priority: High     · S/p CPR 2/1/19 morning in setting of initiation of hemodialysis  · unknown cause at this time, work up pending. Possibly related to low BP with dialysis. Low suspicion for ACS.  · Echo (02/01/2019): Normal EF. 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 present on the chordal apparatus of the anterior leaflet - but likely not cause of her arrest or source for her sepsis  · Rythmol d/c'ed for now in setting of potential arrythmia     • 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] 02/02/2019     Priority: Medium     · Echo (02/01/2019): Normal EF. 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 present on the chordal apparatus of the anterior leaflet which likely represents age-related degeneration changes     • CKD (chronic kidney disease) stage 5, GFR  less than 15 ml/min (CMS/HCC) [N18.5]      Priority: Medium     · History of right renal cell carcinoma status post nephrectomy, 2001     • Huge hiatal hernia with intrathoracic stomach [K44.9] 02/01/2019   • Leukocytosis [D72.829] 01/31/2019   • Anemia due to chronic kidney disease [N18.9, D63.1] 01/31/2019   • Debility [R53.81] 01/31/2019   • Hx of renal cell carcinoma [Z85.528] 01/31/2019   • Chronic diastolic CHF (congestive heart failure) (CMS/HCC) [I50.32] 01/31/2019   • Falls frequently [R29.6] 01/31/2019   • Pulmonary hypertension (CMS/[facility]) [I27.20]      · Echo (04/11/2015): Normal LV systolic function, wall motion with trace MR, moderate TR and moderate to severe pulmonary hypertension with an RVSP of 50 to 55 mmHg  · Echo (02/01/2018): Normal LVEF.  Moderate TR.  RVSP 45-55 mmHg.     • ITP (idiopathic thrombocytopenic purpura) [D69.3] 09/21/2016     84-year-old female with PEA arrest during initiation of dialysis. Echocardiogram revealed normal LVEF and echodense, age-related sclerosis of the mitral valve apparatus unlikely to be a source of sepsis or cause of PEA. Propafenone discontinued due to arrhythmia. Patient presently tolerating dialysis.         · Continue bisoprolol  · Continue supportive care  · Dr. Maxwell to reassess in the morning.     Joe Degroot IV, MD  2/3/2019

## 2019-02-03 NOTE — PLAN OF CARE
Problem: Patient Care Overview  Goal: Plan of Care Review  Outcome: Ongoing (interventions implemented as appropriate)   02/03/19 3769   Coping/Psychosocial   Plan of Care Reviewed With patient;family   Plan of Care Review   Progress no change   OTHER   Outcome Summary VSS. C/O buttock pain from wound. Tylenol given per prn order. Lacks motivation to feed self, turn or get out of bed. No arrythmias noted. Left upper extremity fistula positive for bruit with doppler only. Unable to feel thrill.        Problem: Fall Risk (Adult)  Goal: Absence of Fall  Outcome: Ongoing (interventions implemented as appropriate)      Problem: Skin Injury Risk (Adult)  Goal: Skin Health and Integrity  Outcome: Ongoing (interventions implemented as appropriate)      Problem: Wound (Includes Pressure Injury) (Adult)  Goal: Signs and Symptoms of Listed Potential Problems Will be Absent, Minimized or Managed (Wound)  Outcome: Ongoing (interventions implemented as appropriate)

## 2019-02-03 NOTE — THERAPY EVALUATION
Acute Care - Occupational Therapy Initial Evaluation  [facility]     Patient Name: Fabiola Pimentel  : 1934  MRN: [id number]  Today's Date: 2/3/2019  Onset of Illness/Injury or Date of Surgery: 19  Date of Referral to OT: 19  Referring Physician: MD Radha    Admit Date: 2019       ICD-10-CM ICD-9-CM   1. Impaired functional mobility, balance, gait, and endurance Z74.09 V49.89   2. Impaired mobility and ADLs Z74.09 799.89     Patient Active Problem List   Diagnosis   • ITP (idiopathic thrombocytopenic purpura)   • Palpitations   • Premature atrial contractions   • Hypertension   • Pulmonary hypertension (CMS/HCC)   • DJD (degenerative joint disease)   • Raynaud's phenomenon (secondary)   • Diverticular disease   • CKD (chronic kidney disease) stage 5, GFR less than 15 ml/min (CMS/HCC)   • Leukocytosis   • Anemia due to chronic kidney disease   • Debility   • Hx of renal cell carcinoma   • Chronic diastolic CHF (congestive heart failure) (CMS/HCC)   • Falls frequently   • PEA (Pulseless electrical activity) (CMS/HCC)   • Huge hiatal hernia with intrathoracic stomach   • Mitral valve mass     Past Medical History:   Diagnosis Date   • Chronic ITP (idiopathic thrombocytopenia) (CMS/HCC)    • Chronic renal insufficiency    • CKD (chronic kidney disease)    • Diverticular disease    • Dizziness     Dizziness with increase of propafenone to t.i.d.; however, palpitations improved, patient wishes to stay on current dose.   • DJD (degenerative joint disease)    • Hip fracture (CMS/HCC)     Recent hip and pelvic fracture.   • History of uterine cancer     Initial diagnosis, , status post SALVATORE/BSO. Recurrence documented , status post radiation therapy and implants.   • Hypertension    • Lower extremity edema     Lower extremity venous duplex, 2013:  No evidence of deep venous thrombosis. Negative VQ scan for pulmonary embolus, findings suggest congestive heart failure, 10/11/2013.No  evidence of deep venous thrombosis by bilateral duplex, 03/25/2015.Mild   • Palpitations    • Pelvic fracture (CMS/HCC)     Recent hip and pelvic fracture.   • Premature atrial contractions     Premature atrial contractions, postoperative from incisional hernia repair:A 2D echocardiogram 01/03/2008:  Mild MR, mild to moderate TR, small pericardial effusion, EF 65%.   • Pulmonary hypertension (CMS/HCC)    • Raynaud's phenomenon (secondary)     Raynaud’s phenomenon involving the left upper extremity.     • Stroke (CMS/HCC)     Right optic nerve stroke.     Past Surgical History:   Procedure Laterality Date   • APPENDECTOMY     • ARTERIOVENOUS FISTULA/SHUNT SURGERY Left 1/16/2019    Procedure: LEFT UPPER EXTREMITY ARTERIOVENOUS FISTULA FORMATION, BRACHIO-AXILLARY SHUNT WITH ARTEGRAFT;  Surgeon: Dale Ramirez MD;  [facility];  Service: Vascular   • CATARACT EXTRACTION WITH INTRAOCULAR LENS IMPLANT      Cataract surgery with lens implantation.   • CHOLECYSTECTOMY  1958   • COLON RESECTION  10/2005    Colon resection, October 2005, with associated splenectomy.   • DILATATION AND CURETTAGE     • INCISIONAL HERNIA REPAIR  01/02/2008    With mesh   • NEPHRECTOMY Right 2001   • OTHER SURGICAL HISTORY      Recurrence documented 2003, status post radiation therapy and implants.   • SPLENECTOMY     • SYMPATHECTOMY Left     Left axilla sympathetectomy secondary to Raynaud’s phenomenon.   • TOTAL ABDOMINAL HYSTERECTOMY WITH SALPINGO OOPHORECTOMY  2001          OT ASSESSMENT FLOWSHEET (last 72 hours)      Occupational Therapy Evaluation     Row Name 02/03/19 1314                   OT Evaluation Time/Intention    Subjective Information  complains of;weakness;fatigue  -CL        Document Type  evaluation  -CL        Mode of Treatment  occupational therapy  -CL        Patient Effort  poor  -CL        Symptoms Noted During/After Treatment  fatigue  -CL        Comment  Pt reports per OSH after RUE dislocation  reduction that she was wearing a sling and was told not to lift anything, though unable to state what kind of sling or further precautions. Pt declined any EOB or OOB activity despite encouragement.   (Significant)   -CL           General Information    Patient Profile Reviewed?  yes  -CL        Onset of Illness/Injury or Date of Surgery  01/31/19  -CL        Referring Physician  MD Radha  -CL        Prior Level of Function  independent:;all household mobility;transfer;ADL's;dressing;bathing Pt questionnable historian, no family present  -CL        Equipment Currently Used at Home  none Pt questionnable historian, no family present  -CL        Pertinent History of Current Functional Problem  Pt presented to an OSH s/p fall w/ subsequent R shoulder dislocation. R shoulder was reduced at OSH. Pt t/f to [facility] d/t RUE fistula not working. Pt developed PEA arrest during HD, t/f to the ICU. R shldr x-ray (-) for acute fx, no MRI completed to determine potention soft tissue injury. Physical assessment of R should limited as pt declining mobility.   -CL        Existing Precautions/Restrictions  fall;oxygen therapy device and L/min;non-weight bearing  (Significant)  NWB RUE pending further clarification  -CL        Limitations/Impairments  safety/cognitive  -CL        Risks Reviewed  patient:;LOB;nausea/vomiting;increased discomfort;change in vital signs;dizziness;lines disloged  -CL        Benefits Reviewed  patient:;improve function;increase independence;increase strength;increase balance;decrease pain;increase knowledge  -CL        Barriers to Rehab  medically complex;cognitive status  -CL           Relationship/Environment    Lives With  alone  -CL           Resource/Environmental Concerns    Current Living Arrangements  home/apartment/condo  -CL           Home Main Entrance    Number of Stairs, Main Entrance  two  -CL           Cognitive Assessment/Interventions    Additional Documentation  Cognitive  Assessment/Intervention (Group)  -CL           Cognitive Assessment/Intervention- PT/OT    Affect/Mental Status (Cognitive)  confused  -CL        Orientation Status (Cognition)  oriented to;person;place  -CL        Follows Commands (Cognition)  follows one step commands;75-90% accuracy;verbal cues/prompting required;repetition of directions required;increased processing time needed  -CL        Cognitive Function (Cognitive)  safety deficit  -CL        Safety Deficit (Cognitive)  moderate deficit;awareness of need for assistance;insight into deficits/self awareness;safety precautions awareness  -CL        Personal Safety Interventions  fall prevention program maintained;supervised activity;muscle strengthening facilitated  -CL           Bed Mobility Assessment/Treatment    Comment (Bed Mobility)  Pt declined.   -CL           ADL Assessment/Intervention    30822 - OT Self Care/Mgmt Minutes  5  -CL        BADL Assessment/Intervention  feeding  -CL           Self-Feeding Assessment/Training    Emerson Level (Feeding)  liquids to mouth;dependent (less than 25% patient effort)  -CL        Assistive Devices (Feeding)  adapted cup  -CL        Position (Self-Feeding)  supported sitting  -CL        Comment (Feeding)  Pt administered T hand mug to improve performance, though refused to attempt use.   -CL           General ROM    GENERAL ROM COMMENTS  RUE shldr deferred, remainder grossly WFL. LUE shldr FE ~90 deg, remainder grossly WFL. Noted RUE shldr AC joint separation, though limited formal assessment as pt declining movement.   -CL           MMT (Manual Muscle Testing)    General MMT Comments  RUE deferred. LUE grossly 3+/5.   -CL           Motor Assessment/Interventions    Additional Documentation  Balance (Group);Therapeutic Exercise (Group)  -CL           Therapeutic Exercise    Therapeutic Exercise  seated, upper extremities HOB elevated  -CL        13479 - OT Therapeutic Exercise Minutes  5  -CL           Upper  Extremity Seated Therapeutic Exercise    Performed, Seated Upper Extremity (Therapeutic Exercise)  wrist flexion/extension;elbow flexion/extension;digit flexion/extension  -CL        Exercise Type, Seated Upper Extremity (Therapeutic Exercise)  AAROM (active assistive range of motion);AROM (active range of motion)  -CL        Sets/Reps Detail, Seated Upper Extremity (Therapeutic Exercise)  1/10  -CL        Comment, Seated Upper Extremity (Therapeutic Exercise)  RUE  -CL           Sensory Assessment/Intervention    Sensory General Assessment  no sensation deficits identified  -CL           Positioning and Restraints    Pre-Treatment Position  in bed  -CL        Post Treatment Position  bed  -CL        In Bed  notified nsg;fowlers;call light within reach;encouraged to call for assist;exit alarm on;side rails up x3;LUE elevated;RUE elevated;legs elevated;SCD pump applied;heels elevated  -CL           Pain Scale: Numbers Pre/Post-Treatment    Pain Scale: Numbers, Pretreatment  0/10 - no pain  -CL        Pain Scale: Numbers, Post-Treatment  0/10 - no pain  -CL           [REMOVED] Wound 01/16/19 1630 coccyx pressure injury    Wound - Properties Group Date first assessed: 01/16/19  -LS Time first assessed: 1630  -LS Present On Admission : yes  -LS Location: coccyx  -LS Type: pressure injury  -LS Stage, Pressure Injury: Stage 2  -LS Resolution Date: 02/01/19  -MR Resolution Time: 1152  -MR Wound Outcome: --  -MR, reclassified        Wound 02/01/19 0827 Bilateral coccyx    Wound - Properties Group Date first assessed: 02/01/19  -AS Time first assessed: 0827  -AS Present On Admission : yes  -AS Side: Bilateral  -AS Location: coccyx  -AS Stage, Pressure Injury: unstageable  -AS Additional Comments: --  -MR, probable DTPI        Wound 02/01/19 0827 Left heel pressure injury    Wound - Properties Group Date first assessed: 02/01/19  -AS Time first assessed: 0827  -AS Side: Left  -AS Location: heel  -AS Type: pressure injury  -AS  Stage, Pressure Injury: deep tissue injury  -AS       Wound 02/01/19 1825 Other (See comments) chest incision    Wound - Properties Group Date first assessed: 02/01/19  -KS Time first assessed: 1825  -KS Side: Other (See comments)  -KS Location: chest  -KS Type: incision  -KS       Wound 01/16/19 1429 Left arm incision    Wound - Properties Group Date first assessed: 01/16/19  -JOSHUA Time first assessed: 1429  -JOSHUA Side: Left  -JOSHUA Location: arm  -JOSHUA Type: incision  -JOSHUA       Wound 01/31/19 1600 Bilateral medial coccyx pressure injury    Wound - Properties Group Date first assessed: 01/31/19  -CM Time first assessed: 1600  -CM Present On Admission : yes  -CM Side: Bilateral  -CM Orientation: medial  -CM Location: coccyx  -CM Type: pressure injury  -CM Stage, Pressure Injury: Stage 2  -CM       Clinical Impression (OT)    Date of Referral to OT  02/02/19  -CL        OT Diagnosis  Decreased independence in ADLs.   -CL        Patient/Family Goals Statement (OT Eval)  Return to [facility].   -CL        Criteria for Skilled Therapeutic Interventions Met (OT Eval)  yes;treatment indicated  -CL        Rehab Potential (OT Eval)  fair, will monitor progress closely  -CL        Therapy Frequency (OT Eval)  daily  -CL        Anticipated Equipment Needs at Discharge (OT)  -- TBA further  -CL        Anticipated Discharge Disposition (OT)  skilled nursing facility  -CL           Vital Signs    Pre Systolic BP Rehab  -- VSS, RN cleared for tx.   -CL           OT Goals    Transfer Goal Selection (OT)  transfer, OT goal 1  -CL        Dressing Goal Selection (OT)  dressing, OT goal 1  -CL        Self-Feeding Goal Selection (OT)  self feeding, OT goal 1  -CL        Strength Goal Selection (OT)  strength, OT goal 1  -CL        Additional Documentation  Strength Goal Selection (OT) (Row);Self-Feeding Goal Selection (OT) (Row)  -CL           Transfer Goal 1 (OT)    Activity/Assistive Device (Transfer Goal 1, OT)  sit-to-stand/stand-to-sit;toilet   -CL        Stephens Level/Cues Needed (Transfer Goal 1, OT)  moderate assist (50-74% patient effort);verbal cues required  -CL        Time Frame (Transfer Goal 1, OT)  by discharge;long term goal (LTG)  -CL        Progress/Outcome (Transfer Goal 1, OT)  goal ongoing  -CL           Dressing Goal 1 (OT)    Activity/Assistive Device (Dressing Goal 1, OT)  upper body dressing utilizng maribel-dressing technique  -CL        Stephens/Cues Needed (Dressing Goal 1, OT)  moderate assist (50-74% patient effort);verbal cues required  -CL        Time Frame (Dressing Goal 1, OT)  by discharge;long term goal (LTG)  -CL        Progress/Outcome (Dressing Goal 1, OT)  goal ongoing  -CL           Self-Feeding Goal 1 (OT)    Activity/Assistive Device (Self-Feeding Goal 1, OT)  liquids to mouth;scoop food and bring to mouth  -CL        Stephens Level/Cues Needed (Self-Feeding Goal 1, OT)  standby assist;verbal cues required  -CL        Time Frame (Self-Feeding Goal 1, OT)  by discharge;long term goal (LTG)  -CL        Progress/Outcomes (Self-Feeding Goal 1, OT)  goal ongoing  -CL           Strength Goal 1 (OT)    Strength Goal 1 (OT)  Pt will tolerate RUE elbow/wrist/hand AROM and LUE AROM HEP x10 reps to promote ADL performance.   -CL        Time Frame (Strength Goal 1, OT)  by discharge;long term goal (LTG)  -CL        Progress/Outcome (Strength Goal 1, OT)  goal ongoing  -CL           Living Environment    Home Accessibility  stairs within home;tub/shower is not walk in Pt questionnable historian, no family present  -CL          User Key  (r) = Recorded By, (t) = Taken By, (c) = Cosigned By    Initials Name Effective Dates    Nilam Martinez RN 06/16/16 -     France Britt RN 06/16/16 -     Leonela Quezada RN 05/09/17 -     LS Tammi You RN 06/16/16 -     AS Lorri Sommers, RN 06/16/16 -     CL Liliam Valdes, OT 04/03/18 -     Trudy Peterson, RN 06/21/17 -          Occupational Therapy Education      Title: PT OT SLP Therapies (In Progress)     Topic: Occupational Therapy (In Progress)     Point: ADL training (In Progress)     Description: Instruct learner(s) on proper safety adaptation and remediation techniques during self care or transfers.   Instruct in proper use of assistive devices.    Learning Progress Summary           Patient Acceptance, E, NR by CL at 2/3/2019  2:41 PM    Comment:  Pt educated on appropriate safety precautions, positioning, role of OT, AE for self feeding, and benefits of therapy.                   Point: Home exercise program (In Progress)     Description: Instruct learner(s) on appropriate technique for monitoring, assisting and/or progressing therapeutic exercises/activities.    Learning Progress Summary           Patient Acceptance, E, NR by CL at 2/3/2019  2:41 PM    Comment:  Pt educated on appropriate safety precautions, positioning, role of OT, AE for self feeding, and benefits of therapy.                   Point: Precautions (In Progress)     Description: Instruct learner(s) on prescribed precautions during self-care and functional transfers.    Learning Progress Summary           Patient Acceptance, E, NR by CL at 2/3/2019  2:41 PM    Comment:  Pt educated on appropriate safety precautions, positioning, role of OT, AE for self feeding, and benefits of therapy.                               User Key     Initials Effective Dates Name Provider Type Discipline     04/03/18 -  Liliam Valdes, OT Occupational Therapist OT                  OT Recommendation and Plan  Outcome Summary/Treatment Plan (OT)  Anticipated Equipment Needs at Discharge (OT): (TBA further)  Anticipated Discharge Disposition (OT): skilled nursing facility  Therapy Frequency (OT Eval): daily  Plan of Care Review  Plan of Care Reviewed With: patient  Plan of Care Reviewed With: patient  Outcome Summary: OT completed a brief chart review relating to presenting physical deficits. Pt session limited as pt  declined any EOB or OOB activity. Pt administered AE to improve self feeding however pt refused to utilize this date. Pt reports wearing RUE sling at previous hospital, unable to recall full restrictions or sling wear schedule. Will assess further once pt agreeable to mobility. Recommend cont skilled IPOT POC. Recommend pt DC to SNF.     Outcome Measures     Row Name 02/03/19 1314 02/02/19 1448          How much help from another person do you currently need...    Turning from your back to your side while in flat bed without using bedrails?  --  2  -LS     Moving from lying on back to sitting on the side of a flat bed without bedrails?  --  2  -LS     Moving to and from a bed to a chair (including a wheelchair)?  --  1  -LS     Standing up from a chair using your arms (e.g., wheelchair, bedside chair)?  --  2  -LS     Climbing 3-5 steps with a railing?  --  1  -LS     To walk in hospital room?  --  1  -LS     AM-PAC 6 Clicks Score  --  9  -LS        How much help from another is currently needed...    Putting on and taking off regular lower body clothing?  1  -CL  --     Bathing (including washing, rinsing, and drying)  1  -CL  --     Toileting (which includes using toilet bed pan or urinal)  1  -CL  --     Putting on and taking off regular upper body clothing  1  -CL  --     Taking care of personal grooming (such as brushing teeth)  2  -CL  --     Eating meals  2  -CL  --     Score  8  -CL  --        Functional Assessment    Outcome Measure Options  AM-PAC 6 Clicks Daily Activity (OT)  -CL  AM-PAC 6 Clicks Basic Mobility (PT)  -LS       User Key  (r) = Recorded By, (t) = Taken By, (c) = Cosigned By    Initials Name Provider Type    Tanesha Rodney, PT Physical Therapist    CL Liliam Valdes, OT Occupational Therapist          Time Calculation:   Time Calculation- OT     Row Name 02/03/19 1314             Time Calculation- OT    OT Start Time  1314  -CL      OT Received On  02/03/19  -CL      OT Goal Re-Cert Due  Date  02/13/19  -CL         Timed Charges    64030 - OT Therapeutic Exercise Minutes  5  -CL      18362 - OT Self Care/Mgmt Minutes  5  -CL        User Key  (r) = Recorded By, (t) = Taken By, (c) = Cosigned By    Initials Name Provider Type    CL Liliam Valdes OT Occupational Therapist        Therapy Suggested Charges     Code   Minutes Charges    30768 (CPT®) Hc Ot Neuromusc Re Education Ea 15 Min      35448 (CPT®) Hc Ot Ther Proc Ea 15 Min 5 1    35826 (CPT®) Hc Ot Therapeutic Act Ea 15 Min      85147 (CPT®) Hc Ot Manual Therapy Ea 15 Min      58013 (CPT®) Hc Ot Iontophoresis Ea 15 Min      16394 (CPT®) Hc Ot Elec Stim Ea-Per 15 Min      69759 (CPT®) Hc Ot Ultrasound Ea 15 Min      58341 (CPT®) Hc Ot Self Care/Mgmt/Train Ea 15 Min 5     Total  10 1        Therapy Charges for Today     Code Description Service Date Service Provider Modifiers Qty    08745530152 HC OT THER PROC EA 15 MIN 2/3/2019 Liliam Valdes OT GO 1    72698639291 HC OT EVAL LOW COMPLEXITY 3 2/3/2019 Liliam Valdes OT GO 1               Liliam Valdes OT  2/3/2019

## 2019-02-03 NOTE — PLAN OF CARE
Problem: Patient Care Overview  Goal: Plan of Care Review  Outcome: Ongoing (interventions implemented as appropriate)   02/03/19 0609   Coping/Psychosocial   Plan of Care Reviewed With patient   Plan of Care Review   Progress improving   OTHER   Outcome Summary No major issues during the night. VSS; afebrile. PRN melatonin ordered/given x1. H/H stable. Pt still has not had a BM.        Problem: Fall Risk (Adult)  Goal: Absence of Fall  Outcome: Ongoing (interventions implemented as appropriate)      Problem: Skin Injury Risk (Adult)  Goal: Skin Health and Integrity  Outcome: Ongoing (interventions implemented as appropriate)      Problem: Wound (Includes Pressure Injury) (Adult)  Goal: Signs and Symptoms of Listed Potential Problems Will be Absent, Minimized or Managed (Wound)  Outcome: Ongoing (interventions implemented as appropriate)

## 2019-02-03 NOTE — THERAPY WOUND CARE TREATMENT
Acute Care - Wound/Debridement Treatment Note  [facility]     Patient Name: Fabiola Pimentel  : 1934  MRN: [id number]  Today's Date: 2/3/2019  Onset of Illness/Injury or Date of Surgery: 19   Date of Referral to PT: 19   Referring Physician: MD Radha       Admit Date: 2019    Visit Dx:    ICD-10-CM ICD-9-CM   1. Impaired functional mobility, balance, gait, and endurance Z74.09 V49.89       Patient Active Problem List   Diagnosis   • ITP (idiopathic thrombocytopenic purpura)   • Palpitations   • Premature atrial contractions   • Hypertension   • Pulmonary hypertension (CMS/[facility])   • DJD (degenerative joint disease)   • Raynaud's phenomenon (secondary)   • Diverticular disease   • CKD (chronic kidney disease) stage 5, GFR less than 15 ml/min (CMS/[facility])   • Leukocytosis   • Anemia due to chronic kidney disease   • Debility   • Hx of renal cell carcinoma   • Chronic diastolic CHF (congestive heart failure) (CMS/[facility])   • Falls frequently   • PEA (Pulseless electrical activity) (CMS/[facility])   • Huge hiatal hernia with intrathoracic stomach   • Mitral valve mass           Wound 19 1429 Left arm incision (Active)   Dressing Appearance open to air 2/3/2019  6:00 AM   Closure [place] 2/3/2019  6:00 AM   Drainage Amount none 2/3/2019  6:00 AM       Wound Right anterior groin puncture (Active)   Dressing Appearance open to air 2/3/2019  6:00 AM   Closure None 2/3/2019  6:00 AM   Dressing Care, Wound open to air 2/3/2019  6:00 AM       Wound 19 0827 Bilateral coccyx (Active)   Wound Image   2019  2:00 PM   Dressing Appearance intact;dry;moist drainage 2/3/2019  8:15 AM   Closure None 2/3/2019  6:00 AM   Base moist;red/granulating;maroon/purple;necrotic;gray;yellow;slough 2/3/2019  8:15 AM   Periwound intact;dry;redness;blanchable;non-blanchable 2/3/2019  8:15 AM   Periwound Temperature warm 2/3/2019  8:15 AM   Periwound Skin Turgor soft 2/3/2019  8:15 AM   Edges open;jagged 2/3/2019  8:15 AM    Wound Length (cm) 4 cm 2/2/2019  2:05 PM   Wound Width (cm) 4 cm 2/2/2019  2:05 PM   Drainage Characteristics/Odor serosanguineous;serous 2/3/2019  8:15 AM   Drainage Amount small 2/3/2019  8:15 AM   Care, Wound ultrasound therapy, non contact low frequency;irrigated with;sterile normal saline;debrided 2/3/2019  8:15 AM   Dressing Care, Wound dressing applied;low-adherent;foam 2/3/2019  8:15 AM   Periwound Care, Wound barrier ointment applied 2/3/2019  8:15 AM       Wound 02/01/19 0849 Left heel pressure injury (Active)   Dressing Appearance dry;intact 2/3/2019  6:00 AM   Base dressing in place, unable to visualize 2/3/2019  6:00 AM   Drainage Amount none 2/2/2019  8:00 PM   Dressing Care, Wound foam;low-adherent 2/3/2019  6:00 AM       Wound 02/01/19 1822 Other (See comments) chest incision (Active)   Dressing Appearance dry;intact 2/3/2019  6:00 AM   Base dressing in place, unable to visualize 2/3/2019  6:00 AM         WOUND DEBRIDEMENT  Total area of Debridement: 4 cmsq  Debridement Site 1  Location- Site 1: sacrum  Selective Debridement- Site 1: Wound Surface <20cmsq  Instruments- Site 1: tweezers  Excised Tissue Description- Site 1: minimum, slough, eschar  Bleeding- Site 1: none            Therapy Treatment    Rehabilitation Treatment Summary     Row Name 02/03/19 0815             Treatment Time/Intention    Discipline  physical therapist  -      Document Type  wound care;therapy note (daily note)  -      Subjective Information  complains of;weakness;fatigue;pain  -      Mode of Treatment  physical therapy;individual therapy  -      Patient/Family Observations  Pt initially sleeping, but woke easily, pleasant and cooperative.  -      Patient Effort  good  -      Existing Precautions/Restrictions  fall;oxygen therapy device and L/min;other (see comments) recent R shoulder dislocation, sore L toe  -      Recorded by [SEFERINO] Ebony Amin, PT 02/03/19 3617      Row Name 02/03/19 0888              "Cognitive Assessment/Intervention- PT/OT    Affect/Mental Status (Cognitive)  WFL  -JM      Orientation Status (Cognition)  oriented x 4  -JM      Follows Commands (Cognition)  follows one step commands  -JM      Recorded by [JM] Ebony Amin, PT 02/03/19 0852      Row Name 02/03/19 0815             Bed Mobility Assessment/Treatment    Rolling Left [place] (Bed Mobility)  maximum assist (25% patient effort);2 person assist  -JM      Rolling Right [place] (Bed Mobility)  maximum assist (25% patient effort);2 person assist  -JM      Comment (Bed Mobility)  LSL for MIST tx  -JM      Recorded by [JM] Ebony Amin, PT 02/03/19 0852      Row Name 02/03/19 0815             Positioning and Restraints    Pre-Treatment Position  in bed  -JM      Post Treatment Position  bed  -JM      In Bed  supine;call light within reach;encouraged to call for assist;with nsg  -JM      Recorded by [JM] Ebony Amin, PT 02/03/19 0852      Row Name 02/03/19 0815             Pain Assessment    Additional Documentation  Pain Scale: Numbers Pre/Post-Treatment (Group)  -JM      Recorded by [JM] Ebony Amin, PT 02/03/19 0852      Row Name 02/03/19 0815             Pain Scale: Numbers Pre/Post-Treatment    Pain Scale: Numbers, Pretreatment  0/10 - no pain  -JM      Pain Scale: Numbers, Post-Treatment  0/10 - no pain  -JM      Pre/Post Treatment Pain Comment  Pt states no \"pain\" only sore with rolling during tx  -JM      Recorded by [JM] Ebony Amin, PT 02/03/19 0852      Row Name 02/03/19 0815             Wound 02/01/19 0827 Bilateral coccyx    Wound - Properties Group Date first assessed: 02/01/19 [AS] Time first assessed: 0827 [AS] Present On Admission : yes [AS] Side: Bilateral [AS] Location: coccyx [AS] Stage, Pressure Injury: unstageable [AS] Additional Comments: -- [MR], probable DTPI  Recorded by:  [AS] Lorri Sommers, RN 02/01/19 1020 [MR] France Layton RN 02/01/19 1315    Dressing Appearance  " intact;dry;moist drainage  -      Base  moist;red/granulating;maroon/purple;necrotic;gray;yellow;slough  -      Periwound  intact;dry;redness;blanchable;non-blanchable  -      Periwound Temperature  warm  -      Periwound Skin Turgor  soft  -JM      Edges  open;jagged  -      Drainage Characteristics/Odor  serosanguineous;serous  -JM      Drainage Amount  small  -JM      Care, Wound  ultrasound therapy, non contact low frequency;irrigated with;sterile normal saline;debrided MIST x8min  -      Dressing Care, Wound  dressing applied;low-adherent;foam  -      Periwound Care, Wound  barrier ointment applied venelex  -JM      Recorded by [JM] Ebony Amin PT 02/03/19 0852      Row Name                Wound 02/01/19 0827 Left heel pressure injury    Wound - Properties Group Date first assessed: 02/01/19 [AS] Time first assessed: 0827 [AS] Side: Left [AS] Location: heel [AS] Type: pressure injury [AS] Stage, Pressure Injury: deep tissue injury [AS] Recorded by:  [AS] Lorri Sommers RN 02/01/19 1206    Row Name                Wound 02/01/19 1825 Other (See comments) chest incision    Wound - Properties Group Date first assessed: 02/01/19 [KS] Time first assessed: 1825 [KS] Side: Other (See comments) [KS] Location: chest [KS] Type: incision [KS] Recorded by:  [KS] Leonela Caceres RN 02/01/19 1825    Row Name                Wound 01/16/19 1429 Left arm incision    Wound - Properties Group Date first assessed: 01/16/19 [JOSHUA] Time first assessed: 1429 [JOSHUA] Side: Left [JOSHUA] Location: arm [JOSHUA] Type: incision [JOSHUA] Recorded by:  [JOSHUA] Nilam Joshi RN 01/16/19 1429    Row Name                Wound 01/31/19 1600 Bilateral medial coccyx pressure injury    Wound - Properties Group Date first assessed: 01/31/19 [CM] Time first assessed: 1600 [CM] Present On Admission : yes [CM] Side: Bilateral [CM] Orientation: medial [CM] Location: coccyx [CM] Type: pressure injury [CM] Stage, Pressure Injury: Stage 2 [CM]  Recorded by:  [CM] Trudy Cornejo, RN 02/03/19 0718      User Key  (r) = Recorded By, (t) = Taken By, (c) = Cosigned By    Initials Name Effective Dates Discipline    Nilam Martinez RN 06/16/16 -  Nurse    France Britt JOJO RN 06/16/16 -  Nurse    Leonela Quezada RN 05/09/17 -  Nurse    Lorri Lamb RN 06/16/16 -  Nurse    Ebony Davey, PT 06/19/15 -  PT    CM Trudy Cronejo RN 06/21/17 -  Nurse          Physical Therapy Education     Title: PT OT SLP Therapies (In Progress)     Topic: Physical Therapy (In Progress)     Point: Mobility training (In Progress)     Learning Progress Summary           Patient Acceptance, E,D, NR by  at 2/2/2019  2:48 PM                   Point: Home exercise program (In Progress)     Learning Progress Summary           Patient Acceptance, E,D, NR by  at 2/2/2019  2:48 PM                   Point: Body mechanics (In Progress)     Learning Progress Summary           Patient Acceptance, E,D, NR by  at 2/2/2019  2:48 PM                   Point: Precautions (In Progress)     Learning Progress Summary           Patient Acceptance, E,D, NR by  at 2/2/2019  2:48 PM                               User Key     Initials Effective Dates Name Provider Type Discipline     06/19/15 -  Tanesha Castro, PT Physical Therapist PT                  PT Recommendation and Plan  Anticipated Discharge Disposition (PT): skilled nursing facility  Planned Therapy Interventions (PT Eval): bed mobility training, balance training, gait training, home exercise program, patient/family education, strengthening, transfer training  Therapy Frequency (PT Clinical Impression): daily        Progress: improving      Progress: improving  Outcome Summary: PT continuing MIST and debridement for sacral/coccyx wound.  Loose overlying tissues debrided, central base with adherent yellolw/gray slough that will require ongoing debridement.  Venelex and sacral optifoam applied.  Plan of Care  Reviewed With: patient    Outcome Measures     Row Name 02/02/19 1448             How much help from another person do you currently need...    Turning from your back to your side while in flat bed without using bedrails?  2  -LS      Moving from lying on back to sitting on the side of a flat bed without bedrails?  2  -LS      Moving to and from a bed to a chair (including a wheelchair)?  1  -LS      Standing up from a chair using your arms (e.g., wheelchair, bedside chair)?  2  -LS      Climbing 3-5 steps with a railing?  1  -LS      To walk in hospital room?  1  -LS      AM-PAC 6 Clicks Score  9  -LS         Functional Assessment    Outcome Measure Options  AM-PAC 6 Clicks Basic Mobility (PT)  -        User Key  (r) = Recorded By, (t) = Taken By, (c) = Cosigned By    Initials Name Provider Type    Tanesha Rodney, PT Physical Therapist              Time Calculation  PT Charges     Row Name 02/03/19 0815             Time Calculation    Start Time  0815  -      PT Goal Re-Cert Due Date  02/12/19  -        User Key  (r) = Recorded By, (t) = Taken By, (c) = Cosigned By    Initials Name Provider Type    Ebony Davey, PT Physical Therapist         Therapy Suggested Charges     Code   Minutes Charges    46252 (CPT®) Hc Pt Neuromusc Re Education Ea 15 Min      51967 (CPT®) Hc Pt Ther Proc Ea 15 Min 2     82850 (CPT®) Hc Gait Training Ea 15 Min      70314 (CPT®) Hc Pt Therapeutic Act Ea 15 Min 21 2    64361 (CPT®) Hc Pt Manual Therapy Ea 15 Min      64680 (CPT®) Hc Pt Iontophoresis Ea 15 Min      25977 (CPT®) Hc Pt Elec Stim Ea-Per 15 Min      01755 (CPT®) Hc Pt Ultrasound Ea 15 Min      54460 (CPT®) Hc Pt Self Care/Mgmt/Train Ea 15 Min      66736 (CPT®) Hc Pt Prosthetic (S) Train Initial Encounter, Each 15 Min      50780 (CPT®) Hc Pt Orthotic(S)/Prosthetic(S) Encounter, Each 15 Min      16312 (CPT®) Hc Orthotic(S) Mgmt/Train Initial Encounter, Each 15min      Total  23 2          Therapy Charges for Today      Code Description Service Date Service Provider Modifiers Qty    27390268734 HC PAM DEBRIDE OPEN WOUND UP TO 20CM 2/2/2019 Ebony Amin, PT GP 1    19973996606 HC PT NLFU MIST 2/2/2019 Ebony Amin, PT GP 1    42019813262 HC PT EVAL MOD COMPLEXITY 4 2/2/2019 Ebony Amin, PT GP 1    92343561953 HC PAM DEBRIDE OPEN WOUND UP TO 20CM 2/3/2019 Ebony Amin, PT GP 1    32222468867  PT NLFU MIST 2/3/2019 Ebony Amin, PT GP 1            PT G-Codes  Outcome Measure Options: AM-PAC 6 Clicks Basic Mobility (PT)  AM-PAC 6 Clicks Score: 9        Ebony Amin, PT  2/3/2019

## 2019-02-03 NOTE — PLAN OF CARE
Problem: Patient Care Overview  Goal: Plan of Care Review  Outcome: Ongoing (interventions implemented as appropriate)   02/03/19 1442   Coping/Psychosocial   Plan of Care Reviewed With patient   Plan of Care Review   Progress improving   OTHER   Outcome Summary OT completed a brief chart review relating to presenting physical deficits. Pt session limited as pt declined any EOB or OOB activity. Pt administered AE to improve self feeding however pt refused to utilize this date. Pt reports wearing RUE sling at previous hospital, unable to recall full restrictions or sling wear schedule. Will assess further once pt agreeable to mobility. Recommend cont skilled IPOT POC. Recommend pt DC to SNF.

## 2019-02-03 NOTE — PROGRESS NOTES
Clinical Nutrition     Nutrition Assessment  Reason for Visit:   Follow-up protocol, NPO/Clear liquid    Patient Name: Fabiola Pimentel  YOB: 1934  MRN: [id number]  Date of Encounter: 02/03/19 11:17 AM  Admission date: 1/31/2019     Pt NPO/CLear  Liquids 3 days now, rec advance diet as soon as medically indicated    Nutrition Assessment   Admission Diagnosis         PEA (Pulseless electrical activity) (CMS/HCC)    ITP (idiopathic thrombocytopenic purpura)    Pulmonary hypertension (CMS/HCC)    CKD (chronic kidney disease) stage 5, GFR less than 15 ml/min (CMS/HCC)    Leukocytosis    Anemia due to chronic kidney disease    Debility    Hx of renal cell carcinoma    Chronic diastolic CHF (congestive heart failure) (CMS/HCC)    Falls frequently    Huge hiatal hernia with intrathoracic stomach    Mitral valve mass      PMH: She  has a past medical history of Chronic ITP (idiopathic thrombocytopenia) (CMS/HCC), Chronic renal insufficiency, CKD (chronic kidney disease), Diverticular disease, Dizziness, DJD (degenerative joint disease), Hip fracture (CMS/HCC), History of uterine cancer, Hypertension, Lower extremity edema, Palpitations, Pelvic fracture (CMS/HCC), Premature atrial contractions, Pulmonary hypertension (CMS/HCC), Raynaud's phenomenon (secondary), and Stroke (CMS/HCC).   PSxH: She  has a past surgical history that includes Incisional hernia repair (01/02/2008); Nephrectomy (Right, 2001); Total abdominal hysterectomy w/ bilateral salpingoophorectomy (2001); Other surgical history; Appendectomy; Cholecystectomy (1958); Cataract extraction w/  intraocular lens implant; Colectomy (10/2005); Splenectomy, total; Dilation and curettage of uterus; Sympathectomy (Left); and LEFT UPPER EXTREMITY ARTERIOVENOUS FISTULA FORMATION, BRACHIO-AXILLARY SHUNT WITH ARTEGRAFT (Left, 1/16/2019).     ESRD/ HD    Sacral/ Coccyx DTI s/p MIST therapy + debridement    L. Heel  DTI    Reported/Observed/Food/Nutrition Related History:     Pt resting in bed,  tolerating clear liquids, no complaints, would prefer to have solid food       Labs reviewed     Results from last 7 days   Lab Units 02/03/19  0459 02/02/19  0743   SODIUM mmol/L 141 141   POTASSIUM mmol/L 3.9 4.3   CHLORIDE mmol/L 108 108   CO2 mmol/L 22.0 19.0*   BUN mg/dL 31* 52*   CREATININE mg/dL 2.66* 3.79*   CALCIUM mg/dL 6.9* 6.7*   BILIRUBIN mg/dL  --  0.4   ALK PHOS U/L  --  46   ALT (SGPT) U/L  --  14   AST (SGOT) U/L  --  34*   GLUCOSE mg/dL 111* 58*       Results from last 7 days   Lab Units 02/02/19  2134 02/01/19  0745   GLUCOSE mg/dL 202* 68*     Lab Results   Lab Value Date/Time    HGBA1C 5.60 01/31/2019 1758         Medications reviewed   Pertinent: abx, albumin    Applicable medical tests/Procedures since admission: s/p placement of HD access 2-1-19           GI: wnl    SKIN: sacral/coccyx- DTI s/p MIST therapy, debridement, L. heel DTI    Current Nutrition Prescription     PO: Diet Clear Liquid  Orders Placed This Encounter      Dietary Nutrition Supplements Boost Breeze (Clear Liquid)  Boost Breeze 3x/day    Intake:no data         Nutrition Diagnosis       2/1, updated 2-3-19  Problem Inadequate oral intake   Etiology GI status/procedures   Signs/Symptoms NPO/ Clear Liquids 3 days      2-1, updated 2-3-19  Problem Increased kcal and protein needs   Etiology Skin integrity    Signs/Symptoms Unstageable pressure ulcer to sacrum/coccyx. L. heel DTI       Nutrition Intervention     Interventions Goal    General: Nutrition support treatment  Advance Diet    Nutrition Interventions   1.  Follow treatment progress, Interview for preferences    Pt NPO/ Clear  Liquids 3 days now, rec advance diet as soon as medically indicated    Monitor/ Evaluation    Per protocol, I&O, PO intake, Supplement intake, Pertinent labs, Skin status, GI status, Symptoms        Nora Gomez RD  Time Spent: 30min

## 2019-02-04 NOTE — PLAN OF CARE
Problem: Patient Care Overview  Goal: Plan of Care Review  Outcome: Ongoing (interventions implemented as appropriate)   02/04/19 1430   Coping/Psychosocial   Plan of Care Reviewed With patient   OTHER   Outcome Summary DTPI stable, with no significant changes from intial photo. PT to cont with mist therapy daily x 2-3 more days.

## 2019-02-04 NOTE — PROGRESS NOTES
"   LOS: 4 days    Patient Care Team:  Otilio Smith DO as PCP - General (Family Medicine)        Subjective     Interval History:     No acute events overnight. No new complaints.      Review of Systems:   No CP or SOA    Objective     Vital Sign Min/Max for last 24 hours  Temp  Min: 97.6 °F (36.4 °C)  Max: 98.5 °F (36.9 °C)   BP  Min: 112/27  Max: 175/98   Pulse  Min: 57  Max: 63   Resp  Min: 14  Max: 22   SpO2  Min: 88 %  Max: 98 %   No Data Recorded   No Data Recorded     Flowsheet Rows      First Filed Value   Admission Height  163.8 cm (64.5\") Documented at 02/01/2019 0300   Admission Weight  70 kg (154 lb 4.8 oz) Documented at 01/31/2019 1607          No intake/output data recorded.  I/O last 3 completed shifts:  In: 642.2 [P.O.:360; I.V.:132.2; IV Piggyback:150]  Out: -     Physical Exam:     General Appearance:    Alert, cooperative, in no acute distress   Head:    Normocephalic, without obvious abnormality, atraumatic               Neck:   No adenopathy, supple, trachea midline, no thyromegaly, no     carotid bruit, no JVD       Lungs:     Diminished air entry,respirations regular, even and                   unlabored    Heart:    Regular rhythm and normal rate, normal S1 and S2, no            murmur, no gallop, no rub, no click       Abdomen:     Normal bowel sounds, no masses, no organomegaly, soft        non-tender, non-distended, no guarding, no rebound                 tenderness       Extremities:   Moves all extremities well, no edema, no cyanosis, no              redness               Neurologic:   No focal deficit noted grossly.        WBC WBC   Date Value Ref Range Status   02/04/2019 18.13 (H) 3.50 - 10.80 10*3/mm3 Final   02/03/2019 17.00 (H) 3.50 - 10.80 10*3/mm3 Final   02/02/2019 18.67 (H) 3.50 - 10.80 10*3/mm3 Final      HGB Hemoglobin   Date Value Ref Range Status   02/04/2019 10.3 (L) 11.5 - 15.5 g/dL Final   02/03/2019 10.1 (L) 11.5 - 15.5 g/dL Final   02/02/2019 7.8 (L) 11.5 - 15.5 " g/dL Final   02/01/2019 8.2 (L) 11.5 - 15.5 g/dL Final      HCT Hematocrit   Date Value Ref Range Status   02/04/2019 32.0 (L) 34.5 - 44.0 % Final   02/03/2019 30.9 (L) 34.5 - 44.0 % Final   02/02/2019 24.1 (L) 34.5 - 44.0 % Final   02/01/2019 25.5 (L) 34.5 - 44.0 % Final      Platlets No results found for: LABPLAT   MCV MCV   Date Value Ref Range Status   02/04/2019 89.9 80.0 - 99.0 fL Final   02/03/2019 88.3 80.0 - 99.0 fL Final   02/02/2019 88.3 80.0 - 99.0 fL Final          Sodium Sodium   Date Value Ref Range Status   02/04/2019 139 132 - 146 mmol/L Final   02/03/2019 141 132 - 146 mmol/L Final   02/02/2019 141 132 - 146 mmol/L Final      Potassium Potassium   Date Value Ref Range Status   02/04/2019 3.8 3.5 - 5.5 mmol/L Final   02/03/2019 3.9 3.5 - 5.5 mmol/L Final   02/02/2019 4.3 3.5 - 5.5 mmol/L Final      Chloride Chloride   Date Value Ref Range Status   02/04/2019 107 99 - 109 mmol/L Final   02/03/2019 108 99 - 109 mmol/L Final   02/02/2019 108 99 - 109 mmol/L Final      CO2 CO2   Date Value Ref Range Status   02/04/2019 23.0 20.0 - 31.0 mmol/L Final   02/03/2019 22.0 20.0 - 31.0 mmol/L Final   02/02/2019 19.0 (L) 20.0 - 31.0 mmol/L Final      BUN BUN   Date Value Ref Range Status   02/04/2019 39 (H) 9 - 23 mg/dL Final   02/03/2019 31 (H) 9 - 23 mg/dL Final   02/02/2019 52 (H) 9 - 23 mg/dL Final      Creatinine Creatinine   Date Value Ref Range Status   02/04/2019 3.23 (H) 0.60 - 1.30 mg/dL Final   02/03/2019 2.66 (H) 0.60 - 1.30 mg/dL Final   02/02/2019 3.79 (H) 0.60 - 1.30 mg/dL Final      Calcium Calcium   Date Value Ref Range Status   02/04/2019 6.8 (L) 8.7 - 10.4 mg/dL Final   02/03/2019 6.9 (L) 8.7 - 10.4 mg/dL Final   02/02/2019 6.7 (L) 8.7 - 10.4 mg/dL Final      PO4 No results found for: CAPO4   Albumin Albumin   Date Value Ref Range Status   02/04/2019 2.55 (L) 3.20 - 4.80 g/dL Final   02/03/2019 2.54 (L) 3.20 - 4.80 g/dL Final   02/02/2019 2.91 (L) 3.20 - 4.80 g/dL Final      Magnesium  Magnesium   Date Value Ref Range Status   02/02/2019 1.9 1.3 - 2.7 mg/dL Final      Uric Acid No results found for: URICACID        Results Review:     I reviewed the patient's new clinical results.      bisoprolol 5 mg Oral Nightly   calcitriol 0.25 mcg Oral Daily   castor oil-balsam peru  Topical Q12H   epoetin alexus 10,000 Units Subcutaneous Once per day on Mon Wed Fri   piperacillin-tazobactam 3.375 g Intravenous Q12H   sodium chloride 3 mL Intravenous Q12H   vancomycin (dosing per levels)  Does not apply Daily       Pharmacy to dose vancomycin        Medication Review:     Assessment/Plan       PEA    ITP (idiopathic thrombocytopenic purpura)    Pulmonary hypertension (CMS/HCC)    CKD (chronic kidney disease) stage 5, GFR less than 15 ml/min (CMS/HCC)    Leukocytosis    Anemia due to chronic kidney disease    Debility    Hx of renal cell carcinoma    Chronic diastolic CHF (congestive heart failure) (CMS/HCC)    Falls frequently    Huge hiatal hernia with intrathoracic stomach    Mitral valve mass      1- 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   4- Anemia of chronic disease.   5- Dialysis access - S/p tunneled catheter. AVF 2 week old.     Plan:  - Next Dialysis tomorrow per schedule. UF as tolerated.   - Renal diet  - Epo and IV iron with HD.     Discussed with MAXWELL Proctor MD  02/04/19  9:50 AM

## 2019-02-04 NOTE — PLAN OF CARE
Problem: Patient Care Overview  Goal: Plan of Care Review  Outcome: Ongoing (interventions implemented as appropriate)   02/04/19 5755   Coping/Psychosocial   Plan of Care Reviewed With patient   Plan of Care Review   Progress improving   OTHER   Outcome Summary VSS for shift, remains on 1L NC. Patient c/o severe weakness limiting activity including feeding herself. Working with PT. Possible transfer to tele when okay with other providers.

## 2019-02-04 NOTE — PROGRESS NOTES
Continued Stay Note  [facility]     Patient Name: Fabiola Pimentel  MRN: [id number]  Today's Date: 2/4/2019    Admit Date: 1/31/2019    Discharge Plan     Row Name 02/04/19 1629       Plan    Plan [facility] + Outpatient HD at [place], [place] when medically ready    Plan Comments  Attempted to meet with patient mutliple times today to update discharge planning; however, she was unavailable due to actively receiving care.     PAT spoke with Arcenio at [facility] (P: [phone], ext [phone]) and she confirmed that they are holding a bed for Ms. Pimentel. She will need insurance pre-cert which CM will ask them to initiate when patient is closer to medical readiness.     PAT also spoke with Yoly at [facility] (P: [phone]) and faxed requested initial outpatient dialysis-related clinicals to her at F: [phone]. Also faxed dialysis-related clinicals to Jovana at [facility] in [place] (P: [phone]; F: [phone]) per her request. Tentative start date for Ms. Pimentel's outpatient dialysis is Thursday, 2/7 at 10:55 AM as clinic only currently has T, TH, Sat schedule available. Per Sunita with [facility], they have other SNF patients that receive dialysis T, TH, Sat and they should be able to transport Ms. Pimentel at that time. Please notify CM when patient will be nearing medical readiness so discharge plans can be confirmed. Thank you. Indira De La Fuente RN [phone]    Final Discharge Disposition Code  03 - skilled nursing facility (SNF)        Discharge Codes    No documentation.             Indira De La Fuente RN

## 2019-02-04 NOTE — PLAN OF CARE
Problem: Patient Care Overview  Goal: Plan of Care Review  Outcome: Ongoing (interventions implemented as appropriate)   02/04/19 1276   Coping/Psychosocial   Plan of Care Reviewed With patient   Plan of Care Review   Progress improving   OTHER   Outcome Summary Pt performed STS x3 from chair and SPT from chair to bed with maxA x2. Pt required increased cueing for upright posture; unable to achieve full upright posture. Pt limited by c/o pain and increased weakness. Continue to progress as appropriate.

## 2019-02-04 NOTE — THERAPY TREATMENT NOTE
Acute Care - Physical Therapy Treatment Note  [facility]     Patient Name: Fabiola Pimentel  : 1934  MRN: [id number]  Today's Date: 2019  Onset of Illness/Injury or Date of Surgery: 19  Date of Referral to PT: 19  Referring Physician: MD Radha    Admit Date: 2019    Visit Dx:    ICD-10-CM ICD-9-CM   1. Impaired functional mobility, balance, gait, and endurance Z74.09 V49.89   2. Impaired mobility and ADLs Z74.09 799.89     Patient Active Problem List   Diagnosis   • ITP (idiopathic thrombocytopenic purpura)   • Palpitations   • Premature atrial contractions   • Hypertension   • Pulmonary hypertension (CMS/HCC)   • DJD (degenerative joint disease)   • Raynaud's phenomenon (secondary)   • Diverticular disease   • CKD (chronic kidney disease) stage 5, GFR less than 15 ml/min (CMS/[facility])   • Leukocytosis   • Anemia due to chronic kidney disease   • Debility   • Hx of renal cell carcinoma   • Chronic diastolic CHF (congestive heart failure) (CMS/[facility])   • Falls frequently   • PEA   • Huge hiatal hernia with intrathoracic stomach   • Mitral valve mass       Therapy Treatment    Rehabilitation Treatment Summary     Row Name 19 1430 19 1306          Treatment Time/Intention    Discipline  physical therapist  -MF  physical therapist  -KR     Document Type  therapy note (daily note);wound care  -  therapy note (daily note)  -KR     Subjective Information  complains of;weakness;fatigue;pain  -  complains of;weakness;fatigue  -KR     Mode of Treatment  --  physical therapy  -KR     Care Plan Review  --  care plan/treatment goals reviewed;risks/benefits reviewed;patient/other agree to care plan  -KR     Therapy Frequency (PT Clinical Impression)  --  daily  -KR     Patient Effort  --  adequate  -KR     Existing Precautions/Restrictions  --  fall;oxygen therapy device and L/min;other (see comments) NWB RUE  -KR     Recorded by [MF] Neftaly Seaman, PT 19 9559 [KR] Gemma Ying  CARLOS, PT 02/04/19 1613     Row Name 02/04/19 1306             Vital Signs    Pre Systolic BP Rehab  134  -KR      Pre Treatment Diastolic BP  72  -KR      Post Systolic BP Rehab  -- RN present  -KR      Pretreatment Heart Rate (beats/min)  62  -KR      Posttreatment Heart Rate (beats/min)  64  -KR      Pre SpO2 (%)  98  -KR      O2 Delivery Pre Treatment  supplemental O2  -KR      Post SpO2 (%)  94  -KR      O2 Delivery Post Treatment  supplemental O2  -KR      Pre Patient Position  Sitting  -KR      Intra Patient Position  Standing  -KR      Post Patient Position  Supine  -KR      Recorded by [KR] Gemma Ying, PT 02/04/19 1613      Row Name 02/04/19 1306             Cognitive Assessment/Intervention    Additional Documentation  Cognitive Assessment/Intervention (Group)  -KR      Recorded by [KR] Gemma Ying, PT 02/04/19 1613      Row Name 02/04/19 1306             Cognitive Assessment/Intervention- PT/OT    Affect/Mental Status (Cognitive)  confused  -KR      Orientation Status (Cognition)  oriented to;person;place  -KR      Follows Commands (Cognition)  follows one step commands;75-90% accuracy;repetition of directions required;verbal cues/prompting required  -KR      Cognitive Function (Cognitive)  safety deficit  -KR      Safety Deficit (Cognitive)  moderate deficit;ability to follow commands;awareness of need for assistance;insight into deficits/self awareness;safety precautions awareness;safety precautions follow-through/compliance  -KR      Personal Safety Interventions  fall prevention program maintained;gait belt;nonskid shoes/slippers when out of bed  -KR      Recorded by [KR] Gemma Ying, PT 02/04/19 1613      Row Name 02/04/19 1306             Safety Issues, Functional Mobility    Safety Issues Affecting Function (Mobility)  awareness of need for assistance;insight into deficits/self awareness;safety precaution awareness;safety precautions follow-through/compliance  -KR      Impairments  Affecting Function (Mobility)  balance;cognition;coordination;endurance/activity tolerance;pain;strength  -KR      Recorded by [KR] Gemma Ying, PT 02/04/19 1613      Row Name 02/04/19 1306             Bed Mobility Assessment/Treatment    Bed Mobility Assessment/Treatment  sit-supine  -KR      Sit-Supine [place] (Bed Mobility)  maximum assist (25% patient effort);2 person assist;verbal cues  -KR      Bed Mobility, Safety Issues  decreased use of arms for pushing/pulling;decreased use of legs for bridging/pushing  -KR      Assistive Device (Bed Mobility)  draw sheet;head of bed elevated  -KR      Comment (Bed Mobility)  VC's for sequencing. Pt required assistance at trunk and BLEs.  -KR      Recorded by [KR] Gemma Ying, PT 02/04/19 1613      Row Name 02/04/19 1306             Transfer Assessment/Treatment    Transfer Assessment/Treatment  chair-bed transfer;sit-stand transfer;stand-sit transfer  -KR      Comment (Transfers)  STS x3 from chair. PT/tech in front on either side to block knees and provide UE support. Pt unable to achieve full upright posture. SPT from chair to bed.   -KR      Recorded by [KR] Gemma Ying, PT 02/04/19 1613      Row Name 02/04/19 1306             Chair-Bed Transfer    Chair-Bed [place] (Transfers)  maximum assist (25% patient effort);2 person assist;verbal cues  -KR      Assistive Device (Chair-Bed Transfers)  other (see comments) B UE support  -KR      Recorded by [KR] Gemma Ying, PT 02/04/19 1613      Row Name 02/04/19 1306             Sit-Stand Transfer    Sit-Stand [place] (Transfers)  maximum assist (25% patient effort);2 person assist;verbal cues  -KR      Assistive Device (Sit-Stand Transfers)  other (see comments) B UE support  -KR      Recorded by [KR] Gemma Ying, PT 02/04/19 1613      Row Name 02/04/19 1306             Stand-Sit Transfer    Stand-Sit [place] (Transfers)  maximum assist (25% patient effort);2 person assist;verbal cues   -KR      Assistive Device (Stand-Sit Transfers)  other (see comments) B UE support  -KR      Recorded by [KR] Gemma Ying, PT 02/04/19 1613      Row Name 02/04/19 1306             Gait/Stairs Assessment/Training    [place] Level (Gait)  unable to assess  -KR      Comment (Gait/Stairs)  Ambulation deferred. Not appropriate to assess.   -KR      Recorded by [KR] Gemma Ying, PT 02/04/19 1613      Row Name 02/04/19 1306             Motor Skills Assessment/Interventions    Additional Documentation  Balance (Group)  -KR      Recorded by [KR] Gemma Ying, PT 02/04/19 1613      Row Name 02/04/19 1306             Therapeutic Exercise    08202 - PT Therapeutic Activity Minutes  23  -KR      Recorded by [KR] Gemma Ying, PT 02/04/19 1613      Row Name 02/04/19 1306             Balance    Balance  static sitting balance;static standing balance  -KR      Recorded by [KR] Gemma Ying, PT 02/04/19 1613      Row Name 02/04/19 1306             Static Sitting Balance    Level of [place] (Unsupported Sitting, Static Balance)  contact guard assist  -KR      Sitting Position (Unsupported Sitting, Static Balance)  sitting in chair  -KR      Recorded by [KR] Gemma Ying, PT 02/04/19 1613      Row Name 02/04/19 1306             Static Standing Balance    Level of [place] (Supported Standing, Static Balance)  maximal assist, 25 to 49% patient effort;2 person assist  -KR      Recorded by [KR] Gemma Ying, PT 02/04/19 1613      Row Name 02/04/19 1430 02/04/19 1306          Positioning and Restraints    Pre-Treatment Position  in bed  -  sitting in chair/recliner  -KR     Post Treatment Position  bed  -  bed  -KR     In Bed  supine;call light within reach;with nsg;with family/caregiver  -  supine;call light within reach;encouraged to call for assist;with nsg;side rails up x2  -KR     Recorded by [MF] Neftaly Seaman, PT 02/04/19 1609 [KR] Gemma Ying, PT 02/04/19 1613     Row Name  02/04/19 1430 02/04/19 1306          Pain Assessment    Additional Documentation  Pain Scale: FACES Pre/Post-Treatment (Group)  -MF  Pain Scale: Numbers Pre/Post-Treatment (Group)  -KR     Recorded by [MF] Neftaly Seaman, PT 02/04/19 1609 [KR] Gemma Ying, PT 02/04/19 1613     Row Name 02/04/19 1430 02/04/19 1306          Pain Scale: Numbers Pre/Post-Treatment    Pain Scale: Numbers, Pretreatment  --  7/10  -KR     Pain Scale: Numbers, Post-Treatment  --  7/10  -KR     Pain Location - Side  --  Right  -KR     Pain Location - Orientation  --  upper  -KR     Pain Location  chest ribs  -MF  extremity  -KR     Pain Intervention(s)  Repositioned  -MF  Repositioned;Ambulation/increased activity  -KR     Recorded by [MF] Neftaly Seaman, PT 02/04/19 1609 [KR] Gemma Ying, PT 02/04/19 1613     Row Name 02/04/19 1430             Pain Scale: FACES Pre/Post-Treatment    Pain: FACES Scale, Pretreatment  4-->hurts little more  -      Pain: FACES Scale, Post-Treatment  4-->hurts little more  -MF      Recorded by [MF] Neftaly Seaman, PT 02/04/19 1609      Row Name 02/04/19 1430             Wound 02/01/19 0827 Bilateral coccyx    Wound - Properties Group Date first assessed: 02/01/19 [AS] Time first assessed: 0827 [AS] Present On Admission : yes [AS] Side: Bilateral [AS] Location: coccyx [AS] Stage, Pressure Injury: unstageable [AS] Additional Comments: -- [MR], probable DTPI  Recorded by:  [AS] Lorri Sommers, RN 02/01/19 1020 [MR] France Layton, RN 02/01/19 1315    Dressing Appearance  dry;intact  -MF      Base  dry;necrotic  -MF      Periwound  intact;dry;redness;blanchable;non-blanchable  -MF      Periwound Temperature  warm  -MF      Periwound Skin Turgor  soft  -MF      Edges  open;jagged  -MF      Drainage Characteristics/Odor  serosanguineous;brown  -MF      Drainage Amount  small  -MF      Care, Wound  irrigated with;sterile normal saline;ultrasound therapy, non contact low frequency 8 min   -MF       Dressing Care, Wound  foam;low-adherent  -MF      Periwound Care, Wound  other (see comments) venelex  -MF      Recorded by [MF] Neftaly Seaman, PT 02/04/19 1609      Row Name                Wound 02/01/19 0827 Left heel pressure injury    Wound - Properties Group Date first assessed: 02/01/19 [AS] Time first assessed: 0827 [AS] Side: Left [AS] Location: heel [AS] Type: pressure injury [AS] Stage, Pressure Injury: deep tissue injury [AS] Recorded by:  [AS] Lorri Sommers RN 02/01/19 1206    Row Name                Wound 02/01/19 1825 Other (See comments) chest incision    Wound - Properties Group Date first assessed: 02/01/19 [KS] Time first assessed: 1825 [KS] Side: Other (See comments) [KS] Location: chest [KS] Type: incision [KS] Recorded by:  [KS] Leonela Caceres RN 02/01/19 1825    Row Name                Wound 01/16/19 1429 Left arm incision    Wound - Properties Group Date first assessed: 01/16/19 [JOSHUA] Time first assessed: 1429 [JOSHUA] Side: Left [JOSHUA] Location: arm [JOSHUA] Type: incision [JOSHUA] Recorded by:  [JOSHUA] Nilam Joshi RN 01/16/19 1429    Row Name                Wound 01/31/19 1600 Bilateral medial coccyx pressure injury    Wound - Properties Group Date first assessed: 01/31/19 [CM] Time first assessed: 1600 [CM] Present On Admission : yes [CM] Side: Bilateral [CM] Orientation: medial [CM] Location: coccyx [CM] Type: pressure injury [CM] Stage, Pressure Injury: Stage 2 [CM] Recorded by:  [CM] Trudy Cornejo RN 02/03/19 0718    Row Name 02/04/19 1430             Coping    Observed Emotional State  calm;cooperative  -MF      Verbalized Emotional State  acceptance  -MF      Recorded by [MF] Neftaly Seaman, PT 02/04/19 1609      Row Name 02/04/19 1430 02/04/19 1306          Plan of Care Review    Plan of Care Reviewed With  patient  -MF  patient  -KR     Recorded by [MF] Neftaly Seaman, PT 02/04/19 1609 [KR] Gemma Ying, PT 02/04/19 1613     Row Name 02/04/19 1306             Outcome  Summary/Treatment Plan (PT)    Daily Summary of Progress (PT)  progress toward functional goals is gradual  -KR      Recorded by [KR] Gemma Ying, PT 02/04/19 1613        User Key  (r) = Recorded By, (t) = Taken By, (c) = Cosigned By    Initials Name Effective Dates Discipline     Neftaly Seaman MARIE, PT 06/19/15 -  PT    Nilam Martinez, RN 06/16/16 -  Nurse     France Layton E, RN 06/16/16 -  Nurse    Leonela Quezada, RN 05/09/17 -  Nurse    AS Lorri Sommers, RN 06/16/16 -  Nurse    Gemma Allison, PT 04/03/18 -  PT    Trudy Peterson, RN 06/21/17 -  Nurse          Wound 01/16/19 1429 Left arm incision (Active)   Dressing Appearance open to air 2/4/2019  2:00 PM   Closure Open to air 2/4/2019  2:00 PM   Drainage Amount none 2/4/2019  2:00 PM   Care, Wound cleansed with 2/4/2019  2:00 PM   Staples Removed Intact Yes 2/4/2019  2:00 PM       Wound Right anterior groin puncture (Active)   Dressing Appearance open to air 2/4/2019  2:00 PM   Closure None 2/4/2019  2:00 PM   Base dressing in place, unable to visualize 2/4/2019  2:00 PM   Drainage Amount none 2/4/2019  2:00 PM   Dressing Care, Wound open to air 2/3/2019  8:00 PM       Wound 02/01/19 0827 Bilateral coccyx (Active)   Dressing Appearance dry;intact 2/4/2019  2:30 PM   Closure None 2/3/2019  9:00 PM   Base dry;necrotic 2/4/2019  2:30 PM   Periwound intact;dry;redness;blanchable;non-blanchable 2/4/2019  2:30 PM   Periwound Temperature warm 2/4/2019  2:30 PM   Periwound Skin Turgor soft 2/4/2019  2:30 PM   Edges open;jagged 2/4/2019  2:30 PM   Drainage Characteristics/Odor serosanguineous;brown 2/4/2019  2:30 PM   Drainage Amount small 2/4/2019  2:30 PM   Care, Wound irrigated with;sterile normal saline;ultrasound therapy, non contact low frequency 2/4/2019  2:30 PM   Dressing Care, Wound foam;low-adherent 2/4/2019  2:30 PM   Periwound Care, Wound other (see comments) 2/4/2019  2:30 PM       Wound 02/01/19 0827 Left heel pressure  injury (Active)   Dressing Appearance dry;intact 2/3/2019  9:00 PM   Base clean;dry;pink 2/4/2019  2:00 PM   Dressing Care, Wound dressing changed;foam;low-adherent 2/3/2019  9:00 PM   Periwound Care, Wound barrier ointment applied;other (see comments) 2/3/2019  9:00 PM       Wound 02/01/19 1825 Other (See comments) chest incision (Active)   Dressing Appearance dry;intact 2/4/2019  2:00 PM   Closure BARRY 2/4/2019  2:00 PM   Base dressing in place, unable to visualize 2/4/2019  2:00 PM   Drainage Amount small 2/4/2019  2:00 PM   Care, Wound cleansed with;antimicrobial agent applied 2/4/2019  2:00 PM   Dressing Care, Wound dressing applied 2/4/2019  2:00 PM       Wound 01/31/19 1600 Bilateral medial coccyx pressure injury (Active)   Dressing Appearance dry;intact 2/4/2019  2:00 PM   Base dressing in place, unable to visualize 2/4/2019  2:00 PM   Drainage Amount none 2/3/2019  9:00 PM   Care, Wound wound cleanser 2/4/2019  2:00 PM   Dressing Care, Wound dressing changed;low-adherent 2/4/2019  2:00 PM   Periwound Care, Wound absorptive dressing applied;topical treatment applied 2/4/2019  2:00 PM           Physical Therapy Education     Title: PT OT SLP Therapies (In Progress)     Topic: Physical Therapy (In Progress)     Point: Mobility training (In Progress)     Learning Progress Summary           Patient Acceptance, E, NR by KR at 2/4/2019  1:06 PM    Acceptance, E,D, NR by LS at 2/2/2019  2:48 PM                   Point: Home exercise program (In Progress)     Learning Progress Summary           Patient Acceptance, E, NR by KR at 2/4/2019  1:06 PM    Acceptance, E,D, NR by LS at 2/2/2019  2:48 PM                   Point: Body mechanics (In Progress)     Learning Progress Summary           Patient Acceptance, E, NR by KR at 2/4/2019  1:06 PM    Acceptance, E,D, NR by LS at 2/2/2019  2:48 PM                   Point: Precautions (In Progress)     Learning Progress Summary           Patient Acceptance, E, NR by KR at  2/4/2019  1:06 PM    Acceptance, E,D, NR by SANTA at 2/2/2019  2:48 PM                               User Key     Initials Effective Dates Name Provider Type Discipline     06/19/15 -  Tanesha Castro, PT Physical Therapist PT    SIMIN 04/03/18 -  Gemma Ying, PT Physical Therapist PT                PT Recommendation and Plan  Therapy Frequency (PT Clinical Impression): daily  Outcome Summary/Treatment Plan (PT)  Daily Summary of Progress (PT): progress toward functional goals is gradual  Plan of Care Reviewed With: patient  Progress: improving  Outcome Summary: Pt performed STS x3 from chair and SPT from chair to bed with maxA x2. Pt required increased cueing for upright posture; unable to achieve full upright posture. Pt limited by c/o pain and increased weakness. Continue to progress as appropriate.   Outcome Measures     Row Name 02/04/19 1306 02/03/19 1314 02/02/19 1448       How much help from another person do you currently need...    Turning from your back to your side while in flat bed without using bedrails?  2  -KR  --  2  -LS    Moving from lying on back to sitting on the side of a flat bed without bedrails?  2  -KR  --  2  -LS    Moving to and from a bed to a chair (including a wheelchair)?  2  -KR  --  1  -LS    Standing up from a chair using your arms (e.g., wheelchair, bedside chair)?  2  -KR  --  2  -LS    Climbing 3-5 steps with a railing?  1  -KR  --  1  -LS    To walk in hospital room?  1  -KR  --  1  -LS    AM-PAC 6 Clicks Score  10  -KR  --  9  -LS       How much help from another is currently needed...    Putting on and taking off regular lower body clothing?  --  1  -CL  --    Bathing (including washing, rinsing, and drying)  --  1  -CL  --    Toileting (which includes using toilet bed pan or urinal)  --  1  -CL  --    Putting on and taking off regular upper body clothing  --  1  -CL  --    Taking care of personal grooming (such as brushing teeth)  --  2  -CL  --    Eating meals  --  2  -CL   --    Score  --  8  -CL  --       Functional Assessment    Outcome Measure Options  AM-PAC 6 Clicks Basic Mobility (PT)  -KR  AM-PAC 6 Clicks Daily Activity (OT)  -CL  AM-PAC 6 Clicks Basic Mobility (PT)  -LS      User Key  (r) = Recorded By, (t) = Taken By, (c) = Cosigned By    Initials Name Provider Type    LS Tanesha Castro, PT Physical Therapist    CL Liliam Valdes, OT Occupational Therapist    KR Gemma Ying, PT Physical Therapist         Time Calculation:   PT Charges     Row Name 02/04/19 1430 02/04/19 1306          Time Calculation    Start Time  1430  -MF  1306  -KR     PT Received On  --  02/04/19  -KR     PT Goal Re-Cert Due Date  02/12/19  -  02/12/19  -KR        Time Calculation- PT    Total Timed Code Minutes- PT  --  23 minute(s)  -KR        Timed Charges    30489 - PT Therapeutic Activity Minutes  --  23  -KR       User Key  (r) = Recorded By, (t) = Taken By, (c) = Cosigned By    Initials Name Provider Type    Neftaly Moore, PT Physical Therapist    KR Gemma Ying, PT Physical Therapist        Therapy Suggested Charges     Code   Minutes Charges    20824 (CPT®) Hc Pt Neuromusc Re Education Ea 15 Min      63749 (CPT®) Hc Pt Ther Proc Ea 15 Min      46509 (CPT®) Hc Gait Training Ea 15 Min      29111 (CPT®) Hc Pt Therapeutic Act Ea 15 Min 23 2    23278 (CPT®) Hc Pt Manual Therapy Ea 15 Min      94780 (CPT®) Hc Pt Iontophoresis Ea 15 Min      88026 (CPT®) Hc Pt Elec Stim Ea-Per 15 Min      43727 (CPT®) Hc Pt Ultrasound Ea 15 Min      61902 (CPT®) Hc Pt Self Care/Mgmt/Train Ea 15 Min      02878 (CPT®) Hc Pt Prosthetic (S) Train Initial Encounter, Each 15 Min      27431 (CPT®) Hc Pt Orthotic(S)/Prosthetic(S) Encounter, Each 15 Min      08248 (CPT®) Hc Orthotic(S) Mgmt/Train Initial Encounter, Each 15min      Total  23 2        Therapy Charges for Today     Code Description Service Date Service Provider Modifiers Qty    93867068574 HC PT THERAPEUTIC ACT EA 15 MIN 2/4/2019 Gemma Ying,  PT GP 2    [id number]  PT THER SUPP EA 15 MIN 2/4/2019 Gemma Ying, PT GP 2          PT G-Codes  Outcome Measure Options: AM-PAC 6 Clicks Basic Mobility (PT)  AM-PAC 6 Clicks Score: 10  Score: 8    Corry Ying, PT  2/4/2019

## 2019-02-04 NOTE — PROGRESS NOTES
INTENSIVIST   PROGRESS NOTE     Hospital:  LOS: 4 days      S     Ms. Fabiola Pimentel, 84 y.o. female is followed for:      PEA    ITP (idiopathic thrombocytopenic purpura)      As an Intensivist, we provide an integrated approach to the ICU patient and family, medical management of comorbid conditions, including but not limited to electrolytes, glycemic control, organ dysfunction, lead interdisciplinary rounds and coordinate the care with all other services, including those from other specialists.     Interval History:  Doing well.  She had troubles sleeping last night.     The patient's relevant past medical, surgical and social history were reviewed and updated in Epic as appropriate.     ROS:   Constitutional: Negative for fever.   Respiratory: Negative for dyspnea.   Cardiovascular: Negative for chest pain.   Gastrointestinal: Negative for  nausea, vomiting and diarrhea.        O     Vitals:  Temp: 98 °F (36.7 °C) (02/04/19 0800) Temp  Min: 97.6 °F (36.4 °C)  Max: 98.5 °F (36.9 °C)   BP: 151/77 (02/04/19 1000) BP  Min: 112/27  Max: 175/98   Pulse: 63 (02/04/19 1000) Pulse  Min: 57  Max: 63   Resp: 18 (02/04/19 1000) Resp  Min: 14  Max: 22   SpO2: 96 % (02/04/19 1000) SpO2  Min: 88 %  Max: 99 %   Device: nasal cannula (02/04/19 1000)    Flow Rate: 2 (02/04/19 1000) Flow (L/min)  Min: 1  Max: 2       Physical Examination  Telemetry:  Rhythm: normal sinus rhythm (02/04/19 1000)     Constitutional:  No acute distress.   Cardiovascular: Normal rate, regular and rhythm. Normal heart sounds.  (+) murmur  No gallop or rub.   Respiratory: No respiratory distress. Normal respiratory effort.  Normal breath sounds  Clear to auscultation and percussion.    Abdominal:  Soft. No masses. Non-tender. No distension. No HSM.   Extremities: No digital cyanosis. No clubbing.  No edema.   Neurological:   Alert. Non focal.   Lines/Drains/Airways: L IJ tunneled Dialysis Catheter       Hematology:  Results from last 7 days   Lab Units  02/04/19 0441 02/03/19 0459 02/02/19  0743   WBC 10*3/mm3 18.13* 17.00* 18.67*   HEMOGLOBIN g/dL 10.3* 10.1* 7.8*   MCV fL 89.9 88.3 88.3   PLATELETS 10*3/mm3 140* 124* 132*       Chemistry:  Estimated Creatinine Clearance: 13.5 mL/min (A) (by C-G formula based on SCr of 3.23 mg/dL (H)).    Results from last 7 days   Lab Units 02/04/19 0441 02/03/19 0459 02/02/19  0743   SODIUM mmol/L 139 141 141   POTASSIUM mmol/L 3.8 3.9 4.3   CHLORIDE mmol/L 107 108 108   CO2 mmol/L 23.0 22.0 19.0*   ANION GAP mmol/L 9.0 11.0 14.0*   GLUCOSE mg/dL 104* 111* 58*   CALCIUM mg/dL 6.8* 6.9* 6.7*     Results from last 7 days   Lab Units 02/04/19 0441 02/03/19 0459 02/02/19  0743   BUN mg/dL 39* 31* 52*   CREATININE mg/dL 3.23* 2.66* 3.79*     Images:  No radiology results for the last day    Echo:  Results for orders placed during the hospital encounter of 01/31/19   Transthoracic Echo Complete With Contrast if Necessary Per Protocol    Narrative · Left ventricular systolic function is normal.  · Left ventricular diastolic dysfunction (grade I) consistent with   impaired relaxation.  · Estimated EF appears to be in the range of 61 - 65%.  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 Likely represents age-related   focal region of sclerosis and calcification of the chordal apparatus,   however vegetation cannot be completely excluded, clinical correlation   required.          Results: Reviewed.  I reviewed the patient's new laboratory and imaging results.  I independently reviewed the patient's new images.    Medications: Reviewed.    Assessment/Plan   A / P     84 y.o.female, admitted on 1/31/2019 with Acute renal failure (ARF) (CMS/LTAC, located within [facility]) [N17.9]:     1. S/p PEA, while she was in the inpatient dialysis unit, quickly resuscitated (within 3 minutes), and it did not require intubation, 2/1/19  2. 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  3. ESRD on HD  4. ITP  5. Leukocytosis  6. HTN  7. Anemia, chronic disease  8. Hiatal hernia.  9. Antibiotics: Piperacillin-Tazobactam   10. Glucose: No h/o DM    Lab Results   Lab Value Date/Time    HGBA1C 5.60 01/31/2019 1758       Diet: Diet Regular; Renal   Advance Directives: Code Status and Medical Interventions:   Ordered at: 02/01/19 1600     Limited Support to NOT Include:    Intubation     Code Status:    CPR     Medical Interventions (Level of Support Prior to Arrest):    Limited          Assessment / Plan:    1. Discontinue Vanco IV  2. F/u WBC  3. Advance diet  4. Disposition: Transfer to Telemetry Unit.} when ok with consultants    Plan of care and goals reviewed during interdisciplinary rounds.  Level of Risk is High due to:  illness with threat to life or bodily function.   I discussed the patient's findings and my recommendations with patient      Chung Ferrari MD, FACP, FCCP, CNSC  Intensive Care Medicine, Nutrition Support and Pulmonary Medicine

## 2019-02-04 NOTE — THERAPY WOUND CARE TREATMENT
Acute Care - Wound/Debridement Treatment Note  [facility]     Patient Name: Fabiola Pimentel  : 1934  MRN: [id number]  Today's Date: 2019  Onset of Illness/Injury or Date of Surgery: 19   Date of Referral to PT: 19   Referring Physician: MD Radha       Admit Date: 2019    Visit Dx:    ICD-10-CM ICD-9-CM   1. Impaired functional mobility, balance, gait, and endurance Z74.09 V49.89   2. Impaired mobility and ADLs Z74.09 799.89       Patient Active Problem List   Diagnosis   • ITP (idiopathic thrombocytopenic purpura)   • Palpitations   • Premature atrial contractions   • Hypertension   • Pulmonary hypertension (CMS/HCC)   • DJD (degenerative joint disease)   • Raynaud's phenomenon (secondary)   • Diverticular disease   • CKD (chronic kidney disease) stage 5, GFR less than 15 ml/min (CMS/HCC)   • Leukocytosis   • Anemia due to chronic kidney disease   • Debility   • Hx of renal cell carcinoma   • Chronic diastolic CHF (congestive heart failure) (CMS/HCC)   • Falls frequently   • PEA   • Huge hiatal hernia with intrathoracic stomach   • Mitral valve mass           Wound 19 1429 Left arm incision (Active)   Dressing Appearance open to air 2019  2:00 PM   Closure Open to air 2019  2:00 PM   Drainage Amount none 2019  2:00 PM   Care, Wound cleansed with 2019  2:00 PM   Staples Removed Intact Yes 2019  2:00 PM       Wound Right anterior groin puncture (Active)   Dressing Appearance open to air 2019  2:00 PM   Closure None 2019  2:00 PM   Base dressing in place, unable to visualize 2019  2:00 PM   Drainage Amount none 2019  2:00 PM   Dressing Care, Wound open to air 2/3/2019  8:00 PM       Wound 19 0827 Bilateral coccyx (Active)   Dressing Appearance dry;intact 2019  2:30 PM   Closure None 2/3/2019  9:00 PM   Base dry;necrotic 2019  2:30 PM   Periwound intact;dry;redness;blanchable;non-blanchable 2019  2:30 PM   Periwound Temperature  warm 2/4/2019  2:30 PM   Periwound Skin Turgor soft 2/4/2019  2:30 PM   Edges open;jagged 2/4/2019  2:30 PM   Drainage Characteristics/Odor serosanguineous;brown 2/4/2019  2:30 PM   Drainage Amount small 2/4/2019  2:30 PM   Care, Wound irrigated with;sterile normal saline;ultrasound therapy, non contact low frequency 2/4/2019  2:30 PM   Dressing Care, Wound foam;low-adherent 2/4/2019  2:30 PM   Periwound Care, Wound other (see comments) 2/4/2019  2:30 PM       Wound 02/01/19 0827 Left heel pressure injury (Active)   Dressing Appearance dry;intact 2/3/2019  9:00 PM   Base clean;dry;pink 2/4/2019  2:00 PM   Dressing Care, Wound dressing changed;foam;low-adherent 2/3/2019  9:00 PM   Periwound Care, Wound barrier ointment applied;other (see comments) 2/3/2019  9:00 PM       Wound 02/01/19 1825 Other (See comments) chest incision (Active)   Dressing Appearance dry;intact 2/4/2019  2:00 PM   Closure BARRY 2/4/2019  2:00 PM   Base dressing in place, unable to visualize 2/4/2019  2:00 PM   Drainage Amount small 2/4/2019  2:00 PM   Care, Wound cleansed with;antimicrobial agent applied 2/4/2019  2:00 PM   Dressing Care, Wound dressing applied 2/4/2019  2:00 PM       Wound 01/31/19 1600 Bilateral medial coccyx pressure injury (Active)   Dressing Appearance dry;intact 2/4/2019  2:00 PM   Base dressing in place, unable to visualize 2/4/2019  2:00 PM   Drainage Amount none 2/3/2019  9:00 PM   Care, Wound wound cleanser 2/4/2019  2:00 PM   Dressing Care, Wound dressing changed;low-adherent 2/4/2019  2:00 PM   Periwound Care, Wound absorptive dressing applied;topical treatment applied 2/4/2019  2:00 PM         WOUND DEBRIDEMENT                  Therapy Treatment    Rehabilitation Treatment Summary     Row Name 02/04/19 1430 02/04/19 1306          Treatment Time/Intention    Discipline  physical therapist  -MF  physical therapist  (Pended)   -KR     Document Type  therapy note (daily note);wound care  -MF  therapy note (daily note)   (Pended)   -KR     Subjective Information  complains of;weakness;fatigue;pain  -  complains of;weakness;fatigue  (Pended)   -KR     Mode of Treatment  --  physical therapy  (Pended)   -KR     Care Plan Review  --  care plan/treatment goals reviewed;risks/benefits reviewed;patient/other agree to care plan  (Pended)   -KR     Therapy Frequency (PT Clinical Impression)  --  daily  (Pended)   -KR     Patient Effort  --  adequate  (Pended)   -KR     Existing Precautions/Restrictions  --  fall;oxygen therapy device and L/min;other (see comments)  (Pended)  NWB RUE  -KR     Recorded by [] Neftaly Seaman, PT 02/04/19 1609 [KR] Gemma Ying, PT 02/04/19 1608     Row Name 02/04/19 1306             Vital Signs    Pre Systolic BP Rehab  134  (Pended)   -KR      Pre Treatment Diastolic BP  72  (Pended)   -KR      Post Systolic BP Rehab  --  (Pended)  RN present  -KR      Pretreatment Heart Rate (beats/min)  62  (Pended)   -KR      Posttreatment Heart Rate (beats/min)  64  (Pended)   -KR      Pre SpO2 (%)  98  (Pended)   -KR      O2 Delivery Pre Treatment  supplemental O2  (Pended)   -KR      Post SpO2 (%)  94  (Pended)   -KR      O2 Delivery Post Treatment  supplemental O2  (Pended)   -KR      Pre Patient Position  Sitting  (Pended)   -KR      Intra Patient Position  Standing  (Pended)   -KR      Post Patient Position  Supine  (Pended)   -KR      Recorded by [KR] Gemma Ying, PT 02/04/19 1608      Row Name 02/04/19 1306             Cognitive Assessment/Intervention    Additional Documentation  Cognitive Assessment/Intervention (Group)  (Pended)   -KR      Recorded by [KR] Gemma Ying, PT 02/04/19 1608      Row Name 02/04/19 1306             Cognitive Assessment/Intervention- PT/OT    Affect/Mental Status (Cognitive)  confused  (Pended)   -KR      Orientation Status (Cognition)  oriented to;person;place  (Pended)   -KR      Follows Commands (Cognition)  follows one step commands;75-90% accuracy;repetition of  directions required;verbal cues/prompting required  (Pended)   -KR      Cognitive Function (Cognitive)  safety deficit  (Pended)   -KR      Safety Deficit (Cognitive)  moderate deficit;ability to follow commands;awareness of need for assistance;insight into deficits/self awareness;safety precautions awareness;safety precautions follow-through/compliance  (Pended)   -KR      Personal Safety Interventions  fall prevention program maintained;gait belt;nonskid shoes/slippers when out of bed  (Pended)   -KR      Recorded by [KR] Gemma Ying, PT 02/04/19 1609      Row Name 02/04/19 1306             Safety Issues, Functional Mobility    Safety Issues Affecting Function (Mobility)  awareness of need for assistance;insight into deficits/self awareness;safety precaution awareness;safety precautions follow-through/compliance  (Pended)   -KR      Impairments Affecting Function (Mobility)  balance;cognition;coordination;endurance/activity tolerance;pain;strength  (Pended)   -KR      Recorded by [KR] Gemma Ying, PT 02/04/19 1609      Row Name 02/04/19 1306             Bed Mobility Assessment/Treatment    Bed Mobility Assessment/Treatment  sit-supine  (Pended)   -KR      Recorded by [KR] Gemma Ying, PT 02/04/19 1609      Row Name 02/04/19 1430             Positioning and Restraints    Pre-Treatment Position  in bed  -      Post Treatment Position  bed  -      In Bed  supine;call light within reach;with nsg;with family/caregiver  -MF      Recorded by [MF] Neftaly Seaman, PT 02/04/19 1609      Row Name 02/04/19 1430             Pain Assessment    Additional Documentation  Pain Scale: FACES Pre/Post-Treatment (Group)  -      Recorded by [MF] Neftaly Seaman, PT 02/04/19 1609      Row Name 02/04/19 1430             Pain Scale: Numbers Pre/Post-Treatment    Pain Location  chest ribs  -      Pain Intervention(s)  Repositioned  -      Recorded by [MF] Neftaly Seaman, PT 02/04/19 1609      Row Name  02/04/19 1430             Pain Scale: FACES Pre/Post-Treatment    Pain: FACES Scale, Pretreatment  4-->hurts little more  -MF      Pain: FACES Scale, Post-Treatment  4-->hurts little more  -MF      Recorded by [MF] Neftaly Seaman, PT 02/04/19 1609      Row Name 02/04/19 1430             Wound 02/01/19 0827 Bilateral coccyx    Wound - Properties Group Date first assessed: 02/01/19 [AS] Time first assessed: 0827 [AS] Present On Admission : yes [AS] Side: Bilateral [AS] Location: coccyx [AS] Stage, Pressure Injury: unstageable [AS] Additional Comments: -- [MR], probable DTPI  Recorded by:  [AS] Lorri Sommers, RN 02/01/19 1020 [MR] France Layton RN 02/01/19 1315    Dressing Appearance  dry;intact  -MF      Base  dry;necrotic  -MF      Periwound  intact;dry;redness;blanchable;non-blanchable  -MF      Periwound Temperature  warm  -MF      Periwound Skin Turgor  soft  -MF      Edges  open;jagged  -MF      Drainage Characteristics/Odor  serosanguineous;brown  -MF      Drainage Amount  small  -MF      Care, Wound  irrigated with;sterile normal saline;ultrasound therapy, non contact low frequency 8 min   -MF      Dressing Care, Wound  foam;low-adherent  -MF      Periwound Care, Wound  other (see comments) venelex  -MF      Recorded by [MF] Neftaly Seaman, PT 02/04/19 1609      Row Name                Wound 02/01/19 0827 Left heel pressure injury    Wound - Properties Group Date first assessed: 02/01/19 [AS] Time first assessed: 0827 [AS] Side: Left [AS] Location: heel [AS] Type: pressure injury [AS] Stage, Pressure Injury: deep tissue injury [AS] Recorded by:  [AS] Lorri Sommers RN 02/01/19 1206    Row Name                Wound 02/01/19 1825 Other (See comments) chest incision    Wound - Properties Group Date first assessed: 02/01/19 [KS] Time first assessed: 1825 [KS] Side: Other (See comments) [KS] Location: chest [KS] Type: incision [KS] Recorded by:  [KS] Leonela Caceres RN 02/01/19 1825    Row Name                 Wound 01/16/19 1429 Left arm incision    Wound - Properties Group Date first assessed: 01/16/19 [JOSHUA] Time first assessed: 1429 [JOSHUA] Side: Left [JOSHUA] Location: arm [JOSHUA] Type: incision [JOSHUA] Recorded by:  [JOSHUA] Nilam Joshi RN 01/16/19 1429    Row Name                Wound 01/31/19 1600 Bilateral medial coccyx pressure injury    Wound - Properties Group Date first assessed: 01/31/19 [CM] Time first assessed: 1600 [CM] Present On Admission : yes [CM] Side: Bilateral [CM] Orientation: medial [CM] Location: coccyx [CM] Type: pressure injury [CM] Stage, Pressure Injury: Stage 2 [CM] Recorded by:  [CM] Trudy Cornejo RN 02/03/19 0718    Row Name 02/04/19 1430             Coping    Observed Emotional State  calm;cooperative  -MF      Verbalized Emotional State  acceptance  -MF      Recorded by [MAGGIE] Neftaly Seaman, PT 02/04/19 1609      Row Name 02/04/19 1430             Plan of Care Review    Plan of Care Reviewed With  patient  -MF      Recorded by [MAGGIE] Neftaly Seaman, PT 02/04/19 1609        User Key  (r) = Recorded By, (t) = Taken By, (c) = Cosigned By    Initials Name Effective Dates Discipline    Neftaly Moore, PT 06/19/15 -  PT    Nilam Martinez RN 06/16/16 -  Nurse    MR SimmonsonFrance RN 06/16/16 -  Nurse    Leonela Quezada RN 05/09/17 -  Nurse    Lorri Lamb RN 06/16/16 -  Nurse    Gemma Allison, PT 04/03/18 -  PT    Trudy Peterson RN 06/21/17 -  Nurse          Physical Therapy Education     Title: PT OT SLP Therapies (In Progress)     Topic: Physical Therapy (In Progress)     Point: Mobility training (In Progress)     Learning Progress Summary           Patient Acceptance, E,D, NR by LS at 2/2/2019  2:48 PM                   Point: Home exercise program (In Progress)     Learning Progress Summary           Patient Acceptance, E,D, NR by LS at 2/2/2019  2:48 PM                   Point: Body mechanics (In Progress)     Learning Progress Summary            Patient Acceptance, E,D, NR by  at 2/2/2019  2:48 PM                   Point: Precautions (In Progress)     Learning Progress Summary           Patient Acceptance, E,D, NR by  at 2/2/2019  2:48 PM                               User Key     Initials Effective Dates Name Provider Type Discipline     06/19/15 -  Tanesha Castro, PT Physical Therapist PT                  PT Recommendation and Plan  Anticipated Discharge Disposition (PT): skilled nursing facility  Planned Therapy Interventions (PT Eval): bed mobility training, balance training, gait training, home exercise program, patient/family education, strengthening, transfer training  Therapy Frequency (PT Clinical Impression): (P) daily               Outcome Summary: DTPI stable, with no significant changes from intial photo. PT to cont with mist therapy daily x 2-3 more days.   Plan of Care Reviewed With: patient    Outcome Measures     Row Name 02/03/19 1314 02/02/19 1448          How much help from another person do you currently need...    Turning from your back to your side while in flat bed without using bedrails?  --  2  -LS     Moving from lying on back to sitting on the side of a flat bed without bedrails?  --  2  -LS     Moving to and from a bed to a chair (including a wheelchair)?  --  1  -LS     Standing up from a chair using your arms (e.g., wheelchair, bedside chair)?  --  2  -LS     Climbing 3-5 steps with a railing?  --  1  -LS     To walk in hospital room?  --  1  -LS     AM-PAC 6 Clicks Score  --  9  -LS        How much help from another is currently needed...    Putting on and taking off regular lower body clothing?  1  -CL  --     Bathing (including washing, rinsing, and drying)  1  -CL  --     Toileting (which includes using toilet bed pan or urinal)  1  -CL  --     Putting on and taking off regular upper body clothing  1  -CL  --     Taking care of personal grooming (such as brushing teeth)  2  -CL  --     Eating meals  2  -CL  --      Score  8  -CL  --        Functional Assessment    Outcome Measure Options  AM-PAC 6 Clicks Daily Activity (OT)  -CL  AM-PAC 6 Clicks Basic Mobility (PT)  -LS       User Key  (r) = Recorded By, (t) = Taken By, (c) = Cosigned By    Initials Name Provider Type    LS Tanesha Castro, PT Physical Therapist    CL Liliam Valdes, OT Occupational Therapist              Time Calculation  PT Charges     Row Name 02/04/19 1430             Time Calculation    Start Time  1430  -MF      PT Goal Re-Cert Due Date  02/12/19  -        User Key  (r) = Recorded By, (t) = Taken By, (c) = Cosigned By    Initials Name Provider Type    Neftaly Moore, PT Physical Therapist         Therapy Suggested Charges     Code   Minutes Charges    00198 (CPT®) Hc Pt Neuromusc Re Education Ea 15 Min      04307 (CPT®) Hc Pt Ther Proc Ea 15 Min 2     65513 (CPT®) Hc Gait Training Ea 15 Min      61076 (CPT®) Hc Pt Therapeutic Act Ea 15 Min 21 2    50326 (CPT®) Hc Pt Manual Therapy Ea 15 Min      48417 (CPT®) Hc Pt Iontophoresis Ea 15 Min      13702 (CPT®) Hc Pt Elec Stim Ea-Per 15 Min      92591 (CPT®) Hc Pt Ultrasound Ea 15 Min      00163 (CPT®) Hc Pt Self Care/Mgmt/Train Ea 15 Min      97105 (CPT®) Hc Pt Prosthetic (S) Train Initial Encounter, Each 15 Min      93107 (CPT®) Hc Pt Orthotic(S)/Prosthetic(S) Encounter, Each 15 Min      74766 (CPT®) Hc Orthotic(S) Mgmt/Train Initial Encounter, Each 15min      Total  23 2          Therapy Charges for Today     Code Description Service Date Service Provider Modifiers Qty    [id number] HC PT [facility] MIST 2/4/2019 Neftaly Seaman, PT GP 1            PT G-Codes  Outcome Measure Options: AM-PAC 6 Clicks Daily Activity (OT)  AM-PAC 6 Clicks Score: 9  Score: 8        Neftaly Seaman, PT  2/4/2019

## 2019-02-04 NOTE — PLAN OF CARE
Problem: Patient Care Overview  Goal: Plan of Care Review  Outcome: Ongoing (interventions implemented as appropriate)   02/04/19 8900   Coping/Psychosocial   Plan of Care Reviewed With patient   Plan of Care Review   Progress improving   OTHER   Outcome Summary VSS on 1L NC while awake and 2L NC with sleep. NSR. , but improved with scheduled zebeta. No complaints except buttocks wound pain and sore throat. Improved with prn tylenol and repositioning. Pt did not sleep well in bed despite melatonin given. Assisted up to chair at 0245. Required max assist of 2 to transfer. Pt slept well in chair. Anuric. Poor po intake. No BM and pt unsure of last BM, but she has good bowel sounds and passing flatus.        Problem: Fall Risk (Adult)  Goal: Absence of Fall  Outcome: Ongoing (interventions implemented as appropriate)      Problem: Skin Injury Risk (Adult)  Goal: Skin Health and Integrity  Outcome: Ongoing (interventions implemented as appropriate)      Problem: Wound (Includes Pressure Injury) (Adult)  Goal: Signs and Symptoms of Listed Potential Problems Will be Absent, Minimized or Managed (Wound)  Outcome: Ongoing (interventions implemented as appropriate)      Problem: Hemodialysis (Adult)  Goal: Signs and Symptoms of Listed Potential Problems Will be Absent, Minimized or Managed (Hemodialysis)  Outcome: Ongoing (interventions implemented as appropriate)

## 2019-02-04 NOTE — PROGRESS NOTES
"[facility] at [facility] Progress Note     LOS: 4 days   Patient Care Team:  Otilio Smith DO as PCP - General (Family Medicine)  PCP:  Otilio Smith DO    Chief Complaint:  PEA arrest    SUBJECTIVE: In bedside chair.  Reports generalized fatigue but denies chest pain, palpitations or dyspnea.    Review of Systems:   All systems have been reviewed and are negative with the exception of those mentioned above.      OBJECTIVE:    Vital Sign Min/Max for last 24 hours  Temp  Min: 97.6 °F (36.4 °C)  Max: 98.5 °F (36.9 °C)   BP  Min: 112/27  Max: 175/98   Pulse  Min: 57  Max: 63   Resp  Min: 14  Max: 22   SpO2  Min: 88 %  Max: 99 %   No Data Recorded   No Data Recorded     Flowsheet Rows      First Filed Value   Admission Height  163.8 cm (64.5\") Documented at 02/01/2019 0300   Admission Weight  70 kg (154 lb 4.8 oz) Documented at 01/31/2019 1607          Telemetry: Sinus rhythm      Intake/Output Summary (Last 24 hours) at 2/4/2019 1338  Last data filed at 2/4/2019 0900  Gross per 24 hour   Intake 388.01 ml   Output --   Net 388.01 ml     Intake & Output (last 3 days)       02/01 0701 - 02/02 0700 02/02 0701 - 02/03 0700 02/03 0701 - 02/04 0700 02/04 0701 - 02/05 0700    P.O. 120  360 120    I.V. (mL/kg) 58.3 (0.9) 53.8 (0.8) 86.2 (1.3)     Blood 302.5 360      IV Piggyback 151.6 104 100     Total Intake(mL/kg) 632.4 (10) 517.8 (7.8) 546.2 (8.3) 120 (1.8)    Urine (mL/kg/hr)        Other  1390      Total Output  1390      Net +632.4 -872.2 +546.2 +120                   Physical Exam:    General Appearance:    Alert, cooperative, no distress, appears stated age   Neck:   Supple, symmetrical, trachea midline.   Lungs:     Clear to auscultation bilaterally, respirations unlabored   Chest Wall:    No tenderness or deformity    Heart:    Regular rate and rhythm, S1 and S2 normal, 6 holosystolic murmur apex, rub   or gallop, normal carotid impulse bilaterally without bruit.   Extremities:  "  Extremities normal, atraumatic, no cyanosis or edema   Pulses:   2+ and symmetric all extremities   Skin:   Skin color, texture, turgor normal, no rashes or lesions      LABS/DIAGNOSTIC DATA:  Results from last 7 days   Lab Units 02/04/19 0441 02/03/19 0459 02/02/19 0743   WBC 10*3/mm3 18.13* 17.00* 18.67*   HEMOGLOBIN g/dL 10.3* 10.1* 7.8*   HEMATOCRIT % 32.0* 30.9* 24.1*   PLATELETS 10*3/mm3 140* 124* 132*     No results found for: TROPONINT      Results from last 7 days   Lab Units 02/04/19 0441 02/03/19 0459 02/02/19 0743  01/31/19  1758   SODIUM mmol/L 139 141 141   < > 139   POTASSIUM mmol/L 3.8 3.9 4.3   < > 4.4   CHLORIDE mmol/L 107 108 108   < > 107   CO2 mmol/L 23.0 22.0 19.0*   < > 20.0   BUN mg/dL 39* 31* 52*   < > 47*   CREATININE mg/dL 3.23* 2.66* 3.79*   < > 3.57*   CALCIUM mg/dL 6.8* 6.9* 6.7*   < > 6.7*   BILIRUBIN mg/dL 0.5  --  0.4  --  0.3   ALK PHOS U/L 53  --  46  --  52   ALT (SGPT) U/L 10  --  14  --  10   AST (SGOT) U/L 23  --  34*  --  31   GLUCOSE mg/dL 104* 111* 58*   < > 78    < > = values in this interval not displayed.     Results from last 7 days   Lab Units 01/31/19  1758   HEMOGLOBIN A1C % 5.60     Results from last 7 days   Lab Units 02/01/19  0441   CHOLESTEROL mg/dL 70   TRIGLYCERIDES mg/dL 91   HDL CHOL mg/dL 18*   LDL CHOL mg/dL 25     Results from last 7 days   Lab Units 01/31/19  1758   TSH mIU/mL 1.851           Medication Review:     bisoprolol 5 mg Oral Nightly   calcitriol 0.25 mcg Oral Daily   castor oil-balsam peru  Topical Q12H   epoetin alexus 10,000 Units Subcutaneous Once per day on Mon Wed Fri   piperacillin-tazobactam 3.375 g Intravenous Q12H   sodium chloride 3 mL Intravenous Q12H              PEA    ITP (idiopathic thrombocytopenic purpura)    Pulmonary hypertension (CMS/HCC)    CKD (chronic kidney disease) stage 5, GFR less than 15 ml/min (CMS/HCC)    Leukocytosis    Anemia due to chronic kidney disease    Debility    Hx of renal cell carcinoma    Chronic  diastolic CHF (congestive heart failure) (CMS/HCC)    Falls frequently    Huge hiatal hernia with intrathoracic stomach    Mitral valve mass      ASSESSMENT/PLAN:  Stable from a cardiac standpoint  Continue bisoprolol          Malachi Maxwell III, MD   02/04/19  1:38 PM

## 2019-02-04 NOTE — NURSING NOTE
Prior to central line removal, order for the removal of catheter was verified, patient was assessed, necessary materials were gathered and patient was educated regarding procedure .    Patient was positioned flat to ensure that the insertion site was at or below the level of the heart.    Hands were washed, clean gloves were applied and central line dressing was gently removed. Catheter exit site was not cultured.     A new pair of clean gloves were then applied. Insertion site was cleansed with 2% Chlorhexidine swab using a circular motion beginning at the insertion site and moving outward for 30 seconds and allowed to dry.     Clamp on line was present and clamped.     Patient was instructed to perform Valsalva maneuver as catheter was withdrawn.     The central line was grasped at the insertion site and slowly pulled outward parallel to the skin. Resistance was not met.    After central line was completely removed, a sterile 4x4 gauze pad was used to apply light pressure until bleeding stopped. At that time, petroleum-based gauze and a sterile occlusive dressing was applied to exit site.     Patient was instructed to keep dressing in place for at least 24 hours and to remain in a flat or reclining position for 30 minutes post-removal.     Catheter was inspected after removal and   . Tip of central line was not sent for culture. Patient tolerated procedure.

## 2019-02-04 NOTE — PROGRESS NOTES
Clinical Nutrition     Nutrition Assessment  Reason for Visit:   MDR, Follow-up protocol    Patient Name: Fabiola Pimentel  YOB: 1934  MRN: [id number]  Date of Encounter: 02/04/19 11:28 AM  Admission date: 1/31/2019    Nutrition Assessment   Admission Diagnosis         PEA    ITP (idiopathic thrombocytopenic purpura)    Pulmonary hypertension (CMS/HCC)    CKD (chronic kidney disease) stage 5, GFR less than 15 ml/min (CMS/HCC)    Leukocytosis    Anemia due to chronic kidney disease    Debility    Hx of renal cell carcinoma    Chronic diastolic CHF (congestive heart failure) (CMS/HCC)    Falls frequently    Huge hiatal hernia with intrathoracic stomach    Mitral valve mass    PMH: She  has a past medical history of Chronic ITP (idiopathic thrombocytopenia) (CMS/HCC), Chronic renal insufficiency, CKD (chronic kidney disease), Diverticular disease, Dizziness, DJD (degenerative joint disease), Hip fracture (CMS/HCC), History of uterine cancer, Hypertension, Lower extremity edema, Palpitations, Pelvic fracture (CMS/HCC), Premature atrial contractions, Pulmonary hypertension (CMS/HCC), Raynaud's phenomenon (secondary), and Stroke (CMS/HCC).   PSxH: She  has a past surgical history that includes Incisional hernia repair (01/02/2008); Nephrectomy (Right, 2001); Total abdominal hysterectomy w/ bilateral salpingoophorectomy (2001); Other surgical history; Appendectomy; Cholecystectomy (1958); Cataract extraction w/  intraocular lens implant; Colectomy (10/2005); Splenectomy, total; Dilation and curettage of uterus; Sympathectomy (Left); HEMODIALYSIS CATHETER INSERTION (N/A, 2/1/2019); and LEFT UPPER EXTREMITY ARTERIOVENOUS FISTULA FORMATION, BRACHIO-AXILLARY SHUNT WITH ARTEGRAFT (Left, 1/16/2019).        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 heel DTI    Reported/Observed/Food/Nutrition Related History:     Pt sitting up in chair, no complaints, reports she ate  some breakfast     Labs reviewed     Results from last 7 days   Lab Units 02/04/19  0441   SODIUM mmol/L 139   POTASSIUM mmol/L 3.8   CHLORIDE mmol/L 107   CO2 mmol/L 23.0   BUN mg/dL 39*   CREATININE mg/dL 3.23*   CALCIUM mg/dL 6.8*   BILIRUBIN mg/dL 0.5   ALK PHOS U/L 53   ALT (SGPT) U/L 10   AST (SGOT) U/L 23   GLUCOSE mg/dL 104*       Results from last 7 days   Lab Units 02/02/19  2134 02/01/19  0745   GLUCOSE mg/dL 202* 68*     Lab Results   Lab Value Date/Time    HGBA1C 5.60 01/31/2019 1758         Medications reviewed   Pertinent: abx           GI: wnl    SKIN: sacral/coccyx- DTI s/p MIST therapy, debridement, L. heel DTI    Current Nutrition Prescription     PO: Clear Liquids  RFB 3x/day      Intake:25% of 1 meal  Insufficient data         Nutrition Diagnosis   2/1, updated 2-4-19  Problem Inadequate oral intake   Etiology GI status/procedures   Signs/Symptoms NPO/ Clear Liquids 3 days- Diet advanced to Solids 2-4-19    Status: improving    2-1, updated 2-4-19  Problem Increased kcal and protein needs   Etiology Skin integrity    Signs/Symptoms Unstageable pressure ulcer to sacrum/coccyx. L. heel DTI    Status: ongoing    Nutrition Intervention     Interventions Goal    General: Nutrition support treatment  Advance Diet  Ok per MD  '  Nutrition Interventions   1.  Follow treatment progress, Interview for preferences, Menu provided, Menu adjusted, Adjusted supplement    Renal Diet  Nepro 3x/day    Monitor/ Evaluation    Per protocol, I&O, PO intake, Supplement intake, Pertinent labs, Skin status, GI status, Symptoms    Nora Gomez RD  Time Spent: 30min

## 2019-02-05 NOTE — PROGRESS NOTES
Continued Stay Note  [facility]     Patient Name: Fabiola Pimentel  MRN: [id number]  Today's Date: 2/5/2019    Admit Date: 1/31/2019    Discharge Plan     Row Name 02/05/19 1513       Plan    Plan  Placement Update     Patient/Family in Agreement with Plan  yes    Plan Comments  I spoke with patient this am in ICU and she still agrees with placement. I called [facility] this afternoon and talked with Nancy and bed is available and I told her she was medically ready to go. Nicole will start insurance pre-auth this afternoon. I rec'd call from [facility] at [phone] and hemodialysis set up at [facility] in [facility], first chair time is Sat 2-9 at 11am needs to be there at 10:45. [facility] insurance this afternoon for insurance approval. I asked [facility] to call back to make sure she got this message. [facility] is checking to see if we need or they will arrange transportation to and from hemodialysis. Pura @ [phone]        Discharge Codes    No documentation.             Antonella Patel, MAXWELL

## 2019-02-05 NOTE — PLAN OF CARE
Problem: Patient Care Overview  Goal: Plan of Care Review  Outcome: Ongoing (interventions implemented as appropriate)   02/05/19 0613   Coping/Psychosocial   Plan of Care Reviewed With patient   Plan of Care Review   Progress improving   OTHER   Outcome Summary VSS; afebrile. HD planned for this AM. No major events during the night.        Problem: Fall Risk (Adult)  Goal: Absence of Fall  Outcome: Ongoing (interventions implemented as appropriate)      Problem: Skin Injury Risk (Adult)  Goal: Skin Health and Integrity  Outcome: Ongoing (interventions implemented as appropriate)

## 2019-02-05 NOTE — DISCHARGE PLACEMENT REQUEST
"Fabiola Pimentel (84 y.o. Female) Her is the med list for Ms. Pimentel.    is on room air right now. Looks like she was on 2L/NC this morning    Date of Birth Social Security Number Address Home Phone MRN    [id number]  [id number]  [facility] [address] [phone] [id number]    Worship Marital Status          [place]        Admission Date Admission Type Admitting Provider Attending Provider Department, Room/Bed    1/31/19 Urgent Malachi Stephens MD Villaran, Yuri, MD [facility] 2A ICU, N212/1    Discharge Date Discharge Disposition Discharge Destination                       Attending Provider:  Chung Ferrari MD    Allergies:  Benadryl [Diphenhydramine], Diltiazem, Micardis [Telmisartan], Other    Isolation:  None   Infection:  None   Code Status:  CPR    Ht:  163.8 cm (64.5\")   Wt:  66.1 kg (145 lb 11.6 oz)    Admission Cmt:  None   Principal Problem:  PEA [I46.9] More...                 Active Insurance as of 1/31/2019     Primary Coverage     Payor Plan Insurance Group Employer/Plan Group    [facility] MEDICARE REPLACEMENT [facility] [id number]     Payor Plan Address Payor Plan Phone Number Payor Plan Fax Number Effective Dates    [address] [address]   1/1/2016 - None Entered    [facility] [id number]       Subscriber Name Subscriber Birth Date Member ID       FABIOLA PIMENTEL [id number]                 Emergency Contacts      (Rel.) Home Phone Work Phone Mobile Phone    Ang Pimentel (Son) [phone]            Hospital Medications (active)       Dose Frequency Start End    0.9% sodium chloride for irrigation-mineral oil-glycerin (SMOG) enema 300 mL 300 mL Once 2/5/2019 2/5/2019    Sig - Route: Insert 300 mL into the rectum 1 (One) Time. - Rectal    acetaminophen (TYLENOL) tablet 650 mg 650 mg Every 6 Hours PRN 2/3/2019     Sig - Route: Take 2 tablets by mouth Every 6 (Six) Hours As Needed for Mild Pain . - Oral    " albumin human 25 % IV SOLN 25 g 25 g As Needed 2/5/2019 2/5/2019    Sig - Route: Infuse 100 mL into a venous catheter As Needed (Hypotension During Dialysis). - Intravenous    alteplase ((CATHFLO/ACTIVASE)) injection 2 mg 2 mg As Needed 2/5/2019     Sig - Route: 2 mL by Intracatheter route As Needed (Dialysis). - Intracatheter    Notes to Pharmacy: Arterial: 1.9 mL  Venous: 1.9 mL    bisoprolol (ZEBeta) tablet 5 mg 5 mg Nightly 1/31/2019     Sig - Route: Take 1 tablet by mouth Every Night. - Oral    calcitriol (ROCALTROL) capsule 0.25 mcg 0.25 mcg Daily 1/31/2019     Sig - Route: Take 1 capsule by mouth Daily. - Oral    castor oil-balsam peru (VENELEX) ointment  Every 12 Hours Scheduled 2/1/2019     Sig - Route: Apply  topically to the appropriate area as directed Every 12 (Twelve) Hours. - Topical    Cosign for Ordering: Accepted by Malachi Stephens MD on 2/1/2019  3:41 PM    dextrose (D50W) 25 g/ 50mL Intravenous Solution 25 mL 25 mL Every 1 Hour PRN 2/1/2019     Sig - Route: Infuse 25 mL into a venous catheter Every 1 (One) Hour As Needed for Low Blood Sugar. - Intravenous    docusate sodium (COLACE) capsule 100 mg 100 mg 2 Times Daily 2/4/2019     Sig - Route: Take 1 capsule by mouth 2 (Two) Times a Day. - Oral    epoetin alexus (EPOGEN,PROCRIT) injection 10,000 Units 10,000 Units 3 Times Weekly 2/5/2019     Sig - Route: Inject 1 mL under the skin into the appropriate area as directed Once per day on Tue Thu Sat. - Subcutaneous    ferric gluconate (FERRLECIT) 125 mg in sodium chloride 0.9 % 110 mL IVPB 125 mg Once 2/5/2019 2/5/2019    Sig - Route: Infuse 125 mg into a venous catheter 1 (One) Time. - Intravenous    melatonin sublingual tablet 5 mg 5 mg Nightly PRN 2/2/2019     Sig - Route: Place 1 tablet under the tongue At Night As Needed for Sleep. - Sublingual    ondansetron (ZOFRAN) injection 4 mg 4 mg Every 6 Hours PRN 2/4/2019     Sig - Route: Infuse 2 mL into a venous catheter Every 6 (Six) Hours As  Needed for Nausea or Vomiting. - Intravenous    piperacillin-tazobactam (ZOSYN) 3.375 g in iso-osmotic dextrose 50 ml (premix) 3.375 g Every 12 Hours Scheduled 2/1/2019 2/6/2019    Sig - Route: Infuse 50 mL into a venous catheter Every 12 (Twelve) Hours. - Intravenous    sodium chloride 0.9 % flush 3 mL 3 mL Every 12 Hours Scheduled 1/31/2019     Sig - Route: Infuse 3 mL into a venous catheter Every 12 (Twelve) Hours. - Intravenous    sodium chloride 0.9 % flush 3-10 mL 3-10 mL As Needed 1/31/2019     Sig - Route: Infuse 3-10 mL into a venous catheter As Needed for Line Care. - Intravenous    albumin human 25 % IV SOLN 12.5 g (Discontinued) 12.5 g As Needed 2/5/2019 2/5/2019    Sig - Route: Infuse 50 mL into a venous catheter As Needed (Hypotension During Dialysis). - Intravenous    albumin human 25 % IV SOLN 12.5 g (Discontinued) 12.5 g As Needed 2/5/2019 2/5/2019    Sig - Route: Infuse 50 mL into a venous catheter As Needed (Hypotension During Dialysis). - Intravenous    albumin human 25 % IV SOLN 12.5 g (Discontinued) 12.5 g As Needed 2/5/2019 2/5/2019    Sig - Route: Infuse 50 mL into a venous catheter As Needed (Hypotension During Dialysis). - Intravenous    epoetin alexus (EPOGEN,PROCRIT) injection 10,000 Units (Discontinued) 10,000 Units 3 Times Weekly 2/4/2019 2/5/2019    Sig - Route: Inject 1 mL under the skin into the appropriate area as directed Once per day on Mon Wed Fri. - Subcutaneous             Physician Progress Notes (last 24 hours) (Notes from 2/4/2019  3:57 PM through 2/5/2019  3:57 PM)      Chetna Cortez MD at 2/5/2019  2:13 PM        Was asked to evaluate the patient due to concerns of skin excoriation around the tunneled HD line insertion site in the skin.  On examination, there appears to be ecchymosis of the tunnel on the left chest along with an area of denuded skin just proximal to the insertion site into the skin.  The skin overlying the catheter is intact, and the catheter is  "functioning well.  I recommend a wound care consult to address this issue.  Please call if additional questions or concerns.    Chetna Cortez MD    Electronically signed by Chetna Cortez MD at 2/5/2019  2:14 PM     HiteshIsaac MD at 2/5/2019 10:10 AM             LOS: 5 days    Patient Care Team:  Otilio Smith DO as PCP - General (Family Medicine)        Subjective     Interval History:     No acute events overnight. No new complaints. Feeling well.     Review of Systems:   No CP or SOA    Objective     Vital Sign Min/Max for last 24 hours  Temp  Min: 97.7 °F (36.5 °C)  Max: 98.5 °F (36.9 °C)   BP  Min: 106/61  Max: 147/73   Pulse  Min: 58  Max: 65   Resp  Min: 16  Max: 20   SpO2  Min: 92 %  Max: 98 %   No Data Recorded   No Data Recorded     Flowsheet Rows      First Filed Value   Admission Height  163.8 cm (64.5\") Documented at 02/01/2019 0300   Admission Weight  70 kg (154 lb 4.8 oz) Documented at 01/31/2019 1607          No intake/output data recorded.  I/O last 3 completed shifts:  In: 259.9 [P.O.:120; I.V.:39.9; IV Piggyback:100]  Out: -     Physical Exam:     General Appearance:    Alert, cooperative, in no acute distress   Head:    Normocephalic, without obvious abnormality, atraumatic               Neck:   No adenopathy, supple, trachea midline, no thyromegaly, no     carotid bruit, no JVD       Lungs:     Diminished air entry,respirations regular, even and                   unlabored    Heart:    Regular rhythm and normal rate, normal S1 and S2, no            murmur, no gallop, no rub, no click       Abdomen:     Normal bowel sounds, no masses, no organomegaly, soft        non-tender, non-distended, no guarding, no rebound                 tenderness       Extremities:   Moves all extremities well, no edema, no cyanosis, no              redness               Neurologic:   No focal deficit noted grossly.        WBC WBC   Date Value Ref Range Status   02/05/2019 21.70 (H) 3.50 - 10.80 " 10*3/mm3 Final     Comment:     WBC corrected for presence of NRBC's  Modified report. Previous result was 22.57 10*3/mm3 on 2/5/2019 at 0900 EST.   02/04/2019 18.13 (H) 3.50 - 10.80 10*3/mm3 Final   02/03/2019 17.00 (H) 3.50 - 10.80 10*3/mm3 Final      HGB Hemoglobin   Date Value Ref Range Status   02/05/2019 10.0 (L) 11.5 - 15.5 g/dL Final   02/04/2019 10.3 (L) 11.5 - 15.5 g/dL Final   02/03/2019 10.1 (L) 11.5 - 15.5 g/dL Final      HCT Hematocrit   Date Value Ref Range Status   02/05/2019 31.0 (L) 34.5 - 44.0 % Final   02/04/2019 32.0 (L) 34.5 - 44.0 % Final   02/03/2019 30.9 (L) 34.5 - 44.0 % Final      Platlets No results found for: LABPLAT   MCV MCV   Date Value Ref Range Status   02/05/2019 89.6 80.0 - 99.0 fL Final   02/04/2019 89.9 80.0 - 99.0 fL Final   02/03/2019 88.3 80.0 - 99.0 fL Final          Sodium Sodium   Date Value Ref Range Status   02/05/2019 138 132 - 146 mmol/L Final   02/04/2019 139 132 - 146 mmol/L Final   02/03/2019 141 132 - 146 mmol/L Final      Potassium Potassium   Date Value Ref Range Status   02/05/2019 3.9 3.5 - 5.5 mmol/L Final   02/04/2019 3.8 3.5 - 5.5 mmol/L Final   02/03/2019 3.9 3.5 - 5.5 mmol/L Final      Chloride Chloride   Date Value Ref Range Status   02/05/2019 106 99 - 109 mmol/L Final   02/04/2019 107 99 - 109 mmol/L Final   02/03/2019 108 99 - 109 mmol/L Final      CO2 CO2   Date Value Ref Range Status   02/05/2019 22.0 20.0 - 31.0 mmol/L Final   02/04/2019 23.0 20.0 - 31.0 mmol/L Final   02/03/2019 22.0 20.0 - 31.0 mmol/L Final      BUN BUN   Date Value Ref Range Status   02/05/2019 46 (H) 9 - 23 mg/dL Final   02/04/2019 39 (H) 9 - 23 mg/dL Final   02/03/2019 31 (H) 9 - 23 mg/dL Final      Creatinine Creatinine   Date Value Ref Range Status   02/05/2019 4.18 (H) 0.60 - 1.30 mg/dL Final   02/04/2019 3.23 (H) 0.60 - 1.30 mg/dL Final   02/03/2019 2.66 (H) 0.60 - 1.30 mg/dL Final      Calcium Calcium   Date Value Ref Range Status   02/05/2019 7.3 (L) 8.7 - 10.4 mg/dL  Final   02/04/2019 6.8 (L) 8.7 - 10.4 mg/dL Final   02/03/2019 6.9 (L) 8.7 - 10.4 mg/dL Final      PO4 No results found for: CAPO4   Albumin Albumin   Date Value Ref Range Status   02/04/2019 2.55 (L) 3.20 - 4.80 g/dL Final   02/03/2019 2.54 (L) 3.20 - 4.80 g/dL Final      Magnesium No results found for: MG   Uric Acid No results found for: URICACID        Results Review:     I reviewed the patient's new clinical results.      bisoprolol 5 mg Oral Nightly   calcitriol 0.25 mcg Oral Daily   castor oil-balsam peru  Topical Q12H   docusate sodium 100 mg Oral BID   epoetin alexus 10,000 Units Subcutaneous Once per day on Tue Thu Sat   piperacillin-tazobactam 3.375 g Intravenous Q12H   sodium chloride 3 mL Intravenous Q12H          Medication Review:     Assessment/Plan       PEA    ITP (idiopathic thrombocytopenic purpura)    Pulmonary hypertension (CMS/HCC)    CKD (chronic kidney disease) stage 5, GFR less than 15 ml/min (CMS/HCC)    Leukocytosis    Anemia due to chronic kidney disease    Debility    Hx of renal cell carcinoma    Chronic diastolic CHF (congestive heart failure) (CMS/HCC)    Falls frequently    Huge hiatal hernia with intrathoracic stomach    Mitral valve mass      1- 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   4- Anemia of chronic disease.   5- Dialysis access - S/p tunneled catheter. AVF 2 week old.     Plan:  - Patient seen on dialysis. UF as tolerated.   - Renal diet  - Epo and IV iron with HD.     Discussed with RN    Isaac Proctor MD  02/05/19  10:10 AM        Electronically signed by Isaac Proctor MD at 2/5/2019 10:10 AM     Malachi Maxwell III, MD at 2/5/2019  9:52 AM          [facility] Progress Note     LOS: 5 days   Patient Care Team:  Otilio Smith DO as PCP - General (Family Medicine)  PCP:  Otilio Smith DO    Chief Complaint:  PEA arrest    SUBJECTIVE: Lying in bed  "undergoing hemodialysis.  Tolerated hemodialysis well, -2.5 liters with this run.  Also receiving iron infusion.  Telemetry reveals sinus rhythm.    Review of Systems:   All systems have been reviewed and are negative with the exception of those mentioned above.      OBJECTIVE:    Vital Sign Min/Max for last 24 hours  Temp  Min: 97.7 °F (36.5 °C)  Max: 98.5 °F (36.9 °C)   BP  Min: 106/61  Max: 151/77   Pulse  Min: 58  Max: 65   Resp  Min: 16  Max: 20   SpO2  Min: 92 %  Max: 98 %   No Data Recorded   No Data Recorded     Flowsheet Rows      First Filed Value   Admission Height  163.8 cm (64.5\") Documented at 02/01/2019 0300   Admission Weight  70 kg (154 lb 4.8 oz) Documented at 01/31/2019 1607          Telemetry: Sinus rhythm      Intake/Output Summary (Last 24 hours) at 2/5/2019 0952  Last data filed at 2/4/2019 2200  Gross per 24 hour   Intake 50 ml   Output --   Net 50 ml     Intake & Output (last 3 days)       02/02 0701 - 02/03 0700 02/03 0701 - 02/04 0700 02/04 0701 - 02/05 0700 02/05 0701 - 02/06 0700    P.O.  360 120     I.V. (mL/kg) 53.8 (0.8) 86.2 (1.3)      Blood 360       IV Piggyback 104 100 50     Total Intake(mL/kg) 517.8 (7.8) 546.2 (8.3) 170 (2.6)     Other 1390       Total Output 1390       Net -872.2 +546.2 +170             Urine Unmeasured Occurrence   1 x            Physical Exam:    General Appearance:    Alert, cooperative, no distress, appears stated age   Neck:   Supple, symmetrical, trachea midline.   Lungs:     Clear to auscultation bilaterally, respirations unlabored   Chest Wall:    No tenderness or deformity    Heart:    Regular rate and rhythm, S1 and S2 normal, II/6 holosystolic murmur apex, rub   or gallop, normal carotid impulse bilaterally without bruit.   Extremities:   Extremities normal, atraumatic, no cyanosis or edema   Pulses:   2+ and symmetric all extremities   Skin:   Skin color, texture, turgor normal, no rashes or lesions      LABS/DIAGNOSTIC DATA:  Results from last 7 " days   Lab Units 02/05/19  0645 02/04/19 0441 02/03/19 0459   WBC 10*3/mm3 21.70* 18.13* 17.00*   HEMOGLOBIN g/dL 10.0* 10.3* 10.1*   HEMATOCRIT % 31.0* 32.0* 30.9*   PLATELETS 10*3/mm3 162 140* 124*     No results found for: TROPONINT      Results from last 7 days   Lab Units 02/05/19  0645 02/04/19 0441 02/03/19 0459 02/02/19  0743  01/31/19  1758   SODIUM mmol/L 138 139 141 141   < > 139   POTASSIUM mmol/L 3.9 3.8 3.9 4.3   < > 4.4   CHLORIDE mmol/L 106 107 108 108   < > 107   CO2 mmol/L 22.0 23.0 22.0 19.0*   < > 20.0   BUN mg/dL 46* 39* 31* 52*   < > 47*   CREATININE mg/dL 4.18* 3.23* 2.66* 3.79*   < > 3.57*   CALCIUM mg/dL 7.3* 6.8* 6.9* 6.7*   < > 6.7*   BILIRUBIN mg/dL  --  0.5  --  0.4  --  0.3   ALK PHOS U/L  --  53  --  46  --  52   ALT (SGPT) U/L  --  10  --  14  --  10   AST (SGOT) U/L  --  23  --  34*  --  31   GLUCOSE mg/dL 78 104* 111* 58*   < > 78    < > = values in this interval not displayed.     Results from last 7 days   Lab Units 01/31/19  1758   HEMOGLOBIN A1C % 5.60     Results from last 7 days   Lab Units 02/01/19  0441   CHOLESTEROL mg/dL 70   TRIGLYCERIDES mg/dL 91   HDL CHOL mg/dL 18*   LDL CHOL mg/dL 25     Results from last 7 days   Lab Units 01/31/19  1758   TSH mIU/mL 1.851           Medication Review:     bisoprolol 5 mg Oral Nightly   calcitriol 0.25 mcg Oral Daily   castor oil-balsam peru  Topical Q12H   docusate sodium 100 mg Oral BID   epoetin alexus 10,000 Units Subcutaneous Once per day on Tue Thu Sat   ferric gluconate (FERRLECIT) IVPB 125 mg Intravenous Once   piperacillin-tazobactam 3.375 g Intravenous Q12H   sodium chloride 3 mL Intravenous Q12H              PEA    ITP (idiopathic thrombocytopenic purpura)    Pulmonary hypertension (CMS/HCC)    CKD (chronic kidney disease) stage 5, GFR less than 15 ml/min (CMS/HCC)    Leukocytosis    Anemia due to chronic kidney disease    Debility    Hx of renal cell carcinoma    Chronic diastolic CHF (congestive heart failure)  (CMS/HCC)    Falls frequently    Huge hiatal hernia with intrathoracic stomach    Mitral valve mass      ASSESSMENT/PLAN:  Stable from a cardiac standpoint for transfer to telemetry floor  Continue bisoprolol          Malachi Maxwell III, MD   02/05/19  9:52 AM    Electronically signed by Malachi Maxwell III, MD at 2/5/2019  9:53 AM

## 2019-02-05 NOTE — PROGRESS NOTES
Was asked to evaluate the patient due to concerns of skin excoriation around the tunneled HD line insertion site in the skin.  On examination, there appears to be ecchymosis of the tunnel on the left chest along with an area of denuded skin just proximal to the insertion site into the skin.  The skin overlying the catheter is intact, and the catheter is functioning well.  I recommend a wound care consult to address this issue.  Please call if additional questions or concerns.    Chetna Cortez MD

## 2019-02-05 NOTE — PROGRESS NOTES
Multidisciplinary Rounds    Time: 20min  Patient Name: Fabiola Pimentel  Date of Encounter: 02/05/19 9:08 AM  MRN: [id number]  Admission date: 1/31/2019      Reason for visit: MDR. RD to continue to follow per protocol.     Additional information obtained during MDR: Pt receiving HD this AM.     Current diet: Diet Regular; Renal and Nepro 3x/day      Intervention:  Follow treatment plan  Care plan reviewed    Follow up:   Per protocol      Christine Man MS RD/[facility]IG[facility] CNSC  9:08 AM

## 2019-02-05 NOTE — PROGRESS NOTES
"   LOS: 5 days    Patient Care Team:  Otilio Smith DO as PCP - General (Family Medicine)        Subjective     Interval History:     No acute events overnight. No new complaints. Feeling well.     Review of Systems:   No CP or SOA    Objective     Vital Sign Min/Max for last 24 hours  Temp  Min: 97.7 °F (36.5 °C)  Max: 98.5 °F (36.9 °C)   BP  Min: 106/61  Max: 147/73   Pulse  Min: 58  Max: 65   Resp  Min: 16  Max: 20   SpO2  Min: 92 %  Max: 98 %   No Data Recorded   No Data Recorded     Flowsheet Rows      First Filed Value   Admission Height  163.8 cm (64.5\") Documented at 02/01/2019 0300   Admission Weight  70 kg (154 lb 4.8 oz) Documented at 01/31/2019 1607          No intake/output data recorded.  I/O last 3 completed shifts:  In: 259.9 [P.O.:120; I.V.:39.9; IV Piggyback:100]  Out: -     Physical Exam:     General Appearance:    Alert, cooperative, in no acute distress   Head:    Normocephalic, without obvious abnormality, atraumatic               Neck:   No adenopathy, supple, trachea midline, no thyromegaly, no     carotid bruit, no JVD       Lungs:     Diminished air entry,respirations regular, even and                   unlabored    Heart:    Regular rhythm and normal rate, normal S1 and S2, no            murmur, no gallop, no rub, no click       Abdomen:     Normal bowel sounds, no masses, no organomegaly, soft        non-tender, non-distended, no guarding, no rebound                 tenderness       Extremities:   Moves all extremities well, no edema, no cyanosis, no              redness               Neurologic:   No focal deficit noted grossly.        WBC WBC   Date Value Ref Range Status   02/05/2019 21.70 (H) 3.50 - 10.80 10*3/mm3 Final     Comment:     WBC corrected for presence of NRBC's  Modified report. Previous result was 22.57 10*3/mm3 on 2/5/2019 at 0900 EST.   02/04/2019 18.13 (H) 3.50 - 10.80 10*3/mm3 Final   02/03/2019 17.00 (H) 3.50 - 10.80 10*3/mm3 Final      HGB Hemoglobin   Date " Value Ref Range Status   02/05/2019 10.0 (L) 11.5 - 15.5 g/dL Final   02/04/2019 10.3 (L) 11.5 - 15.5 g/dL Final   02/03/2019 10.1 (L) 11.5 - 15.5 g/dL Final      HCT Hematocrit   Date Value Ref Range Status   02/05/2019 31.0 (L) 34.5 - 44.0 % Final   02/04/2019 32.0 (L) 34.5 - 44.0 % Final   02/03/2019 30.9 (L) 34.5 - 44.0 % Final      Platlets No results found for: LABPLAT   MCV MCV   Date Value Ref Range Status   02/05/2019 89.6 80.0 - 99.0 fL Final   02/04/2019 89.9 80.0 - 99.0 fL Final   02/03/2019 88.3 80.0 - 99.0 fL Final          Sodium Sodium   Date Value Ref Range Status   02/05/2019 138 132 - 146 mmol/L Final   02/04/2019 139 132 - 146 mmol/L Final   02/03/2019 141 132 - 146 mmol/L Final      Potassium Potassium   Date Value Ref Range Status   02/05/2019 3.9 3.5 - 5.5 mmol/L Final   02/04/2019 3.8 3.5 - 5.5 mmol/L Final   02/03/2019 3.9 3.5 - 5.5 mmol/L Final      Chloride Chloride   Date Value Ref Range Status   02/05/2019 106 99 - 109 mmol/L Final   02/04/2019 107 99 - 109 mmol/L Final   02/03/2019 108 99 - 109 mmol/L Final      CO2 CO2   Date Value Ref Range Status   02/05/2019 22.0 20.0 - 31.0 mmol/L Final   02/04/2019 23.0 20.0 - 31.0 mmol/L Final   02/03/2019 22.0 20.0 - 31.0 mmol/L Final      BUN BUN   Date Value Ref Range Status   02/05/2019 46 (H) 9 - 23 mg/dL Final   02/04/2019 39 (H) 9 - 23 mg/dL Final   02/03/2019 31 (H) 9 - 23 mg/dL Final      Creatinine Creatinine   Date Value Ref Range Status   02/05/2019 4.18 (H) 0.60 - 1.30 mg/dL Final   02/04/2019 3.23 (H) 0.60 - 1.30 mg/dL Final   02/03/2019 2.66 (H) 0.60 - 1.30 mg/dL Final      Calcium Calcium   Date Value Ref Range Status   02/05/2019 7.3 (L) 8.7 - 10.4 mg/dL Final   02/04/2019 6.8 (L) 8.7 - 10.4 mg/dL Final   02/03/2019 6.9 (L) 8.7 - 10.4 mg/dL Final      PO4 No results found for: CAPO4   Albumin Albumin   Date Value Ref Range Status   02/04/2019 2.55 (L) 3.20 - 4.80 g/dL Final   02/03/2019 2.54 (L) 3.20 - 4.80 g/dL Final       Magnesium No results found for: MG   Uric Acid No results found for: URICACID        Results Review:     I reviewed the patient's new clinical results.      bisoprolol 5 mg Oral Nightly   calcitriol 0.25 mcg Oral Daily   castor oil-balsam peru  Topical Q12H   docusate sodium 100 mg Oral BID   epoetin alexus 10,000 Units Subcutaneous Once per day on Tue Thu Sat   piperacillin-tazobactam 3.375 g Intravenous Q12H   sodium chloride 3 mL Intravenous Q12H          Medication Review:     Assessment/Plan       PEA    ITP (idiopathic thrombocytopenic purpura)    Pulmonary hypertension (CMS/HCC)    CKD (chronic kidney disease) stage 5, GFR less than 15 ml/min (CMS/HCC)    Leukocytosis    Anemia due to chronic kidney disease    Debility    Hx of renal cell carcinoma    Chronic diastolic CHF (congestive heart failure) (CMS/HCC)    Falls frequently    Huge hiatal hernia with intrathoracic stomach    Mitral valve mass      1- 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   4- Anemia of chronic disease.   5- Dialysis access - S/p tunneled catheter. AVF 2 week old.     Plan:  - Patient seen on dialysis. UF as tolerated.   - Renal diet  - Epo and IV iron with HD.     Discussed with MAXWELL Proctor MD  02/05/19  10:10 AM

## 2019-02-05 NOTE — PLAN OF CARE
Problem: Patient Care Overview  Goal: Plan of Care Review  Outcome: Ongoing (interventions implemented as appropriate)   02/05/19 3289   Coping/Psychosocial   Plan of Care Reviewed With patient   Plan of Care Review   Progress improving   OTHER   Outcome Summary VSS, 2L NC-room air throughout the day. HD this AM. Pt stable to transfer to nursing facility and has bed ready. Awaiting insurance authorization. Pt c/o abdominal discomfort and constipation. Disimpacted large amounts of hard stool and enema given- very effective. Patient states she frequently is constipated at home, lactulose ordered.

## 2019-02-05 NOTE — PROGRESS NOTES
INTENSIVIST   PROGRESS NOTE     Hospital:  LOS: 5 days      S     Ms. Fabiola Pimentel, 84 y.o. female is followed for:      PEA    ITP (idiopathic thrombocytopenic purpura)      As an Intensivist, we provide an integrated approach to the ICU patient and family, medical management of comorbid conditions, including but not limited to electrolytes, glycemic control, organ dysfunction, lead interdisciplinary rounds and coordinate the care with all other services, including those from other specialists.     Interval History:  Comfortable.  Uneventful night.  HD done this morning.     The patient's relevant past medical, surgical and social history were reviewed and updated in Epic as appropriate.     ROS:   Constitutional: Negative for fever.   Respiratory: Negative for dyspnea.   Cardiovascular: Negative for chest pain.   Gastrointestinal: Negative for  nausea, vomiting and diarrhea.        O     Vitals:  Temp: 97.9 °F (36.6 °C) (02/05/19 1015) Temp  Min: 97.7 °F (36.5 °C)  Max: 98.5 °F (36.9 °C)   BP: 135/70 (02/05/19 1015) BP  Min: 106/61  Max: 147/73   Pulse: 67 (02/05/19 1500) Pulse  Min: 58  Max: 67   Resp: 16 (02/05/19 1015) Resp  Min: 16  Max: 20   SpO2: 95 % (02/05/19 1500) SpO2  Min: 92 %  Max: 98 %   Device: nasal cannula, humidified (02/05/19 1015)    Flow Rate: 2 (02/05/19 1015) Flow (L/min)  Min: 1  Max: 2       Physical Examination  Telemetry:  Rhythm: normal sinus rhythm (02/05/19 1400)     Constitutional:  No acute distress.   Cardiovascular: Normal rate, regular and rhythm. Normal heart sounds.  (+) murmur  No gallop or rub.   Respiratory: No respiratory distress. Normal respiratory effort.  Normal breath sounds  Clear to auscultation and percussion.    Abdominal:  Soft. No masses. Non-tender. No distension. No HSM.   Extremities: No digital cyanosis. No clubbing.  No edema.   Neurological:   Alert. Non focal.   Lines/Drains/Airways: L IJ tunneled Dialysis Catheter       Hematology:  Results from last 7  days   Lab Units 02/05/19 0645 02/04/19 0441 02/03/19 0459   WBC 10*3/mm3 21.70* 18.13* 17.00*   HEMOGLOBIN g/dL 10.0* 10.3* 10.1*   MCV fL 89.6 89.9 88.3   PLATELETS 10*3/mm3 162 140* 124*       Chemistry:  Estimated Creatinine Clearance: 10.5 mL/min (A) (by C-G formula based on SCr of 4.18 mg/dL (H)).    Results from last 7 days   Lab Units 02/05/19 0645 02/04/19 0441 02/03/19 0459   SODIUM mmol/L 138 139 141   POTASSIUM mmol/L 3.9 3.8 3.9   CHLORIDE mmol/L 106 107 108   CO2 mmol/L 22.0 23.0 22.0   ANION GAP mmol/L 10.0 9.0 11.0   GLUCOSE mg/dL 78 104* 111*   CALCIUM mg/dL 7.3* 6.8* 6.9*     Results from last 7 days   Lab Units 02/05/19 0645 02/04/19 0441 02/03/19 0459   BUN mg/dL 46* 39* 31*   CREATININE mg/dL 4.18* 3.23* 2.66*     Images:  Xr Chest 1 View    Result Date: 2/5/2019  Mild pulmonary vascular congestion and large hiatal hernia unchanged.  D:  02/05/2019 E:  02/05/2019         Echo:  Results for orders placed during the hospital encounter of 01/31/19   Transthoracic Echo Complete With Contrast if Necessary Per Protocol    Narrative · Left ventricular systolic function is normal.  · Left ventricular diastolic dysfunction (grade I) consistent with   impaired relaxation.  · Estimated EF appears to be in the range of 61 - 65%.  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 Likely represents age-related   focal region of sclerosis and calcification of the chordal apparatus,   however vegetation cannot be completely excluded, clinical correlation   required.          Results: Reviewed.  I reviewed the patient's new laboratory and imaging results.  I independently reviewed the patient's new images.    Medications: Reviewed.    Assessment/Plan   A / P     84 y.o.female, admitted on 1/31/2019 with Acute renal failure (ARF) (CMS/HCC) [N17.9]:     1. S/p PEA, while she was in the inpatient dialysis unit, quickly resuscitated (within 3  minutes), and it did not require intubation, 2/1/19  2. 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  3. ESRD on HD  4. ITP  5. Leukocytosis  1. Still not signs of improvement.  6. HTN  7. Anemia, chronic disease  1. EPO and IV iron during HD } As per Nephrology  8. Hiatal hernia.  9. Antibiotics: Piperacillin-Tazobactam   10. Glucose: No h/o DM    Lab Results   Lab Value Date/Time    HGBA1C 5.60 01/31/2019 1758       Diet: Diet Regular; Renal   Advance Directives: Code Status and Medical Interventions:   Ordered at: 02/01/19 1600     Limited Support to NOT Include:    Intubation     Code Status:    CPR     Medical Interventions (Level of Support Prior to Arrest):    Limited          Assessment / Plan:    1. F/u WBC  2. PCT in AM  3. May need to adjust abs if WBC keeps increasing.  4. Advance diet  5. Disposition: Transfer to Telemetry Unit.} when ok with Nephrology.  1. Cardiology agrees with transfer to floor.  2. Eventually [facility]    Plan of care and goals reviewed during interdisciplinary rounds.  Level of Risk is High due to:  illness with threat to life or bodily function.   I discussed the patient's findings and my recommendations with patient    Chung Ferrari MD, FACP, FCCP, CNSC  Intensive Care Medicine, Nutrition Support and Pulmonary Medicine

## 2019-02-05 NOTE — PROGRESS NOTES
Continued Stay Note  [facility]     Patient Name: Fabiola Pimentel  MRN: [id number]  Today's Date: 2/5/2019    Admit Date: 1/31/2019    Discharge Plan     Row Name 02/05/19 1513       Plan    Plan  Placement Update     Patient/Family in Agreement with Plan  yes    Plan Comments  I spoke with patient this am in ICU and she still agrees with placement at [facility].  I called [facility] @ [phone] this afternoon and talked with Nicole and bed is available and I told her she was medically ready to go. Nicole will start insurance pre-auth this afternoon. I rec'd call from [facility] at [phone] x [phone] and hemodialysis set up at [facility] in [facility] for t-thur-sat. First chair time if Sat 2-9 at 11am but patient needs to be there at 10:45. [facility] and we are working on insurance approval and to call back to let us know she rec'd this message. [facility] to check and see if we need to arrange transportation to  from dialysis. [facility] @ [phone]         Discharge Codes    No documentation.             Antonella Patel RN

## 2019-02-05 NOTE — PROGRESS NOTES
"[facility] Progress Note     LOS: 5 days   Patient Care Team:  Otilio Smith DO as PCP - General (Family Medicine)  PCP:  Otilio Smith DO    Chief Complaint:  PEA arrest    SUBJECTIVE: Lying in bed undergoing hemodialysis.  Tolerated hemodialysis well, -2.5 liters with this run.  Also receiving iron infusion.  Telemetry reveals sinus rhythm.    Review of Systems:   All systems have been reviewed and are negative with the exception of those mentioned above.      OBJECTIVE:    Vital Sign Min/Max for last 24 hours  Temp  Min: 97.7 °F (36.5 °C)  Max: 98.5 °F (36.9 °C)   BP  Min: 106/61  Max: 151/77   Pulse  Min: 58  Max: 65   Resp  Min: 16  Max: 20   SpO2  Min: 92 %  Max: 98 %   No Data Recorded   No Data Recorded     Flowsheet Rows      First Filed Value   Admission Height  163.8 cm (64.5\") Documented at 02/01/2019 0300   Admission Weight  70 kg (154 lb 4.8 oz) Documented at 01/31/2019 1607          Telemetry: Sinus rhythm      Intake/Output Summary (Last 24 hours) at 2/5/2019 0952  Last data filed at 2/4/2019 2200  Gross per 24 hour   Intake 50 ml   Output --   Net 50 ml     Intake & Output (last 3 days)       02/02 0701 - 02/03 0700 02/03 0701 - 02/04 0700 02/04 0701 - 02/05 0700 02/05 0701 - 02/06 0700    P.O.  360 120     I.V. (mL/kg) 53.8 (0.8) 86.2 (1.3)      Blood 360       IV Piggyback 104 100 50     Total Intake(mL/kg) 517.8 (7.8) 546.2 (8.3) 170 (2.6)     Other 1390       Total Output 1390       Net -872.2 +546.2 +170             Urine Unmeasured Occurrence   1 x            Physical Exam:    General Appearance:    Alert, cooperative, no distress, appears stated age   Neck:   Supple, symmetrical, trachea midline.   Lungs:     Clear to auscultation bilaterally, respirations unlabored   Chest Wall:    No tenderness or deformity    Heart:    Regular rate and rhythm, S1 and S2 normal, II/6 holosystolic murmur apex, rub   or gallop, normal carotid impulse " bilaterally without bruit.   Extremities:   Extremities normal, atraumatic, no cyanosis or edema   Pulses:   2+ and symmetric all extremities   Skin:   Skin color, texture, turgor normal, no rashes or lesions      LABS/DIAGNOSTIC DATA:  Results from last 7 days   Lab Units 02/05/19  0645 02/04/19 0441 02/03/19 0459   WBC 10*3/mm3 21.70* 18.13* 17.00*   HEMOGLOBIN g/dL 10.0* 10.3* 10.1*   HEMATOCRIT % 31.0* 32.0* 30.9*   PLATELETS 10*3/mm3 162 140* 124*     No results found for: TROPONINT      Results from last 7 days   Lab Units 02/05/19  0645 02/04/19 0441 02/03/19 0459 02/02/19  0743  01/31/19  1758   SODIUM mmol/L 138 139 141 141   < > 139   POTASSIUM mmol/L 3.9 3.8 3.9 4.3   < > 4.4   CHLORIDE mmol/L 106 107 108 108   < > 107   CO2 mmol/L 22.0 23.0 22.0 19.0*   < > 20.0   BUN mg/dL 46* 39* 31* 52*   < > 47*   CREATININE mg/dL 4.18* 3.23* 2.66* 3.79*   < > 3.57*   CALCIUM mg/dL 7.3* 6.8* 6.9* 6.7*   < > 6.7*   BILIRUBIN mg/dL  --  0.5  --  0.4  --  0.3   ALK PHOS U/L  --  53  --  46  --  52   ALT (SGPT) U/L  --  10  --  14  --  10   AST (SGOT) U/L  --  23  --  34*  --  31   GLUCOSE mg/dL 78 104* 111* 58*   < > 78    < > = values in this interval not displayed.     Results from last 7 days   Lab Units 01/31/19  1758   HEMOGLOBIN A1C % 5.60     Results from last 7 days   Lab Units 02/01/19  0441   CHOLESTEROL mg/dL 70   TRIGLYCERIDES mg/dL 91   HDL CHOL mg/dL 18*   LDL CHOL mg/dL 25     Results from last 7 days   Lab Units 01/31/19  1758   TSH mIU/mL 1.851           Medication Review:     bisoprolol 5 mg Oral Nightly   calcitriol 0.25 mcg Oral Daily   castor oil-balsam peru  Topical Q12H   docusate sodium 100 mg Oral BID   epoetin alexus 10,000 Units Subcutaneous Once per day on Tue Thu Sat   ferric gluconate (FERRLECIT) IVPB 125 mg Intravenous Once   piperacillin-tazobactam 3.375 g Intravenous Q12H   sodium chloride 3 mL Intravenous Q12H              PEA    ITP (idiopathic thrombocytopenic purpura)    Pulmonary  hypertension (CMS/HCC)    CKD (chronic kidney disease) stage 5, GFR less than 15 ml/min (CMS/HCC)    Leukocytosis    Anemia due to chronic kidney disease    Debility    Hx of renal cell carcinoma    Chronic diastolic CHF (congestive heart failure) (CMS/HCC)    Falls frequently    Huge hiatal hernia with intrathoracic stomach    Mitral valve mass      ASSESSMENT/PLAN:  Stable from a cardiac standpoint for transfer to telemetry floor  Continue bisoprolol          Malachi Maxwell III, MD   02/05/19  9:52 AM

## 2019-02-06 NOTE — MBS/VFSS/FEES
Acute Care - Speech Language Pathology   Swallow Initial Evaluation [facility]   Clinical Swallow Evaluation  Fiberoptic Endoscopic Evaluation of Swallowing (FEES)       Patient Name: Fabiola Pimentel  : 1934  MRN: [id number]  Today's Date: 2019  Onset of Illness/Injury or Date of Surgery: 19     Referring Physician: MD Radha      Admit Date: 2019    Visit Dx:     ICD-10-CM ICD-9-CM   1. Impaired functional mobility, balance, gait, and endurance Z74.09 V49.89   2. Impaired mobility and ADLs Z74.09 799.89   3. Pharyngeal dysphagia R13.13 787.23     Patient Active Problem List   Diagnosis   • ITP (idiopathic thrombocytopenic purpura)   • Palpitations   • Premature atrial contractions   • Hypertension   • Pulmonary hypertension (CMS/HCC)   • DJD (degenerative joint disease)   • Raynaud's phenomenon (secondary)   • Diverticular disease   • CKD (chronic kidney disease) stage 5, GFR less than 15 ml/min (CMS/HCC)   • Leukocytosis   • Anemia due to chronic kidney disease   • Debility   • Hx of renal cell carcinoma   • Chronic diastolic CHF (congestive heart failure) (CMS/HCC)   • Falls frequently   • PEA   • Huge hiatal hernia with intrathoracic stomach   • Mitral valve mass     Past Medical History:   Diagnosis Date   • Chronic ITP (idiopathic thrombocytopenia) (CMS/HCC)    • Chronic renal insufficiency    • CKD (chronic kidney disease)    • Diverticular disease    • Dizziness     Dizziness with increase of propafenone to t.i.d.; however, palpitations improved, patient wishes to stay on current dose.   • DJD (degenerative joint disease)    • Hip fracture (CMS/HCC)     Recent hip and pelvic fracture.   • History of uterine cancer     Initial diagnosis, , status post SALVATORE/BSO. Recurrence documented , status post radiation therapy and implants.   • Hypertension    • Lower extremity edema     Lower extremity venous duplex, 2013:  No evidence of deep venous thrombosis. Negative VQ scan for  pulmonary embolus, findings suggest congestive heart failure, 10/11/2013.No evidence of deep venous thrombosis by bilateral duplex, 03/25/2015.Mild   • Palpitations    • Pelvic fracture (CMS/HCC)     Recent hip and pelvic fracture.   • Premature atrial contractions     Premature atrial contractions, postoperative from incisional hernia repair:A 2D echocardiogram 01/03/2008:  Mild MR, mild to moderate TR, small pericardial effusion, EF 65%.   • Pulmonary hypertension (CMS/HCC)    • Raynaud's phenomenon (secondary)     Raynaud’s phenomenon involving the left upper extremity.     • Stroke (CMS/HCC)     Right optic nerve stroke.     Past Surgical History:   Procedure Laterality Date   • APPENDECTOMY     • ARTERIOVENOUS FISTULA/SHUNT SURGERY Left 1/16/2019    Procedure: LEFT UPPER EXTREMITY ARTERIOVENOUS FISTULA FORMATION, BRACHIO-AXILLARY SHUNT WITH ARTEGRAFT;  Surgeon: Dale Ramirez MD;  Location: [facility];  Service: Vascular   • CATARACT EXTRACTION WITH INTRAOCULAR LENS IMPLANT      Cataract surgery with lens implantation.   • CHOLECYSTECTOMY  1958   • COLON RESECTION  10/2005    Colon resection, October 2005, with associated splenectomy.   • DILATATION AND CURETTAGE     • INCISIONAL HERNIA REPAIR  01/02/2008    With mesh   • INSERTION HEMODIALYSIS CATHETER N/A 2/1/2019    Procedure: HEMODIALYSIS CATHETER INSERTION;  Surgeon: Raymond Elise MD;  Location:  [facility];  Service: General   • NEPHRECTOMY Right 2001   • OTHER SURGICAL HISTORY      Recurrence documented 2003, status post radiation therapy and implants.   • SPLENECTOMY     • SYMPATHECTOMY Left     Left axilla sympathetectomy secondary to Raynaud’s phenomenon.   • TOTAL ABDOMINAL HYSTERECTOMY WITH SALPINGO OOPHORECTOMY  2001        SWALLOW EVALUATION (last 72 hours)      [facility] Adult Swallow Evaluation     Row Name 02/06/19 1200                   Rehab Evaluation    Document Type  evaluation  -AV        Subjective Information  no complaints  -AV         Patient Observations  alert;cooperative  -AV        Patient/Family Observations  no family present   -AV        Patient Effort  adequate  -AV           General Information    Patient Profile Reviewed  yes  -AV        Pertinent History Of Current Problem  ESRD, dialysis, HTn, CVA, pulmonary hypertension   -AV        Current Method of Nutrition  regular textures;thin liquids  -AV        Precautions/Limitations, Vision  WFL;for purposes of eval  -AV        Precautions/Limitations, Hearing  WFL;for purposes of eval  -AV        Prior Level of Function-Communication  WFL  -AV        Prior Level of Function-Swallowing  no diet consistency restrictions  -AV        Plans/Goals Discussed with  patient  -AV        Barriers to Rehab  medically complex  -AV        Patient's Goals for Discharge  return home  -AV           Pain Assessment    Additional Documentation  Pain Scale: FACES Pre/Post-Treatment (Group)  -AV           Pain Scale: FACES Pre/Post-Treatment    Pain: FACES Scale, Pretreatment  2-->hurts little bit  -AV        Pain: FACES Scale, Post-Treatment  2-->hurts little bit  -AV           Oral Motor and Function    Dentition Assessment  upper dentures/partial in place;lower dentures/partial in place  -AV        Secretion Management  WNL/WFL  -AV        Mucosal Quality  moist, healthy  -AV        Volitional Swallow  WFL  -AV        Volitional Cough  WFL  -AV           Oral Musculature and Cranial Nerve Assessment    Oral Motor General Assessment  WFL  -AV           General Eating/Swallowing Observations    Respiratory Support Currently in Use  nasal cannula  -AV        Eating/Swallowing Skills  fed by SLP  -AV        Positioning During Eating  upright 90 degree;upright in bed  -AV        Utensils Used  spoon;cup;straw  -AV        Consistencies Trialed  regular textures;pureed;thin liquids  -AV           Clinical Swallow Eval    Oral Prep Phase  WFL  -AV        Oral Transit  WFL  -AV        Oral Residue  WFL  -AV         Pharyngeal Phase  suspected pharyngeal impairment  -AV        Esophageal Phase  suspected esophageal impairment  -AV        Clinical Swallow Evaluation Summary  Bedside swallow eval this am:  cough after all trials. RN reports worsening chest sounds.  Rec FEES to further assess.   -AV           Pharyngeal Phase Concerns    Pharyngeal Phase Concerns  wet vocal quality;cough  -AV           Fiberoptic Endoscopic Evaluation of Swallowing (FEES)    Risks/Benefits Reviewed  risks/benefits explained;patient;agreed to eval  -AV        Nasal Entry  left:  -AV        Special Considerations  scope 338   -AV           Anatomy and Physiology    Anatomic Considerations  no anatomic structural deviation  -AV        Velopharyngeal  WFL  -AV        Base of Tongue  symmetrical  -AV        Epiglottis  WFL  -AV        Laryngeal Function Breathing  symmetrical  -AV        Laryngeal Function Phonation  symmetrical  -AV        Laryngeal Function to Breath Hold  TVF contact  -AV        Secretion Rating Scale (Deandre et al. 1996)  1- secretions present around the laryngeal vestibule  -AV        Secretion Description  thin;clear  -AV        Ice Chips  DNA  -AV        Spontaneous Swallow  frequency reduced partially cleared secretions   -AV        Sensory  reduced sensation  -AV        Utensils Used  Cup;Spoon;Straw  -AV        Consistencies Trialed  thin liquids;nectar-thick liquids;pudding/puree;Regular textures;Spoon;cup;straw  -AV           FEES Interpretation    Oral Phase  prespill of liquids into pharynx  -AV           Initiation of Pharyngeal Swallow    Initiation of Pharyngeal Swallow  bolus in valleculae  -AV        Pharyngeal Phase  impaired pharyngeal phase of swallowing  -AV        Aspiration After the Swallow  thin liquids;in pyriform sinuses  -AV        Response to Aspiration  no response, silent aspiration  -AV        Rosenbek's Scale  thin:;8-->Level 8  -AV        Residue  all consistencies tested  -AV        Response to  Residue  -- partially cleared residue   -AV        FEES Summary  FEES completed this am:  patient demonstrates prespill with liquids.  Pen with subsequent aspiration after with thins from residue especially at posterior commissure and pyriforms.  Silent., cued to ocugh but doesn't completely clear.  no pen/asp with nectar, pudding, and solids.  Rec level 4 and nectar.   -AV           Clinical Impression    SLP Swallowing Diagnosis  moderate;pharyngeal dysfunction  -AV        Functional Impact  risk of aspiration/pneumonia  -AV        Rehab Potential/Prognosis, Swallowing  good, to achieve stated therapy goals  -AV        Swallow Criteria for Skilled Therapeutic Interventions Met  demonstrates skilled criteria  -AV           Recommendations    Therapy Frequency (Swallow)  5 days per week  -AV        Predicted Duration Therapy Intervention (Days)  until discharge  -AV        SLP Diet Recommendation  mechanical soft with no mixed consistencies;nectar thick liquids  -AV        Recommended Precautions and Strategies  small bites of food and sips of liquid  -AV        SLP Rec. for Method of Medication Administration  meds whole;with pudding or applesauce  -AV        Monitor for Signs of Aspiration  yes;notify SLP if any concerns  -AV        Anticipated Dischage Disposition  unknown  -AV          User Key  (r) = Recorded By, (t) = Taken By, (c) = Cosigned By    Initials Name Effective Dates    AV Mary Luo, MS CCC-SLP 04/03/18 -           EDUCATION  The patient has been educated in the following areas:   Dysphagia (Swallowing Impairment) Oral Care/Hydration.    SLP Recommendation and Plan  SLP Swallowing Diagnosis: moderate, pharyngeal dysfunction  SLP Diet Recommendation: mechanical soft with no mixed consistencies, nectar thick liquids  Recommended Precautions and Strategies: small bites of food and sips of liquid     Monitor for Signs of Aspiration: yes, notify SLP if any concerns     Swallow Criteria for  Skilled Therapeutic Interventions Met: demonstrates skilled criteria  Anticipated Dischage Disposition: unknown  Rehab Potential/Prognosis, Swallowing: good, to achieve stated therapy goals  Therapy Frequency (Swallow): 5 days per week  Predicted Duration Therapy Intervention (Days): until discharge       Plan of Care Reviewed With: patient  Plan of Care Review  Plan of Care Reviewed With: patient  Progress: (FEES)    SLP GOALS     Row Name 02/06/19 1200             Oral Nutrition/Hydration Goal 1 (SLP)    Oral Nutrition/Hydration Goal 1, SLP  LTG:  Patient will tolerate reg diet and thin liquids with no s/s of pen/asp   -AV      Time Frame (Oral Nutrition/Hydration Goal 1, SLP)  by discharge  -AV         Pharyngeal Strengthening Exercise Goal 1 (SLP)    Activity (Pharyngeal Strengthening Goal 1, SLP)  increase superior movement of the hyolaryngeal complex;increase anterior movement of the hyolaryngeal complex  -AV      Increase Superior Movement of the Hyolaryngeal Complex  supraglottic swallow;hard effortful swallow  -AV      Increase Anterior Movement of the Hyolaryngeal Complex  supraglottic swallow;hard effortful swallow  -AV      [place]/Accuracy (Pharyngeal Strengthening Goal 1, SLP)  with minimal cues (75-90% accuracy)  -AV      Time Frame (Pharyngeal Strengthening Goal 1, SLP)  short term goal (STG);by discharge  -AV        User Key  (r) = Recorded By, (t) = Taken By, (c) = Cosigned By    Initials Name Provider Type    Mary Golden MS CCC-SLP Speech and Language Pathologist             Time Calculation:   Time Calculation- SLP     Row Name 02/06/19 1434             Time Calculation- SLP    SLP Start Time  1130  -AV      SLP Received On  02/06/19  -AV        User Key  (r) = Recorded By, (t) = Taken By, (c) = Cosigned By    Initials Name Provider Type    Mary Golden MS CCC-SLP Speech and Language Pathologist          Therapy Charges for Today     Code Description Service Date Service  Provider Modifiers Qty    [id number]  ST EVAL ORAL PHARYNG SWALLOW 3 2/6/2019 Mary Luo, MS CCC-SLP GN 1    [id number]  ST FIBEROPTIC ENDO EVAL SWALL 6 2/6/2019 Mary Luo, MS CCC-SLP GN 1               Mary Luo MS CCC-SLP  2/6/2019

## 2019-02-06 NOTE — PROGRESS NOTES
INTENSIVIST   PROGRESS NOTE     Hospital:  LOS: 6 days      S     Ms. Fabiola Pimentel, 84 y.o. female is followed for:      PEA    ITP (idiopathic thrombocytopenic purpura)      As an Intensivist, we provide an integrated approach to the ICU patient and family, medical management of comorbid conditions, including but not limited to electrolytes, glycemic control, organ dysfunction, lead interdisciplinary rounds and coordinate the care with all other services, including those from other specialists.     Interval History:  She can't sleep in the unit.  Hurts when she swallows.  Otherwise, uneventful night.     The patient's relevant past medical, surgical and social history were reviewed and updated in Epic as appropriate.     ROS:   Constitutional: Negative for fever.   Respiratory: Negative for dyspnea.   Cardiovascular: Negative for chest pain.   Gastrointestinal: Negative for  nausea, vomiting and diarrhea.        O     Vitals:  Temp: (P) 98 °F (36.7 °C) (02/06/19 1200) Temp  Min: 98 °F (36.7 °C)  Max: 98.7 °F (37.1 °C)   BP: 154/72 (02/06/19 1600) BP  Min: 116/28  Max: 174/84   Pulse: 58 (02/06/19 1600) Pulse  Min: 57  Max: 73   Resp: 24 (02/06/19 0800) Resp  Min: 16  Max: 24   SpO2: 94 % (02/06/19 1600) SpO2  Min: 93 %  Max: 97 %   Device: nasal cannula, humidified (02/06/19 1200)    Flow Rate: 3 (02/06/19 1200) Flow (L/min)  Min: 2  Max: 3       Physical Examination  Telemetry:  Rhythm: normal sinus rhythm (02/06/19 1200)  Oral thrush.   Constitutional:  No acute distress.   Cardiovascular: Normal rate, regular and rhythm. Normal heart sounds.  (+) murmur  No gallop or rub.   Respiratory: No respiratory distress. Normal respiratory effort.  Normal breath sounds  Clear to auscultation and percussion.    Abdominal:  Soft. No masses. Non-tender. No distension. No HSM.   Extremities: No digital cyanosis. No clubbing.  No edema.   Neurological:   Alert. Non focal.   Lines/Drains/Airways: L IJ tunneled Dialysis  Catheter       Hematology:  Results from last 7 days   Lab Units 02/06/19 0427 02/05/19  0645 02/04/19  0441   WBC 10*3/mm3 25.95* 21.70* 18.13*   HEMOGLOBIN g/dL 9.9* 10.0* 10.3*   MCV fL 92.3 89.6 89.9   PLATELETS 10*3/mm3 204 162 140*       Chemistry:  Estimated Creatinine Clearance: 14.1 mL/min (A) (by C-G formula based on SCr of 3.11 mg/dL (H)).    Results from last 7 days   Lab Units 02/06/19 0427 02/05/19 0645 02/04/19  0441   SODIUM mmol/L 139 138 139   POTASSIUM mmol/L 3.9 3.9 3.8   CHLORIDE mmol/L 106 106 107   CO2 mmol/L 23.0 22.0 23.0   ANION GAP mmol/L 10.0 10.0 9.0   GLUCOSE mg/dL 87 78 104*   CALCIUM mg/dL 7.8* 7.3* 6.8*     Results from last 7 days   Lab Units 02/06/19 0427 02/05/19 0645 02/04/19  0441   BUN mg/dL 31* 46* 39*   CREATININE mg/dL 3.11* 4.18* 3.23*     Images:  Xr Chest 1 View    Result Date: 2/5/2019  Mild pulmonary vascular congestion and large hiatal hernia unchanged.  D:  02/05/2019 E:  02/05/2019  This report was finalized on 2/5/2019 9:25 PM by DR. Cruzito Piña MD.        Echo:  Results for orders placed during the hospital encounter of 01/31/19   Transthoracic Echo Complete With Contrast if Necessary Per Protocol    Narrative · Left ventricular systolic function is normal.  · Left ventricular diastolic dysfunction (grade I) consistent with   impaired relaxation.  · Estimated EF appears to be in the range of 61 - 65%.  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 Likely represents age-related   focal region of sclerosis and calcification of the chordal apparatus,   however vegetation cannot be completely excluded, clinical correlation   required.          Results: Reviewed.  I reviewed the patient's new laboratory and imaging results.  I independently reviewed the patient's new images.    Medications: Reviewed.    Assessment/Plan   A / P     84 y.o.female, admitted on 1/31/2019 with Acute renal failure (ARF)  (CMS/[facility]) [N17.9]:     1. S/p PEA, while she was in the inpatient dialysis unit, quickly resuscitated (within 3 minutes), and it did not require intubation, 2/1/19  2. 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  3. ESRD on HD  4. ITP  5. Leukocytosis  1. Continues to get worse.    Lab Results   Lab Value Date/Time    PROCALCITO 1.08 (H) 02/06/2019 0427    PROCALCITO 0.69 (H) 02/02/2019 0743     6. Odynophagia and oral thrush  1. R/o Esoph. candidiasis  7. HTN  8. Anemia, chronic disease  1. EPO and IV iron during HD } As per Nephrology  9. Hiatal hernia.  10. Antibiotics: Piperacillin-Tazobactam   MRP, and Fluconazole, and recheck vanco level to see if need to re-dose  11. Glucose: No h/o DM    Lab Results   Lab Value Date/Time    HGBA1C 5.60 01/31/2019 1758       Diet: Diet Dysphagia; IV - Mechanical Soft No Mixed Consistencies; Nectar / Syrup Thick; Renal   Advance Directives: Code Status and Medical Interventions:   Ordered at: 02/01/19 1600     Limited Support to NOT Include:    Intubation     Code Status:    CPR     Medical Interventions (Level of Support Prior to Arrest):    Limited          Assessment / Plan:    1. Start MRP, Fluconazole  2. WBC in am.  3. CXR in AM  4. Vanco level today   Re dose vanco    Results from last 7 days   Lab Units 02/06/19  0427 02/01/19  0640   VANCOMYCIN RM mcg/mL 22.50 58.50*     5. Disposition: Keep in ICU.    Plan of care and goals reviewed during interdisciplinary rounds.  Level of Risk is High due to:  illness with threat to life or bodily function.   I discussed the patient's findings and my recommendations with patient    Chung Ferrari MD, FACP, FCCP, CNSC  Intensive Care Medicine, Nutrition Support and Pulmonary Medicine

## 2019-02-06 NOTE — PLAN OF CARE
Problem: Patient Care Overview  Goal: Plan of Care Review  Outcome: Ongoing (interventions implemented as appropriate)   02/06/19 8542   Coping/Psychosocial   Plan of Care Reviewed With patient;son   Plan of Care Review   Progress declining   OTHER   Outcome Summary WBC count elevated to 25.95 from 21.7 yesterday. Changed abx from Zosyn to Merrem. Fluconazole started due to thrush. Pt failed FEES test, diet changed to dysphagia 4 nectar thickened. Pt had multiple loose stools throughout the day, Colace and Lactulose held. PT provided mist therapy to coccyx. Son visited. Client appears comfortable sitting in bed.        Problem: Fall Risk (Adult)  Goal: Absence of Fall  Outcome: Ongoing (interventions implemented as appropriate)      Problem: Skin Injury Risk (Adult)  Goal: Skin Health and Integrity  Outcome: Ongoing (interventions implemented as appropriate)      Problem: Wound (Includes Pressure Injury) (Adult)  Goal: Signs and Symptoms of Listed Potential Problems Will be Absent, Minimized or Managed (Wound)  Outcome: Ongoing (interventions implemented as appropriate)

## 2019-02-06 NOTE — PLAN OF CARE
Problem: Patient Care Overview  Goal: Plan of Care Review  Outcome: Ongoing (interventions implemented as appropriate)   02/06/19 0900   Coping/Psychosocial   Plan of Care Reviewed With patient   OTHER   Outcome Summary sacral wound now with moderate slough/eschar covering with limited progress noted to this point. PT will cont with debridement and mist therapy 2-3 x/week with continued expectation of limited / slow progress due to limited mobility and multiple medical issues.

## 2019-02-06 NOTE — PLAN OF CARE
Problem: Patient Care Overview  Goal: Plan of Care Review  Outcome: Ongoing (interventions implemented as appropriate)   02/06/19 1104   Coping/Psychosocial   Plan of Care Reviewed With patient   Plan of Care Review   Progress improving   OTHER   Outcome Summary Pt demonstrated ability to perform STS x4 from EOB (mod x2); able to take small steps towards HOB (max x2 A). Further mobility and ther ex deferred today due to frequent and loose BM. Pt with overall improved motivation to participate. Will cont PT POC as clinically warranted.

## 2019-02-06 NOTE — PROGRESS NOTES
"   LOS: 6 days    Patient Care Team:  Otilio Smith DO as PCP - General (Family Medicine)        Subjective     Interval History:     No acute events overnight. No new complaints.    Review of Systems:   No CP or SOA    Objective     Vital Sign Min/Max for last 24 hours  Temp  Min: 98 °F (36.7 °C)  Max: 98.7 °F (37.1 °C)   BP  Min: 116/28  Max: 143/47   Pulse  Min: 57  Max: 67   Resp  Min: 16  Max: 24   SpO2  Min: 95 %  Max: 97 %   No Data Recorded   No Data Recorded     Flowsheet Rows      First Filed Value   Admission Height  163.8 cm (64.5\") Documented at 02/01/2019 0300   Admission Weight  70 kg (154 lb 4.8 oz) Documented at 01/31/2019 1607          I/O this shift:  In: 100 [IV Piggyback:100]  Out: -   I/O last 3 completed shifts:  In: 220 [IV Piggyback:220]  Out: 2720 [Other:2720]    Physical Exam:     General Appearance:    Alert, cooperative, in no acute distress   Head:    Normocephalic, without obvious abnormality, atraumatic               Neck:   No adenopathy, supple, trachea midline, no thyromegaly, no     carotid bruit, no JVD       Lungs:     Diminished air entry,respirations regular, even and                   unlabored    Heart:    Regular rhythm and normal rate, normal S1 and S2, no            murmur, no gallop, no rub, no click       Abdomen:     Normal bowel sounds, no masses, no organomegaly, soft        non-tender, non-distended, no guarding, no rebound                 tenderness       Extremities:   Moves all extremities well, no edema, no cyanosis, no              redness               Neurologic:   No focal deficit noted grossly.        WBC WBC   Date Value Ref Range Status   02/06/2019 25.95 (H) 3.50 - 10.80 10*3/mm3 Final     Comment:     WBC corrected for presence of NRBC's  Modified report. Previous result was 27.25 10*3/mm3 on 2/6/2019 at 0625 EST.   02/05/2019 21.70 (H) 3.50 - 10.80 10*3/mm3 Final     Comment:     WBC corrected for presence of NRBC's  Modified report. Previous " result was 22.57 10*3/mm3 on 2/5/2019 at 0900 EST.   02/04/2019 18.13 (H) 3.50 - 10.80 10*3/mm3 Final      HGB Hemoglobin   Date Value Ref Range Status   02/06/2019 9.9 (L) 11.5 - 15.5 g/dL Final   02/05/2019 10.0 (L) 11.5 - 15.5 g/dL Final   02/04/2019 10.3 (L) 11.5 - 15.5 g/dL Final      HCT Hematocrit   Date Value Ref Range Status   02/06/2019 31.3 (L) 34.5 - 44.0 % Final   02/05/2019 31.0 (L) 34.5 - 44.0 % Final   02/04/2019 32.0 (L) 34.5 - 44.0 % Final      Platlets No results found for: LABPLAT   MCV MCV   Date Value Ref Range Status   02/06/2019 92.3 80.0 - 99.0 fL Final   02/05/2019 89.6 80.0 - 99.0 fL Final   02/04/2019 89.9 80.0 - 99.0 fL Final          Sodium Sodium   Date Value Ref Range Status   02/06/2019 139 132 - 146 mmol/L Final   02/05/2019 138 132 - 146 mmol/L Final   02/04/2019 139 132 - 146 mmol/L Final      Potassium Potassium   Date Value Ref Range Status   02/06/2019 3.9 3.5 - 5.5 mmol/L Final   02/05/2019 3.9 3.5 - 5.5 mmol/L Final   02/04/2019 3.8 3.5 - 5.5 mmol/L Final      Chloride Chloride   Date Value Ref Range Status   02/06/2019 106 99 - 109 mmol/L Final   02/05/2019 106 99 - 109 mmol/L Final   02/04/2019 107 99 - 109 mmol/L Final      CO2 CO2   Date Value Ref Range Status   02/06/2019 23.0 20.0 - 31.0 mmol/L Final   02/05/2019 22.0 20.0 - 31.0 mmol/L Final   02/04/2019 23.0 20.0 - 31.0 mmol/L Final      BUN BUN   Date Value Ref Range Status   02/06/2019 31 (H) 9 - 23 mg/dL Final   02/05/2019 46 (H) 9 - 23 mg/dL Final   02/04/2019 39 (H) 9 - 23 mg/dL Final      Creatinine Creatinine   Date Value Ref Range Status   02/06/2019 3.11 (H) 0.60 - 1.30 mg/dL Final   02/05/2019 4.18 (H) 0.60 - 1.30 mg/dL Final   02/04/2019 3.23 (H) 0.60 - 1.30 mg/dL Final      Calcium Calcium   Date Value Ref Range Status   02/06/2019 7.8 (L) 8.7 - 10.4 mg/dL Final   02/05/2019 7.3 (L) 8.7 - 10.4 mg/dL Final   02/04/2019 6.8 (L) 8.7 - 10.4 mg/dL Final      PO4 No results found for: CAPO4   Albumin Albumin    Date Value Ref Range Status   02/06/2019 3.11 (L) 3.20 - 4.80 g/dL Final   02/04/2019 2.55 (L) 3.20 - 4.80 g/dL Final      Magnesium No results found for: MG   Uric Acid No results found for: URICACID        Results Review:     I reviewed the patient's new clinical results.      bisoprolol 5 mg Oral Nightly   calcitriol 0.25 mcg Oral Daily   castor oil-balsam peru  Topical Q12H   docusate sodium 100 mg Oral BID   epoetin alexus 10,000 Units Subcutaneous Once per day on Tue Thu Sat   fluconazole 200 mg Intravenous Once   lactulose 30 g Oral BID   meropenem 500 mg Intravenous Q24H   sodium chloride 3 mL Intravenous Q12H          Medication Review:     Assessment/Plan       PEA    ITP (idiopathic thrombocytopenic purpura)    Pulmonary hypertension (CMS/HCC)    CKD (chronic kidney disease) stage 5, GFR less than 15 ml/min (CMS/HCC)    Leukocytosis    Anemia due to chronic kidney disease    Debility    Hx of renal cell carcinoma    Chronic diastolic CHF (congestive heart failure) (CMS/HCC)    Falls frequently    Huge hiatal hernia with intrathoracic stomach    Mitral valve mass      1- 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   3- PEA for 3 min with Resolution of spontaneous circulation and rhythm.   4- Anemia of chronic disease.   5- Dialysis access - S/p tunneled catheter. AVF 3 week old.     Plan:  - HD per schedule. Dialysis tomorrow.   - Renal diet  - Epo and IV iron with HD.     Discussed with MAXWELL Proctor MD  02/06/19  11:13 AM

## 2019-02-06 NOTE — PROGRESS NOTES
Continued Stay Note   [facility]     Patient Name: Fabiola Pimentel  MRN: [id number]  Today's Date: 2/6/2019    Admit Date: 1/31/2019    Discharge Plan     Row Name 02/06/19 1154       Plan    Plan  Placement on Hold     Patient/Family in Agreement with Plan  yes    Plan Comments  Attended am ICU rounds and MD feels patient isn't medically ready for discharge and placement on hold til first of next week. I spoke with patient at bedside to let her know about this. I called [facility] @ [phone] and spoke with DON, made her aware transfer on hold til first of next week. I then called [facility] @ [phone] left her a message of discharge placed on hold. CM will follow up first of next week for medical readiness, make sure bed is available at Nursing Home and need to insurance approval too. Also, make contact with [facility] to coordinate hemodialysis again.  Pura @ [phone]        Discharge Codes    No documentation.             Antonella Patel RN

## 2019-02-06 NOTE — PLAN OF CARE
Problem: Patient Care Overview  Goal: Plan of Care Review  Outcome: Ongoing (interventions implemented as appropriate)   02/06/19 0202   Coping/Psychosocial   Plan of Care Reviewed With patient   Plan of Care Review   Progress no change   OTHER   Outcome Summary Consulted to assess left chest sidra cath site. Line was placed on 2-1-2019. During the initial dressing change the skin around the sidra cath site was torn. As stated by the RN. Area appears sallow with dry slough and is stable. Because of location topical therapy in not recomoended. Cleaned area and placed new chlorhexidene dressing. Just continue with normal protocol for dressing changes once a week. Will continue to follow. Please contact if further needs arise. Thanks

## 2019-02-06 NOTE — PROGRESS NOTES
Clinical Nutrition     Nutrition Assessment  Reason for Visit:   MDR, Follow-up protocol    Patient Name: Fabiola Pimentel  YOB: 1934  MRN: [id number]  Date of Encounter: 02/06/19 8:47 AM  Admission date: 1/31/2019      REC SLP evaluation    Nutrition Assessment   Admission Diagnosis         PEA    ITP (idiopathic thrombocytopenic purpura)    Pulmonary hypertension (CMS/HCC)    CKD (chronic kidney disease) stage 5, GFR less than 15 ml/min (CMS/HCC)    Leukocytosis    Anemia due to chronic kidney disease    Debility    Hx of renal cell carcinoma    Chronic diastolic CHF (congestive heart failure) (CMS/HCC)    Falls frequently    Huge hiatal hernia with intrathoracic stomach    Mitral valve mass    PMH: She  has a past medical history of Chronic ITP (idiopathic thrombocytopenia) (CMS/HCC), Chronic renal insufficiency, CKD (chronic kidney disease), Diverticular disease, Dizziness, DJD (degenerative joint disease), Hip fracture (CMS/HCC), History of uterine cancer, Hypertension, Lower extremity edema, Palpitations, Pelvic fracture (CMS/HCC), Premature atrial contractions, Pulmonary hypertension (CMS/HCC), Raynaud's phenomenon (secondary), and Stroke (CMS/HCC).   PSxH: She  has a past surgical history that includes Incisional hernia repair (01/02/2008); Nephrectomy (Right, 2001); Total abdominal hysterectomy w/ bilateral salpingoophorectomy (2001); Other surgical history; Appendectomy; Cholecystectomy (1958); Cataract extraction w/  intraocular lens implant; Colectomy (10/2005); Splenectomy, total; Dilation and curettage of uterus; Sympathectomy (Left); HEMODIALYSIS CATHETER INSERTION (N/A, 2/1/2019); and LEFT UPPER EXTREMITY ARTERIOVENOUS FISTULA FORMATION, BRACHIO-AXILLARY SHUNT WITH ARTEGRAFT (Left, 1/16/2019).      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 heel DTI    Reported/Observed/Food/Nutrition Related History:     Pt resting bed, c/o difficulty  swallowing, dislikes nepro, prefers boost breeze    Per RN: pt coughing a lot         Labs reviewed     Results from last 7 days   Lab Units 02/06/19  0427  02/04/19  0441   SODIUM mmol/L 139   < > 139   POTASSIUM mmol/L 3.9   < > 3.8   CHLORIDE mmol/L 106   < > 107   CO2 mmol/L 23.0   < > 23.0   BUN mg/dL 31*   < > 39*   CREATININE mg/dL 3.11*   < > 3.23*   CALCIUM mg/dL 7.8*   < > 6.8*   BILIRUBIN mg/dL  --   --  0.5   ALK PHOS U/L  --   --  53   ALT (SGPT) U/L  --   --  10   AST (SGOT) U/L  --   --  23   GLUCOSE mg/dL 87   < > 104*    < > = values in this interval not displayed.       Results from last 7 days   Lab Units 02/02/19  2134 02/01/19  0745   GLUCOSE mg/dL 202* 68*     Lab Results   Lab Value Date/Time    HGBA1C 5.60 01/31/2019 1758         Medications reviewed   Pertinent:abx, iron           GI: wnl     SKIN: sacral/coccyx- DTI s/p MIST therapy, debridement, L. heel DTI  Current Nutrition Prescription     PO: Renal  Nepro 3x/day    Intake: 75% of 1 supplement       Nutrition Diagnosis     Problem Inadequate oral intake   Etiology Per clinical Status   Signs/Symptoms Poor Po Intake     Problem Increased nutrient needs   Etiology Poor Skin Integrity   Signs/Symptoms Sacral ulcer, L. Heel DTI     Nutrition Intervention     Interventions Goal    General: Nutrition support treatment  Increase Intake  Evaluate Swallowing    Nutrition Interventions   1.  Follow treatment progress, Interview for preferences, Menu provided, Menu adjusted, Adjusted supplement    Rec SLP Eval    Soft Diet- Renal  RFB 3x/day    Monitor/ Evaluation    Per protocol, I&O, PO intake, Supplement intake, Pertinent labs, Skin status, GI status, Swallow function      Will Continue to follow per protocol      Nora Gomez RD  Time Spent: 30min

## 2019-02-06 NOTE — PROGRESS NOTES
"[facility] Progress Note     LOS: 6 days   Patient Care Team:  Otilio Smith DO as PCP - General (Family Medicine)  PCP:  Otilio Smith DO    Chief Complaint:  PEA arrest    SUBJECTIVE:     Resting in bed, no family present.  States she's \"not doing well but no worse than before.\"  No events noted overnight.  Vitals remain stable.  Telemetry reveals sinus rhythm.    Review of Systems:   All systems have been reviewed and are negative with the exception of those mentioned above.      OBJECTIVE:    Vital Sign Min/Max for last 24 hours  Temp  Min: 97.9 °F (36.6 °C)  Max: 98.7 °F (37.1 °C)   BP  Min: 116/28  Max: 143/47   Pulse  Min: 57  Max: 67   Resp  Min: 16  Max: 24   SpO2  Min: 95 %  Max: 98 %   No Data Recorded   No Data Recorded     Flowsheet Rows      First Filed Value   Admission Height  163.8 cm (64.5\") Documented at 02/01/2019 0300   Admission Weight  70 kg (154 lb 4.8 oz) Documented at 01/31/2019 1607          Telemetry: Sinus rhythm      Intake/Output Summary (Last 24 hours) at 2/6/2019 0944  Last data filed at 2/6/2019 0200  Gross per 24 hour   Intake 170 ml   Output 2720 ml   Net -2550 ml     Intake & Output (last 3 days)       02/03 0701 - 02/04 0700 02/04 0701 - 02/05 0700 02/05 0701 - 02/06 0700 02/06 0701 - 02/07 0700    P.O. 360 120      I.V. (mL/kg) 86.2 (1.3)       Blood        IV Piggyback 100 50 170     Total Intake(mL/kg) 546.2 (8.3) 170 (2.6) 170 (2.6)     Other   2720     Total Output   2720     Net +546.2 +170 -2550             Urine Unmeasured Occurrence  1 x             Physical Exam:    General Appearance:    Alert, cooperative, no distress, appears stated age   Neck:   Supple, symmetrical, trachea midline.   Lungs:     Clear to auscultation bilaterally, respirations unlabored   Chest Wall:    No tenderness or deformity    Heart:    Regular rate and rhythm, S1 and S2 normal, II/6 holosystolic murmur apex, rub   or gallop, normal carotid " impulse bilaterally without bruit.   Extremities:   Extremities normal, atraumatic, no cyanosis or edema   Pulses:   2+ and symmetric all extremities   Skin:   Skin color, texture, turgor normal, no rashes or lesions      LABS/DIAGNOSTIC DATA:  Results from last 7 days   Lab Units 02/06/19 0427 02/05/19 0645 02/04/19 0441   WBC 10*3/mm3 25.95* 21.70* 18.13*   HEMOGLOBIN g/dL 9.9* 10.0* 10.3*   HEMATOCRIT % 31.3* 31.0* 32.0*   PLATELETS 10*3/mm3 204 162 140*     No results found for: TROPONINT      Results from last 7 days   Lab Units 02/06/19 0427 02/05/19 0645 02/04/19 0441 02/02/19  0743  01/31/19  1758   SODIUM mmol/L 139 138 139   < > 141   < > 139   POTASSIUM mmol/L 3.9 3.9 3.8   < > 4.3   < > 4.4   CHLORIDE mmol/L 106 106 107   < > 108   < > 107   CO2 mmol/L 23.0 22.0 23.0   < > 19.0*   < > 20.0   BUN mg/dL 31* 46* 39*   < > 52*   < > 47*   CREATININE mg/dL 3.11* 4.18* 3.23*   < > 3.79*   < > 3.57*   CALCIUM mg/dL 7.8* 7.3* 6.8*   < > 6.7*   < > 6.7*   BILIRUBIN mg/dL  --   --  0.5  --  0.4  --  0.3   ALK PHOS U/L  --   --  53  --  46  --  52   ALT (SGPT) U/L  --   --  10  --  14  --  10   AST (SGOT) U/L  --   --  23  --  34*  --  31   GLUCOSE mg/dL 87 78 104*   < > 58*   < > 78    < > = values in this interval not displayed.     Results from last 7 days   Lab Units 01/31/19  1758   HEMOGLOBIN A1C % 5.60     Results from last 7 days   Lab Units 02/01/19  0441   CHOLESTEROL mg/dL 70   TRIGLYCERIDES mg/dL 91   HDL CHOL mg/dL 18*   LDL CHOL mg/dL 25     Results from last 7 days   Lab Units 01/31/19  1758   TSH mIU/mL 1.851           Medication Review:     bisoprolol 5 mg Oral Nightly   calcitriol 0.25 mcg Oral Daily   castor oil-balsam peru  Topical Q12H   docusate sodium 100 mg Oral BID   epoetin alexus 10,000 Units Subcutaneous Once per day on Tue Thu Sat   fluconazole 200 mg Intravenous Once   lactulose 30 g Oral BID   meropenem 500 mg Intravenous Q24H   sodium chloride 3 mL Intravenous Q12H               PEA    ITP (idiopathic thrombocytopenic purpura)    Pulmonary hypertension (CMS/HCC)    CKD (chronic kidney disease) stage 5, GFR less than 15 ml/min (CMS/HCC)    Leukocytosis    Anemia due to chronic kidney disease    Debility    Hx of renal cell carcinoma    Chronic diastolic CHF (congestive heart failure) (CMS/HCC)    Falls frequently    Huge hiatal hernia with intrathoracic stomach    Mitral valve mass      ASSESSMENT/PLAN:  Stable from a cardiac standpoint, ok for transfer to telemetry floor  Continue bisoprolol          Tasha Nunez PA-C working with Dr. Malachi Maxwell, III  02/06/19  9:44 AM

## 2019-02-06 NOTE — THERAPY TREATMENT NOTE
Acute Care - Physical Therapy Treatment Note  [facility]     Patient Name: Fabiola Pimentel  : 1934  MRN: [id number]  Today's Date: 2019  Onset of Illness/Injury or Date of Surgery: 19  Date of Referral to PT: 19  Referring Physician: MD Radha    Admit Date: 2019    Visit Dx:    ICD-10-CM ICD-9-CM   1. Impaired functional mobility, balance, gait, and endurance Z74.09 V49.89   2. Impaired mobility and ADLs Z74.09 799.89   3. Pharyngeal dysphagia R13.13 787.23     Patient Active Problem List   Diagnosis   • ITP (idiopathic thrombocytopenic purpura)   • Palpitations   • Premature atrial contractions   • Hypertension   • Pulmonary hypertension (CMS/HCC)   • DJD (degenerative joint disease)   • Raynaud's phenomenon (secondary)   • Diverticular disease   • CKD (chronic kidney disease) stage 5, GFR less than 15 ml/min (CMS/HCC)   • Leukocytosis   • Anemia due to chronic kidney disease   • Debility   • Hx of renal cell carcinoma   • Chronic diastolic CHF (congestive heart failure) (CMS/HCC)   • Falls frequently   • PEA   • Huge hiatal hernia with intrathoracic stomach   • Mitral valve mass       Therapy Treatment    Rehabilitation Treatment Summary     Row Name 19 1438 19 1308 19 0900       Treatment Time/Intention    Discipline  occupational therapist  -CL  physical therapy assistant  -LS  physical therapist  -MF    Document Type  therapy note (daily note)  -CL  therapy note (daily note)  -LS  therapy note (daily note);wound care  -MF    Subjective Information  complains of;weakness;fatigue;pain  -CL  complains of;fatigue  -LS  complains of;weakness;fatigue;pain  -MF    Mode of Treatment  --  physical therapy  -LS  physical therapy  -MF    Patient Effort  adequate  -CL  good  -LS  --    Existing Precautions/Restrictions  fall;oxygen therapy device and L/min;other (see comments) recent R shldr dislocation, s/p reduction  -CL  fall;non-weight bearing NWB RUE until further  clarification  -LS  --    Treatment Considerations/Comments  --  Recent R shoulder dislocation and reduction.   -LS  --    Recorded by [CL] Liliam Valdes, OT 02/06/19 1536 [LS] Tanesha Castro, PT 02/06/19 1538 [MF] Neftaly Seaman, PT 02/06/19 1325    Row Name 02/06/19 1438 02/06/19 1308          Vital Signs    Pre Systolic BP Rehab  -- CORTES, RN cleared for tx.   -CL  174  -LS     Pre Treatment Diastolic BP  --  84  -LS     Pretreatment Heart Rate (beats/min)  --  69  -LS     Posttreatment Heart Rate (beats/min)  --  68  -LS     Pre SpO2 (%)  --  98  -LS     O2 Delivery Pre Treatment  --  nasal cannula  -LS     Post SpO2 (%)  --  95  -LS     O2 Delivery Post Treatment  --  nasal cannula  -LS     Pre Patient Position  --  Supine  -LS     Intra Patient Position  --  Standing  -LS     Post Patient Position  --  Supine  -LS     Recorded by [CL] Liliam Valdes, OT 02/06/19 1536 [LS] Tanesha Castro, PT 02/06/19 1538     Row Name 02/06/19 1438 02/06/19 1308          Cognitive Assessment/Intervention- PT/OT    Affect/Mental Status (Cognitive)  confused  -CL  WFL  -LS     Orientation Status (Cognition)  oriented to;person;place  -CL  oriented x 4  -LS     Follows Commands (Cognition)  follows one step commands;75-90% accuracy;repetition of directions required;verbal cues/prompting required  -CL  follows one step commands;over 90% accuracy;verbal cues/prompting required  -LS     Cognitive Function (Cognitive)  safety deficit  -CL  safety deficit  -LS     Safety Deficit (Cognitive)  moderate deficit;awareness of need for assistance;safety precautions awareness  -CL  mild deficit;awareness of need for assistance;insight into deficits/self awareness;safety precautions awareness  -LS     Personal Safety Interventions  fall prevention program maintained;nonskid shoes/slippers when out of bed;supervised activity  -CL  fall prevention program maintained;gait belt;nonskid shoes/slippers when out of bed  -LS     Recorded by [CL] Lucio  Liliam, OT 02/06/19 1536 [LS] Tanesha Castro, PT 02/06/19 1538     Row Name 02/06/19 1308             Bed Mobility Assessment/Treatment    Bed Mobility Assessment/Treatment  supine-sit;sit-supine  -LS      Rolling Right [place] (Bed Mobility)  moderate assist (50% patient effort);2 person assist;verbal cues  -LS      Supine-Sit [place] (Bed Mobility)  moderate assist (50% patient effort);2 person assist;verbal cues  -LS      Sit-Supine [place] (Bed Mobility)  maximum assist (25% patient effort);2 person assist;verbal cues  -LS      Assistive Device (Bed Mobility)  bed rails;draw sheet;head of bed elevated  -LS      Comment (Bed Mobility)  Rolling performed multiple times for hygiene; NSG present. Pt with very loose stool today.   -LS      Recorded by [LS] Tanesha Castro, PT 02/06/19 1538      Row Name 02/06/19 1438 02/06/19 1308          Transfer Assessment/Treatment    Comment (Transfers)  Pt declined, defer to PT.   -CL  STS x4 from EOB. PT/tech on either side of pt for BUE support. Deferred transfer to chair today due to frequent BM and pt decline OOB.   -LS     Recorded by [CL] Liliam Valdes, OT 02/06/19 1536 [LS] Tanesha Castro, PT 02/06/19 1538     Row Name 02/06/19 1308             Sit-Stand Transfer    Sit-Stand [place] (Transfers)  moderate assist (50% patient effort);2 person assist;verbal cues  -LS      Recorded by [LS] Tanesha Castro, PT 02/06/19 1538      Row Name 02/06/19 1308             Stand-Sit Transfer    Stand-Sit [place] (Transfers)  moderate assist (50% patient effort);2 person assist;verbal cues  -LS      Recorded by [LS] Tanesha Castro, PT 02/06/19 1538      Row Name 02/06/19 1308             Gait/Stairs Assessment/Training    Gait/Stairs Assessment/Training  gait/ambulation independence  -LS      [place] Level (Gait)  maximum assist (25% patient effort);2 person assist;verbal cues  -LS      Distance in Feet (Gait)  2  -LS      Comment (Gait/Stairs)  Able to take  6 small side steps towards HOB.   -LS      Recorded by [LS] Tanesha Castro, PT 02/06/19 1538      Row Name 02/06/19 1438             ADL Assessment/Intervention    29476 - OT Self Care/Mgmt Minutes  8  -CL      Recorded by [CL] Liliam Valdes, OT 02/06/19 1536      Row Name 02/06/19 1438             Self-Feeding Assessment/Training    [place] Level (Feeding)  liquids to mouth;scoop food and bring to mouth;verbal cues;supervision  -CL      Assistive Devices (Feeding)  adapted cup  -CL      Position (Self-Feeding)  sitting up in bed  -CL      Comment (Feeding)  Pt simulated, declined LH utensils though performed adequately. Pt/nursing educated on improved performance w/ finger foods. Pt educated on positioning.   -CL      Recorded by [CL] Liliam Valdes, OT 02/06/19 1536      Row Name 02/06/19 1438             Motor Skills Assessment/Interventions    Additional Documentation  Therapeutic Exercise (Group)  -CL      Recorded by [CL] Liliam Valdes, OT 02/06/19 1536      Row Name 02/06/19 1308             Therapeutic Exercise    49125 - PT Therapeutic Activity Minutes  25  -LS      Recorded by [LS] Tanesha Castro, PT 02/06/19 1538      Row Name 02/06/19 1438             Upper Extremity Seated Therapeutic Exercise    Comment, Seated Upper Extremity (Therapeutic Exercise)  Pt encouraged to perform R elbow/wrist/hand ROM, declined to perform.   -CL      Recorded by [CL] Liliam Valdes, OT 02/06/19 1536      Row Name 02/06/19 1308             Lower Extremity Supine Therapeutic Exercise    Performed, Supine Lower Extremity (Therapeutic Exercise)  ankle dorsiflexion/plantarflexion  -LS      Exercise Type, Supine Lower Extremity (Therapeutic Exercise)  AROM (active range of motion)  -LS      Sets/Reps Detail, Supine Lower Extremity (Therapeutic Exercise)  1/10 BLE  -LS      Recorded by [LS] Tanesha Castro, PT 02/06/19 1538      Row Name 02/06/19 1308             Static Sitting Balance    Level of [place] (Unsupported Sitting,  Static Balance)  contact guard assist  -LS      Sitting Position (Unsupported Sitting, Static Balance)  sitting on edge of bed  -LS      Time Able to Maintain Position (Unsupported Sitting, Static Balance)  more than 5 minutes  -LS      Recorded by [LS] Tanesha Castro, PT 02/06/19 1538      Row Name 02/06/19 1308             Static Standing Balance    Level of [place] (Supported Standing, Static Balance)  moderate assist, 50 to 74% patient effort;2 person assist  -LS      Time Able to Maintain Position (Supported Standing, Static Balance)  30 to 45 seconds  -LS      Comment (Supported Standing, Static Balance)  15s x3 with seated rest breaks between attempts.   -LS      Recorded by [LS] Tanesha Castro, PT 02/06/19 1538      Row Name 02/06/19 1438 02/06/19 1308 02/06/19 0900       Positioning and Restraints    Pre-Treatment Position  in bed  -CL  in bed  -LS  in bed  -MF    Post Treatment Position  bed  -CL  bed  -LS  bed  -MF    In Bed  notified nsg;fowlers;call light within reach;encouraged to call for assist;exit alarm on;with family/caregiver;side rails up x3;RUE elevated;LUE elevated;legs elevated;heels elevated  -CL  notified nsg;fowlers;call light within reach;encouraged to call for assist;with nsg NSG present for further hygiene  -LS  supine;call light within reach;with nsg  -MF    Recorded by [CL] Liliam Valdes, OT 02/06/19 1536 [LS] Tanesha Castro, PT 02/06/19 1538 [MF] Neftaly Seaman, PT 02/06/19 1325    Row Name 02/06/19 1438 02/06/19 1308 02/06/19 0900       Pain Assessment    Additional Documentation  Pain Scale 2: FACES Pre/Post-Treatment (Group)  -CL  Pain Scale: FACES Pre/Post-Treatment (Group)  -LS  Pain Scale: FACES Pre/Post-Treatment (Group)  -    Recorded by [CL] Liliam Valdes, OT 02/06/19 1536 [LS] Tanesha Castro, PT 02/06/19 1538 [MF] Neftaly Seaman, PT 02/06/19 1325    Row Name 02/06/19 1308             Pain Scale: Numbers Pre/Post-Treatment    Pain Location  perineum and buttocks  "area; c/o \"soreness\" from frequent hygiene  -LS      Pain Intervention(s)  Repositioned;Ambulation/increased activity  -LS      Recorded by [LS] Tanesha Castro, PT 02/06/19 1538      Row Name 02/06/19 1308 02/06/19 0900          Pain Scale: FACES Pre/Post-Treatment    Pain: FACES Scale, Pretreatment  2-->hurts little bit  -LS  2-->hurts little bit  -MF     Pain: FACES Scale, Post-Treatment  2-->hurts little bit  -LS  4-->hurts little more  -MF     Pre/Post Treatment Pain Comment  --  6-8/10 pain with debridement and cleaning from BM.   -MF     Recorded by [LS] Tanesha Castro, PT 02/06/19 1538 [MF] Neftaly Seaman, PT 02/06/19 1325     Row Name 02/06/19 1438             Pain Scale 2: FACES Pre/Post-Treatment    Pain 2: FACES Scale, Pretreatment  2-->hurts little bit  -CL      Pain 2: FACES Scale, Post-Treatment  2-->hurts little bit  -CL      Pain Location 2 - Side  Right  -CL      Pain Location 2 - Orientation  upper  -CL      Pain Location 2  extremity  -CL      Pre/Post Treatment Pain 2 Comment  Tolerated.   -CL      Pain Intervention(s) 2  Repositioned;Ambulation/increased activity  -CL      Recorded by [CL] Liliam Valdes OT 02/06/19 1536      Row Name                Wound 02/01/19 0827 Bilateral coccyx    Wound - Properties Group Date first assessed: 02/01/19 [AS] Time first assessed: 0827 [AS] Present On Admission : yes [AS] Side: Bilateral [AS] Location: coccyx [AS] Stage, Pressure Injury: unstageable [AS] Additional Comments: -- [MR], probable DTPI  Recorded by:  [AS] Lorri Sommers RN 02/01/19 1020 [MR] France Layton RN 02/01/19 1315    Row Name                Wound 02/01/19 0827 Left heel pressure injury    Wound - Properties Group Date first assessed: 02/01/19 [AS] Time first assessed: 0827 [AS] Side: Left [AS] Location: heel [AS] Type: pressure injury [AS] Stage, Pressure Injury: deep tissue injury [AS] Recorded by:  [AS] Lorri Sommers RN 02/01/19 1206    Row Name                Wound 02/01/19 1825 " Other (See comments) chest incision    Wound - Properties Group Date first assessed: 02/01/19 [KS] Time first assessed: 1825 [KS] Side: Other (See comments) [KS] Location: chest [KS] Type: incision [KS] Recorded by:  [KS] Leonela Caceres RN 02/01/19 1825    Row Name                Wound 01/16/19 1429 Left arm incision    Wound - Properties Group Date first assessed: 01/16/19 [JOSHUA] Time first assessed: 1429 [JOSHUA] Side: Left [JOSHUA] Location: arm [JOSHUA] Type: incision [JOSHUA] Recorded by:  [JOSHUA] Nilam Joshi RN 01/16/19 1429    Row Name 02/06/19 0900             Wound 01/31/19 1600 Bilateral medial coccyx pressure injury    Wound - Properties Group Date first assessed: 01/31/19 [CM] Time first assessed: 1600 [CM] Present On Admission : yes [CM] Side: Bilateral [CM] Orientation: medial [CM] Location: coccyx [CM] Type: pressure injury [CM] Stage, Pressure Injury: Stage 2 [CM] Additional Comments: wound now unstageable with moderate slough / eschar covering.  [MF] Recorded by:  [CM] Trudy Cornejo RN 02/03/19 0718 [MF] Neftaly Seaman, PT 02/06/19 1326    Wound Image  Images linked: 1  -MF      Dressing Appearance  dry;intact  -MF      Base  moist;necrotic;red/granulating;slough;yellow  -MF      Black (%), Wound Tissue Color  75  -MF      Red (%), Wound Tissue Color  10  -MF      Yellow (%), Wound Tissue Color  15  -MF      Periwound  dry;pink;redness  -MF      Wound Length (cm)  4 cm  -MF      Wound Width (cm)  6 cm  -MF      Wound Depth (cm)  0.1 cm  -MF      Drainage Characteristics/Odor  serosanguineous  -MF2      Drainage Amount  small  -MF2      Care, Wound  irrigated with;sterile normal saline;ultrasound therapy, non contact low frequency;debrided MIST x 8 min   -MF2      Dressing Care, Wound  foam;low-adherent venelex  -MF2      Periwound Care, Wound  topical treatment applied zguard  -MF2      Recorded by [MF] Neftaly Seaman, PT 02/06/19 1325  [MF2] Neftaly Seaman, PT 02/06/19 1327      Row Name                 Wound 02/04/19 2000 Right upper chest puncture    Wound - Properties Group Date first assessed: 02/04/19 [RS] Time first assessed: 2000 [RS] Present On Admission : no [RS] Side: Right [RS] Orientation: upper [RS] Location: chest [RS] Type: puncture [RS], HD cath with pigtail site (removed)  Recorded by:  [RS] Rodriguez Garcia RN 02/04/19 2230    Row Name 02/06/19 0900             Coping    Observed Emotional State  calm;cooperative  -MF      Verbalized Emotional State  acceptance  -MF      Recorded by [MF] Neftaly Seaman, PT 02/06/19 1327      Row Name 02/06/19 1308 02/06/19 0900          Plan of Care Review    Plan of Care Reviewed With  patient  -LS  patient  -MF     Recorded by [LS] Tanesha Castro, PT 02/06/19 1538 [MF] Neftaly Seaman, PT 02/06/19 1327     Row Name 02/06/19 1308 02/06/19 0900          Outcome Summary/Treatment Plan (PT)    Daily Summary of Progress (PT)  progress toward functional goals is good  -LS  unable to show any progress toward functional goals pt with moderate necrotic tissue covering wound  -MF     Barriers to Overall Progress (PT)  --  limited mobility and poor PO intake   -MF     Plan for Continued Treatment (PT)  --  cont with mist 2-3 x/ week with debridement prn   -MF     Recorded by [LS] Tanesha Castro, PT 02/06/19 1538 [MF] Neftaly Seaman, PT 02/06/19 1327       User Key  (r) = Recorded By, (t) = Taken By, (c) = Cosigned By    Initials Name Effective Dates Discipline     Neftaly Seaman, PT 06/19/15 -  PT     Tanesha Castro, PT 06/19/15 -  PT    RS Rodriguez Garcia, RN 06/30/16 -  Nurse    Nilam Martinez RN 06/16/16 -  Nurse    France Britt RN 06/16/16 -  Nurse    Leonela Quezada RN 05/09/17 -  Nurse    Lorri Lamb RN 06/16/16 -  Nurse    Liliam Read OT 04/03/18 -  OT    Trudy Peterson RN 06/21/17 -  Nurse          Wound 01/16/19 0784 Left arm incision (Active)   Dressing Appearance open to air  2/6/2019 12:00 PM   Closure Open to air 2/6/2019 12:00 PM   Drainage Amount none 2/6/2019 12:00 PM   Care, Wound cleansed with;soap and water 2/6/2019  8:00 AM   Dressing Care, Wound open to air 2/6/2019  8:00 AM       Wound Right anterior groin puncture (Active)   Dressing Appearance open to air 2/6/2019 12:00 PM   Closure None 2/6/2019 12:00 PM   Drainage Amount none 2/6/2019 12:00 PM   Care, Wound cleansed with;soap and water 2/6/2019  8:00 AM   Dressing Care, Wound open to air 2/6/2019  8:00 AM       Wound 02/01/19 0827 Bilateral coccyx (Active)   Dressing Appearance dry;intact 2/6/2019 12:00 PM   Closure None 2/6/2019 12:00 PM   Base dressing in place, unable to visualize 2/6/2019 12:00 PM   Periwound intact 2/6/2019 12:00 PM   Drainage Amount none 2/6/2019 12:00 PM   Care, Wound cleansed with;antimicrobial agent applied 2/6/2019  8:00 AM   Dressing Care, Wound dressing changed;low-adherent 2/6/2019  8:00 AM   Periwound Care, Wound dry periwound area maintained 2/6/2019  8:00 AM       Wound 02/01/19 0827 Left heel pressure injury (Active)   Dressing Appearance dry;intact 2/6/2019 12:00 PM   Base dressing in place, unable to visualize 2/6/2019 12:00 PM   Drainage Amount none 2/6/2019 12:00 PM   Dressing Care, Wound dressing reinforced 2/6/2019  8:00 AM   Periwound Care, Wound dry periwound area maintained 2/6/2019  8:00 AM       Wound 02/01/19 1825 Other (See comments) chest incision (Active)   Dressing Appearance dry;intact 2/6/2019 12:00 PM   Closure BARRY 2/6/2019 12:00 PM   Base dressing in place, unable to visualize 2/6/2019 12:00 PM   Drainage Amount none 2/6/2019 12:00 PM   Dressing Care, Wound dressing reinforced 2/5/2019  8:00 PM   Number of Adhesive Closure Strips 2 2/6/2019  8:00 AM       Wound 01/31/19 1600 Bilateral medial coccyx pressure injury (Active)   Wound Image   2/6/2019  9:00 AM   Dressing Appearance dry;intact 2/6/2019 12:00 PM   Closure None 2/6/2019 12:00 PM   Base dressing in place,  unable to visualize 2/6/2019 12:00 PM   Black (%), Wound Tissue Color 75 2/6/2019  9:00 AM   Red (%), Wound Tissue Color 10 2/6/2019  9:00 AM   Yellow (%), Wound Tissue Color 15 2/6/2019  9:00 AM   Periwound dry;pink;redness 2/6/2019  9:00 AM   Wound Length (cm) 4 cm 2/6/2019  9:00 AM   Wound Width (cm) 6 cm 2/6/2019  9:00 AM   Wound Depth (cm) 0.1 cm 2/6/2019  9:00 AM   Drainage Characteristics/Odor serosanguineous 2/6/2019  9:00 AM   Drainage Amount none 2/6/2019 12:00 PM   Care, Wound irrigated with;sterile normal saline;ultrasound therapy, non contact low frequency;debrided 2/6/2019  9:00 AM   Dressing Care, Wound foam;low-adherent 2/6/2019  9:00 AM   Periwound Care, Wound topical treatment applied 2/6/2019  9:00 AM       Wound 02/04/19 2000 Right upper chest puncture (Active)   Dressing Appearance dry;intact 2/6/2019 12:00 PM   Base dressing in place, unable to visualize 2/6/2019 12:00 PM   Periwound intact 2/6/2019  8:00 AM   Drainage Characteristics/Odor serosanguineous 2/6/2019 12:00 PM   Drainage Amount scant 2/6/2019 12:00 PM   Care, Wound cleansed with;sterile normal saline 2/6/2019  8:00 AM   Dressing Care, Wound dressing changed;petroleum-based;low-adherent 2/6/2019  8:00 AM   Periwound Care, Wound dry periwound area maintained 2/6/2019  8:00 AM       Rehab Goal Summary     Row Name 02/06/19 1200             Swallow Goals (SLP)    Oral Nutrition/Hydration Goal Selection (SLP)  oral nutrition/hydration, SLP goal 1  -AV      Pharyngeal Strengthening Exercise Goal Selection (SLP)  pharyngeal strengthening exercise, SLP goal 1  -AV      Additional Documentation  pharyngeal strengthening exercise goal selection (SLP)  -AV         Oral Nutrition/Hydration Goal 1 (SLP)    Oral Nutrition/Hydration Goal 1, SLP  LTG:  Patient will tolerate reg diet and thin liquids with no s/s of pen/asp   -AV      Time Frame (Oral Nutrition/Hydration Goal 1, SLP)  by discharge  -AV         Pharyngeal Strengthening Exercise Goal  1 (SLP)    Activity (Pharyngeal Strengthening Goal 1, SLP)  increase superior movement of the hyolaryngeal complex;increase anterior movement of the hyolaryngeal complex  -AV      Increase Superior Movement of the Hyolaryngeal Complex  supraglottic swallow;hard effortful swallow  -AV      Increase Anterior Movement of the Hyolaryngeal Complex  supraglottic swallow;hard effortful swallow  -AV      [place]/Accuracy (Pharyngeal Strengthening Goal 1, SLP)  with minimal cues (75-90% accuracy)  -AV      Time Frame (Pharyngeal Strengthening Goal 1, SLP)  short term goal (STG);by discharge  -AV        User Key  (r) = Recorded By, (t) = Taken By, (c) = Cosigned By    Initials Name Provider Type Discipline    AV Mary Luo MS CCC-SLP Speech and Language Pathologist SLP          Physical Therapy Education     Title: PT OT SLP Therapies (In Progress)     Topic: Physical Therapy (In Progress)     Point: Mobility training (In Progress)     Learning Progress Summary           Patient Acceptance, E,D, NR by LS at 2/6/2019  1:08 PM    Acceptance, E, NR by KR at 2/4/2019  1:06 PM    Acceptance, E,D, NR by LS at 2/2/2019  2:48 PM                   Point: Home exercise program (In Progress)     Learning Progress Summary           Patient Acceptance, E,D, NR by LS at 2/6/2019  1:08 PM    Acceptance, E, NR by KR at 2/4/2019  1:06 PM    Acceptance, E,D, NR by LS at 2/2/2019  2:48 PM                   Point: Body mechanics (In Progress)     Learning Progress Summary           Patient Acceptance, E,D, NR by LS at 2/6/2019  1:08 PM    Acceptance, E, NR by KR at 2/4/2019  1:06 PM    Acceptance, E,D, NR by LS at 2/2/2019  2:48 PM                   Point: Precautions (In Progress)     Learning Progress Summary           Patient Acceptance, E,D, NR by LS at 2/6/2019  1:08 PM    Acceptance, E, NR by KR at 2/4/2019  1:06 PM    Acceptance, E,D, NR by LS at 2/2/2019  2:48 PM                               User Key     Initials Effective  Dates Name Provider Type Discipline     06/19/15 -  Tanesha Castro, PT Physical Therapist PT    KR 04/03/18 -  Gemma Ying, PT Physical Therapist PT                PT Recommendation and Plan  Anticipated Discharge Disposition (PT): skilled nursing facility  Planned Therapy Interventions (PT Eval): bed mobility training, balance training, gait training, home exercise program, patient/family education, strengthening, transfer training  Therapy Frequency (PT Clinical Impression): daily  Outcome Summary/Treatment Plan (PT)  Daily Summary of Progress (PT): progress toward functional goals is good  Anticipated Discharge Disposition (PT): skilled nursing facility  Referral Needed to Another Service (PT): occupational therapy  Plan of Care Reviewed With: patient  Progress: improving  Outcome Summary: Pt demonstrated ability to perform STS x4 from EOB (mod x2); able to take small steps towards HOB (max x2 A). Further mobility and ther ex deferred today due to frequent and loose BM. Pt with overall improved motivation to participate. Will cont PT POC as clinically warranted.   Outcome Measures     Row Name 02/06/19 1438 02/06/19 1308 02/04/19 1306       How much help from another person do you currently need...    Turning from your back to your side while in flat bed without using bedrails?  --  2  -LS  2  -KR    Moving from lying on back to sitting on the side of a flat bed without bedrails?  --  2  -LS  2  -KR    Moving to and from a bed to a chair (including a wheelchair)?  --  2  -LS  2  -KR    Standing up from a chair using your arms (e.g., wheelchair, bedside chair)?  --  2  -LS  2  -KR    Climbing 3-5 steps with a railing?  --  1  -LS  1  -KR    To walk in hospital room?  --  2  -LS  1  -KR    AM-PAC 6 Clicks Score  --  11  -LS  10  -KR       How much help from another is currently needed...    Putting on and taking off regular lower body clothing?  1  -CL  --  --    Bathing (including washing, rinsing, and  drying)  1  -CL  --  --    Toileting (which includes using toilet bed pan or urinal)  1  -CL  --  --    Putting on and taking off regular upper body clothing  1  -CL  --  --    Taking care of personal grooming (such as brushing teeth)  2  -CL  --  --    Eating meals  3  -CL  --  --    Score  9  -CL  --  --       Functional Assessment    Outcome Measure Options  AM-PAC 6 Clicks Daily Activity (OT)  -CL  AM-PAC 6 Clicks Basic Mobility (PT)  -LS  AM-PAC 6 Clicks Basic Mobility (PT)  -KR      User Key  (r) = Recorded By, (t) = Taken By, (c) = Cosigned By    Initials Name Provider Type    Tanesha Rodney, PT Physical Therapist    CL Liliam Valdes, OT Occupational Therapist    KR Gemma Ying, PT Physical Therapist         Time Calculation:   PT Charges     Row Name 02/06/19 1308 02/06/19 0900          Time Calculation    Start Time  1308  -LS  0900  -     PT Received On  02/06/19  -  --     PT Goal Re-Cert Due Date  --  02/12/19  -        Time Calculation- PT    Total Timed Code Minutes- PT  25 minute(s)  -LS  --        Timed Charges    10316 - PT Therapeutic Activity Minutes  25  -LS  --       User Key  (r) = Recorded By, (t) = Taken By, (c) = Cosigned By    Initials Name Provider Type    Neftayl Moore, PT Physical Therapist    LS Tanesha Castro, PT Physical Therapist        Therapy Suggested Charges     Code   Minutes Charges    99978 (CPT®) Hc Pt Neuromusc Re Education Ea 15 Min      33033 (CPT®) Hc Pt Ther Proc Ea 15 Min      58297 (CPT®) Hc Gait Training Ea 15 Min      56079 (CPT®) Hc Pt Therapeutic Act Ea 15 Min 25 2    20360 (CPT®) Hc Pt Manual Therapy Ea 15 Min      19139 (CPT®) Hc Pt Iontophoresis Ea 15 Min      86415 (CPT®) Hc Pt Elec Stim Ea-Per 15 Min      33754 (CPT®) Hc Pt Ultrasound Ea 15 Min      36502 (CPT®) Hc Pt Self Care/Mgmt/Train Ea 15 Min      99282 (CPT®) Hc Pt Prosthetic (S) Train Initial Encounter, Each 15 Min      27682 (CPT®) Hc Pt Orthotic(S)/Prosthetic(S) Encounter, Each 15  Min      89338 (CPT®)  Orthotic(S) Mgmt/Train Initial Encounter, Each 15min      Total  25 2        Therapy Charges for Today     Code Description Service Date Service Provider Modifiers Qty    [id number]  PT THERAPEUTIC ACT EA 15 MIN 2/6/2019 Tanesha Castro, PT GP 2    [id number]  PT THER SUPP EA 15 MIN 2/6/2019 Tanesha Castro, PT GP 2          PT G-Codes  Outcome Measure Options: AM-PAC 6 Clicks Daily Activity (OT)  AM-PAC 6 Clicks Score: 11  Score: 9    Tanesha Castro, PT  2/6/2019

## 2019-02-06 NOTE — THERAPY TREATMENT NOTE
Acute Care - Occupational Therapy Treatment Note  [facility]     Patient Name: Fabiola Pimentel  : 1934  MRN: [id number]  Today's Date: 2019  Onset of Illness/Injury or Date of Surgery: 19  Date of Referral to OT: 19  Referring Physician: MD Radha    Admit Date: 2019       ICD-10-CM ICD-9-CM   1. Impaired functional mobility, balance, gait, and endurance Z74.09 V49.89   2. Impaired mobility and ADLs Z74.09 799.89   3. Pharyngeal dysphagia R13.13 787.23     Patient Active Problem List   Diagnosis   • ITP (idiopathic thrombocytopenic purpura)   • Palpitations   • Premature atrial contractions   • Hypertension   • Pulmonary hypertension (CMS/HCC)   • DJD (degenerative joint disease)   • Raynaud's phenomenon (secondary)   • Diverticular disease   • CKD (chronic kidney disease) stage 5, GFR less than 15 ml/min (CMS/HCC)   • Leukocytosis   • Anemia due to chronic kidney disease   • Debility   • Hx of renal cell carcinoma   • Chronic diastolic CHF (congestive heart failure) (CMS/HCC)   • Falls frequently   • PEA   • Huge hiatal hernia with intrathoracic stomach   • Mitral valve mass     Past Medical History:   Diagnosis Date   • Chronic ITP (idiopathic thrombocytopenia) (CMS/HCC)    • Chronic renal insufficiency    • CKD (chronic kidney disease)    • Diverticular disease    • Dizziness     Dizziness with increase of propafenone to t.i.d.; however, palpitations improved, patient wishes to stay on current dose.   • DJD (degenerative joint disease)    • Hip fracture (CMS/HCC)     Recent hip and pelvic fracture.   • History of uterine cancer     Initial diagnosis, , status post SALVATORE/BSO. Recurrence documented , status post radiation therapy and implants.   • Hypertension    • Lower extremity edema     Lower extremity venous duplex, 2013:  No evidence of deep venous thrombosis. Negative VQ scan for pulmonary embolus, findings suggest congestive heart failure, 10/11/2013.No evidence of  deep venous thrombosis by bilateral duplex, 03/25/2015.Mild   • Palpitations    • Pelvic fracture (CMS/HCC)     Recent hip and pelvic fracture.   • Premature atrial contractions     Premature atrial contractions, postoperative from incisional hernia repair:A 2D echocardiogram 01/03/2008:  Mild MR, mild to moderate TR, small pericardial effusion, EF 65%.   • Pulmonary hypertension (CMS/HCC)    • Raynaud's phenomenon (secondary)     Raynaud’s phenomenon involving the left upper extremity.     • Stroke (CMS/HCC)     Right optic nerve stroke.     Past Surgical History:   Procedure Laterality Date   • APPENDECTOMY     • ARTERIOVENOUS FISTULA/SHUNT SURGERY Left 1/16/2019    Procedure: LEFT UPPER EXTREMITY ARTERIOVENOUS FISTULA FORMATION, BRACHIO-AXILLARY SHUNT WITH ARTEGRAFT;  Surgeon: Dale Ramirez MD;  Location: [facility] OR;  Service: Vascular   • CATARACT EXTRACTION WITH INTRAOCULAR LENS IMPLANT      Cataract surgery with lens implantation.   • CHOLECYSTECTOMY  1958   • COLON RESECTION  10/2005    Colon resection, October 2005, with associated splenectomy.   • DILATATION AND CURETTAGE     • INCISIONAL HERNIA REPAIR  01/02/2008    With mesh   • INSERTION HEMODIALYSIS CATHETER N/A 2/1/2019    Procedure: HEMODIALYSIS CATHETER INSERTION;  Surgeon: Raymond Elise MD;  Location: [facility] OR;  Service: General   • NEPHRECTOMY Right 2001   • OTHER SURGICAL HISTORY      Recurrence documented 2003, status post radiation therapy and implants.   • SPLENECTOMY     • SYMPATHECTOMY Left     Left axilla sympathetectomy secondary to Raynaud’s phenomenon.   • TOTAL ABDOMINAL HYSTERECTOMY WITH SALPINGO OOPHORECTOMY  2001       Therapy Treatment    Rehabilitation Treatment Summary     Row Name 02/06/19 1438 02/06/19 1308 02/06/19 0900       Treatment Time/Intention    Discipline  occupational therapist  -CL  physical therapy assistant  -LS  physical therapist  -MF    Document Type  therapy note (daily note)  -CL  therapy note  (daily note)  -LS  therapy note (daily note);wound care  -MF    Subjective Information  complains of;weakness;fatigue;pain  -CL  complains of;fatigue  -LS  complains of;weakness;fatigue;pain  -MF    Mode of Treatment  --  physical therapy  -LS  physical therapy  -MF    Patient Effort  adequate  -CL  good  -LS  --    Existing Precautions/Restrictions  fall;oxygen therapy device and L/min;other (see comments) recent R shldr dislocation, s/p reduction  -CL  fall;non-weight bearing NWB RUE until further clarification  -LS  --    Treatment Considerations/Comments  --  Recent R shoulder dislocation and reduction.   -LS  --    Recorded by [CL] Liliam Valdes, OT 02/06/19 1536 [LS] Tanesha Castro, PT 02/06/19 1538 [MF] Neftaly Seaman, PT 02/06/19 1325    Row Name 02/06/19 1438 02/06/19 1308          Vital Signs    Pre Systolic BP Rehab  -- VSS, RN cleared for tx.   -CL  174  -LS     Pre Treatment Diastolic BP  --  84  -LS     Pretreatment Heart Rate (beats/min)  --  69  -LS     Posttreatment Heart Rate (beats/min)  --  68  -LS     Pre SpO2 (%)  --  98  -LS     O2 Delivery Pre Treatment  --  nasal cannula  -LS     Post SpO2 (%)  --  95  -LS     O2 Delivery Post Treatment  --  nasal cannula  -LS     Pre Patient Position  --  Supine  -LS     Intra Patient Position  --  Standing  -LS     Post Patient Position  --  Supine  -LS     Recorded by [CL] Liliam Valdes, OT 02/06/19 1536 [LS] Tanesha Castro, PT 02/06/19 1538     Row Name 02/06/19 1438 02/06/19 1308          Cognitive Assessment/Intervention- PT/OT    Affect/Mental Status (Cognitive)  confused  -CL  WFL  -LS     Orientation Status (Cognition)  oriented to;person;place  -CL  oriented x 4  -LS     Follows Commands (Cognition)  follows one step commands;75-90% accuracy;repetition of directions required;verbal cues/prompting required  -CL  follows one step commands;over 90% accuracy;verbal cues/prompting required  -LS     Cognitive Function (Cognitive)  safety deficit  -CL   safety deficit  -LS     Safety Deficit (Cognitive)  moderate deficit;awareness of need for assistance;safety precautions awareness  -CL  mild deficit;awareness of need for assistance;insight into deficits/self awareness;safety precautions awareness  -LS     Personal Safety Interventions  fall prevention program maintained;nonskid shoes/slippers when out of bed;supervised activity  -CL  fall prevention program maintained;gait belt;nonskid shoes/slippers when out of bed  -LS     Recorded by [CL] iLliam Valdes, OT 02/06/19 1536 [LS] Tanesha Castro, PT 02/06/19 1538     Row Name 02/06/19 1308             Bed Mobility Assessment/Treatment    Bed Mobility Assessment/Treatment  supine-sit;sit-supine  -LS      Rolling Right [place] (Bed Mobility)  moderate assist (50% patient effort);2 person assist;verbal cues  -LS      Supine-Sit [place] (Bed Mobility)  moderate assist (50% patient effort);2 person assist;verbal cues  -LS      Sit-Supine [place] (Bed Mobility)  maximum assist (25% patient effort);2 person assist;verbal cues  -LS      Assistive Device (Bed Mobility)  bed rails;draw sheet;head of bed elevated  -LS      Comment (Bed Mobility)  Rolling performed multiple times for hygiene; NSG present. Pt with very loose stool today.   -LS      Recorded by [LS] Tanesha Castro, PT 02/06/19 1538      Row Name 02/06/19 1438 02/06/19 1308          Transfer Assessment/Treatment    Comment (Transfers)  Pt declined, defer to PT.   -CL  STS x4 from EOB. PT/tech on either side of pt for BUE support. Deferred transfer to chair today due to frequent BM and pt decline OOB.   -LS     Recorded by [CL] Liliam Valdes, OT 02/06/19 1536 [LS] Tanesha Castro, PT 02/06/19 1538     Row Name 02/06/19 1308             Sit-Stand Transfer    Sit-Stand [place] (Transfers)  moderate assist (50% patient effort);2 person assist;verbal cues  -LS      Recorded by [LS] Tanesha Castro, PT 02/06/19 1538      Row Name 02/06/19 1308              Stand-Sit Transfer    Stand-Sit [place] (Transfers)  moderate assist (50% patient effort);2 person assist;verbal cues  -LS      Recorded by [LS] Tanesha Castro, PT 02/06/19 1538      Row Name 02/06/19 1308             Gait/Stairs Assessment/Training    Gait/Stairs Assessment/Training  gait/ambulation independence  -LS      [place] Level (Gait)  maximum assist (25% patient effort);2 person assist;verbal cues  -LS      Distance in Feet (Gait)  2  -LS      Comment (Gait/Stairs)  Able to take 6 small side steps towards HOB.   -LS      Recorded by [LS] Tanesha Castro, PT 02/06/19 1538      Row Name 02/06/19 1438             ADL Assessment/Intervention    96690 - OT Self Care/Mgmt Minutes  8  -CL      Recorded by [CL] Liliam Valdes, OT 02/06/19 1536      Row Name 02/06/19 1438             Self-Feeding Assessment/Training    [place] Level (Feeding)  liquids to mouth;scoop food and bring to mouth;verbal cues;supervision  -CL      Assistive Devices (Feeding)  adapted cup  -CL      Position (Self-Feeding)  sitting up in bed  -CL      Comment (Feeding)  Pt simulated, declined LH utensils though performed adequately. Pt/nursing educated on improved performance w/ finger foods. Pt educated on positioning.   -CL      Recorded by [CL] Liliam Valdes, OT 02/06/19 1536      Row Name 02/06/19 1438             Motor Skills Assessment/Interventions    Additional Documentation  Therapeutic Exercise (Group)  -CL      Recorded by [CL] Liliam Valdes, OT 02/06/19 1536      Row Name 02/06/19 1308             Therapeutic Exercise    54794 - PT Therapeutic Activity Minutes  25  -LS      Recorded by [LS] Tanesha Castro, PT 02/06/19 1538      Row Name 02/06/19 1438             Upper Extremity Seated Therapeutic Exercise    Comment, Seated Upper Extremity (Therapeutic Exercise)  Pt encouraged to perform R elbow/wrist/hand ROM, declined to perform.   -CL      Recorded by [CL] Liliam Valdes, OT 02/06/19 1536      Row Name 02/06/19 1308              Lower Extremity Supine Therapeutic Exercise    Performed, Supine Lower Extremity (Therapeutic Exercise)  ankle dorsiflexion/plantarflexion  -LS      Exercise Type, Supine Lower Extremity (Therapeutic Exercise)  AROM (active range of motion)  -LS      Sets/Reps Detail, Supine Lower Extremity (Therapeutic Exercise)  1/10 BLE  -LS      Recorded by [LS] Tanesha Castro, PT 02/06/19 1538      Row Name 02/06/19 1308             Static Sitting Balance    Level of [place] (Unsupported Sitting, Static Balance)  contact guard assist  -LS      Sitting Position (Unsupported Sitting, Static Balance)  sitting on edge of bed  -LS      Time Able to Maintain Position (Unsupported Sitting, Static Balance)  more than 5 minutes  -LS      Recorded by [LS] Tanesha Castro, PT 02/06/19 1538      Row Name 02/06/19 1308             Static Standing Balance    Level of [place] (Supported Standing, Static Balance)  moderate assist, 50 to 74% patient effort;2 person assist  -LS      Time Able to Maintain Position (Supported Standing, Static Balance)  30 to 45 seconds  -LS      Comment (Supported Standing, Static Balance)  15s x3 with seated rest breaks between attempts.   -LS      Recorded by [LS] Tanesha Castro, PT 02/06/19 1538      Row Name 02/06/19 1438 02/06/19 1308 02/06/19 0900       Positioning and Restraints    Pre-Treatment Position  in bed  -CL  in bed  -LS  in bed  -MF    Post Treatment Position  bed  -CL  bed  -LS  bed  -MF    In Bed  notified nsg;fowlers;call light within reach;encouraged to call for assist;exit alarm on;with family/caregiver;side rails up x3;RUE elevated;LUE elevated;legs elevated;heels elevated  -CL  notified nsg;fowlers;call light within reach;encouraged to call for assist;with nsg NSG present for further hygiene  -LS  supine;call light within reach;with nsg  -MF    Recorded by [CL] Liliam Valdes, OT 02/06/19 1536 [LS] Tanesha Castro, PT 02/06/19 1538 [MF] Neftaly Seaman, PT 02/06/19 1325     "Row Name 02/06/19 1438 02/06/19 1308 02/06/19 0900       Pain Assessment    Additional Documentation  Pain Scale 2: FACES Pre/Post-Treatment (Group)  -CL  Pain Scale: FACES Pre/Post-Treatment (Group)  -LS  Pain Scale: FACES Pre/Post-Treatment (Group)  -MF    Recorded by [CL] Liliam Valdes, OT 02/06/19 1536 [LS] Tanesha Castro, PT 02/06/19 1538 [MF] Neftaly Seaman, PT 02/06/19 1325    Row Name 02/06/19 1308             Pain Scale: Numbers Pre/Post-Treatment    Pain Location  perineum and buttocks area; c/o \"soreness\" from frequent hygiene  -LS      Pain Intervention(s)  Repositioned;Ambulation/increased activity  -LS      Recorded by [LS] Tanesha Castro, PT 02/06/19 1538      Row Name 02/06/19 1308 02/06/19 0900          Pain Scale: FACES Pre/Post-Treatment    Pain: FACES Scale, Pretreatment  2-->hurts little bit  -LS  2-->hurts little bit  -MF     Pain: FACES Scale, Post-Treatment  2-->hurts little bit  -LS  4-->hurts little more  -MF     Pre/Post Treatment Pain Comment  --  6-8/10 pain with debridement and cleaning from BM.   -MF     Recorded by [LS] Tanesha Castro, PT 02/06/19 1538 [MF] Neftaly Seaman, PT 02/06/19 1325     Row Name 02/06/19 1438             Pain Scale 2: FACES Pre/Post-Treatment    Pain 2: FACES Scale, Pretreatment  2-->hurts little bit  -CL      Pain 2: FACES Scale, Post-Treatment  2-->hurts little bit  -CL      Pain Location 2 - Side  Right  -CL      Pain Location 2 - Orientation  upper  -CL      Pain Location 2  extremity  -CL      Pre/Post Treatment Pain 2 Comment  Tolerated.   -CL      Pain Intervention(s) 2  Repositioned;Ambulation/increased activity  -CL      Recorded by [CL] Liliam Valdes, OT 02/06/19 1536      Row Name                Wound 02/01/19 0827 Bilateral coccyx    Wound - Properties Group Date first assessed: 02/01/19 [AS] Time first assessed: 0827 [AS] Present On Admission : yes [AS] Side: Bilateral [AS] Location: coccyx [AS] Stage, Pressure Injury: unstageable [AS] " Additional Comments: -- [MR], probable DTPI  Recorded by:  [AS] Lorri Sommers RN 02/01/19 1020 [MR] Calin Irina, RN 02/01/19 1315    Row Name                Wound 02/01/19 0827 Left heel pressure injury    Wound - Properties Group Date first assessed: 02/01/19 [AS] Time first assessed: 0827 [AS] Side: Left [AS] Location: heel [AS] Type: pressure injury [AS] Stage, Pressure Injury: deep tissue injury [AS] Recorded by:  [AS] Lorri Sommers, RN 02/01/19 1206    Row Name                Wound 02/01/19 1825 Other (See comments) chest incision    Wound - Properties Group Date first assessed: 02/01/19 [KS] Time first assessed: 1825 [KS] Side: Other (See comments) [KS] Location: chest [KS] Type: incision [KS] Recorded by:  [KS] Leonela Caceres RN 02/01/19 1825    Row Name                Wound 01/16/19 1429 Left arm incision    Wound - Properties Group Date first assessed: 01/16/19 [JOSHUA] Time first assessed: 1429 [JOSHUA] Side: Left [JOSHUA] Location: arm [JOSHUA] Type: incision [JOSHUA] Recorded by:  [JOSHUA] Nilam Joshi RN 01/16/19 1429    Row Name 02/06/19 0900             Wound 01/31/19 1600 Bilateral medial coccyx pressure injury    Wound - Properties Group Date first assessed: 01/31/19 [CM] Time first assessed: 1600 [CM] Present On Admission : yes [CM] Side: Bilateral [CM] Orientation: medial [CM] Location: coccyx [CM] Type: pressure injury [CM] Stage, Pressure Injury: Stage 2 [CM] Additional Comments: wound now unstageable with moderate slough / eschar covering.  [MF] Recorded by:  [CM] Trudy Cornejo RN 02/03/19 0718 [MF] Neftaly Seaman, KATE 02/06/19 1326    Wound Image  Images linked: 1  -MF      Dressing Appearance  dry;intact  -MF      Base  moist;necrotic;red/granulating;slough;yellow  -MF      Black (%), Wound Tissue Color  75  -MF      Red (%), Wound Tissue Color  10  -MF      Yellow (%), Wound Tissue Color  15  -MF      Periwound  dry;pink;redness  -      Wound Length (cm)  4 cm  -MF      Wound Width (cm)  6  cm  -MF      Wound Depth (cm)  0.1 cm  -MF      Drainage Characteristics/Odor  serosanguineous  -MF2      Drainage Amount  small  -MF2      Care, Wound  irrigated with;sterile normal saline;ultrasound therapy, non contact low frequency;debrided MIST x 8 min   -MF2      Dressing Care, Wound  foam;low-adherent venelex  -MF2      Periwound Care, Wound  topical treatment applied zguard  -MF2      Recorded by [MF] Neftaly Seaman, PT 02/06/19 1325  [MF2] Neftaly Seaman, PT 02/06/19 1327      Row Name                Wound 02/04/19 2000 Right upper chest puncture    Wound - Properties Group Date first assessed: 02/04/19 [RS] Time first assessed: 2000 [RS] Present On Admission : no [RS] Side: Right [RS] Orientation: upper [RS] Location: chest [RS] Type: puncture [RS], HD cath with pigtail site (removed)  Recorded by:  [RS] Rodriguez Garcia RN 02/04/19 2230    Row Name 02/06/19 0900             Coping    Observed Emotional State  calm;cooperative  -MF      Verbalized Emotional State  acceptance  -MF      Recorded by [MF] Neftaly Seaman, PT 02/06/19 1327      Row Name 02/06/19 1308 02/06/19 0900          Plan of Care Review    Plan of Care Reviewed With  patient  -LS  patient  -MF     Recorded by [LS] Tanesha Castro, PT 02/06/19 1538 [MF] Neftaly Seaman, PT 02/06/19 1327     Row Name 02/06/19 1308 02/06/19 0900          Outcome Summary/Treatment Plan (PT)    Daily Summary of Progress (PT)  progress toward functional goals is good  -LS  unable to show any progress toward functional goals pt with moderate necrotic tissue covering wound  -MF     Barriers to Overall Progress (PT)  --  limited mobility and poor PO intake   -MF     Plan for Continued Treatment (PT)  --  cont with mist 2-3 x/ week with debridement prn   -MF     Recorded by [LS] Tanesha Castro, PT 02/06/19 1538 [MF] Neftaly Seaman, PT 02/06/19 1327       User Key  (r) = Recorded By, (t) = Taken By, (c) = Cosigned By    Initials Name Effective  Dates Discipline     Neftaly Seaman, PT 06/19/15 -  PT    LS Tanesha Castro, PT 06/19/15 -  PT    RS Rodriguez Garcia, RN 06/30/16 -  Nurse    Nilam Martinez, RN 06/16/16 -  Nurse    France Britt E, RN 06/16/16 -  Nurse    Leonela Quezada, RN 05/09/17 -  Nurse    Lorri Lamb, RN 06/16/16 -  Nurse    Liliam Read, OT 04/03/18 -  OT    CM Trudy Cornejo, RN 06/21/17 -  Nurse        Wound 01/16/19 1429 Left arm incision (Active)   Dressing Appearance open to air 2/6/2019 12:00 PM   Closure Open to air 2/6/2019 12:00 PM   Drainage Amount none 2/6/2019 12:00 PM   Care, Wound cleansed with;soap and water 2/6/2019  8:00 AM   Dressing Care, Wound open to air 2/6/2019  8:00 AM       Wound Right anterior groin puncture (Active)   Dressing Appearance open to air 2/6/2019 12:00 PM   Closure None 2/6/2019 12:00 PM   Drainage Amount none 2/6/2019 12:00 PM   Care, Wound cleansed with;soap and water 2/6/2019  8:00 AM   Dressing Care, Wound open to air 2/6/2019  8:00 AM       Wound 02/01/19 0827 Bilateral coccyx (Active)   Dressing Appearance dry;intact 2/6/2019 12:00 PM   Closure None 2/6/2019 12:00 PM   Base dressing in place, unable to visualize 2/6/2019 12:00 PM   Periwound intact 2/6/2019 12:00 PM   Drainage Amount none 2/6/2019 12:00 PM   Care, Wound cleansed with;antimicrobial agent applied 2/6/2019  8:00 AM   Dressing Care, Wound dressing changed;low-adherent 2/6/2019  8:00 AM   Periwound Care, Wound dry periwound area maintained 2/6/2019  8:00 AM       Wound 02/01/19 0827 Left heel pressure injury (Active)   Dressing Appearance dry;intact 2/6/2019 12:00 PM   Base dressing in place, unable to visualize 2/6/2019 12:00 PM   Drainage Amount none 2/6/2019 12:00 PM   Dressing Care, Wound dressing reinforced 2/6/2019  8:00 AM   Periwound Care, Wound dry periwound area maintained 2/6/2019  8:00 AM       Wound 02/01/19 7764 Other (See comments) chest incision (Active)   Dressing Appearance  dry;intact 2/6/2019 12:00 PM   Closure BARRY 2/6/2019 12:00 PM   Base dressing in place, unable to visualize 2/6/2019 12:00 PM   Drainage Amount none 2/6/2019 12:00 PM   Dressing Care, Wound dressing reinforced 2/5/2019  8:00 PM   Number of Adhesive Closure Strips 2 2/6/2019  8:00 AM       Wound 01/31/19 1600 Bilateral medial coccyx pressure injury (Active)   Wound Image   2/6/2019  9:00 AM   Dressing Appearance dry;intact 2/6/2019 12:00 PM   Closure None 2/6/2019 12:00 PM   Base dressing in place, unable to visualize 2/6/2019 12:00 PM   Black (%), Wound Tissue Color 75 2/6/2019  9:00 AM   Red (%), Wound Tissue Color 10 2/6/2019  9:00 AM   Yellow (%), Wound Tissue Color 15 2/6/2019  9:00 AM   Periwound dry;pink;redness 2/6/2019  9:00 AM   Wound Length (cm) 4 cm 2/6/2019  9:00 AM   Wound Width (cm) 6 cm 2/6/2019  9:00 AM   Wound Depth (cm) 0.1 cm 2/6/2019  9:00 AM   Drainage Characteristics/Odor serosanguineous 2/6/2019  9:00 AM   Drainage Amount none 2/6/2019 12:00 PM   Care, Wound irrigated with;sterile normal saline;ultrasound therapy, non contact low frequency;debrided 2/6/2019  9:00 AM   Dressing Care, Wound foam;low-adherent 2/6/2019  9:00 AM   Periwound Care, Wound topical treatment applied 2/6/2019  9:00 AM       Wound 02/04/19 2000 Right upper chest puncture (Active)   Dressing Appearance dry;intact 2/6/2019 12:00 PM   Base dressing in place, unable to visualize 2/6/2019 12:00 PM   Periwound intact 2/6/2019  8:00 AM   Drainage Characteristics/Odor serosanguineous 2/6/2019 12:00 PM   Drainage Amount scant 2/6/2019 12:00 PM   Care, Wound cleansed with;sterile normal saline 2/6/2019  8:00 AM   Dressing Care, Wound dressing changed;petroleum-based;low-adherent 2/6/2019  8:00 AM   Periwound Care, Wound dry periwound area maintained 2/6/2019  8:00 AM     Rehab Goal Summary     Row Name 02/06/19 1200             Swallow Goals (SLP)    Oral Nutrition/Hydration Goal Selection (SLP)  oral nutrition/hydration, SLP goal 1   -AV      Pharyngeal Strengthening Exercise Goal Selection (SLP)  pharyngeal strengthening exercise, SLP goal 1  -AV      Additional Documentation  pharyngeal strengthening exercise goal selection (SLP)  -AV         Oral Nutrition/Hydration Goal 1 (SLP)    Oral Nutrition/Hydration Goal 1, SLP  LTG:  Patient will tolerate reg diet and thin liquids with no s/s of pen/asp   -AV      Time Frame (Oral Nutrition/Hydration Goal 1, SLP)  by discharge  -AV         Pharyngeal Strengthening Exercise Goal 1 (SLP)    Activity (Pharyngeal Strengthening Goal 1, SLP)  increase superior movement of the hyolaryngeal complex;increase anterior movement of the hyolaryngeal complex  -AV      Increase Superior Movement of the Hyolaryngeal Complex  supraglottic swallow;hard effortful swallow  -AV      Increase Anterior Movement of the Hyolaryngeal Complex  supraglottic swallow;hard effortful swallow  -AV      [place]/Accuracy (Pharyngeal Strengthening Goal 1, SLP)  with minimal cues (75-90% accuracy)  -AV      Time Frame (Pharyngeal Strengthening Goal 1, SLP)  short term goal (STG);by discharge  -AV        User Key  (r) = Recorded By, (t) = Taken By, (c) = Cosigned By    Initials Name Provider Type Discipline    Mary Golden MS CCC-SLP Speech and Language Pathologist SLP        Occupational Therapy Education     Title: PT OT SLP Therapies (In Progress)     Topic: Occupational Therapy (In Progress)     Point: ADL training (In Progress)     Description: Instruct learner(s) on proper safety adaptation and remediation techniques during self care or transfers.   Instruct in proper use of assistive devices.    Learning Progress Summary           Patient Acceptance, E, NR by CL at 2/6/2019  3:36 PM    Comment:  Pt educated on appropriate safety precautions, positioning, role of OT, AE for self feeding, and benefits of therapy.    Acceptance, E, NR by CL at 2/3/2019  2:41 PM    Comment:  Pt educated on appropriate safety  precautions, positioning, role of OT, AE for self feeding, and benefits of therapy.                   Point: Home exercise program (In Progress)     Description: Instruct learner(s) on appropriate technique for monitoring, assisting and/or progressing therapeutic exercises/activities.    Learning Progress Summary           Patient Acceptance, E, NR by CL at 2/6/2019  3:36 PM    Comment:  Pt educated on appropriate safety precautions, positioning, role of OT, AE for self feeding, and benefits of therapy.    Acceptance, E, NR by CL at 2/3/2019  2:41 PM    Comment:  Pt educated on appropriate safety precautions, positioning, role of OT, AE for self feeding, and benefits of therapy.                   Point: Precautions (In Progress)     Description: Instruct learner(s) on prescribed precautions during self-care and functional transfers.    Learning Progress Summary           Patient Acceptance, E, NR by CL at 2/6/2019  3:36 PM    Comment:  Pt educated on appropriate safety precautions, positioning, role of OT, AE for self feeding, and benefits of therapy.    Acceptance, E, NR by CL at 2/3/2019  2:41 PM    Comment:  Pt educated on appropriate safety precautions, positioning, role of OT, AE for self feeding, and benefits of therapy.                   Point: Body mechanics (In Progress)     Description: Instruct learner(s) on proper positioning and spine alignment during self-care, functional mobility activities and/or exercises.    Learning Progress Summary           Patient Acceptance, E, NR by CL at 2/6/2019  3:36 PM    Comment:  Pt educated on appropriate safety precautions, positioning, role of OT, AE for self feeding, and benefits of therapy.                               User Key     Initials Effective Dates Name Provider Type Discipline     04/03/18 -  Liliam Valdes OT Occupational Therapist OT                OT Recommendation and Plan  Outcome Summary/Treatment Plan (OT)  Anticipated Equipment Needs at Discharge  (OT): (TBA further)  Anticipated Discharge Disposition (OT): skilled nursing facility  Therapy Frequency (OT Eval): daily  Plan of Care Review  Plan of Care Reviewed With: patient  Plan of Care Reviewed With: patient  Outcome Summary: Pt session limited d/t c/o fatigue, pain, frequent BM. Pt educated on positioning and compensatory strategies to improve self feeding. Recommend cont skilled IPOT POC.   Outcome Measures     Row Name 02/06/19 1438 02/04/19 1306          How much help from another person do you currently need...    Turning from your back to your side while in flat bed without using bedrails?  --  2  -KR     Moving from lying on back to sitting on the side of a flat bed without bedrails?  --  2  -KR     Moving to and from a bed to a chair (including a wheelchair)?  --  2  -KR     Standing up from a chair using your arms (e.g., wheelchair, bedside chair)?  --  2  -KR     Climbing 3-5 steps with a railing?  --  1  -KR     To walk in hospital room?  --  1  -KR     AM-PAC 6 Clicks Score  --  10  -KR        How much help from another is currently needed...    Putting on and taking off regular lower body clothing?  1  -CL  --     Bathing (including washing, rinsing, and drying)  1  -CL  --     Toileting (which includes using toilet bed pan or urinal)  1  -CL  --     Putting on and taking off regular upper body clothing  1  -CL  --     Taking care of personal grooming (such as brushing teeth)  2  -CL  --     Eating meals  3  -CL  --     Score  9  -CL  --        Functional Assessment    Outcome Measure Options  AM-PAC 6 Clicks Daily Activity (OT)  -CL  AM-PAC 6 Clicks Basic Mobility (PT)  -KR       User Key  (r) = Recorded By, (t) = Taken By, (c) = Cosigned By    Initials Name Provider Type    CL Liliam Valdes, OT Occupational Therapist    Gemma Allison, PT Physical Therapist           Time Calculation:   Time Calculation- OT     Row Name 02/06/19 5142             Time Calculation- OT    OT Start Time  7894   -CL      OT Received On  02/06/19  -CL      OT Goal Re-Cert Due Date  02/13/19  -CL         Timed Charges    77242 - OT Self Care/Mgmt Minutes  8  -CL        User Key  (r) = Recorded By, (t) = Taken By, (c) = Cosigned By    Initials Name Provider Type    CL Liliam Valdes OT Occupational Therapist           Therapy Suggested Charges     Code   Minutes Charges    73627 (CPT®) Hc Ot Neuromusc Re Education Ea 15 Min      87239 (CPT®) Hc Ot Ther Proc Ea 15 Min      95163 (CPT®) Hc Ot Therapeutic Act Ea 15 Min      30059 (CPT®) Hc Ot Manual Therapy Ea 15 Min      19491 (CPT®) Hc Ot Iontophoresis Ea 15 Min      79467 (CPT®) Hc Ot Elec Stim Ea-Per 15 Min      47755 (CPT®) Hc Ot Ultrasound Ea 15 Min      14993 (CPT®) Hc Ot Self Care/Mgmt/Train Ea 15 Min 8 1    Total  8 1        Therapy Charges for Today     Code Description Service Date Service Provider Modifiers Qty    37126017701 HC OT SELF CARE/MGMT/TRAIN EA 15 MIN 2/6/2019 Liliam Valdes OT GO 1               Liliam Valdes OT  2/6/2019

## 2019-02-06 NOTE — PLAN OF CARE
Problem: Patient Care Overview  Goal: Plan of Care Review  Outcome: Ongoing (interventions implemented as appropriate)   02/06/19 9923   Coping/Psychosocial   Plan of Care Reviewed With patient   Plan of Care Review   Progress (FEES)   SLP evaluation completed. Will address dysphagia. Please see note for further details and recommendations.

## 2019-02-06 NOTE — THERAPY WOUND CARE TREATMENT
Acute Care - Wound/Debridement Treatment Note  [facility]     Patient Name: Fabiola Pimentel  : 1934  MRN: [id number]  Today's Date: 2019  Onset of Illness/Injury or Date of Surgery: 19   Date of Referral to PT: 19   Referring Physician: MD Radha       Admit Date: 2019    Visit Dx:    ICD-10-CM ICD-9-CM   1. Impaired functional mobility, balance, gait, and endurance Z74.09 V49.89   2. Impaired mobility and ADLs Z74.09 799.89       Patient Active Problem List   Diagnosis   • ITP (idiopathic thrombocytopenic purpura)   • Palpitations   • Premature atrial contractions   • Hypertension   • Pulmonary hypertension (CMS/HCC)   • DJD (degenerative joint disease)   • Raynaud's phenomenon (secondary)   • Diverticular disease   • CKD (chronic kidney disease) stage 5, GFR less than 15 ml/min (CMS/HCC)   • Leukocytosis   • Anemia due to chronic kidney disease   • Debility   • Hx of renal cell carcinoma   • Chronic diastolic CHF (congestive heart failure) (CMS/HCC)   • Falls frequently   • PEA   • Huge hiatal hernia with intrathoracic stomach   • Mitral valve mass           Wound 19 1429 Left arm incision (Active)   Dressing Appearance open to air 2019 12:00 PM   Closure Open to air 2019 12:00 PM   Drainage Amount none 2019 12:00 PM   Care, Wound cleansed with;soap and water 2019  8:00 AM   Dressing Care, Wound open to air 2019  8:00 AM       Wound Right anterior groin puncture (Active)   Dressing Appearance open to air 2019 12:00 PM   Closure None 2019 12:00 PM   Drainage Amount none 2019 12:00 PM   Care, Wound cleansed with;soap and water 2019  8:00 AM   Dressing Care, Wound open to air 2019  8:00 AM       Wound 19 0827 Bilateral coccyx (Active)   Dressing Appearance dry;intact 2019 12:00 PM   Closure None 2019 12:00 PM   Base dressing in place, unable to visualize 2019 12:00 PM   Periwound intact 2019 12:00 PM   Drainage Amount  none 2/6/2019 12:00 PM   Care, Wound cleansed with;antimicrobial agent applied 2/6/2019  8:00 AM   Dressing Care, Wound dressing changed;low-adherent 2/6/2019  8:00 AM   Periwound Care, Wound dry periwound area maintained 2/6/2019  8:00 AM       Wound 02/01/19 0827 Left heel pressure injury (Active)   Dressing Appearance dry;intact 2/6/2019 12:00 PM   Base dressing in place, unable to visualize 2/6/2019 12:00 PM   Drainage Amount none 2/6/2019 12:00 PM   Dressing Care, Wound dressing reinforced 2/6/2019  8:00 AM   Periwound Care, Wound dry periwound area maintained 2/6/2019  8:00 AM       Wound 02/01/19 1825 Other (See comments) chest incision (Active)   Dressing Appearance dry;intact 2/6/2019 12:00 PM   Closure BARRY 2/6/2019 12:00 PM   Base dressing in place, unable to visualize 2/6/2019 12:00 PM   Drainage Amount none 2/6/2019 12:00 PM   Dressing Care, Wound dressing reinforced 2/5/2019  8:00 PM   Number of Adhesive Closure Strips 2 2/6/2019  8:00 AM       Wound 01/31/19 1600 Bilateral medial coccyx pressure injury (Active)   Wound Image   2/6/2019  9:00 AM   Dressing Appearance dry;intact 2/6/2019 12:00 PM   Closure None 2/6/2019 12:00 PM   Base dressing in place, unable to visualize 2/6/2019 12:00 PM   Black (%), Wound Tissue Color 75 2/6/2019  9:00 AM   Red (%), Wound Tissue Color 10 2/6/2019  9:00 AM   Yellow (%), Wound Tissue Color 15 2/6/2019  9:00 AM   Periwound dry;pink;redness 2/6/2019  9:00 AM   Wound Length (cm) 4 cm 2/6/2019  9:00 AM   Wound Width (cm) 6 cm 2/6/2019  9:00 AM   Wound Depth (cm) 0.1 cm 2/6/2019  9:00 AM   Drainage Characteristics/Odor serosanguineous 2/6/2019  9:00 AM   Drainage Amount none 2/6/2019 12:00 PM   Care, Wound irrigated with;sterile normal saline;ultrasound therapy, non contact low frequency;debrided 2/6/2019  9:00 AM   Dressing Care, Wound foam;low-adherent 2/6/2019  9:00 AM   Periwound Care, Wound topical treatment applied 2/6/2019  9:00 AM       Wound 02/04/19 2000 Right  upper chest puncture (Active)   Dressing Appearance dry;intact 2/6/2019 12:00 PM   Base dressing in place, unable to visualize 2/6/2019 12:00 PM   Periwound intact 2/6/2019  8:00 AM   Drainage Characteristics/Odor serosanguineous 2/6/2019 12:00 PM   Drainage Amount scant 2/6/2019 12:00 PM   Care, Wound cleansed with;sterile normal saline 2/6/2019  8:00 AM   Dressing Care, Wound dressing changed;petroleum-based;low-adherent 2/6/2019  8:00 AM   Periwound Care, Wound dry periwound area maintained 2/6/2019  8:00 AM         WOUND DEBRIDEMENT  Total area of Debridement: ~3cm2  Debridement Site 1  Location- Site 1: sacrum  Selective Debridement- Site 1: Wound Surface <20cmsq  Instruments- Site 1: tweezers, scapel, #15  Excised Tissue Description- Site 1: minimum, slough, eschar  Bleeding- Site 1: seeping, held pressure, 1 minute            Therapy Treatment    Rehabilitation Treatment Summary     Row Name 02/06/19 0900             Treatment Time/Intention    Discipline  physical therapist  -      Document Type  therapy note (daily note);wound care  -      Subjective Information  complains of;weakness;fatigue;pain  -      Mode of Treatment  physical therapy  -      Recorded by [] Neftaly Seaman, PT 02/06/19 1325      Row Name 02/06/19 0900             Positioning and Restraints    Pre-Treatment Position  in bed  -      Post Treatment Position  bed  -MF      In Bed  supine;call light within reach;with nsg  -      Recorded by [] Neftaly Seaman, PT 02/06/19 1325      Row Name 02/06/19 0900             Pain Assessment    Additional Documentation  Pain Scale: FACES Pre/Post-Treatment (Group)  -      Recorded by [] Neftaly Seaman, PT 02/06/19 1325      Row Name 02/06/19 0900             Pain Scale: FACES Pre/Post-Treatment    Pain: FACES Scale, Pretreatment  2-->hurts little bit  -      Pain: FACES Scale, Post-Treatment  4-->hurts little more  -      Pre/Post Treatment Pain Comment  6-8/10 pain  with debridement and cleaning from BM.   -MF      Recorded by [MF] Neftaly Seaman, PT 02/06/19 1325      Row Name                Wound 02/01/19 0827 Bilateral coccyx    Wound - Properties Group Date first assessed: 02/01/19 [AS] Time first assessed: 0827 [AS] Present On Admission : yes [AS] Side: Bilateral [AS] Location: coccyx [AS] Stage, Pressure Injury: unstageable [AS] Additional Comments: -- [MR], probable DTPI  Recorded by:  [AS] Lorri Sommers, RN 02/01/19 1020 [MR] France Layton RN 02/01/19 1315    Row Name                Wound 02/01/19 0827 Left heel pressure injury    Wound - Properties Group Date first assessed: 02/01/19 [AS] Time first assessed: 0827 [AS] Side: Left [AS] Location: heel [AS] Type: pressure injury [AS] Stage, Pressure Injury: deep tissue injury [AS] Recorded by:  [AS] Lorri Sommers RN 02/01/19 1206    Row Name                Wound 02/01/19 1825 Other (See comments) chest incision    Wound - Properties Group Date first assessed: 02/01/19 [KS] Time first assessed: 1825 [KS] Side: Other (See comments) [KS] Location: chest [KS] Type: incision [KS] Recorded by:  [KS] Leonela Caceres RN 02/01/19 1825    Row Name                Wound 01/16/19 1429 Left arm incision    Wound - Properties Group Date first assessed: 01/16/19 [JOSHUA] Time first assessed: 1429 [JOSHUA] Side: Left [JOSHUA] Location: arm [JOSHUA] Type: incision [JOSHUA] Recorded by:  [JOSHUA] Nilam Joshi RN 01/16/19 1429    Row Name 02/06/19 0900             Wound 01/31/19 1600 Bilateral medial coccyx pressure injury    Wound - Properties Group Date first assessed: 01/31/19 [CM] Time first assessed: 1600 [CM] Present On Admission : yes [CM] Side: Bilateral [CM] Orientation: medial [CM] Location: coccyx [CM] Type: pressure injury [CM] Stage, Pressure Injury: Stage 2 [CM] Additional Comments: wound now unstageable with moderate slough / eschar covering.  [MF] Recorded by:  [CM] Trudy Cornejo RN 02/03/19 0718 [MF] Neftaly Seaman, PT  02/06/19 1326    Wound Image  Images linked: 1  -MF      Dressing Appearance  dry;intact  -MF      Base  moist;necrotic;red/granulating;slough;yellow  -MF      Black (%), Wound Tissue Color  75  -MF      Red (%), Wound Tissue Color  10  -MF      Yellow (%), Wound Tissue Color  15  -MF      Periwound  dry;pink;redness  -MF      Wound Length (cm)  4 cm  -MF      Wound Width (cm)  6 cm  -MF      Wound Depth (cm)  0.1 cm  -MF      Drainage Characteristics/Odor  serosanguineous  -MF2      Drainage Amount  small  -MF2      Care, Wound  irrigated with;sterile normal saline;ultrasound therapy, non contact low frequency;debrided MIST x 8 min   -MF2      Dressing Care, Wound  foam;low-adherent venelex  -MF2      Periwound Care, Wound  topical treatment applied zguard  -MF2      Recorded by [MF] Neftaly Seaman, PT 02/06/19 1325  [MF2] Neftaly Seaman, PT 02/06/19 1327      Row Name                Wound 02/04/19 2000 Right upper chest puncture    Wound - Properties Group Date first assessed: 02/04/19 [RS] Time first assessed: 2000 [RS] Present On Admission : no [RS] Side: Right [RS] Orientation: upper [RS] Location: chest [RS] Type: puncture [RS], HD cath with pigtail site (removed)  Recorded by:  [RS] Rodriguez Garcia RN 02/04/19 2230    Row Name 02/06/19 0900             Coping    Observed Emotional State  calm;cooperative  -MF      Verbalized Emotional State  acceptance  -MF      Recorded by [MF] Neftaly Seaman, PT 02/06/19 1327      Row Name 02/06/19 0900             Plan of Care Review    Plan of Care Reviewed With  patient  -MF      Recorded by [MF] Neftaly Seaman, PT 02/06/19 1327      Row Name 02/06/19 0900             Outcome Summary/Treatment Plan (PT)    Daily Summary of Progress (PT)  unable to show any progress toward functional goals pt with moderate necrotic tissue covering wound  -MF      Barriers to Overall Progress (PT)  limited mobility and poor PO intake   -MF      Plan for Continued  Treatment (PT)  cont with mist 2-3 x/ week with debridement prn   -MF      Recorded by [MF] Neftaly Seaman R, PT 02/06/19 1327        User Key  (r) = Recorded By, (t) = Taken By, (c) = Cosigned By    Initials Name Effective Dates Discipline    Neftaly Moore, PT 06/19/15 -  PT    Rodriguez Pittman, RN 06/30/16 -  Nurse    Nilam Martinez, RN 06/16/16 -  Nurse     Calin France URIBE, RN 06/16/16 -  Nurse    Leonela Quezada RN 05/09/17 -  Nurse    Lorri Lamb, RN 06/16/16 -  Nurse    Trudy Peterson RN 06/21/17 -  Nurse          Physical Therapy Education     Title: PT OT SLP Therapies (In Progress)     Topic: Physical Therapy (In Progress)     Point: Mobility training (In Progress)     Learning Progress Summary           Patient Acceptance, E, NR by  at 2/4/2019  1:06 PM    Acceptance, E,D, NR by  at 2/2/2019  2:48 PM                   Point: Home exercise program (In Progress)     Learning Progress Summary           Patient Acceptance, E, NR by KR at 2/4/2019  1:06 PM    Acceptance, E,D, NR by  at 2/2/2019  2:48 PM                   Point: Body mechanics (In Progress)     Learning Progress Summary           Patient Acceptance, E, NR by KR at 2/4/2019  1:06 PM    Acceptance, E,D, NR by  at 2/2/2019  2:48 PM                   Point: Precautions (In Progress)     Learning Progress Summary           Patient Acceptance, E, NR by  at 2/4/2019  1:06 PM    Acceptance, E,D, NR by  at 2/2/2019  2:48 PM                               User Key     Initials Effective Dates Name Provider Type Discipline     06/19/15 -  Tanesha Castro, PT Physical Therapist PT    SIMIN 04/03/18 -  Gemma Ying, PT Physical Therapist PT                  PT Recommendation and Plan  Anticipated Discharge Disposition (PT): skilled nursing facility  Planned Therapy Interventions (PT Eval): bed mobility training, balance training, gait training, home exercise program, patient/family education,  strengthening, transfer training  Therapy Frequency (PT Clinical Impression): daily            Outcome Summary/Treatment Plan (PT)  Daily Summary of Progress (PT): unable to show any progress toward functional goals(pt with moderate necrotic tissue covering wound)  Barriers to Overall Progress (PT): limited mobility and poor PO intake   Plan for Continued Treatment (PT): cont with mist 2-3 x/ week with debridement prn   Daily Summary of Progress (PT): unable to show any progress toward functional goals(pt with moderate necrotic tissue covering wound)  Plan for Continued Treatment (PT): cont with mist 2-3 x/ week with debridement prn   Outcome Summary: sacral wound now with moderate slough/eschar covering with limited progress noted to this point. PT will cont with debridement and mist therapy 2-3 x/week with continued expectation of limited progress due to limited mobility and multiple medical issues.   Plan of Care Reviewed With: patient    Outcome Measures     Row Name 02/04/19 1306             How much help from another person do you currently need...    Turning from your back to your side while in flat bed without using bedrails?  2  -KR      Moving from lying on back to sitting on the side of a flat bed without bedrails?  2  -KR      Moving to and from a bed to a chair (including a wheelchair)?  2  -KR      Standing up from a chair using your arms (e.g., wheelchair, bedside chair)?  2  -KR      Climbing 3-5 steps with a railing?  1  -KR      To walk in hospital room?  1  -KR      AM-PAC 6 Clicks Score  10  -KR         Functional Assessment    Outcome Measure Options  AM-PAC 6 Clicks Basic Mobility (PT)  -KR        User Key  (r) = Recorded By, (t) = Taken By, (c) = Cosigned By    Initials Name Provider Type    Gemma Allison, PT Physical Therapist              Time Calculation  PT Charges     Row Name 02/06/19 0900             Time Calculation    Start Time  0900  -MF      PT Goal Re-Cert Due Date  02/12/19   -        User Key  (r) = Recorded By, (t) = Taken By, (c) = Cosigned By    Initials Name Provider Type    MF Neftaly Seaman, PT Physical Therapist         Therapy Suggested Charges     Code   Minutes Charges    51922 (CPT®) Hc Pt Neuromusc Re Education Ea 15 Min      13735 (CPT®) Hc Pt Ther Proc Ea 15 Min      11632 (CPT®) Hc Gait Training Ea 15 Min      93965 (CPT®) Hc Pt Therapeutic Act Ea 15 Min 23 2    76841 (CPT®) Hc Pt Manual Therapy Ea 15 Min      37118 (CPT®) Hc Pt Iontophoresis Ea 15 Min      07290 (CPT®) Hc Pt Elec Stim Ea-Per 15 Min      97806 (CPT®) Hc Pt Ultrasound Ea 15 Min      99733 (CPT®) Hc Pt Self Care/Mgmt/Train Ea 15 Min      45078 (CPT®) Hc Pt Prosthetic (S) Train Initial Encounter, Each 15 Min      54553 (CPT®) Hc Pt Orthotic(S)/Prosthetic(S) Encounter, Each 15 Min      12418 (CPT®) Hc Orthotic(S) Mgmt/Train Initial Encounter, Each 15min      Total  23 2          Therapy Charges for Today     Code Description Service Date Service Provider Modifiers Qty    78720257146 HC PAM DEBRIDE OPEN WOUND UP TO 20CM 2/6/2019 Neftaly Seaman, PT GP 1    45997290880 HC PT NLFU MIST 2/6/2019 Neftaly Seaman, PT GP 1            PT G-Codes  Outcome Measure Options: AM-PAC 6 Clicks Basic Mobility (PT)  AM-PAC 6 Clicks Score: 10  Score: 8        Neftaly Seaman, PT  2/6/2019

## 2019-02-06 NOTE — PLAN OF CARE
Problem: Patient Care Overview  Goal: Plan of Care Review  Outcome: Ongoing (interventions implemented as appropriate)   02/06/19 1300   Coping/Psychosocial   Plan of Care Reviewed With patient   OTHER   Outcome Summary Pt session limited d/t c/o fatigue, pain, frequent BM. Pt educated on positioning and compensatory strategies to improve self feeding. Recommend cont skilled IPOT POC.

## 2019-02-07 NOTE — PROGRESS NOTES
Clinical Nutrition     Multidisciplinary Rounds    Time: 20min  Patient Name: Fabiola Pimentel  Date of Encounter: 02/07/19 9:10 AM  MRN: [id number]  Admission date: 1/31/2019      Reason for visit: MDR. RD to continue to follow per protocol.     Pt resting in bed, reports sore throat is better, swallowing improved, is drinking resource breeze    Current diet: Diet Dysphagia; IV - Mechanical Soft No Mixed Consistencies; Nectar / Syrup Thick; Renal  RFB 3x/day  Magic Cups 3x/day    Intervention:  Follow treatment plan  Care plan reviewed  Supplement added    Follow up:   Per protocol      Nora Gomez RD  9:10 AM

## 2019-02-07 NOTE — PROGRESS NOTES
"Pharmacy Consult-Vancomycin Dosing  Fabiola Pimentel is a  84 y.o. female receiving vancomycin therapy.     Indication: Sepsis  Consulting Provider: Chung Ferrari MD  Goal Trough:15-20 mcg/mL    Current Antimicrobial Therapy  Anti-Infectives (From admission, onward)      Ordered     Dose/Rate Route Frequency Start Stop    02/06/19 1434  meropenem (MERREM) 500mg/100 mL 0.9% NS IVPB (mbp)     Comments:  On dialysis days, give dose after dialysis.   Ordering Provider:  Lorri Grey, RPH    500 mg  over 3 Hours Intravenous Every 24 Hours Scheduled 02/07/19 1400 02/13/19 1359    02/06/19 1434  vancomycin (VANCOCIN) in iso-osmotic dextrose IVPB 1 g (premix) 200 mL     Ordering Provider:  Lorri Grey RPH    1,000 mg  over 60 Minutes Intravenous Once 02/07/19 1300      02/06/19 1434  fluconazole (DIFLUCAN) IVPB 200 mg     Ordering Provider:  Lorri Grey RPH    200 mg  over 60 Minutes Intravenous Daily 02/07/19 1200 02/13/19 1159    02/06/19 0929  fluconazole (DIFLUCAN) IVPB 200 mg     Ordering Provider:  Chung Ferrari MD    200 mg  over 60 Minutes Intravenous Once 02/06/19 1015 02/06/19 1152    01/31/19 1716  vancomycin 1750 mg/500 mL 0.9% NS IVPB ([facility])     Ordering Provider:  Tammi Brown APRN    25 mg/kg × 70 kg  over 120 Minutes Intravenous Once 01/31/19 1815 01/31/19 2012          Allergies  Allergies as of 01/31/2019 - Reviewed 01/31/2019   Allergen Reaction Noted   • Benadryl [diphenhydramine]  10/12/2016   • Diltiazem  10/12/2016   • Micardis [telmisartan]  10/12/2016   • Other  10/12/2016     Labs    Results from last 7 days   Lab Units 02/07/19  0543 02/06/19  0427 02/05/19  0645   BUN mg/dL 41* 31* 46*   CREATININE mg/dL 3.91* 3.11* 4.18*     Results from last 7 days   Lab Units 02/07/19  0543 02/06/19  0427 02/05/19  0645   WBC 10*3/mm3 27.21* 25.95* 21.70*     Evaluation of Dosing     Is Patient on Dialysis or Renal Replacement: HD (TTS)    Ht - 163.8 cm (64.5\")  Wt - 66.1 kg (145 lb 11.6 oz)    Estimated " Creatinine Clearance: 11.2 mL/min (A) (by C-G formula based on SCr of 3.91 mg/dL (H)).    Intake & Output (last 3 days)         02/04 0701 - 02/05 0700 02/05 0701 - 02/06 0700 02/06 0701 - 02/07 0700 02/07 0701 - 02/08 0700    P.O. 120  336 100    I.V. (mL/kg)   66 (1) 30 (0.5)    IV Piggyback 50 170 400 100    Total Intake(mL/kg) 170 (2.6) 170 (2.6) 802 (12.1) 230 (3.5)    Other  2720      Total Output  2720      Net +170 -2550 +802 +230            Urine Unmeasured Occurrence 1 x  1 x           Microbiology and Radiology  Microbiology Results (last 10 days)       Procedure Component Value - Date/Time    Blood Culture - Blood, Arm, Right [[id number]] Collected:  02/06/19 1047    Lab Status:  Preliminary result Specimen:  Blood from Arm, Right Updated:  02/07/19 1115     Blood Culture No growth at 24 hours          Evaluation of Level    Lab Results   Component Value Date    VANCORANDOM 22.50 02/06/2019       Assessment/Plan:   1. Patient received last dose of Vancomycin 1750 mg IV on 1/31/19. Random Vancomycin level was supratherapeutic at ~58 mcg/mL on 2/1/19. On 2/6/19 Random vancomycin level was ~22.5 mcg/mL. Pt is scheduled to receive HD on morning of 2/7/19. Will give Vancomycin 1000 mg IV once today following HD session. Pt is on day 8/10 of Vancomycin treatment.  2. Will monitor BMP to assess renal function(SrCr/BUN/UOP) and changed in clinical status and adjust accordingly.   3. Will adjust accordingly to meet trough goal of 15-20 mcg/mL.   4. Pharmacy will follow    Raven Velasquez, PharmD Candidate 2019

## 2019-02-07 NOTE — PROGRESS NOTES
"[facility] Progress Note     LOS: 7 days   Patient Care Team:  Otilio Smith DO as PCP - General (Family Medicine)  PCP:  Otilio Smith DO    Chief Complaint:  PEA arrest    SUBJECTIVE: Lying in bed undergoing hemodialysis.  Tolerated hemodialysis well, -2 liters with this run.    Telemetry reveals sinus rhythm.  No clinical change.    Review of Systems:   All systems have been reviewed and are negative with the exception of those mentioned above.      OBJECTIVE:    Vital Sign Min/Max for last 24 hours  Temp  Min: 97.4 °F (36.3 °C)  Max: 98.2 °F (36.8 °C)   BP  Min: 96/47  Max: 182/78   Pulse  Min: 58  Max: 70   Resp  Min: 16  Max: 18   SpO2  Min: 92 %  Max: 99 %   No Data Recorded   No Data Recorded     Flowsheet Rows      First Filed Value   Admission Height  163.8 cm (64.5\") Documented at 02/01/2019 0300   Admission Weight  70 kg (154 lb 4.8 oz) Documented at 01/31/2019 1607          Telemetry: Sinus rhythm      Intake/Output Summary (Last 24 hours) at 2/7/2019 1358  Last data filed at 2/7/2019 1300  Gross per 24 hour   Intake 596 ml   Output 2220 ml   Net -1624 ml     Intake & Output (last 3 days)       02/04 0701 - 02/05 0700 02/05 0701 - 02/06 0700 02/06 0701 - 02/07 0700 02/07 0701 - 02/08 0700    P.O. 120  336 100    I.V. (mL/kg)   66 (1) 30 (0.5)    IV Piggyback 50 170 400 100    Total Intake(mL/kg) 170 (2.6) 170 (2.6) 802 (12.1) 230 (3.5)    Other  2720  2220    Total Output  2720  2220    Net +170 -2550 +802 -1990            Urine Unmeasured Occurrence 1 x  1 x            Physical Exam:    General Appearance:    Alert, cooperative, no distress, appears stated age   Neck:   Supple, symmetrical, trachea midline.   Lungs:     Clear to auscultation bilaterally, respirations unlabored   Chest Wall:    No tenderness or deformity    Heart:    Regular rate and rhythm, S1 and S2 normal, II/6 holosystolic murmur apex, rub   or gallop, normal carotid impulse " bilaterally without bruit.   Extremities:   Extremities normal, atraumatic, no cyanosis or edema   Pulses:   2+ and symmetric all extremities   Skin:   Skin color, texture, turgor normal, no rashes or lesions      LABS/DIAGNOSTIC DATA:  Results from last 7 days   Lab Units 02/07/19  0543 02/06/19 0427 02/05/19 0645   WBC 10*3/mm3 27.21* 25.95* 21.70*   HEMOGLOBIN g/dL 9.9* 9.9* 10.0*   HEMATOCRIT % 32.3* 31.3* 31.0*   PLATELETS 10*3/mm3 263 204 162     No results found for: TROPONINT      Results from last 7 days   Lab Units 02/07/19  0543 02/06/19 0427 02/05/19 0645 02/04/19 0441 02/02/19  0743   SODIUM mmol/L 138 139 138 139   < > 141   POTASSIUM mmol/L 3.8 3.9 3.9 3.8   < > 4.3   CHLORIDE mmol/L 106 106 106 107   < > 108   CO2 mmol/L 18.0* 23.0 22.0 23.0   < > 19.0*   BUN mg/dL 41* 31* 46* 39*   < > 52*   CREATININE mg/dL 3.91* 3.11* 4.18* 3.23*   < > 3.79*   CALCIUM mg/dL 7.8* 7.8* 7.3* 6.8*   < > 6.7*   BILIRUBIN mg/dL 0.3  --   --  0.5  --  0.4   ALK PHOS U/L 69  --   --  53  --  46   ALT (SGPT) U/L 10  --   --  10  --  14   AST (SGOT) U/L 18  --   --  23  --  34*   GLUCOSE mg/dL 82 87 78 104*   < > 58*    < > = values in this interval not displayed.     Results from last 7 days   Lab Units 01/31/19  1758   HEMOGLOBIN A1C % 5.60     Results from last 7 days   Lab Units 02/01/19  0441   CHOLESTEROL mg/dL 70   TRIGLYCERIDES mg/dL 91   HDL CHOL mg/dL 18*   LDL CHOL mg/dL 25     Results from last 7 days   Lab Units 01/31/19  1758   TSH mIU/mL 1.851           Medication Review:     bisoprolol 5 mg Oral Nightly   calcitriol 0.25 mcg Oral Daily   castor oil-balsam peru  Topical Q12H   epoetin alexus 10,000 Units Subcutaneous Once per day on Tue Thu Sat   fluconazole 200 mg Intravenous Daily   heparin (porcine) 5,000 Units Subcutaneous Q8H   meropenem 500 mg Intravenous Q24H   senna 1 tablet Oral BID   sodium chloride 3 mL Intravenous Q12H   vancomycin 1,000 mg Intravenous Once              PEA    ITP (idiopathic  thrombocytopenic purpura)    Pulmonary hypertension (CMS/HCC)    CKD (chronic kidney disease) stage 5, GFR less than 15 ml/min (CMS/HCC)    Leukocytosis    Anemia due to chronic kidney disease    Debility    Hx of renal cell carcinoma    Chronic diastolic CHF (congestive heart failure) (CMS/HCC)    Falls frequently    Huge hiatal hernia with intrathoracic stomach    Mitral valve mass      ASSESSMENT/PLAN:  Stable from a cardiac standpoint for transfer to telemetry floor  Continue bisoprolol  Repeat limited echo in one to 2 weeks to verify stability of mitral valve abnormality.        Malachi Maxwell III, MD   02/07/19  1:58 PM

## 2019-02-07 NOTE — PLAN OF CARE
Problem: Patient Care Overview  Goal: Plan of Care Review  Outcome: Ongoing (interventions implemented as appropriate)   02/07/19 5685   Coping/Psychosocial   Plan of Care Reviewed With patient   Plan of Care Review   Progress no change   OTHER   Outcome Summary Hd this am, VSS, remains on 2L NC. Pt having some urine output now. afebrile, pulmonary toilet encouraged.

## 2019-02-07 NOTE — PROGRESS NOTES
"   LOS: 7 days    Patient Care Team:  Otilio Smith DO as PCP - General (Family Medicine)        Subjective     Interval History:     No acute events overnight. No new complaints.    Review of Systems:   No CP or SOA    Objective     Vital Sign Min/Max for last 24 hours  Temp  Min: 97.4 °F (36.3 °C)  Max: 98.2 °F (36.8 °C)   BP  Min: 111/49  Max: 182/78   Pulse  Min: 58  Max: 71   Resp  Min: 16  Max: 20   SpO2  Min: 92 %  Max: 99 %   No Data Recorded   No Data Recorded     Flowsheet Rows      First Filed Value   Admission Height  163.8 cm (64.5\") Documented at 02/01/2019 0300   Admission Weight  70 kg (154 lb 4.8 oz) Documented at 01/31/2019 1607          I/O this shift:  In: 230 [P.O.:100; I.V.:30; IV Piggyback:100]  Out: -   I/O last 3 completed shifts:  In: 852 [P.O.:336; I.V.:66; IV Piggyback:450]  Out: -     Physical Exam:     General Appearance:    Alert, cooperative, in no acute distress   Head:    Normocephalic, without obvious abnormality, atraumatic               Neck:   No adenopathy, supple, trachea midline, no thyromegaly, no     carotid bruit, no JVD       Lungs:     Diminished air entry,respirations regular, even and                   unlabored    Heart:    Regular rhythm and normal rate, normal S1 and S2, no            murmur, no gallop, no rub, no click       Abdomen:     Normal bowel sounds, no masses, no organomegaly, soft        non-tender, non-distended, no guarding, no rebound                 tenderness       Extremities:   Moves all extremities well, no edema, no cyanosis, no              redness               Neurologic:   No focal deficit noted grossly.        WBC WBC   Date Value Ref Range Status   02/07/2019 27.21 (H) 3.50 - 10.80 10*3/mm3 Final   02/06/2019 25.95 (H) 3.50 - 10.80 10*3/mm3 Final     Comment:     WBC corrected for presence of NRBC's  Modified report. Previous result was 27.25 10*3/mm3 on 2/6/2019 at 0625 EST.   02/05/2019 21.70 (H) 3.50 - 10.80 10*3/mm3 Final     " Comment:     WBC corrected for presence of NRBC's  Modified report. Previous result was 22.57 10*3/mm3 on 2/5/2019 at 0900 EST.      HGB Hemoglobin   Date Value Ref Range Status   02/07/2019 9.9 (L) 11.5 - 15.5 g/dL Final   02/06/2019 9.9 (L) 11.5 - 15.5 g/dL Final   02/05/2019 10.0 (L) 11.5 - 15.5 g/dL Final      HCT Hematocrit   Date Value Ref Range Status   02/07/2019 32.3 (L) 34.5 - 44.0 % Final   02/06/2019 31.3 (L) 34.5 - 44.0 % Final   02/05/2019 31.0 (L) 34.5 - 44.0 % Final      Platlets No results found for: LABPLAT   MCV MCV   Date Value Ref Range Status   02/07/2019 93.6 80.0 - 99.0 fL Final   02/06/2019 92.3 80.0 - 99.0 fL Final   02/05/2019 89.6 80.0 - 99.0 fL Final          Sodium Sodium   Date Value Ref Range Status   02/07/2019 138 132 - 146 mmol/L Final   02/06/2019 139 132 - 146 mmol/L Final   02/05/2019 138 132 - 146 mmol/L Final      Potassium Potassium   Date Value Ref Range Status   02/07/2019 3.8 3.5 - 5.5 mmol/L Final   02/06/2019 3.9 3.5 - 5.5 mmol/L Final   02/05/2019 3.9 3.5 - 5.5 mmol/L Final      Chloride Chloride   Date Value Ref Range Status   02/07/2019 106 99 - 109 mmol/L Final   02/06/2019 106 99 - 109 mmol/L Final   02/05/2019 106 99 - 109 mmol/L Final      CO2 CO2   Date Value Ref Range Status   02/07/2019 18.0 (L) 20.0 - 31.0 mmol/L Final   02/06/2019 23.0 20.0 - 31.0 mmol/L Final   02/05/2019 22.0 20.0 - 31.0 mmol/L Final      BUN BUN   Date Value Ref Range Status   02/07/2019 41 (H) 9 - 23 mg/dL Final   02/06/2019 31 (H) 9 - 23 mg/dL Final   02/05/2019 46 (H) 9 - 23 mg/dL Final      Creatinine Creatinine   Date Value Ref Range Status   02/07/2019 3.91 (H) 0.60 - 1.30 mg/dL Final   02/06/2019 3.11 (H) 0.60 - 1.30 mg/dL Final   02/05/2019 4.18 (H) 0.60 - 1.30 mg/dL Final      Calcium Calcium   Date Value Ref Range Status   02/07/2019 7.8 (L) 8.7 - 10.4 mg/dL Final   02/06/2019 7.8 (L) 8.7 - 10.4 mg/dL Final   02/05/2019 7.3 (L) 8.7 - 10.4 mg/dL Final      PO4 No results found  for: CAPO4   Albumin Albumin   Date Value Ref Range Status   02/07/2019 2.95 (L) 3.20 - 4.80 g/dL Final   02/06/2019 3.11 (L) 3.20 - 4.80 g/dL Final      Magnesium Magnesium   Date Value Ref Range Status   02/07/2019 1.9 1.3 - 2.7 mg/dL Final      Uric Acid No results found for: URICACID        Results Review:     I reviewed the patient's new clinical results.      bisoprolol 5 mg Oral Nightly   calcitriol 0.25 mcg Oral Daily   castor oil-balsam peru  Topical Q12H   epoetin alexus 10,000 Units Subcutaneous Once per day on Tue Thu Sat   fluconazole 200 mg Intravenous Daily   meropenem 500 mg Intravenous Q24H   senna 1 tablet Oral BID   sodium chloride 3 mL Intravenous Q12H   vancomycin 1,000 mg Intravenous Once          Medication Review:     Assessment/Plan       PEA    ITP (idiopathic thrombocytopenic purpura)    Pulmonary hypertension (CMS/HCC)    CKD (chronic kidney disease) stage 5, GFR less than 15 ml/min (CMS/HCC)    Leukocytosis    Anemia due to chronic kidney disease    Debility    Hx of renal cell carcinoma    Chronic diastolic CHF (congestive heart failure) (CMS/HCC)    Falls frequently    Huge hiatal hernia with intrathoracic stomach    Mitral valve mass      1- 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   3- PEA for 3 min with Resolution of spontaneous circulation and rhythm.   4- Anemia of chronic disease.   5- Dialysis access - S/p tunneled catheter. AVF 3 week old.     Plan:  - Patient seen on dialysis.   - Renal diet  - Epo and IV iron with HD.       Discussed with MAXWELL Proctor MD  02/07/19  10:07 AM

## 2019-02-07 NOTE — PROGRESS NOTES
Continued Stay Note  [facility]     Patient Name: Fabiola Pimentel  MRN: [id number]  Today's Date: 2/7/2019    Admit Date: 1/31/2019    Discharge Plan     Row Name 02/07/19 1015       Plan    Plan  [facility]    Patient/Family in Agreement with Plan  yes    Plan Comments  [place] [facility] [place]  Updated Jovana probably won't be ready before the first of next week.  CM will continue to follow.        Discharge Codes    No documentation.       Expected Discharge Date and Time     Expected Discharge Date Expected Discharge Time    Feb 11, 2019             Thea Singleton RN

## 2019-02-07 NOTE — PLAN OF CARE
Problem: Patient Care Overview  Goal: Plan of Care Review  Outcome: Ongoing (interventions implemented as appropriate)   02/07/19 1777   Coping/Psychosocial   Plan of Care Reviewed With patient   SLP treatment completed. Will continue to address dysphagia in tx. Please see note for further details and recommendations.

## 2019-02-07 NOTE — THERAPY TREATMENT NOTE
Acute Care - Speech Language Pathology   Swallow Treatment Note [facility]     Patient Name: Fabiola Pimentel  : 1934  MRN: [id number]  Today's Date: 2019  Onset of Illness/Injury or Date of Surgery: 19     Referring Physician: MD Radha      Admit Date: 2019    Visit Dx:      ICD-10-CM ICD-9-CM   1. Impaired functional mobility, balance, gait, and endurance Z74.09 V49.89   2. Impaired mobility and ADLs Z74.09 799.89   3. Pharyngeal dysphagia R13.13 787.23   4. PEA I46.9 427.5     Patient Active Problem List   Diagnosis   • ITP (idiopathic thrombocytopenic purpura)   • Palpitations   • Premature atrial contractions   • Hypertension   • Pulmonary hypertension (CMS/HCC)   • DJD (degenerative joint disease)   • Raynaud's phenomenon (secondary)   • Diverticular disease   • CKD (chronic kidney disease) stage 5, GFR less than 15 ml/min (CMS/HCC)   • Leukocytosis   • Anemia due to chronic kidney disease   • Debility   • Hx of renal cell carcinoma   • Chronic diastolic CHF (congestive heart failure) (CMS/HCC)   • Falls frequently   • PEA   • Huge hiatal hernia with intrathoracic stomach   • Mitral valve mass       Therapy Treatment  Rehabilitation Treatment Summary     Row Name 19 1527             Treatment Time/Intention    Discipline  speech language pathologist  -AC      Document Type  therapy note (daily note)  -AC      Subjective Information  no complaints  -AC      Patient/Family Observations  Pt alert, cooperative. No family present.  -AC      Care Plan Review  evaluation/treatment results reviewed;care plan/treatment goals reviewed;risks/benefits reviewed;patient/other agree to care plan;current/potential barriers reviewed  -AC      Therapy Frequency (Swallow)  5 days per week  -AC      Patient Effort  good  -AC      Recorded by [AC] Deisi Nava, MS CCC-SLP 19 4922      Row Name 19 1525             Pain Assessment    Additional Documentation  Pain Scale: Numbers  Pre/Post-Treatment (Group)  -AC      Recorded by [AC] Deisi Nava MS CCC-SLP 02/07/19 1549      Row Name 02/07/19 1527             Pain Scale: Numbers Pre/Post-Treatment    Pain Scale: Numbers, Pretreatment  0/10 - no pain  -AC      Pain Scale: Numbers, Post-Treatment  0/10 - no pain  -AC      Recorded by [AC] Deisi Nava MS CCC-SLP 02/07/19 1549      Row Name                Wound 02/01/19 0827 Bilateral coccyx    Wound - Properties Group Date first assessed: 02/01/19 [AS] Time first assessed: 0827 [AS] Present On Admission : yes [AS] Side: Bilateral [AS] Location: coccyx [AS] Stage, Pressure Injury: unstageable [AS] Additional Comments: -- [MR], probable DTPI  Recorded by:  [AS] Lorri Sommers RN 02/01/19 1020 [MR] France Layton RN 02/01/19 1315    Row Name                Wound 02/01/19 0827 Left heel pressure injury    Wound - Properties Group Date first assessed: 02/01/19 [AS] Time first assessed: 0827 [AS] Side: Left [AS] Location: heel [AS] Type: pressure injury [AS] Stage, Pressure Injury: deep tissue injury [AS] Recorded by:  [AS] Lorri Sommers RN 02/01/19 1206    Row Name                Wound 02/01/19 1825 Other (See comments) chest incision    Wound - Properties Group Date first assessed: 02/01/19 [KS] Time first assessed: 1825 [KS] Side: Other (See comments) [KS] Location: chest [KS] Type: incision [KS] Recorded by:  [KS] Leonela Caceres RN 02/01/19 1825    Row Name                Wound 01/16/19 1429 Left arm incision    Wound - Properties Group Date first assessed: 01/16/19 [JOSHUA] Time first assessed: 1429 [JOSHUA] Side: Left [JOSHUA] Location: arm [JOSHUA] Type: incision [JOSHUA] Recorded by:  [JOSHUA] Nilam Joshi RN 01/16/19 1429    Row Name                Wound 01/31/19 1600 Bilateral medial coccyx pressure injury    Wound - Properties Group Date first assessed: 01/31/19 [CM] Time first assessed: 1600 [CM] Present On Admission : yes [CM] Side: Bilateral [CM] Orientation: medial [CM] Location: coccyx  [CM] Type: pressure injury [CM] Stage, Pressure Injury: Stage 2 [CM] Additional Comments: wound now unstageable with moderate slough / eschar covering.  [] Recorded by:  [CM] Trudy Cornejo RN 02/03/19 0718 [MF] Neftaly Seaman, PT 02/06/19 1326    Row Name                Wound 02/04/19 2000 Right upper chest puncture    Wound - Properties Group Date first assessed: 02/04/19 [RS] Time first assessed: 2000 [RS] Present On Admission : no [RS] Side: Right [RS] Orientation: upper [RS] Location: chest [RS] Type: puncture [RS], HD cath with pigtail site (removed)  Recorded by:  [RS] Rodriguez Garcia RN 02/04/19 2230    Row Name 02/07/19 1527             Outcome Summary/Treatment Plan (SLP)    Daily Summary of Progress (SLP)  progress toward functional goals is good  -AC      Plan for Continued Treatment (SLP)  Cont dysphagia level IV diet (mech soft, no mixed consistencies), nectar-thick liquid. General aspiration precautions & oral care BID/PRN. Pt would benefit from cont'd dysphagia tx.   -AC2      Anticipated Dischage Disposition  anticipate therapy at next level of care  -AC      Recorded by [AC] Deisi Nava, MS CCC-SLP 02/07/19 1549  [AC2] Deisi Nava, MS CCC-SLP 02/07/19 1553        User Key  (r) = Recorded By, (t) = Taken By, (c) = Cosigned By    Initials Name Effective Dates Discipline     Neftaly Seaman, PT 06/19/15 -  PT    AC Deisi Nava, MS CCC-SLP 07/27/17 -  SLP    RS Rodriguez Garcia RN 06/30/16 -  Nurse    Nilam Martinez RN 06/16/16 -  Nurse    France Britt RN 06/16/16 -  Nurse    Leonela Quezada RN 05/09/17 -  Nurse    Lorri Lamb RN 06/16/16 -  Nurse    Trudy Peterson RN 06/21/17 -  Nurse          Outcome Summary  Outcome Summary/Treatment Plan (SLP)  Daily Summary of Progress (SLP): progress toward functional goals is good (02/07/19 1527 : Deisi Nava, MS CCC-SLP)  Plan for Continued Treatment (SLP): Cont dysphagia level IV diet  (mech soft, no mixed consistencies), nectar-thick liquid. General aspiration precautions & oral care BID/PRN. Pt would benefit from cont'd dysphagia tx.  (02/07/19 1527 : Deisi Nava, MS CCC-SLP)  Anticipated Dischage Disposition: anticipate therapy at next level of care (02/07/19 1527 : Deisi Nava, MS CCC-SLP)      SLP GOALS     Row Name 02/07/19 1527 02/06/19 1200          Oral Nutrition/Hydration Goal 1 (SLP)    Oral Nutrition/Hydration Goal 1, SLP  LTG:  Patient will tolerate reg diet and thin liquids with no s/s of pen/asp   -AC  LTG:  Patient will tolerate reg diet and thin liquids with no s/s of pen/asp   -AV     Time Frame (Oral Nutrition/Hydration Goal 1, SLP)  by discharge  -AC  by discharge  -AV     Progress/Outcomes (Oral Nutrition/Hydration Goal 1, SLP)  continuing progress toward goal  -AC  --        Oral Nutrition/Hydration Goal 2 (SLP)    Oral Nutrition/Hydration Goal 2, SLP  Pt will tolerate trials of soft solids and nectar-thick liquids w/o s/sxs aspiration w/ 100% acc w/o cues.  -AC  --     Time Frame (Oral Nutrition/Hydration Goal 2, SLP)  short term goal (STG);by discharge  -AC  --     Barriers (Oral Nutrition/Hydration Goal 2, SLP)  Pt tolerated trials of nectar via straw w/o s/sxs aspiration. General coughing t/o eval, but did not appear to be r/t swallowing.  -AC  --     Progress/Outcomes (Oral Nutrition/Hydration Goal 2, SLP)  continuing progress toward goal  -AC  --        Oral Nutrition/Hydration Goal (SLP)    Oral Nutrition/Hydration Goal, SLP  Pt will tolerate therapeutic trials of H2O w/o s/sxs aspiration w/ 70% acc w/o cues.  -AC  --     Time Frame (Oral Nutrition/Hydration Goal, SLP)  short term goal (STG);by discharge  -AC  --     Progress/Outcomes (Oral Nutrition/Hydration Goal, SLP)  goal ongoing  -AC  --        Pharyngeal Strengthening Exercise Goal 1 (SLP)    Activity (Pharyngeal Strengthening Goal 1, SLP)  --  increase superior movement of the hyolaryngeal  complex;increase anterior movement of the hyolaryngeal complex  -AV     Increase Superior Movement of the Hyolaryngeal Complex  supraglottic swallow;hard effortful swallow  -AC  supraglottic swallow;hard effortful swallow  -AV     Increase Anterior Movement of the Hyolaryngeal Complex  supraglottic swallow;hard effortful swallow  -AC  supraglottic swallow;hard effortful swallow  -AV     Braxton/Accuracy (Pharyngeal Strengthening Goal 1, SLP)  with minimal cues (75-90% accuracy)  -AC  with minimal cues (75-90% accuracy)  -AV     Time Frame (Pharyngeal Strengthening Goal 1, SLP)  short term goal (STG);by discharge  -AC  short term goal (STG);by discharge  -AV     Barriers (Pharyngeal Strengthening Goal 1, SLP)  Introduced exercises and provided handout. Effortful swallow x10, SGS x5  -AC  --     Progress/Outcomes (Pharyngeal Strengthening Goal 1, SLP)  continuing progress toward goal  -AC  --       User Key  (r) = Recorded By, (t) = Taken By, (c) = Cosigned By    Initials Name Provider Type    AC Deisi Nava, MS CCC-SLP Speech and Language Pathologist    Mary Golden, MS CCC-SLP Speech and Language Pathologist          EDUCATION  The patient has been educated in the following areas:   Dysphagia (Swallowing Impairment) Oral Care/Hydration Modified Diet Instruction.    SLP Recommendation and Plan   Anticipated Dischage Disposition: anticipate therapy at next level of care     Therapy Frequency (Swallow): 5 days per week        Plan of Care Reviewed With: patient  Plan of Care Review  Plan of Care Reviewed With: patient  Daily Summary of Progress (SLP): progress toward functional goals is good  Plan for Continued Treatment (SLP): Cont dysphagia level IV diet (mech soft, no mixed consistencies), nectar-thick liquid. General aspiration precautions & oral care BID/PRN. Pt would benefit from cont'd dysphagia tx.       Time Calculation:   Time Calculation- SLP     Row Name 02/07/19 9637             Time  Calculation- SLP    SLP Start Time  1527  -      SLP Received On  02/07/19  -        User Key  (r) = Recorded By, (t) = Taken By, (c) = Cosigned By    Initials Name Provider Type     Deisi Nava MS CCC-SLP Speech and Language Pathologist          Therapy Charges for Today     Code Description Service Date Service Provider Modifiers Qty    [id number]  ST TREATMENT SWALLOW 2 2/7/2019 Deisi Nava MS CCC-SLP GN 1                 Deisi Nava MS CCC-SLP  2/7/2019

## 2019-02-07 NOTE — PLAN OF CARE
Problem: Patient Care Overview  Goal: Plan of Care Review  Outcome: Ongoing (interventions implemented as appropriate)   02/07/19 0528   Coping/Psychosocial   Plan of Care Reviewed With patient   Plan of Care Review   Progress no change   OTHER   Outcome Summary Appetite remains poor. PO intake encouraged. Pt had one, moderate sized urine incontinence. VSS; afebrile. HD scheduled for this AM.        Problem: Fall Risk (Adult)  Goal: Absence of Fall  Outcome: Ongoing (interventions implemented as appropriate)      Problem: Wound (Includes Pressure Injury) (Adult)  Goal: Signs and Symptoms of Listed Potential Problems Will be Absent, Minimized or Managed (Wound)  Outcome: Ongoing (interventions implemented as appropriate)

## 2019-02-07 NOTE — PROGRESS NOTES
INTENSIVIST   PROGRESS NOTE     Hospital:  LOS: 7 days      S     Ms. Fabiola Pimentel, 84 y.o. female is followed for:      PEA    ITP (idiopathic thrombocytopenic purpura)      As an Intensivist, we provide an integrated approach to the ICU patient and family, medical management of comorbid conditions, including but not limited to electrolytes, glycemic control, organ dysfunction, lead interdisciplinary rounds and coordinate the care with all other services, including those from other specialists.     Interval History:  Status quo.  Denies pain.  Some congestion on her chest.  Less pain when eating.     The patient's relevant past medical, surgical and social history were reviewed and updated in Epic as appropriate.     ROS:   Constitutional: Negative for fever.   Respiratory: Negative for dyspnea.   Cardiovascular: Negative for chest pain.   Gastrointestinal: Negative for  nausea, vomiting and diarrhea.        O     Vitals:  Temp: 97.7 °F (36.5 °C) (02/07/19 1400) Temp  Min: 97.6 °F (36.4 °C)  Max: 98.2 °F (36.8 °C)   BP: 147/71 (02/07/19 1500) BP  Min: 96/47  Max: 182/78   Pulse: 66 (02/07/19 1500) Pulse  Min: 59  Max: 70   Resp: 18 (02/07/19 1305) Resp  Min: 16  Max: 18   SpO2: 96 % (02/07/19 1500) SpO2  Min: 91 %  Max: 99 %   Device: nasal cannula (02/07/19 1301)    Flow Rate: 2 (02/07/19 1400) Flow (L/min)  Min: 2  Max: 3       Physical Examination  Telemetry:  Rhythm: normal sinus rhythm (02/07/19 1400)  Oral thrush, improving.   Constitutional:  No acute distress.   Cardiovascular: Normal rate, regular and rhythm. Normal heart sounds.  (+) murmur  No gallop or rub.   Respiratory: No respiratory distress. Normal respiratory effort.  Normal breath sounds  Clear to auscultation and percussion.    Abdominal:  Soft. No masses. Non-tender. No distension. No HSM.   Extremities: No digital cyanosis. No clubbing.  No edema.   Neurological:   Alert. Non focal.   Lines/Drains/Airways: L IJ tunneled Dialysis Catheter        Hematology:  Results from last 7 days   Lab Units 02/07/19  0543 02/06/19 0427 02/05/19  0645   WBC 10*3/mm3 27.21* 25.95* 21.70*   HEMOGLOBIN g/dL 9.9* 9.9* 10.0*   MCV fL 93.6 92.3 89.6   PLATELETS 10*3/mm3 263 204 162       Chemistry:  Estimated Creatinine Clearance: 11.2 mL/min (A) (by C-G formula based on SCr of 3.91 mg/dL (H)).    Results from last 7 days   Lab Units 02/07/19  0543 02/06/19 0427 02/05/19  0645   SODIUM mmol/L 138 139 138   POTASSIUM mmol/L 3.8 3.9 3.9   CHLORIDE mmol/L 106 106 106   CO2 mmol/L 18.0* 23.0 22.0   ANION GAP mmol/L 14.0* 10.0 10.0   GLUCOSE mg/dL 82 87 78   CALCIUM mg/dL 7.8* 7.8* 7.3*     Results from last 7 days   Lab Units 02/07/19 0543 02/06/19 0427 02/05/19 0645   BUN mg/dL 41* 31* 46*   CREATININE mg/dL 3.91* 3.11* 4.18*     Images:  Xr Chest 1 View    Result Date: 2/7/2019  There are no acute findings and there has been no significant change when compared to previous examination of 02/05/2019.  D:  02/07/2019 E:  02/07/2019  This report was finalized on 2/7/2019 12:00 PM by Dr. Raymond Billings MD.        Echo:  Results for orders placed during the hospital encounter of 01/31/19   Transthoracic Echo Complete With Contrast if Necessary Per Protocol    Narrative · Left ventricular systolic function is normal.  · Left ventricular diastolic dysfunction (grade I) consistent with   impaired relaxation.  · Estimated EF appears to be in the range of 61 - 65%.  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 Likely represents age-related   focal region of sclerosis and calcification of the chordal apparatus,   however vegetation cannot be completely excluded, clinical correlation   required.          Results: Reviewed.  I reviewed the patient's new laboratory and imaging results.  I independently reviewed the patient's new images.    Medications: Reviewed.    Assessment/Plan   A / P     84 y.o.female, admitted  on 1/31/2019 with Acute renal failure (ARF) (CMS/[facility]) [N17.9]:     1. S/p PEA, while she was in the inpatient dialysis unit, quickly resuscitated (within 3 minutes), and it did not require intubation, 2/1/19  2. 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  3. ESRD on HD  4. ITP  5. Leukocytosis  1. Continues to get worse.    Lab Results   Lab Value Date/Time    PROCALCITO 1.08 (H) 02/06/2019 0427    PROCALCITO 0.69 (H) 02/02/2019 0743     6. Odynophagia and oral thrush  1. R/o Esoph. candidiasis  7. HTN  8. Anemia, chronic disease  1. EPO and IV iron during HD } As per Nephrology  9. Hiatal hernia.  10. Antibiotics: MRP, and Fluconazole, re dose Vanco after dialysis.  11. Glucose: No h/o DM    Lab Results   Lab Value Date/Time    HGBA1C 5.60 01/31/2019 1758       Diet: Diet Dysphagia; IV - Mechanical Soft No Mixed Consistencies; Nectar / Syrup Thick; Renal   Advance Directives: Code Status and Medical Interventions:   Ordered at: 02/01/19 1600     Limited Support to NOT Include:    Intubation     Code Status:    CPR     Medical Interventions (Level of Support Prior to Arrest):    Limited          Assessment / Plan:    1. F/u WBC tomorrw.  2. Disposition: Keep in ICU.    Plan of care and goals reviewed during interdisciplinary rounds.  Level of Risk is High due to:  illness with threat to life or bodily function.   I discussed the patient's findings and my recommendations with patient    Chung Ferrari MD, FACP, FCCP, CNSC  Intensive Care Medicine, Nutrition Support and Pulmonary Medicine

## 2019-02-08 NOTE — THERAPY TREATMENT NOTE
Acute Care - Physical Therapy Treatment Note  [facility]     Patient Name: Fabiola Pimentel  : 1934  MRN: [id number]  Today's Date: 2019  Onset of Illness/Injury or Date of Surgery: 19  Date of Referral to PT: 19  Referring Physician: MD Radha    Admit Date: 2019    Visit Dx:    ICD-10-CM ICD-9-CM   1. Impaired functional mobility, balance, gait, and endurance Z74.09 V49.89   2. Impaired mobility and ADLs Z74.09 799.89   3. Pharyngeal dysphagia R13.13 787.23   4. PEA I46.9 427.5     Patient Active Problem List   Diagnosis   • ITP (idiopathic thrombocytopenic purpura)   • Palpitations   • Premature atrial contractions   • Hypertension   • Pulmonary hypertension (CMS/HCC)   • DJD (degenerative joint disease)   • Raynaud's phenomenon (secondary)   • Diverticular disease   • CKD (chronic kidney disease) stage 5, GFR less than 15 ml/min (CMS/HCC)   • Leukocytosis   • Anemia due to chronic kidney disease   • Debility   • Hx of renal cell carcinoma   • Chronic diastolic CHF (congestive heart failure) (CMS/HCC)   • Falls frequently   • PEA   • Huge hiatal hernia with intrathoracic stomach   • Mitral valve mass       Therapy Treatment    Rehabilitation Treatment Summary     Row Name 19 1105 19 1015 19 0930       Treatment Time/Intention    Discipline  physical therapist  -LS  speech language pathologist  -VO  physical therapist  -MF    Document Type  therapy note (daily note)  -LS  therapy note (daily note)  -VO  therapy note (daily note);wound care  -    Subjective Information  complains of;fatigue;weakness  -LS  no complaints  -VO  complains of;weakness;fatigue;pain  -    Mode of Treatment  physical therapy  -LS  speech-language pathology  -VO  --    Patient/Family Observations  --  Alert, cooperative, sitting upright in bed  -VO  --    Care Plan Review  --  care plan/treatment goals reviewed;current/potential barriers reviewed;risks/benefits reviewed;patient/other agree  to care plan  -VO  --    Therapy Frequency (Swallow)  --  5 days per week  -VO  --    Patient Effort  good  -LS  good  -VO  --    Existing Precautions/Restrictions  fall;oxygen therapy device and L/min recent R shoulder dislocation  -LS  --  --    Recorded by [LS] Tanesha Castro, PT 02/08/19 1343 [VO] Eliz Gold MA,CCC-SLP 02/08/19 1041 [MF] Neftaly Seaman, PT 02/08/19 1124    Row Name 02/08/19 1105             Vital Signs    Pre Systolic BP Rehab  140  -LS      Pre Treatment Diastolic BP  59  -LS      Pretreatment Heart Rate (beats/min)  66  -LS      Posttreatment Heart Rate (beats/min)  64  -LS      Pre SpO2 (%)  95  -LS      O2 Delivery Pre Treatment  nasal cannula  -LS      Post SpO2 (%)  96  -LS      O2 Delivery Post Treatment  nasal cannula  -LS      Pre Patient Position  Supine  -LS      Intra Patient Position  Standing  -LS      Post Patient Position  Sitting  -LS      Recorded by [LS] Tanesha Castro, PT 02/08/19 1348      Row Name 02/08/19 1107             Cognitive Assessment/Intervention- PT/OT    Affect/Mental Status (Cognitive)  WFL  -LS      Orientation Status (Cognition)  oriented x 4  -LS      Follows Commands (Cognition)  follows one step commands;over 90% accuracy;repetition of directions required;verbal cues/prompting required;physical/tactile prompts required  -LS      Cognitive Function (Cognitive)  safety deficit  -LS      Safety Deficit (Cognitive)  mild deficit;awareness of need for assistance;safety precautions awareness  -LS      Personal Safety Interventions  fall prevention program maintained;gait belt;nonskid shoes/slippers when out of bed  -LS      Recorded by [LS] Tanesha Castro, PT 02/08/19 1348      Row Name 02/08/19 1101             Bed Mobility Assessment/Treatment    Supine-Sit [place] (Bed Mobility)  moderate assist (50% patient effort);verbal cues  -LS      Assistive Device (Bed Mobility)  bed rails;draw sheet;head of bed elevated  -LS      Recorded by [LS]  Tanesha Castro, PT 02/08/19 1348      Row Name 02/08/19 1105             Transfer Assessment/Treatment    Comment (Transfers)  STS x3 from EOB; PT in front of pt for BUE support and blocking feet bilat.   -LS      Recorded by [LS] Tanesha Castro, PT 02/08/19 1348      Row Name 02/08/19 1105             Chair-Bed Transfer    Chair-Bed [place] (Transfers)  maximum assist (25% patient effort);verbal cues  -LS      Recorded by [LS] Tanesha Castro, PT 02/08/19 1348      Row Name 02/08/19 1105             Sit-Stand Transfer    Sit-Stand [place] (Transfers)  moderate assist (50% patient effort);verbal cues  -LS      Recorded by [LS] Tanesha Castro, PT 02/08/19 1348      Row Name 02/08/19 1105             Stand-Sit Transfer    Stand-Sit [place] (Transfers)  moderate assist (50% patient effort);verbal cues  -LS      Recorded by [LS] Tanesha Castro, PT 02/08/19 1348      Row Name 02/08/19 1105             Gait/Stairs Assessment/Training    [place] Level (Gait)  not tested  -LS      Comment (Gait/Stairs)  Small steps EOB to recliner during pivot portion of transfer.   -LS      Recorded by [LS] Tanesha Castro, PT 02/08/19 1348      Row Name 02/08/19 1105             Motor Skills Assessment/Interventions    Additional Documentation  Balance (Group);Therapeutic Exercise (Group)  -LS      Recorded by [LS] Tanesha Castro, PT 02/08/19 1348      Row Name 02/08/19 1105             Therapeutic Exercise    Therapeutic Exercise  supine, lower extremities;supine, upper extremities  -LS      83754 - PT Therapeutic Exercise Minutes  10  -LS      44940 - PT Therapeutic Activity Minutes  16  -LS      Recorded by [LS] Tanesha Castro, PT 02/08/19 1348      Row Name 02/08/19 1105             Upper Extremity Supine Therapeutic Exercise    Performed, Supine Upper Extremity (Therapeutic Exercise)  shoulder abduction/adduction;elbow flexion/extension  -LS      Exercise Type, Supine Upper Extremity (Therapeutic Exercise)  SCOUTOM  (active assistive range of motion);AROM (active range of motion)  -LS      Sets/Reps Detail, Supine Upper Extremity (Therapeutic Exercise)  1/10; AAROM RUE, AROM LUE. Noted weakness at R shoulder.   -LS      Recorded by [LS] Tanesha Castro, PT 02/08/19 1348      Row Name 02/08/19 1105             Lower Extremity Supine Therapeutic Exercise    Performed, Supine Lower Extremity (Therapeutic Exercise)  gluteal sets;ankle pumps;ankle dorsiflexion/plantarflexion;hip flexion/extension;hip abduction/adduction  -LS      Exercise Type, Supine Lower Extremity (Therapeutic Exercise)  AAROM (active assistive range of motion)  -LS      Sets/Reps Detail, Supine Lower Extremity (Therapeutic Exercise)  1/10 BLE  -LS      Recorded by [LS] Tanesha Castro, PT 02/08/19 1348      Row Name 02/08/19 1105             Static Sitting Balance    Level of [place] (Unsupported Sitting, Static Balance)  contact guard assist  -LS      Sitting Position (Unsupported Sitting, Static Balance)  sitting on edge of bed  -LS      Time Able to Maintain Position (Unsupported Sitting, Static Balance)  more than 5 minutes  -LS      Recorded by [LS] Tanesha Castro, PT 02/08/19 1348      Row Name 02/08/19 1105             Static Standing Balance    Level of [place] (Supported Standing, Static Balance)  moderate assist, 50 to 74% patient effort  -LS      Time Able to Maintain Position (Supported Standing, Static Balance)  2 to 3 minutes  -LS      Assistive Device Utilized (Supported Standing, Static Balance)  -- PT providing BUE support  -LS      Comment (Supported Standing, Static Balance)  1 min x2; RN present for hygiene  -LS      Recorded by [LS] Tanesha Castro, PT 02/08/19 1348      Row Name 02/08/19 1105 02/08/19 0930          Positioning and Restraints    Pre-Treatment Position  in bed  -LS  in bed  -MF     Post Treatment Position  chair  -LS  bed  -MF     In Bed  --  supine;call light within reach;with nsg  -MF     In Chair  notified  nsg;reclined;call light within reach;encouraged to call for assist;exit alarm on;RUE elevated;LUE elevated;waffle cushion;on mechanical lift sling;heels elevated  -LS  --     Recorded by [LS] Tanesha Castro, PT 02/08/19 1348 [MF] Neftaly Seaman, PT 02/08/19 1124     Row Name 02/08/19 0930             Pain Assessment    Additional Documentation  Pain Scale: FACES Pre/Post-Treatment (Group)  -MF      Recorded by [MF] Neftaly Seaman, PT 02/08/19 1124      Row Name 02/08/19 1105 02/08/19 0930          Pain Scale: Numbers Pre/Post-Treatment    Pain Scale: Numbers, Pretreatment  0/10 - no pain  -LS  --     Pain Scale: Numbers, Post-Treatment  0/10 - no pain  -LS  --     Pain Location  --  back sacral wound  -MF     Recorded by [LS] Tanesha Castro, PT 02/08/19 1348 [MF] Neftaly Seaman, PT 02/08/19 1124     Row Name 02/08/19 1015 02/08/19 0930          Pain Scale: FACES Pre/Post-Treatment    Pain: FACES Scale, Pretreatment  0-->no hurt  -VO  2-->hurts little bit  -MF     Pain: FACES Scale, Post-Treatment  0-->no hurt  -VO  2-->hurts little bit  -     Recorded by [VO] Eliz Gold MA,CCC-SLP 02/08/19 1041 [MF] Neftaly Seaman, PT 02/08/19 1124     Row Name 02/08/19 0930             Wound 02/01/19 0827 Bilateral coccyx    Wound - Properties Group Date first assessed: 02/01/19 [AS] Time first assessed: 0827 [AS] Present On Admission : yes [AS] Side: Bilateral [AS] Location: coccyx [AS] Stage, Pressure Injury: unstageable [AS] Additional Comments: -- [MR], probable DTPI  Recorded by:  [AS] Lorri Sommers, RN 02/01/19 1020 [MR] France Layton RN 02/01/19 1315    Dressing Appearance  dry;intact  -MF      Base  moist;necrotic;yellow;slough;black eschar  -MF      Periwound  intact  -MF      Periwound Temperature  warm  -MF      Periwound Skin Turgor  soft  -MF      Edges  open;jagged  -MF      Wound Length (cm)  4 cm  -MF      Wound Width (cm)  6 cm  -MF      Wound Depth (cm)  0.2 cm  -MF      Drainage  Characteristics/Odor  serosanguineous;brown  -      Drainage Amount  scant  -MF      Care, Wound  irrigated with;sterile normal saline;debrided;ultrasound therapy, non contact low frequency mist x 8 min  -      Dressing Care, Wound  foam;low-adherent venelex with optifoam  -MF      Recorded by [MF] Neftaly Seaman, PT 02/08/19 1124      Row Name                Wound 02/01/19 0827 Left heel pressure injury    Wound - Properties Group Date first assessed: 02/01/19 [AS] Time first assessed: 0827 [AS] Side: Left [AS] Location: heel [AS] Type: pressure injury [AS] Stage, Pressure Injury: deep tissue injury [AS] Recorded by:  [AS] Lorri Sommers RN 02/01/19 1206    Row Name                Wound 02/01/19 1825 Other (See comments) chest incision    Wound - Properties Group Date first assessed: 02/01/19 [KS] Time first assessed: 1825 [KS] Side: Other (See comments) [KS] Location: chest [KS] Type: incision [KS] Recorded by:  [KS] Leonela Caceres RN 02/01/19 1825    Row Name                Wound 01/16/19 1429 Left arm incision    Wound - Properties Group Date first assessed: 01/16/19 [JOSHUA] Time first assessed: 1429 [JOSHUA] Side: Left [JOSHUA] Location: arm [JOSHUA] Type: incision [JOSHUA] Recorded by:  [JOSHUA] Nilam Joshi RN 01/16/19 1429    Row Name                Wound 01/31/19 1600 Bilateral medial coccyx pressure injury    Wound - Properties Group Date first assessed: 01/31/19 [CM] Time first assessed: 1600 [CM] Present On Admission : yes [CM] Side: Bilateral [CM] Orientation: medial [CM] Location: coccyx [CM] Type: pressure injury [CM] Stage, Pressure Injury: Stage 2 [CM] Additional Comments: wound now unstageable with moderate slough / eschar covering.  [MF] Wound Outcome: Converged [MF2] Recorded by:  [CM] Trudy Cornejo RN 02/03/19 0718 [MF] Neftaly Seaman, PT 02/06/19 1326 [MF2] Neftaly Seaman, PT 02/08/19 1123    Row Name                Wound 02/04/19 2000 Right upper chest puncture    Wound - Properties  Group Date first assessed: 02/04/19 [RS] Time first assessed: 2000 [RS] Present On Admission : no [RS] Side: Right [RS] Orientation: upper [RS] Location: chest [RS] Type: puncture [RS], HD cath with pigtail site (removed)  Recorded by:  [RS] Rodriguez Garcia RN 02/04/19 2230    Row Name 02/08/19 0930             Coping    Observed Emotional State  calm;cooperative  -MF      Verbalized Emotional State  acceptance  -MF      Recorded by [MF] Neftaly Seaman, PT 02/08/19 1125      Row Name 02/08/19 1105 02/08/19 0930          Plan of Care Review    Plan of Care Reviewed With  patient  -LS  patient  -MF     Recorded by [LS] Tanesha Castro, PT 02/08/19 1348 [MF] Neftaly Seaman, PT 02/08/19 1125     Row Name 02/08/19 1105             Outcome Summary/Treatment Plan (PT)    Daily Summary of Progress (PT)  progress toward functional goals is good  -LS      Recorded by [LS] Tanesha Castro, PT 02/08/19 1348      Row Name 02/08/19 1015             Outcome Summary/Treatment Plan (SLP)    Daily Summary of Progress (SLP)  progress toward functional goals is good  -VO      Plan for Continued Treatment (SLP)  Continue current diet. No overt s/sxs aspiration w/ NTL via cup. Trialed thins w/ cough.  Participated well in exercises.   -VO      Anticipated Dischage Disposition  anticipate therapy at next level of care  -VO      Recorded by [VO] Eliz Gold MA,CCC-SLP 02/08/19 1041        User Key  (r) = Recorded By, (t) = Taken By, (c) = Cosigned By    Initials Name Effective Dates Discipline     Neftaly Seaman, PT 06/19/15 -  PT    LS Tanesha Castro, PT 06/19/15 -  PT    Rodriguez Pittman, RN 06/30/16 -  Nurse    Nilam Martinez RN 06/16/16 -  Nurse    MR Simmonson, Irina, RN 06/16/16 -  Nurse    Leonela Quezada RN 05/09/17 -  Nurse    Lorri Lamb RN 06/16/16 -  Nurse    Trudy Peterson RN 06/21/17 -  Nurse    Eliz Garner MA,CCC-SLP 10/24/18 -  SLP          Wound  01/16/19 1429 Left arm incision (Active)   Dressing Appearance open to air 2/7/2019  6:00 PM   Closure Open to air 2/8/2019 10:00 AM   Dressing Care, Wound open to air 2/8/2019  8:00 AM       Wound 02/01/19 0827 Bilateral coccyx (Active)   Dressing Appearance dry;intact 2/8/2019 10:00 AM   Closure None 2/8/2019 10:00 AM   Base dressing in place, unable to visualize 2/8/2019 10:00 AM   Periwound intact 2/8/2019  9:30 AM   Periwound Temperature warm 2/8/2019  9:30 AM   Periwound Skin Turgor soft 2/8/2019  9:30 AM   Edges open;jagged 2/8/2019  9:30 AM   Wound Length (cm) 4 cm 2/8/2019  9:30 AM   Wound Width (cm) 6 cm 2/8/2019  9:30 AM   Wound Depth (cm) 0.2 cm 2/8/2019  9:30 AM   Drainage Characteristics/Odor serosanguineous;brown 2/8/2019  9:30 AM   Drainage Amount scant 2/8/2019  9:30 AM   Care, Wound irrigated with;sterile normal saline;debrided;ultrasound therapy, non contact low frequency 2/8/2019  9:30 AM   Dressing Care, Wound foam;low-adherent 2/8/2019  9:30 AM   Periwound Care, Wound topical treatment applied 2/7/2019  8:00 PM       Wound 02/01/19 0827 Left heel pressure injury (Active)   Dressing Appearance dry;intact 2/8/2019 10:00 AM   Base pink;dry;clean 2/8/2019 10:00 AM   Drainage Amount none 2/8/2019 10:00 AM   Dressing Care, Wound open to air 2/8/2019  8:00 AM   Periwound Care, Wound dry periwound area maintained 2/8/2019  8:00 AM       Wound 02/01/19 1825 Other (See comments) chest incision (Active)   Dressing Appearance dry;intact 2/8/2019 10:00 AM   Closure BARRY 2/8/2019 10:00 AM   Base dressing in place, unable to visualize 2/8/2019 10:00 AM       Wound 01/31/19 1600 Bilateral medial coccyx pressure injury (Active)   Dressing Appearance dry;intact 2/8/2019 10:00 AM   Closure None 2/8/2019 10:00 AM   Base dressing in place, unable to visualize 2/8/2019 10:00 AM       Wound 02/04/19 2000 Right upper chest puncture (Active)   Dressing Appearance dry;intact 2/8/2019 10:00 AM   Base dressing in place,  unable to visualize 2/8/2019 10:00 AM   Drainage Amount none 2/8/2019 10:00 AM   Dressing Care, Wound foam;low-adherent 2/8/2019  8:00 AM   Periwound Care, Wound dry periwound area maintained 2/8/2019  8:00 AM       Rehab Goal Summary     Row Name 02/08/19 1015             Swallow Goals (SLP)    Oral Nutrition/Hydration Goal Selection (SLP)  oral nutrition/hydration, SLP goal 2;oral nutrition/hydration, SLP goal (free text)  -VO      Pharyngeal Strengthening Exercise Goal Selection (SLP)  pharyngeal strengthening exercise, SLP goal 1  -VO      Additional Documentation  pharyngeal strengthening exercise goal selection (SLP)  -VO         Oral Nutrition/Hydration Goal 1 (SLP)    Oral Nutrition/Hydration Goal 1, SLP  LTG:  Patient will tolerate reg diet and thin liquids with no s/s of pen/asp   -VO      Time Frame (Oral Nutrition/Hydration Goal 1, SLP)  by discharge  -VO      Progress/Outcomes (Oral Nutrition/Hydration Goal 1, SLP)  continuing progress toward goal  -VO         Oral Nutrition/Hydration Goal 2 (SLP)    Oral Nutrition/Hydration Goal 2, SLP  Pt will tolerate trials of soft solids and nectar-thick liquids w/o s/sxs aspiration w/ 100% acc w/o cues.  -VO      Time Frame (Oral Nutrition/Hydration Goal 2, SLP)  short term goal (STG);by discharge  -VO      Barriers (Oral Nutrition/Hydration Goal 2, SLP)  Tolerated all trials of NTL and cracker w/o s/sxs aspiration.  -VO      Progress/Outcomes (Oral Nutrition/Hydration Goal 2, SLP)  continuing progress toward goal  -VO         Oral Nutrition/Hydration Goal (SLP)    Oral Nutrition/Hydration Goal, SLP  Pt will tolerate therapeutic trials of H2O w/o s/sxs aspiration w/ 70% acc w/o cues.  -VO      Time Frame (Oral Nutrition/Hydration Goal, SLP)  short term goal (STG);by discharge  -VO      Barriers (Oral Nutrition/Hydration Goal, SLP)  Cough w/ thins via cup  -VO      Progress/Outcomes (Oral Nutrition/Hydration Goal, SLP)  continuing progress toward goal  -VO          Pharyngeal Strengthening Exercise Goal 1 (SLP)    Activity (Pharyngeal Strengthening Goal 1, SLP)  increase superior movement of the hyolaryngeal complex;increase anterior movement of the hyolaryngeal complex  -VO      Increase Superior Movement of the Hyolaryngeal Complex  supraglottic swallow;hard effortful swallow  -VO      Increase Anterior Movement of the Hyolaryngeal Complex  supraglottic swallow;hard effortful swallow  -VO      [place]/Accuracy (Pharyngeal Strengthening Goal 1, SLP)  with minimal cues (75-90% accuracy)  -VO      Time Frame (Pharyngeal Strengthening Goal 1, SLP)  short term goal (STG);by discharge  -VO      Barriers (Pharyngeal Strengthening Goal 1, SLP)  Reviewed exercises. Pt able to complete effortful w/ 70% acc and SSG w/ 50%  -VO      Progress/Outcomes (Pharyngeal Strengthening Goal 1, SLP)  continuing progress toward goal  -VO        User Key  (r) = Recorded By, (t) = Taken By, (c) = Cosigned By    Initials Name Provider Type Discipline    VO Eliz Gold MA,CCC-SLP Speech and Language Pathologist SLP          Physical Therapy Education     Title: PT OT SLP Therapies (In Progress)     Topic: Physical Therapy (Done)     Point: Mobility training (Done)     Learning Progress Summary           Patient Acceptance, E,D, VU,NR by LS at 2/8/2019 11:05 AM    Acceptance, E,D, NR by LS at 2/6/2019  1:08 PM    Acceptance, E, NR by KR at 2/4/2019  1:06 PM    Acceptance, E,D, NR by LS at 2/2/2019  2:48 PM                   Point: Home exercise program (Done)     Learning Progress Summary           Patient Acceptance, E,D, VU,NR by LS at 2/8/2019 11:05 AM    Acceptance, E,D, NR by LS at 2/6/2019  1:08 PM    Acceptance, E, NR by KR at 2/4/2019  1:06 PM    Acceptance, E,D, NR by LS at 2/2/2019  2:48 PM                   Point: Body mechanics (Done)     Learning Progress Summary           Patient Acceptance, E,D, VU,NR by LS at 2/8/2019 11:05 AM    Acceptance, E,D, NR by LS at 2/6/2019  1:08  PM    Acceptance, E, NR by KR at 2/4/2019  1:06 PM    Acceptance, E,D, NR by SANTA at 2/2/2019  2:48 PM                   Point: Precautions (Done)     Learning Progress Summary           Patient Acceptance, E,D, VU,NR by LS at 2/8/2019 11:05 AM    Acceptance, E,D, NR by LS at 2/6/2019  1:08 PM    Acceptance, E, NR by KR at 2/4/2019  1:06 PM    Acceptance, E,D, NR by LS at 2/2/2019  2:48 PM                               User Key     Initials Effective Dates Name Provider Type Discipline     06/19/15 -  Tanesha Castro, PT Physical Therapist PT    SIMIN 04/03/18 -  Gemma Ying, PT Physical Therapist PT                PT Recommendation and Plan  Anticipated Discharge Disposition (PT): skilled nursing facility  Planned Therapy Interventions (PT Eval): bed mobility training, balance training, gait training, home exercise program, patient/family education, strengthening, transfer training  Therapy Frequency (PT Clinical Impression): daily  Outcome Summary/Treatment Plan (PT)  Daily Summary of Progress (PT): progress toward functional goals is good  Anticipated Discharge Disposition (PT): skilled nursing facility  Referral Needed to Another Service (PT): occupational therapy  Plan of Care Reviewed With: patient  Progress: improving  Outcome Summary: Pt demonstrated ability to pprogress to STS x3 from EOB with mod A and pivot to recliner with max A. Gave good effort with supine ther ex. Cont to be limited by generalized fatigue and noted R shoulder weakness, but gave good effort throughout. Will cont PT POC.   Outcome Measures     Row Name 02/08/19 1105 02/06/19 1438 02/06/19 1308       How much help from another person do you currently need...    Turning from your back to your side while in flat bed without using bedrails?  2  -LS  --  2  -LS    Moving from lying on back to sitting on the side of a flat bed without bedrails?  2  -LS  --  2  -LS    Moving to and from a bed to a chair (including a wheelchair)?  2  -LS  --  2   -LS    Standing up from a chair using your arms (e.g., wheelchair, bedside chair)?  2  -LS  --  2  -LS    Climbing 3-5 steps with a railing?  1  -LS  --  1  -LS    To walk in hospital room?  2  -LS  --  2  -LS    AM-PAC 6 Clicks Score  11  -LS  --  11  -LS       How much help from another is currently needed...    Putting on and taking off regular lower body clothing?  --  1  -CL  --    Bathing (including washing, rinsing, and drying)  --  1  -CL  --    Toileting (which includes using toilet bed pan or urinal)  --  1  -CL  --    Putting on and taking off regular upper body clothing  --  1  -CL  --    Taking care of personal grooming (such as brushing teeth)  --  2  -CL  --    Eating meals  --  3  -CL  --    Score  --  9  -CL  --       Functional Assessment    Outcome Measure Options  AM-PAC 6 Clicks Basic Mobility (PT)  -LS  AM-PAC 6 Clicks Daily Activity (OT)  -CL  AM-PAC 6 Clicks Basic Mobility (PT)  -LS      User Key  (r) = Recorded By, (t) = Taken By, (c) = Cosigned By    Initials Name Provider Type    Tanesha Rodney, PT Physical Therapist    CL Liliam Valdes, OT Occupational Therapist         Time Calculation:   PT Charges     Row Name 02/08/19 1105 02/08/19 0930          Time Calculation    Start Time  1105  -LS  0930  -     PT Received On  02/08/19  -  --     PT Goal Re-Cert Due Date  --  02/12/19  -        Time Calculation- PT    Total Timed Code Minutes- PT  26 minute(s)  -LS  --        Timed Charges    58377 - PT Therapeutic Exercise Minutes  10  -LS  --     74490 - PT Therapeutic Activity Minutes  16  -LS  --       User Key  (r) = Recorded By, (t) = Taken By, (c) = Cosigned By    Initials Name Provider Type    Neftaly Moore, PT Physical Therapist    Tanesha Rodney, PT Physical Therapist        Therapy Suggested Charges     Code   Minutes Charges    40833 (CPT®) Hc Pt Neuromusc Re Education Ea 15 Min      08721 (CPT®) Hc Pt Ther Proc Ea 15 Min 10 1    88922 (CPT®) Hc Gait Training Ea 15  Min      21650 (CPT®) Hc Pt Therapeutic Act Ea 15 Min 16 1    70558 (CPT®) Hc Pt Manual Therapy Ea 15 Min      46935 (CPT®) Hc Pt Iontophoresis Ea 15 Min      25659 (CPT®) Hc Pt Elec Stim Ea-Per 15 Min      36065 (CPT®) Hc Pt Ultrasound Ea 15 Min      34414 (CPT®) Hc Pt Self Care/Mgmt/Train Ea 15 Min      23631 (CPT®) Hc Pt Prosthetic (S) Train Initial Encounter, Each 15 Min      42699 (CPT®) Hc Pt Orthotic(S)/Prosthetic(S) Encounter, Each 15 Min      95278 (CPT®) Hc Orthotic(S) Mgmt/Train Initial Encounter, Each 15min      Total  26 2        Therapy Charges for Today     Code Description Service Date Service Provider Modifiers Qty    09815683754 HC PT THER PROC EA 15 MIN 2/8/2019 Tanesha Castro, PT GP 1    97003472244 HC PT THERAPEUTIC ACT EA 15 MIN 2/8/2019 Tanesha Castro, PT GP 1    [id number] HC PT THER PROC EA 15 MIN 2/8/2019 Tanesha Castro, PT GP 1    [id number] HC PT THERAPEUTIC ACT EA 15 MIN 2/8/2019 Tanesha Castro, PT GP 1          PT G-Codes  Outcome Measure Options: AM-PAC 6 Clicks Basic Mobility (PT)  AM-PAC 6 Clicks Score: 11  Score: 9    Tanesha Castro, PT  2/8/2019

## 2019-02-08 NOTE — PLAN OF CARE
Problem: Patient Care Overview  Goal: Plan of Care Review  Outcome: Ongoing (interventions implemented as appropriate)   02/08/19 0619   Coping/Psychosocial   Plan of Care Reviewed With patient   Plan of Care Review   Progress no change   OTHER   Outcome Summary VSS: afebirle. WBC trending down. Coughing at times following drinks of nectar thick liquids. Will continue to monitor.        Problem: Fall Risk (Adult)  Goal: Absence of Fall  Outcome: Ongoing (interventions implemented as appropriate)      Problem: Skin Injury Risk (Adult)  Goal: Skin Health and Integrity  Outcome: Ongoing (interventions implemented as appropriate)

## 2019-02-08 NOTE — PROGRESS NOTES
INTENSIVIST   PROGRESS NOTE     Hospital:  LOS: 8 days      S     Ms. Fabiola Pimentel, 84 y.o. female is followed for:      PEA    ITP (idiopathic thrombocytopenic purpura)      As an Intensivist, we provide an integrated approach to the ICU patient and family, medical management of comorbid conditions, including but not limited to electrolytes, glycemic control, organ dysfunction, lead interdisciplinary rounds and coordinate the care with all other services, including those from other specialists.     Interval History:  Odynophagia better.  Legs sore.  Good night of sleep.  Uneventful night.     The patient's relevant past medical, surgical and social history were reviewed and updated in Epic as appropriate.     ROS:   Constitutional: Negative for fever.   Respiratory: Negative for dyspnea.   Cardiovascular: Negative for chest pain.   Gastrointestinal: Negative for  nausea, vomiting and diarrhea.        O     Vitals:  Temp: 98.4 °F (36.9 °C) (02/08/19 0816) Temp  Min: 98.1 °F (36.7 °C)  Max: 98.4 °F (36.9 °C)   BP: 145/68 (02/08/19 0900) BP  Min: 132/57  Max: 149/63   Pulse: 66 (02/08/19 1000) Pulse  Min: 61  Max: 72   Resp: 20 (02/08/19 0900) Resp  Min: 16  Max: 20   SpO2: 92 % (02/08/19 1000) SpO2  Min: 87 %  Max: 99 %   Device: humidified, nasal cannula (02/08/19 0900)    Flow Rate: 2 (02/08/19 0900) Flow (L/min)  Min: 2  Max: 2       Physical Examination  Telemetry:  Rhythm: normal sinus rhythm (02/08/19 1000)  Oral thrush, improved.   Constitutional:  No acute distress.   Cardiovascular: Normal rate, regular and rhythm. Normal heart sounds.  (+) murmur  No gallop or rub.   Respiratory: No respiratory distress. Normal respiratory effort.  Normal breath sounds  Clear to auscultation and percussion.    Abdominal:  Soft. No masses. Non-tender. No distension. No HSM.   Extremities: No digital cyanosis. No clubbing.  No edema.   Neurological:   Alert. Non focal.   Lines/Drains/Airways: L IJ tunneled Dialysis Catheter        Hematology:  Results from last 7 days   Lab Units 02/08/19 0319 02/07/19  0543 02/06/19  0427   WBC 10*3/mm3 19.96* 27.21* 25.95*   HEMOGLOBIN g/dL 9.4* 9.9* 9.9*   MCV fL 97.3 93.6 92.3   PLATELETS 10*3/mm3 220 263 204       Chemistry:  Estimated Creatinine Clearance: 16.3 mL/min (A) (by C-G formula based on SCr of 2.68 mg/dL (H)).    Results from last 7 days   Lab Units 02/08/19 0319 02/07/19  0543 02/06/19  0427   SODIUM mmol/L 138 138 139   POTASSIUM mmol/L 3.9 3.8 3.9   CHLORIDE mmol/L 108 106 106   CO2 mmol/L 18.0* 18.0* 23.0   ANION GAP mmol/L 12.0* 14.0* 10.0   GLUCOSE mg/dL 90 82 87   CALCIUM mg/dL 8.2* 7.8* 7.8*     Results from last 7 days   Lab Units 02/08/19 0319 02/07/19  0543 02/06/19  0427   BUN mg/dL 25* 41* 31*   CREATININE mg/dL 2.68* 3.91* 3.11*     Images:  Xr Chest 1 View    Result Date: 2/7/2019  There are no acute findings and there has been no significant change when compared to previous examination of 02/05/2019.  D:  02/07/2019 E:  02/07/2019  This report was finalized on 2/7/2019 12:00 PM by Dr. Raymond Billings MD.        Echo:  Results for orders placed during the hospital encounter of 01/31/19   Transthoracic Echo Complete With Contrast if Necessary Per Protocol    Narrative · Left ventricular systolic function is normal.  · Left ventricular diastolic dysfunction (grade I) consistent with   impaired relaxation.  · Estimated EF appears to be in the range of 61 - 65%.  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 Likely represents age-related   focal region of sclerosis and calcification of the chordal apparatus,   however vegetation cannot be completely excluded, clinical correlation   required.          Results: Reviewed.  I reviewed the patient's new laboratory and imaging results.  I independently reviewed the patient's new images.    Medications: Reviewed.    Assessment/Plan   A / P     84 y.o.female, admitted  on 1/31/2019 with Acute renal failure (ARF) (CMS/[facility]) [N17.9]:     1. S/p PEA, while she was in the inpatient dialysis unit, quickly resuscitated (within 3 minutes), and it did not require intubation, 2/1/19  2. 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  3. ESRD on HD  4. ITP  5. Leukocytosis, better.    Lab Results   Lab Value Date/Time    WBC 19.96 (H) 02/08/2019 0319    WBC 27.21 (H) 02/07/2019 0543    WBC 25.95 (H) 02/06/2019 0427    WBC 21.70 (H) 02/05/2019 0645         6. Odynophagia and oral thrush  1. R/o Esoph. candidiasis  7. HTN  8. Anemia, chronic disease.  1. EPO and IV iron during HD } As per Nephrology  9. Hiatal hernia.  10. Antibiotics: MRP and Fluconazole + Vanco IV (based on levels)  11. Glucose: No h/o DM    Lab Results   Lab Value Date/Time    HGBA1C 5.60 01/31/2019 1758       Diet: Diet Dysphagia; IV - Mechanical Soft No Mixed Consistencies; Nectar / Syrup Thick; Renal   Advance Directives: Code Status and Medical Interventions:   Ordered at: 02/01/19 1600     Limited Support to NOT Include:    Intubation     Code Status:    CPR     Medical Interventions (Level of Support Prior to Arrest):    Limited          Assessment / Plan:    1. Continue monitoring WBC, trending down!  2. HD tomorrow.  3. Disposition: Keep in ICU.    Plan of care and goals reviewed during interdisciplinary rounds.  Level of Risk is High due to:  illness with threat to life or bodily function.   I discussed the patient's findings and my recommendations with patient    Chung Ferrari MD, FACP, FCCP, CNSC  Intensive Care Medicine, Nutrition Support and Pulmonary Medicine

## 2019-02-08 NOTE — PLAN OF CARE
Problem: Patient Care Overview  Goal: Plan of Care Review  Outcome: Ongoing (interventions implemented as appropriate)   02/08/19 0864   Coping/Psychosocial   Plan of Care Reviewed With patient   OTHER   Outcome Summary Pt cont to have limited progress with sacral wound. PT will cont with mist therapy and light debridement 2-3 x/week to help decrease bioburden and improve healing potential.

## 2019-02-08 NOTE — THERAPY WOUND CARE TREATMENT
Acute Care - Wound/Debridement Treatment Note  [facility]     Patient Name: Fabiola Pimentel  : 1934  MRN: [id number]  Today's Date: 2019  Onset of Illness/Injury or Date of Surgery: 19   Date of Referral to PT: 19   Referring Physician: MD Radha       Admit Date: 2019    Visit Dx:    ICD-10-CM ICD-9-CM   1. Impaired functional mobility, balance, gait, and endurance Z74.09 V49.89   2. Impaired mobility and ADLs Z74.09 799.89   3. Pharyngeal dysphagia R13.13 787.23   4. PEA I46.9 427.5       Patient Active Problem List   Diagnosis   • ITP (idiopathic thrombocytopenic purpura)   • Palpitations   • Premature atrial contractions   • Hypertension   • Pulmonary hypertension (CMS/HCC)   • DJD (degenerative joint disease)   • Raynaud's phenomenon (secondary)   • Diverticular disease   • CKD (chronic kidney disease) stage 5, GFR less than 15 ml/min (CMS/HCC)   • Leukocytosis   • Anemia due to chronic kidney disease   • Debility   • Hx of renal cell carcinoma   • Chronic diastolic CHF (congestive heart failure) (CMS/HCC)   • Falls frequently   • PEA   • Huge hiatal hernia with intrathoracic stomach   • Mitral valve mass           Wound 19 1429 Left arm incision (Active)   Dressing Appearance open to air 2019  6:00 PM   Closure Open to air 2019 10:00 AM   Dressing Care, Wound open to air 2019  8:00 AM       Wound 19 0827 Bilateral coccyx (Active)   Dressing Appearance dry;intact 2019 10:00 AM   Closure None 2019 10:00 AM   Base dressing in place, unable to visualize 2019 10:00 AM   Periwound intact 2019  9:30 AM   Periwound Temperature warm 2019  9:30 AM   Periwound Skin Turgor soft 2019  9:30 AM   Edges open;jagged 2019  9:30 AM   Wound Length (cm) 4 cm 2019  9:30 AM   Wound Width (cm) 6 cm 2019  9:30 AM   Wound Depth (cm) 0.2 cm 2019  9:30 AM   Drainage Characteristics/Odor serosanguineous;brown 2019  9:30 AM   Drainage Amount  scant 2/8/2019  9:30 AM   Care, Wound irrigated with;sterile normal saline;debrided;ultrasound therapy, non contact low frequency 2/8/2019  9:30 AM   Dressing Care, Wound foam;low-adherent 2/8/2019  9:30 AM   Periwound Care, Wound topical treatment applied 2/7/2019  8:00 PM       Wound 02/01/19 0827 Left heel pressure injury (Active)   Dressing Appearance dry;intact 2/8/2019 10:00 AM   Base pink;dry;clean 2/8/2019 10:00 AM   Drainage Amount none 2/8/2019 10:00 AM   Dressing Care, Wound open to air 2/8/2019  8:00 AM   Periwound Care, Wound dry periwound area maintained 2/8/2019  8:00 AM       Wound 02/01/19 1825 Other (See comments) chest incision (Active)   Dressing Appearance dry;intact 2/8/2019 10:00 AM   Closure BARRY 2/8/2019 10:00 AM   Base dressing in place, unable to visualize 2/8/2019 10:00 AM       Wound 01/31/19 1600 Bilateral medial coccyx pressure injury (Active)   Dressing Appearance dry;intact 2/8/2019 10:00 AM   Closure None 2/8/2019 10:00 AM   Base dressing in place, unable to visualize 2/8/2019 10:00 AM       Wound 02/04/19 2000 Right upper chest puncture (Active)   Dressing Appearance dry;intact 2/8/2019 10:00 AM   Base dressing in place, unable to visualize 2/8/2019 10:00 AM   Drainage Amount none 2/8/2019 10:00 AM   Dressing Care, Wound foam;low-adherent 2/8/2019  8:00 AM   Periwound Care, Wound dry periwound area maintained 2/8/2019  8:00 AM         WOUND DEBRIDEMENT  Total area of Debridement: ~3cm2  Debridement Site 1  Location- Site 1: sacrum  Selective Debridement- Site 1: Wound Surface <20cmsq  Instruments- Site 1: tweezers, scapel, #15  Excised Tissue Description- Site 1: minimum, eschar, slough  Bleeding- Site 1: seeping, held pressure, 1 minute            Therapy Treatment    Rehabilitation Treatment Summary     Row Name 02/08/19 1015 02/08/19 0930          Treatment Time/Intention    Discipline  speech language pathologist  -VO  physical therapist  -MF     Document Type  therapy note  (daily note)  -VO  therapy note (daily note);wound care  -MF     Subjective Information  no complaints  -VO  complains of;weakness;fatigue;pain  -MF     Mode of Treatment  speech-language pathology  -VO  --     Patient/Family Observations  Alert, cooperative, sitting upright in bed  -VO  --     Care Plan Review  care plan/treatment goals reviewed;current/potential barriers reviewed;risks/benefits reviewed;patient/other agree to care plan  -VO  --     Therapy Frequency (Swallow)  5 days per week  -VO  --     Patient Effort  good  -VO  --     Recorded by [VO] Eliz Gold MA,CCC-SLP 02/08/19 1041 [MF] Neftaly Seaman, PT 02/08/19 1124     Row Name 02/08/19 0930             Positioning and Restraints    Pre-Treatment Position  in bed  -MF      Post Treatment Position  bed  -MF      In Bed  supine;call light within reach;with nsg  -MF      Recorded by [MF] Neftaly Seaman, PT 02/08/19 1124      Row Name 02/08/19 0930             Pain Assessment    Additional Documentation  Pain Scale: FACES Pre/Post-Treatment (Group)  -MF      Recorded by [MF] Neftaly Seaman, PT 02/08/19 1124      Row Name 02/08/19 0930             Pain Scale: Numbers Pre/Post-Treatment    Pain Location  back sacral wound  -MF      Recorded by [MF] Neftaly Seaman, PT 02/08/19 1124      Row Name 02/08/19 1015 02/08/19 0930          Pain Scale: FACES Pre/Post-Treatment    Pain: FACES Scale, Pretreatment  0-->no hurt  -VO  2-->hurts little bit  -MF     Pain: FACES Scale, Post-Treatment  0-->no hurt  -VO  2-->hurts little bit  -MF     Recorded by [VO] Eliz Gold MA,CCC-SLP 02/08/19 1041 [MF] Neftaly Seaman, PT 02/08/19 1124     Row Name 02/08/19 0930             Wound 02/01/19 0827 Bilateral coccyx    Wound - Properties Group Date first assessed: 02/01/19 [AS] Time first assessed: 0827 [AS] Present On Admission : yes [AS] Side: Bilateral [AS] Location: coccyx [AS] Stage, Pressure Injury: unstageable [AS] Additional  Comments: -- [MR], probable DTPI  Recorded by:  [AS] Lorri Sommers RN 02/01/19 1020 [MR] France Layton RN 02/01/19 1315    Dressing Appearance  dry;intact  -MF      Base  moist;necrotic;yellow;slough;black eschar  -MF      Periwound  intact  -MF      Periwound Temperature  warm  -MF      Periwound Skin Turgor  soft  -MF      Edges  open;jagged  -MF      Wound Length (cm)  4 cm  -MF      Wound Width (cm)  6 cm  -MF      Wound Depth (cm)  0.2 cm  -MF      Drainage Characteristics/Odor  serosanguineous;brown  -MF      Drainage Amount  scant  -MF      Care, Wound  irrigated with;sterile normal saline;debrided;ultrasound therapy, non contact low frequency mist x 8 min  -MF      Dressing Care, Wound  foam;low-adherent venelex with optifoam  -MF      Recorded by [MF] Neftaly Seaman, PT 02/08/19 1124      Row Name                Wound 02/01/19 0827 Left heel pressure injury    Wound - Properties Group Date first assessed: 02/01/19 [AS] Time first assessed: 0827 [AS] Side: Left [AS] Location: heel [AS] Type: pressure injury [AS] Stage, Pressure Injury: deep tissue injury [AS] Recorded by:  [AS] Lorri Sommers RN 02/01/19 1206    Row Name                Wound 02/01/19 1825 Other (See comments) chest incision    Wound - Properties Group Date first assessed: 02/01/19 [KS] Time first assessed: 1825 [KS] Side: Other (See comments) [KS] Location: chest [KS] Type: incision [KS] Recorded by:  [KS] Leonela Caceres RN 02/01/19 1825    Row Name                Wound 01/16/19 1429 Left arm incision    Wound - Properties Group Date first assessed: 01/16/19 [JOSHUA] Time first assessed: 1429 [JOSHUA] Side: Left [JOSHUA] Location: arm [JOSHUA] Type: incision [JOSHUA] Recorded by:  [JOSHUA] Nilam Joshi RN 01/16/19 1429    Row Name                Wound 01/31/19 1600 Bilateral medial coccyx pressure injury    Wound - Properties Group Date first assessed: 01/31/19 [CM] Time first assessed: 1600 [CM] Present On Admission : yes [CM] Side: Bilateral  [CM] Orientation: medial [CM] Location: coccyx [CM] Type: pressure injury [CM] Stage, Pressure Injury: Stage 2 [CM] Additional Comments: wound now unstageable with moderate slough / eschar covering.  [MF] Wound Outcome: Converged [MF2] Recorded by:  [CM] Trudy Cornejo RN 02/03/19 0718 [MF] Neftaly Seaman, PT 02/06/19 1326 [MF2] Neftaly Seaman, PT 02/08/19 1123    Row Name                Wound 02/04/19 2000 Right upper chest puncture    Wound - Properties Group Date first assessed: 02/04/19 [RS] Time first assessed: 2000 [RS] Present On Admission : no [RS] Side: Right [RS] Orientation: upper [RS] Location: chest [RS] Type: puncture [RS], HD cath with pigtail site (removed)  Recorded by:  [RS] Rodriguez Garcia RN 02/04/19 2230    Row Name 02/08/19 0930             Coping    Observed Emotional State  calm;cooperative  -      Verbalized Emotional State  acceptance  -MF      Recorded by [MF] Neftaly Seaman, PT 02/08/19 1125      Row Name 02/08/19 0930             Plan of Care Review    Plan of Care Reviewed With  patient  -MF      Recorded by [MF] Neftaly Seaman, PT 02/08/19 1125      Row Name 02/08/19 1015             Outcome Summary/Treatment Plan (SLP)    Daily Summary of Progress (SLP)  progress toward functional goals is good  -VO      Plan for Continued Treatment (SLP)  Continue current diet. No overt s/sxs aspiration w/ NTL via cup. Trialed thins w/ cough.  Participated well in exercises.   -VO      Anticipated Dischage Disposition  anticipate therapy at next level of care  -VO      Recorded by [VO] Eliz Gold MA,CCC-SLP 02/08/19 1041        User Key  (r) = Recorded By, (t) = Taken By, (c) = Cosigned By    Initials Name Effective Dates Discipline     Neftaly Seaman, PT 06/19/15 -  PT    Rodriguez Pittman RN 06/30/16 -  Nurse    Nilam Martinez RN 06/16/16 -  Nurse    France Britt RN 06/16/16 -  Nurse    Leonela Quezada RN 05/09/17 -  Nurse     AS Lorri Sommers RN 06/16/16 -  Nurse    Trudy Peterson RN 06/21/17 -  Nurse    VO Eliz Gold MA,CCC-SLP 10/24/18 -  SLP        Rehab Goal Summary     Row Name 02/08/19 1015             Swallow Goals (SLP)    Oral Nutrition/Hydration Goal Selection (SLP)  oral nutrition/hydration, SLP goal 2;oral nutrition/hydration, SLP goal (free text)  -VO      Pharyngeal Strengthening Exercise Goal Selection (SLP)  pharyngeal strengthening exercise, SLP goal 1  -VO      Additional Documentation  pharyngeal strengthening exercise goal selection (SLP)  -VO         Oral Nutrition/Hydration Goal 1 (SLP)    Oral Nutrition/Hydration Goal 1, SLP  LTG:  Patient will tolerate reg diet and thin liquids with no s/s of pen/asp   -VO      Time Frame (Oral Nutrition/Hydration Goal 1, SLP)  by discharge  -VO      Progress/Outcomes (Oral Nutrition/Hydration Goal 1, SLP)  continuing progress toward goal  -VO         Oral Nutrition/Hydration Goal 2 (SLP)    Oral Nutrition/Hydration Goal 2, SLP  Pt will tolerate trials of soft solids and nectar-thick liquids w/o s/sxs aspiration w/ 100% acc w/o cues.  -VO      Time Frame (Oral Nutrition/Hydration Goal 2, SLP)  short term goal (STG);by discharge  -VO      Barriers (Oral Nutrition/Hydration Goal 2, SLP)  Tolerated all trials of NTL and cracker w/o s/sxs aspiration.  -VO      Progress/Outcomes (Oral Nutrition/Hydration Goal 2, SLP)  continuing progress toward goal  -VO         Oral Nutrition/Hydration Goal (SLP)    Oral Nutrition/Hydration Goal, SLP  Pt will tolerate therapeutic trials of H2O w/o s/sxs aspiration w/ 70% acc w/o cues.  -VO      Time Frame (Oral Nutrition/Hydration Goal, SLP)  short term goal (STG);by discharge  -VO      Barriers (Oral Nutrition/Hydration Goal, SLP)  Cough w/ thins via cup  -VO      Progress/Outcomes (Oral Nutrition/Hydration Goal, SLP)  continuing progress toward goal  -VO         Pharyngeal Strengthening Exercise Goal 1 (SLP)    Activity (Pharyngeal  Strengthening Goal 1, SLP)  increase superior movement of the hyolaryngeal complex;increase anterior movement of the hyolaryngeal complex  -VO      Increase Superior Movement of the Hyolaryngeal Complex  supraglottic swallow;hard effortful swallow  -VO      Increase Anterior Movement of the Hyolaryngeal Complex  supraglottic swallow;hard effortful swallow  -VO      [place]/Accuracy (Pharyngeal Strengthening Goal 1, SLP)  with minimal cues (75-90% accuracy)  -VO      Time Frame (Pharyngeal Strengthening Goal 1, SLP)  short term goal (STG);by discharge  -VO      Barriers (Pharyngeal Strengthening Goal 1, SLP)  Reviewed exercises. Pt able to complete effortful w/ 70% acc and SSG w/ 50%  -VO      Progress/Outcomes (Pharyngeal Strengthening Goal 1, SLP)  continuing progress toward goal  -VO        User Key  (r) = Recorded By, (t) = Taken By, (c) = Cosigned By    Initials Name Provider Type Discipline    VO Eliz Gold MA,CCC-SLP Speech and Language Pathologist SLP        Physical Therapy Education     Title: PT OT SLP Therapies (In Progress)     Topic: Physical Therapy (In Progress)     Point: Mobility training (In Progress)     Learning Progress Summary           Patient Acceptance, E,D, NR by LS at 2/6/2019  1:08 PM    Acceptance, E, NR by KR at 2/4/2019  1:06 PM    Acceptance, E,D, NR by LS at 2/2/2019  2:48 PM                   Point: Home exercise program (In Progress)     Learning Progress Summary           Patient Acceptance, E,D, NR by LS at 2/6/2019  1:08 PM    Acceptance, E, NR by KR at 2/4/2019  1:06 PM    Acceptance, E,D, NR by LS at 2/2/2019  2:48 PM                   Point: Body mechanics (In Progress)     Learning Progress Summary           Patient Acceptance, E,D, NR by LS at 2/6/2019  1:08 PM    Acceptance, E, NR by KR at 2/4/2019  1:06 PM    Acceptance, E,D, NR by LS at 2/2/2019  2:48 PM                   Point: Precautions (In Progress)     Learning Progress Summary           Patient  Acceptance, E,D, NR by SANTA at 2/6/2019  1:08 PM    Acceptance, E, NR by KR at 2/4/2019  1:06 PM    Acceptance, E,D, NR by SANTA at 2/2/2019  2:48 PM                               User Key     Initials Effective Dates Name Provider Type Discipline    SANTA 06/19/15 -  Tanesha Castro, PT Physical Therapist PT    SIMIN 04/03/18 -  Gemma Ying, PT Physical Therapist PT                  PT Recommendation and Plan  Anticipated Discharge Disposition (PT): skilled nursing facility  Planned Therapy Interventions (PT Eval): bed mobility training, balance training, gait training, home exercise program, patient/family education, strengthening, transfer training  Therapy Frequency (PT Clinical Impression): daily            Outcome Summary/Treatment Plan (PT)  Daily Summary of Progress (PT): unable to show any progress toward functional goals(pt with moderate necrotic tissue covering wound)  Barriers to Overall Progress (PT): limited mobility and poor PO intake   Plan for Continued Treatment (PT): cont with mist 2-3 x/ week with debridement prn   Daily Summary of Progress (PT): unable to show any progress toward functional goals(pt with moderate necrotic tissue covering wound)  Plan for Continued Treatment (PT): cont with mist 2-3 x/ week with debridement prn   Outcome Summary: Pt cont to have limited progress with sacral wound.  PT will cont with mist therapy and light debridement 2-3 x/week to help decrease bioburden and improve healing potential.   Plan of Care Reviewed With: patient    Outcome Measures     Row Name 02/06/19 1438 02/06/19 1308          How much help from another person do you currently need...    Turning from your back to your side while in flat bed without using bedrails?  --  2  -LS     Moving from lying on back to sitting on the side of a flat bed without bedrails?  --  2  -LS     Moving to and from a bed to a chair (including a wheelchair)?  --  2  -LS     Standing up from a chair using your arms (e.g.,  wheelchair, bedside chair)?  --  2  -LS     Climbing 3-5 steps with a railing?  --  1  -LS     To walk in hospital room?  --  2  -LS     AM-PAC 6 Clicks Score  --  11  -LS        How much help from another is currently needed...    Putting on and taking off regular lower body clothing?  1  -CL  --     Bathing (including washing, rinsing, and drying)  1  -CL  --     Toileting (which includes using toilet bed pan or urinal)  1  -CL  --     Putting on and taking off regular upper body clothing  1  -CL  --     Taking care of personal grooming (such as brushing teeth)  2  -CL  --     Eating meals  3  -CL  --     Score  9  -CL  --        Functional Assessment    Outcome Measure Options  AM-PAC 6 Clicks Daily Activity (OT)  -CL  AM-PAC 6 Clicks Basic Mobility (PT)  -LS       User Key  (r) = Recorded By, (t) = Taken By, (c) = Cosigned By    Initials Name Provider Type    Tanesha Rodney, PT Physical Therapist    CL Liliam Valdes, OT Occupational Therapist              Time Calculation  PT Charges     Row Name 02/08/19 0930             Time Calculation    Start Time  0930  -      PT Goal Re-Cert Due Date  02/12/19  -        User Key  (r) = Recorded By, (t) = Taken By, (c) = Cosigned By    Initials Name Provider Type    Neftaly Moore, PT Physical Therapist         Therapy Suggested Charges     Code   Minutes Charges    14365 (CPT®) Hc Pt Neuromusc Re Education Ea 15 Min      04730 (CPT®) Hc Pt Ther Proc Ea 15 Min      67873 (CPT®) Hc Gait Training Ea 15 Min      53765 (CPT®) Hc Pt Therapeutic Act Ea 15 Min 25 2    72980 (CPT®) Hc Pt Manual Therapy Ea 15 Min      25981 (CPT®) Hc Pt Iontophoresis Ea 15 Min      57335 (CPT®) Hc Pt Elec Stim Ea-Per 15 Min      66668 (CPT®) Hc Pt Ultrasound Ea 15 Min      47590 (CPT®) Hc Pt Self Care/Mgmt/Train Ea 15 Min      54729 (CPT®) Hc Pt Prosthetic (S) Train Initial Encounter, Each 15 Min      96252 (CPT®) Hc Pt Orthotic(S)/Prosthetic(S) Encounter, Each 15 Min      90875 (CPT®)  Hc Orthotic(S) Mgmt/Train Initial Encounter, Each 15min      Total  25 2          Therapy Charges for Today     Code Description Service Date Service Provider Modifiers Qty    35541544468 HC PAM DEBRIDE OPEN WOUND UP TO 20CM 2/8/2019 Neftaly Seaman, PT GP 1    22885404904 HC PT NLFU MIST 2/8/2019 Neftaly Seaman, PT GP 1            PT G-Codes  Outcome Measure Options: AM-PAC 6 Clicks Daily Activity (OT)  AM-PAC 6 Clicks Score: 11  Score: 9        Neftaly Seaman, PT  2/8/2019

## 2019-02-08 NOTE — THERAPY TREATMENT NOTE
Acute Care - Speech Language Pathology   Swallow Progress Note [facility]     Patient Name: Fabiola Pimentel  : 1934  MRN: [id number]  Today's Date: 2019  Onset of Illness/Injury or Date of Surgery: 19     Referring Physician: MD Radha      Admit Date: 2019    Visit Dx:      ICD-10-CM ICD-9-CM   1. Impaired functional mobility, balance, gait, and endurance Z74.09 V49.89   2. Impaired mobility and ADLs Z74.09 799.89   3. Pharyngeal dysphagia R13.13 787.23   4. PEA I46.9 427.5     Patient Active Problem List   Diagnosis   • ITP (idiopathic thrombocytopenic purpura)   • Palpitations   • Premature atrial contractions   • Hypertension   • Pulmonary hypertension (CMS/HCC)   • DJD (degenerative joint disease)   • Raynaud's phenomenon (secondary)   • Diverticular disease   • CKD (chronic kidney disease) stage 5, GFR less than 15 ml/min (CMS/HCC)   • Leukocytosis   • Anemia due to chronic kidney disease   • Debility   • Hx of renal cell carcinoma   • Chronic diastolic CHF (congestive heart failure) (CMS/HCC)   • Falls frequently   • PEA   • Huge hiatal hernia with intrathoracic stomach   • Mitral valve mass       Therapy Treatment  Rehabilitation Treatment Summary     Row Name 19 1015             Treatment Time/Intention    Discipline  speech language pathologist  -VO      Document Type  therapy note (daily note)  -VO      Subjective Information  no complaints  -VO      Mode of Treatment  speech-language pathology  -VO      Patient/Family Observations  Alert, cooperative, sitting upright in bed  -VO      Care Plan Review  care plan/treatment goals reviewed;current/potential barriers reviewed;risks/benefits reviewed;patient/other agree to care plan  -VO      Therapy Frequency (Swallow)  5 days per week  -VO      Patient Effort  good  -VO      Recorded by [VO] Eliz Gold MA,CCC-SLP 19 1041      Row Name 19 1015             Pain Scale: FACES Pre/Post-Treatment    Pain: FACES  Scale, Pretreatment  0-->no hurt  -VO      Pain: FACES Scale, Post-Treatment  0-->no hurt  -VO      Recorded by [VO] Eliz Gold MA,CCC-SLP 02/08/19 1041      Row Name                Wound 02/01/19 0827 Bilateral coccyx    Wound - Properties Group Date first assessed: 02/01/19 [AS] Time first assessed: 0827 [AS] Present On Admission : yes [AS] Side: Bilateral [AS] Location: coccyx [AS] Stage, Pressure Injury: unstageable [AS] Additional Comments: -- [MR], probable DTPI  Recorded by:  [AS] Lorri Sommers RN 02/01/19 1020 [MR] France Layton RN 02/01/19 1315    Row Name                Wound 02/01/19 0827 Left heel pressure injury    Wound - Properties Group Date first assessed: 02/01/19 [AS] Time first assessed: 0827 [AS] Side: Left [AS] Location: heel [AS] Type: pressure injury [AS] Stage, Pressure Injury: deep tissue injury [AS] Recorded by:  [AS] Lorri Sommers RN 02/01/19 1206    Row Name                Wound 02/01/19 1825 Other (See comments) chest incision    Wound - Properties Group Date first assessed: 02/01/19 [KS] Time first assessed: 1825 [KS] Side: Other (See comments) [KS] Location: chest [KS] Type: incision [KS] Recorded by:  [KS] Leonela Caceres RN 02/01/19 1825    Row Name                Wound 01/16/19 1429 Left arm incision    Wound - Properties Group Date first assessed: 01/16/19 [JOSHUA] Time first assessed: 1429 [JOSHUA] Side: Left [JOSHUA] Location: arm [JOSHUA] Type: incision [JOSHUA] Recorded by:  [JOSHUA] Nilam Joshi RN 01/16/19 1429    Row Name                Wound 01/31/19 1600 Bilateral medial coccyx pressure injury    Wound - Properties Group Date first assessed: 01/31/19 [CM] Time first assessed: 1600 [CM] Present On Admission : yes [CM] Side: Bilateral [CM] Orientation: medial [CM] Location: coccyx [CM] Type: pressure injury [CM] Stage, Pressure Injury: Stage 2 [CM] Additional Comments: wound now unstageable with moderate slough / eschar covering.  [MF] Recorded by:  [CM] Trudy Cornejo,  RN 02/03/19 0718 [MF] Lucerocheryl Neftaly SALAZAR, PT 02/06/19 1326    Row Name                Wound 02/04/19 2000 Right upper chest puncture    Wound - Properties Group Date first assessed: 02/04/19 [RS] Time first assessed: 2000 [RS] Present On Admission : no [RS] Side: Right [RS] Orientation: upper [RS] Location: chest [RS] Type: puncture [RS], HD cath with pigtail site (removed)  Recorded by:  [RS] Rodriguez Garcia RN 02/04/19 2230    Row Name 02/08/19 1015             Outcome Summary/Treatment Plan (SLP)    Daily Summary of Progress (SLP)  progress toward functional goals is good  -VO      Plan for Continued Treatment (SLP)  Continue current diet. No overt s/sxs aspiration w/ NTL via cup. Trialed thins w/ cough.  Participated well in exercises.   -VO      Anticipated Dischage Disposition  anticipate therapy at next level of care  -VO      Recorded by [VO] Eliz Gold MA,CCC-SLP 02/08/19 1041        User Key  (r) = Recorded By, (t) = Taken By, (c) = Cosigned By    Initials Name Effective Dates Discipline     Neftaly Seaman, PT 06/19/15 -  PT    Rodriguez Pittman, RN 06/30/16 -  Nurse    Nilam Martinez RN 06/16/16 -  Nurse    France Britt RN 06/16/16 -  Nurse    Leonela Quezada RN 05/09/17 -  Nurse    Lorri Lamb RN 06/16/16 -  Nurse    Trudy Peterson RN 06/21/17 -  Nurse    VO Eliz Gold MA,CCC-SLP 10/24/18 -  SLP          Outcome Summary  Outcome Summary/Treatment Plan (SLP)  Daily Summary of Progress (SLP): progress toward functional goals is good (02/08/19 1015 : Eliz Gold MA,CCC-SLP)  Plan for Continued Treatment (SLP): Continue current diet. No overt s/sxs aspiration w/ NTL via cup. Trialed thins w/ cough.  Participated well in exercises.  (02/08/19 1015 : Eliz Gold MA,CCC-SLP)  Anticipated Dischage Disposition: anticipate therapy at next level of care (02/08/19 1015 : Eliz Gold MA,CCC-SLP)      SLP GOALS     Row  Name 02/08/19 1015 02/07/19 1527 02/06/19 1200       Oral Nutrition/Hydration Goal 1 (SLP)    Oral Nutrition/Hydration Goal 1, SLP  LTG:  Patient will tolerate reg diet and thin liquids with no s/s of pen/asp   -VO  LTG:  Patient will tolerate reg diet and thin liquids with no s/s of pen/asp   -AC  LTG:  Patient will tolerate reg diet and thin liquids with no s/s of pen/asp   -AV    Time Frame (Oral Nutrition/Hydration Goal 1, SLP)  by discharge  -VO  by discharge  -AC  by discharge  -AV    Progress/Outcomes (Oral Nutrition/Hydration Goal 1, SLP)  continuing progress toward goal  -VO  continuing progress toward goal  -AC  --       Oral Nutrition/Hydration Goal 2 (SLP)    Oral Nutrition/Hydration Goal 2, SLP  Pt will tolerate trials of soft solids and nectar-thick liquids w/o s/sxs aspiration w/ 100% acc w/o cues.  -VO  Pt will tolerate trials of soft solids and nectar-thick liquids w/o s/sxs aspiration w/ 100% acc w/o cues.  -AC  --    Time Frame (Oral Nutrition/Hydration Goal 2, SLP)  short term goal (STG);by discharge  -VO  short term goal (STG);by discharge  -AC  --    Barriers (Oral Nutrition/Hydration Goal 2, SLP)  Tolerated all trials of NTL and cracker w/o s/sxs aspiration.  -VO  Pt tolerated trials of nectar via straw w/o s/sxs aspiration. General coughing t/o eval, but did not appear to be r/t swallowing.  -AC  --    Progress/Outcomes (Oral Nutrition/Hydration Goal 2, SLP)  continuing progress toward goal  -VO  continuing progress toward goal  -AC  --       Oral Nutrition/Hydration Goal (SLP)    Oral Nutrition/Hydration Goal, SLP  Pt will tolerate therapeutic trials of H2O w/o s/sxs aspiration w/ 70% acc w/o cues.  -VO  Pt will tolerate therapeutic trials of H2O w/o s/sxs aspiration w/ 70% acc w/o cues.  -AC  --    Time Frame (Oral Nutrition/Hydration Goal, SLP)  short term goal (STG);by discharge  -VO  short term goal (STG);by discharge  -AC  --    Barriers (Oral Nutrition/Hydration Goal, SLP)  Cough w/  thins via cup  -VO  --  --    Progress/Outcomes (Oral Nutrition/Hydration Goal, SLP)  continuing progress toward goal  -VO  goal ongoing  -AC  --       Pharyngeal Strengthening Exercise Goal 1 (SLP)    Activity (Pharyngeal Strengthening Goal 1, SLP)  increase superior movement of the hyolaryngeal complex;increase anterior movement of the hyolaryngeal complex  -VO  --  increase superior movement of the hyolaryngeal complex;increase anterior movement of the hyolaryngeal complex  -AV    Increase Superior Movement of the Hyolaryngeal Complex  supraglottic swallow;hard effortful swallow  -VO  supraglottic swallow;hard effortful swallow  -AC  supraglottic swallow;hard effortful swallow  -AV    Increase Anterior Movement of the Hyolaryngeal Complex  supraglottic swallow;hard effortful swallow  -VO  supraglottic swallow;hard effortful swallow  -AC  supraglottic swallow;hard effortful swallow  -AV    [place]/Accuracy (Pharyngeal Strengthening Goal 1, SLP)  with minimal cues (75-90% accuracy)  -VO  with minimal cues (75-90% accuracy)  -AC  with minimal cues (75-90% accuracy)  -AV    Time Frame (Pharyngeal Strengthening Goal 1, SLP)  short term goal (STG);by discharge  -VO  short term goal (STG);by discharge  -AC  short term goal (STG);by discharge  -AV    Barriers (Pharyngeal Strengthening Goal 1, SLP)  Reviewed exercises. Pt able to complete effortful w/ 70% acc and SSG w/ 50%  -VO  Introduced exercises and provided handout. Effortful swallow x10, SGS x5  -AC  --    Progress/Outcomes (Pharyngeal Strengthening Goal 1, SLP)  continuing progress toward goal  -VO  continuing progress toward goal  -AC  --      User Key  (r) = Recorded By, (t) = Taken By, (c) = Cosigned By    Initials Name Provider Type    AC Deisi Nava, MS CCC-SLP Speech and Language Pathologist    Mary Golden, MS CCC-SLP Speech and Language Pathologist    Eliz Garner MA,CCC-SLP Speech and Language Pathologist           EDUCATION  The patient has been educated in the following areas:   Dysphagia (Swallowing Impairment) Oral Care/Hydration Modified Diet Instruction.    SLP Recommendation and Plan                       Anticipated Dischage Disposition: anticipate therapy at next level of care     Therapy Frequency (Swallow): 5 days per week          Plan of Care Reviewed With: patient  Plan of Care Review  Plan of Care Reviewed With: patient  Daily Summary of Progress (SLP): progress toward functional goals is good  Plan for Continued Treatment (SLP): Continue current diet. No overt s/sxs aspiration w/ NTL via cup. Trialed thins w/ cough.  Participated well in exercises.            Time Calculation:   Time Calculation- SLP     Row Name 02/08/19 1043             Time Calculation- SLP    SLP Start Time  1015  -VO      SLP Received On  02/08/19  -VO        User Key  (r) = Recorded By, (t) = Taken By, (c) = Cosigned By    Initials Name Provider Type    VO Eliz Gold MA,CCC-SLP Speech and Language Pathologist          Therapy Charges for Today     Code Description Service Date Service Provider Modifiers Qty    [id number] HC ST TREATMENT SWALLOW 3 2/8/2019 Eliz Gold MA,CCC-SLP GN 1                 Eliz Gold MA,CCC-SLP  2/8/2019

## 2019-02-08 NOTE — PLAN OF CARE
Problem: Patient Care Overview  Goal: Plan of Care Review  Outcome: Ongoing (interventions implemented as appropriate)   02/08/19 1105   Coping/Psychosocial   Plan of Care Reviewed With patient   Plan of Care Review   Progress improving   OTHER   Outcome Summary Pt demonstrated ability to pprogress to STS x3 from EOB with mod A and pivot to recliner with max A. Gave good effort with supine ther ex. Cont to be limited by generalized fatigue and noted R shoulder weakness, but gave good effort throughout. Will cont PT POC.

## 2019-02-08 NOTE — PROGRESS NOTES
Clinical Nutrition     Nutrition Support Assessment  Reason for Visit:   MDR, Follow-up protocol      Patient Name: Fabiola Pimentel  YOB: 1934  MRN: [id number]  Date of Encounter: 02/08/19 12:26 PM  Admission date: 1/31/2019    Poor po intake since admission,  if intake does not improve, consider supplemental EN: Novasource  Renal @35ml, Prostat 2x/day, free water @10ml/hr    Pt may benefit from MVI to aid in wound healing as well    Nutrition Assessment   Assessment       Hospital Problem List    PEA    ITP (idiopathic thrombocytopenic purpura)    Pulmonary hypertension (CMS/HCC)    CKD (chronic kidney disease) stage 5, GFR less than 15 ml/min (CMS/HCC)    Leukocytosis    Anemia due to chronic kidney disease    Debility    Hx of renal cell carcinoma    Chronic diastolic CHF (congestive heart failure) (CMS/HCC)    Falls frequently    Huge hiatal hernia with intrathoracic stomach    Mitral valve mass      PMH: She  has a past medical history of Chronic ITP (idiopathic thrombocytopenia) (CMS/HCC), Chronic renal insufficiency, CKD (chronic kidney disease), Diverticular disease, Dizziness, DJD (degenerative joint disease), Hip fracture (CMS/HCC), History of uterine cancer, Hypertension, Lower extremity edema, Palpitations, Pelvic fracture (CMS/HCC), Premature atrial contractions, Pulmonary hypertension (CMS/HCC), Raynaud's phenomenon (secondary), and Stroke (CMS/HCC).   PSxH: She  has a past surgical history that includes Incisional hernia repair (01/02/2008); Nephrectomy (Right, 2001); Total abdominal hysterectomy w/ bilateral salpingoophorectomy (2001); Other surgical history; Appendectomy; Cholecystectomy (1958); Cataract extraction w/  intraocular lens implant; Colectomy (10/2005); Splenectomy, total; Dilation and curettage of uterus; Sympathectomy (Left); HEMODIALYSIS CATHETER INSERTION (N/A, 2/1/2019); and LEFT UPPER EXTREMITY ARTERIOVENOUS FISTULA FORMATION, BRACHIO-AXILLARY SHUNT  WITH ARTEGRAFT (Left, 1/16/2019).     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 heel DTI    Reported/Observed/Food/Nutrition Related History:     Pt sitting up in chair, appears frail,  has eaten part of a bundt cake, did not want her lunch, still c/o sore throat, coughing    Per night nurse: pt coughing with nectar liquids, per RN today: pt taking pills ok    Labs reviewed     Results from last 7 days   Lab Units 02/08/19 0319 02/07/19  0543 02/06/19 0427 02/04/19 0441 02/03/19  0459 02/02/19  0743   GLUCOSE mg/dL 90 82 87   < > 104* 111* 58*   BUN mg/dL 25* 41* 31*   < > 39* 31* 52*   CREATININE mg/dL 2.68* 3.91* 3.11*   < > 3.23* 2.66* 3.79*   SODIUM mmol/L 138 138 139   < > 139 141 141   CHLORIDE mmol/L 108 106 106   < > 107 108 108   POTASSIUM mmol/L 3.9 3.8 3.9   < > 3.8 3.9 4.3   PHOSPHORUS mg/dL  --  4.6 3.5  --   --  3.4 5.0   MAGNESIUM mg/dL  --  1.9  --   --   --   --  1.9   ALT (SGPT) U/L  --  10  --   --  10  --  14    < > = values in this interval not displayed.     Results from last 7 days   Lab Units 02/07/19  0543 02/06/19 0427 02/04/19 0441 02/02/19  0743   ALBUMIN g/dL 2.95* 3.11* 2.55*   < > 2.91*   IONIZED CALCIUM mmol/L  --   --   --   --  1.00*    < > = values in this interval not displayed.       Results from last 7 days   Lab Units 02/02/19  2134   GLUCOSE mg/dL 202*     Lab Results   Lab Value Date/Time    HGBA1C 5.60 01/31/2019 1758         Medications reviewed   Pertinent: albumin, abx, diflucan      Intake/Ouptut 24 hrs (7:00AM - 6:59 AM)     Intake & Output (last day)       02/07 0701 - 02/08 0700 02/08 0701 - 02/09 0700    P.O. 100     I.V. (mL/kg) 30 (0.5)     IV Piggyback 100     Total Intake(mL/kg) 230 (3.5)     Other 2220     Total Output 2220     Net -1990           Stool Unmeasured Occurrence  1 x               GI: wnl    SKIN: sacral/ coccyx- DTI s/p MIST/ debridement, L. heel DTI    Needs Assessment       Height used 64.5in   Weight used 145lb    [facility] 2-2-19      Estimated need Method/Equation used Result    Energy/Calorie need  kcals/d MSJ x 1.3 1433kcal    25-30kcal per kg 1648-1977kcal        Protein   g/day 1.2-1.5g protein per kg 79-99g protein                             Current Nutrition Prescription     PO: Diet Dysphagia; IV - Mechanical Soft No Mixed Consistencies; Nectar / Syrup Thick; Renal    RFB 3x/day  Magic Cups 3x/day    Intake:20% of 3 meals         Nutrition Diagnosis        Problem Inadequate oral intake   Etiology Per clinical Status   Signs/Symptoms Poor Po Intake- 20%       Problem Increased nutrient needs   Etiology Poor Skin Integrity   Signs/Symptoms Sacral ulcer, L. Heel DTI        Nutrition Intervention   1.  Follow treatment progress, Interview for preferences, Menu adjusted, Encourage intake    2. Poor po intake since admission, if intake does not improve, consider supplemental EN: Novasource  Renal @35ml, Prostat 2x/day, free water @10ml/hr    3. Pt may benefit from MVI to aid in wound healing     At Target Goal Volume     % Est needs   Volume  700ml     Energy/kcals 1600kcals 100%   Protein 93g pro 100%   Fiber 0g         Fluid via EN 504mL    Total Fluid  704    Meets 100% RDI NO          Goal:   General: Nutrition support treatment  PO: Increase intake  EN/PN: Initiate EN if po intake does not improve      Monitoring/Evaluation:   Per protocol, I&O, PO intake, Supplement intake, Pertinent labs, Weight, Skin status, GI status, Symptoms, Swallow function      Nora Gomez, RD  Time Spent: 60min

## 2019-02-08 NOTE — PROGRESS NOTES
Continued Stay Note  [facility]     Patient Name: Fabiola Pimentel  MRN: [id number]  Today's Date: 2/8/2019    Admit Date: 1/31/2019    Discharge Plan     Row Name 02/08/19 1149       Plan    Plan  [facility]    Plan Comments  Patient sleeping, plan is still [facility] when discharged with HD at [facility] in [place].  Discussed in rounds, still watching WBC.  CM will continue to follow.        Discharge Codes    No documentation.       Expected Discharge Date and Time     Expected Discharge Date Expected Discharge Time    Feb 11, 2019             Thea Singleton RN

## 2019-02-08 NOTE — PLAN OF CARE
Problem: Patient Care Overview  Goal: Plan of Care Review  Outcome: Ongoing (interventions implemented as appropriate)   02/08/19 1042   Coping/Psychosocial   Plan of Care Reviewed With patient   SLP treatment completed. Will continue to address dysphagia through treatment. Please see note for further details and recommendations.

## 2019-02-08 NOTE — PLAN OF CARE
Problem: Patient Care Overview  Goal: Plan of Care Review  Outcome: Ongoing (interventions implemented as appropriate)   02/08/19 6323   Coping/Psychosocial   Plan of Care Reviewed With patient   Plan of Care Review   Progress improving   OTHER   Outcome Summary VSS. OOBTC for 2 hours. Patient very weak with very little appetite. HD planned for tomorrow. WBC trending down.        Problem: Fall Risk (Adult)  Goal: Absence of Fall  Outcome: Ongoing (interventions implemented as appropriate)      Problem: Skin Injury Risk (Adult)  Goal: Skin Health and Integrity  Outcome: Ongoing (interventions implemented as appropriate)      Problem: Wound (Includes Pressure Injury) (Adult)  Goal: Signs and Symptoms of Listed Potential Problems Will be Absent, Minimized or Managed (Wound)  Outcome: Ongoing (interventions implemented as appropriate)      Problem: Hemodialysis (Adult)  Goal: Signs and Symptoms of Listed Potential Problems Will be Absent, Minimized or Managed (Hemodialysis)  Outcome: Ongoing (interventions implemented as appropriate)

## 2019-02-08 NOTE — PROGRESS NOTES
"   LOS: 8 days    Patient Care Team:  Otilio Smith DO as PCP - General (Family Medicine)    Reason For Visit:  F/U ESRD  Subjective           Review of Systems:    Pulm: No soa   CV:  No CP      Objective       bisoprolol 5 mg Oral Nightly   calcitriol 0.25 mcg Oral Daily   castor oil-balsam peru  Topical Q12H   epoetin alexus 10,000 Units Subcutaneous Once per day on Tue Thu Sat   fluconazole 200 mg Intravenous Daily   heparin (porcine) 5,000 Units Subcutaneous Q8H   meropenem 500 mg Intravenous Q24H   senna 1 tablet Oral BID   sodium chloride 3 mL Intravenous Q12H            Vital Signs:  Blood pressure 145/68, pulse 63, temperature 98.4 °F (36.9 °C), temperature source Oral, resp. rate 20, height 163.8 cm (64.5\"), weight 66.1 kg (145 lb 11.6 oz), SpO2 92 %, not currently breastfeeding.    Flowsheet Rows      First Filed Value   Admission Height  163.8 cm (64.5\") Documented at 02/01/2019 0300   Admission Weight  70 kg (154 lb 4.8 oz) Documented at 01/31/2019 1607          02/07 0701 - 02/08 0700  In: 230 [P.O.:100; I.V.:30]  Out: 2220     Physical Exam:    General Appearance: NAD, alert and cooperative, Ox3  Eyes: PER, conjunctivae and sclerae normal, no icterus  Lungs: respirations regular and unlabored, no crepitus, clear to auscultation  Heart/CV: regular rhythm & normal rate, no murmur, no gallop, no rub and no edema  Abdomen: not distended, soft, non-tender, no masses,  bowel sounds present  Skin: No rash, Warm and dry. TUNNELED HD CATH. CLOTTED AVF LUE.    Radiology:            Labs:  Results from last 7 days   Lab Units 02/08/19 0319 02/07/19  0543 02/06/19  0427   WBC 10*3/mm3 19.96* 27.21* 25.95*   HEMOGLOBIN g/dL 9.4* 9.9* 9.9*   HEMATOCRIT % 32.4* 32.3* 31.3*   PLATELETS 10*3/mm3 220 263 204     Results from last 7 days   Lab Units 02/08/19 0319 02/07/19  0543 02/06/19  0427 02/05/19  0645 02/04/19  0441 02/03/19  0459 02/02/19  0743   SODIUM mmol/L 138 138 139 138 139 141 141   POTASSIUM mmol/L " 3.9 3.8 3.9 3.9 3.8 3.9 4.3   CHLORIDE mmol/L 108 106 106 106 107 108 108   CO2 mmol/L 18.0* 18.0* 23.0 22.0 23.0 22.0 19.0*   BUN mg/dL 25* 41* 31* 46* 39* 31* 52*   CREATININE mg/dL 2.68* 3.91* 3.11* 4.18* 3.23* 2.66* 3.79*   CALCIUM mg/dL 8.2* 7.8* 7.8* 7.3* 6.8* 6.9* 6.7*   PHOSPHORUS mg/dL  --  4.6 3.5  --   --  3.4 5.0   MAGNESIUM mg/dL  --  1.9  --   --   --   --  1.9   ALBUMIN g/dL  --  2.95* 3.11*  --  2.55* 2.54* 2.91*     Results from last 7 days   Lab Units 02/08/19  0319   GLUCOSE mg/dL 90       Results from last 7 days   Lab Units 02/07/19  0543   ALK PHOS U/L 69   BILIRUBIN mg/dL 0.3   ALT (SGPT) U/L 10   AST (SGOT) U/L 18                 Estimated Creatinine Clearance: 16.3 mL/min (A) (by C-G formula based on SCr of 2.68 mg/dL (H)).      Assessment       PEA    ITP (idiopathic thrombocytopenic purpura)    Pulmonary hypertension (CMS/HCC)    CKD (chronic kidney disease) stage 5, GFR less than 15 ml/min (CMS/HCC)    Leukocytosis    Anemia due to chronic kidney disease    Debility    Hx of renal cell carcinoma    Chronic diastolic CHF (congestive heart failure) (CMS/HCC)    Falls frequently    Huge hiatal hernia with intrathoracic stomach    Mitral valve mass            Impression: ESRD. ANEMIA.            Recommendations: HD 2/9/19.      Otilio Adler MD  02/08/19  10:20 AM

## 2019-02-08 NOTE — PROGRESS NOTES
"[facility] at [facility] Progress Note     LOS: 8 days   Patient Care Team:  Otilio Smith DO as PCP - General (Family Medicine)  PCP:  Otilio Smith DO    Chief Complaint:  PEA arrest    SUBJECTIVE:   Patient is lying in bed, comfortable. No complaints. HR is stable and in NSR.     Review of Systems:   All systems have been reviewed and are negative with the exception of those mentioned above.      OBJECTIVE:    Vital Sign Min/Max for last 24 hours  Temp  Min: 97.6 °F (36.4 °C)  Max: 98.2 °F (36.8 °C)   BP  Min: 96/47  Max: 149/63   Pulse  Min: 60  Max: 72   Resp  Min: 16  Max: 20   SpO2  Min: 87 %  Max: 99 %   No Data Recorded   No Data Recorded     Flowsheet Rows      First Filed Value   Admission Height  163.8 cm (64.5\") Documented at 02/01/2019 0300   Admission Weight  70 kg (154 lb 4.8 oz) Documented at 01/31/2019 1607          Telemetry: Sinus rhythm      Intake/Output Summary (Last 24 hours) at 2/8/2019 0826  Last data filed at 2/7/2019 1300  Gross per 24 hour   Intake 130 ml   Output 2220 ml   Net -2090 ml     Intake & Output (last 3 days)       02/05 0701 - 02/06 0700 02/06 0701 - 02/07 0700 02/07 0701 - 02/08 0700 02/08 0701 - 02/09 0700    P.O.  336 100     I.V. (mL/kg)  66 (1) 30 (0.5)     IV Piggyback 170 400 100     Total Intake(mL/kg) 170 (2.6) 802 (12.1) 230 (3.5)     Other 2720  2220     Total Output 2720  2220     Net -2550 +802 -1990             Urine Unmeasured Occurrence  1 x             Physical Exam:    General Appearance:    Alert, cooperative, no distress, appears stated age. comfortable   Neck:   Supple, symmetrical, trachea midline. Left IJ tunneled dialysis catheter in place   Lungs:     Clear to auscultation bilaterally anteriorly, respirations unlabored   Chest Wall:    No tenderness or deformity.     Heart:    Regular rate and rhythm, S1 and S2 normal, II/6 holosystolic MR murmur apex, No rub or gallop, normal carotid impulse bilaterally without " bruit.   Extremities:   Extremities normal, atraumatic, no cyanosis or edema   Pulses:   2+ and symmetric all extremities   Skin:   Skin color, texture, turgor normal, no rashes or lesions      LABS/DIAGNOSTIC DATA:  Results from last 7 days   Lab Units 02/08/19 0319 02/07/19 0543 02/06/19 0427   WBC 10*3/mm3 19.96* 27.21* 25.95*   HEMOGLOBIN g/dL 9.4* 9.9* 9.9*   HEMATOCRIT % 32.4* 32.3* 31.3*   PLATELETS 10*3/mm3 220 263 204     No results found for: TROPONINT      Results from last 7 days   Lab Units 02/08/19 0319 02/07/19 0543 02/06/19 0427 02/04/19 0441 02/02/19  0743   SODIUM mmol/L 138 138 139   < > 139   < > 141   POTASSIUM mmol/L 3.9 3.8 3.9   < > 3.8   < > 4.3   CHLORIDE mmol/L 108 106 106   < > 107   < > 108   CO2 mmol/L 18.0* 18.0* 23.0   < > 23.0   < > 19.0*   BUN mg/dL 25* 41* 31*   < > 39*   < > 52*   CREATININE mg/dL 2.68* 3.91* 3.11*   < > 3.23*   < > 3.79*   CALCIUM mg/dL 8.2* 7.8* 7.8*   < > 6.8*   < > 6.7*   BILIRUBIN mg/dL  --  0.3  --   --  0.5  --  0.4   ALK PHOS U/L  --  69  --   --  53  --  46   ALT (SGPT) U/L  --  10  --   --  10  --  14   AST (SGOT) U/L  --  18  --   --  23  --  34*   GLUCOSE mg/dL 90 82 87   < > 104*   < > 58*    < > = values in this interval not displayed.                       Medication Review:     bisoprolol 5 mg Oral Nightly   calcitriol 0.25 mcg Oral Daily   castor oil-balsam peru  Topical Q12H   epoetin alexus 10,000 Units Subcutaneous Once per day on Tue Thu Sat   fluconazole 200 mg Intravenous Daily   heparin (porcine) 5,000 Units Subcutaneous Q8H   meropenem 500 mg Intravenous Q24H   senna 1 tablet Oral BID   sodium chloride 3 mL Intravenous Q12H              PEA    ITP (idiopathic thrombocytopenic purpura)    Pulmonary hypertension (CMS/HCC)    CKD (chronic kidney disease) stage 5, GFR less than 15 ml/min (CMS/HCC)    Leukocytosis    Anemia due to chronic kidney disease    Debility    Hx of renal cell carcinoma    Chronic diastolic CHF (congestive heart  failure) (CMS/HCC)    Falls frequently    Huge hiatal hernia with intrathoracic stomach    Mitral valve mass      ASSESSMENT/PLAN:  Stable from a cardiac standpoint for transfer to telemetry floor when bed is available  Continue Bisoprolol 5 mg nightly. Rhythm has been stable with normal HRs  Will plan to repeat limited echo in one to two weeks to verify stability of mitral valve abnormality noted on echo this admission    Electronically signed by JODIE Bhatt, 02/08/19, 8:27 AM.

## 2019-02-09 NOTE — PROGRESS NOTES
INTENSIVIST   PROGRESS NOTE     Hospital:  LOS: 9 days      S     Ms. Fabiola Pimentel, 84 y.o. female is followed for:      PEA    ITP (idiopathic thrombocytopenic purpura)      As an Intensivist, we provide an integrated approach to the ICU patient and family, medical management of comorbid conditions, including but not limited to electrolytes, glycemic control, organ dysfunction, lead interdisciplinary rounds and coordinate the care with all other services, including those from other specialists.     Interval History:  Better.  Skin site of left tunneled catheter, with a yellow scab.  Getting HD this morning.     The patient's relevant past medical, surgical and social history were reviewed and updated in Epic as appropriate.     ROS:   Constitutional: Negative for fever.   Respiratory: Negative for dyspnea.   Cardiovascular: Negative for chest pain.   Gastrointestinal: Negative for  nausea, vomiting and diarrhea.        O     Vitals:  Temp: 97.5 °F (36.4 °C) (02/09/19 1115) Temp  Min: 97.5 °F (36.4 °C)  Max: 98.4 °F (36.9 °C)   BP: 120/56 (02/09/19 1100) BP  Min: 95/57  Max: 168/77   Pulse: 65 (02/09/19 1100) Pulse  Min: 56  Max: 67   Resp: 20 (02/09/19 0656) Resp  Min: 18  Max: 20   SpO2: 98 % (02/09/19 1100) SpO2  Min: 79 %  Max: 99 %   Device: nasal cannula (02/09/19 1023)    Flow Rate: 2 (02/09/19 1023) Flow (L/min)  Min: 2  Max: 2       Physical Examination  Telemetry:  Rhythm: normal sinus rhythm (02/09/19 0623)  Minimal Oral thrush.   Constitutional:  No acute distress.   Cardiovascular: Normal rate, regular and rhythm. Normal heart sounds.  (+) murmur  No gallop or rub.   Respiratory: No respiratory distress. Normal respiratory effort.  Normal breath sounds  Clear to auscultation and percussion.    Abdominal:  Soft. No masses. Non-tender. No distension. No HSM.   Extremities: No digital cyanosis. No clubbing.  No edema.   Neurological:   Alert. Non focal.   Lines/Drains/Airways: L IJ tunneled Dialysis  Catheter - skin site with a yellow scab.       Hematology:  Results from last 7 days   Lab Units 02/09/19  0701 02/08/19 0319 02/07/19  0543   WBC 10*3/mm3 20.46* 19.96* 27.21*   HEMOGLOBIN g/dL 7.8* 9.4* 9.9*   MCV fL 94.1 97.3 93.6   PLATELETS 10*3/mm3 290 220 263       Chemistry:  Estimated Creatinine Clearance: 11.8 mL/min (A) (by C-G formula based on SCr of 3.69 mg/dL (H)).    Results from last 7 days   Lab Units 02/09/19  0701 02/08/19 0319 02/07/19  0543   SODIUM mmol/L 144 138 138   POTASSIUM mmol/L 3.9 3.9 3.8   CHLORIDE mmol/L 110* 108 106   CO2 mmol/L 22.0 18.0* 18.0*   ANION GAP mmol/L 12.0* 12.0* 14.0*   GLUCOSE mg/dL 107* 90 82   CALCIUM mg/dL 8.4* 8.2* 7.8*     Results from last 7 days   Lab Units 02/09/19  0701 02/08/19 0319 02/07/19  0543   BUN mg/dL 45* 25* 41*   CREATININE mg/dL 3.69* 2.68* 3.91*     Images:  No radiology results for the last day    Echo:  Results for orders placed during the hospital encounter of 01/31/19   Transthoracic Echo Complete With Contrast if Necessary Per Protocol    Narrative · Left ventricular systolic function is normal.  · Left ventricular diastolic dysfunction (grade I) consistent with   impaired relaxation.  · Estimated EF appears to be in the range of 61 - 65%.  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 Likely represents age-related   focal region of sclerosis and calcification of the chordal apparatus,   however vegetation cannot be completely excluded, clinical correlation   required.          Results: Reviewed.  I reviewed the patient's new laboratory and imaging results.  I independently reviewed the patient's new images.    Medications: Reviewed.    Assessment/Plan   A / P     84 y.o.female, admitted on 1/31/2019 with Acute renal failure (ARF) (CMS/HCC) [N17.9]:     1. S/p PEA, while she was in the inpatient dialysis unit, quickly resuscitated (within 3 minutes), and it did not require  intubation, 2/1/19  2. 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  3. ESRD on HD  4. ITP  5. Leukocytosis, 02/09/19 ~ yesterday.    Lab Results   Lab Value Date/Time    WBC 20.46 (H) 02/09/2019 0701    WBC 19.96 (H) 02/08/2019 0319    WBC 27.21 (H) 02/07/2019 0543    WBC 25.95 (H) 02/06/2019 0427     6. Odynophagia and oral thrush  1. R/o Esoph. candidiasis  7. HTN  8. Anemia, chronic disease.  1. EPO and IV iron during HD } As per Nephrology  9. Hiatal hernia.  10. Antibiotics: MRP and Fluconazole + Vanco IV (based on levels)  11. Glucose: No h/o DM    Lab Results   Lab Value Date/Time    HGBA1C 5.60 01/31/2019 1758       Diet: Diet Dysphagia; IV - Mechanical Soft No Mixed Consistencies; Nectar / Syrup Thick; Renal   Advance Directives: Code Status and Medical Interventions:   Ordered at: 02/01/19 1600     Limited Support to NOT Include:    Intubation     Code Status:    CPR     Medical Interventions (Level of Support Prior to Arrest):    Limited          Assessment / Plan:    1. WBC about same as yesterday. Discussed with Dr. Dugan.  2. Continue ABs.  3. Monitor WBC and Hb  4. Disposition: Keep in ICU.    Plan of care and goals reviewed during interdisciplinary rounds.  Level of Risk is High due to:  illness with threat to life or bodily function.   I discussed the patient's findings and my recommendations with patient    Chung Frerari MD, FACP, FCCP, CNSC  Intensive Care Medicine, Nutrition Support and Pulmonary Medicine

## 2019-02-09 NOTE — PLAN OF CARE
Problem: Patient Care Overview  Goal: Plan of Care Review  Outcome: Ongoing (interventions implemented as appropriate)   02/09/19 0898   Coping/Psychosocial   Plan of Care Reviewed With patient   OTHER   Outcome Summary Pt requiring increased motivation to participate this date. Recommend continued skilled OT services to increase independence with ADL's prior to d/c. Recommend SNF at d/c.

## 2019-02-09 NOTE — THERAPY TREATMENT NOTE
Acute Care - Occupational Therapy Treatment Note  [facility]     Patient Name: Fabiola Pimentel  : 1934  MRN: [id number]  Today's Date: 2019  Onset of Illness/Injury or Date of Surgery: 19  Date of Referral to OT: 19  Referring Physician: MD Radha    Admit Date: 2019       ICD-10-CM ICD-9-CM   1. Impaired functional mobility, balance, gait, and endurance Z74.09 V49.89   2. Impaired mobility and ADLs Z74.09 799.89   3. Pharyngeal dysphagia R13.13 787.23   4. PEA I46.9 427.5     Patient Active Problem List   Diagnosis   • ITP (idiopathic thrombocytopenic purpura)   • Palpitations   • Premature atrial contractions   • Hypertension   • Pulmonary hypertension (CMS/HCC)   • DJD (degenerative joint disease)   • Raynaud's phenomenon (secondary)   • Diverticular disease   • CKD (chronic kidney disease) stage 5, GFR less than 15 ml/min (CMS/HCC)   • Leukocytosis   • Anemia due to chronic kidney disease   • Debility   • Hx of renal cell carcinoma   • Chronic diastolic CHF (congestive heart failure) (CMS/HCC)   • Falls frequently   • PEA   • Huge hiatal hernia with intrathoracic stomach   • Mitral valve mass     Past Medical History:   Diagnosis Date   • Chronic ITP (idiopathic thrombocytopenia) (CMS/HCC)    • Chronic renal insufficiency    • CKD (chronic kidney disease)    • Diverticular disease    • Dizziness     Dizziness with increase of propafenone to t.i.d.; however, palpitations improved, patient wishes to stay on current dose.   • DJD (degenerative joint disease)    • Hip fracture (CMS/HCC)     Recent hip and pelvic fracture.   • History of uterine cancer     Initial diagnosis, , status post SAVLATORE/BSO. Recurrence documented , status post radiation therapy and implants.   • Hypertension    • Lower extremity edema     Lower extremity venous duplex, 2013:  No evidence of deep venous thrombosis. Negative VQ scan for pulmonary embolus, findings suggest congestive heart failure,  10/11/2013.No evidence of deep venous thrombosis by bilateral duplex, 03/25/2015.Mild   • Palpitations    • Pelvic fracture (CMS/HCC)     Recent hip and pelvic fracture.   • Premature atrial contractions     Premature atrial contractions, postoperative from incisional hernia repair:A 2D echocardiogram 01/03/2008:  Mild MR, mild to moderate TR, small pericardial effusion, EF 65%.   • Pulmonary hypertension (CMS/HCC)    • Raynaud's phenomenon (secondary)     Raynaud’s phenomenon involving the left upper extremity.     • Stroke (CMS/HCC)     Right optic nerve stroke.     Past Surgical History:   Procedure Laterality Date   • APPENDECTOMY     • ARTERIOVENOUS FISTULA/SHUNT SURGERY Left 1/16/2019    Procedure: LEFT UPPER EXTREMITY ARTERIOVENOUS FISTULA FORMATION, BRACHIO-AXILLARY SHUNT WITH ARTEGRAFT;  Surgeon: Dale Ramirez MD;  Location: [facility] OR;  Service: Vascular   • CATARACT EXTRACTION WITH INTRAOCULAR LENS IMPLANT      Cataract surgery with lens implantation.   • CHOLECYSTECTOMY  1958   • COLON RESECTION  10/2005    Colon resection, October 2005, with associated splenectomy.   • DILATATION AND CURETTAGE     • INCISIONAL HERNIA REPAIR  01/02/2008    With mesh   • INSERTION HEMODIALYSIS CATHETER N/A 2/1/2019    Procedure: HEMODIALYSIS CATHETER INSERTION;  Surgeon: aRymond Elise MD;  Location: [facility] OR;  Service: General   • NEPHRECTOMY Right 2001   • OTHER SURGICAL HISTORY      Recurrence documented 2003, status post radiation therapy and implants.   • SPLENECTOMY     • SYMPATHECTOMY Left     Left axilla sympathetectomy secondary to Raynaud’s phenomenon.   • TOTAL ABDOMINAL HYSTERECTOMY WITH SALPINGO OOPHORECTOMY  2001       Therapy Treatment    Rehabilitation Treatment Summary     Row Name 02/09/19 1439             Treatment Time/Intention    Discipline  occupational therapist  -JR      Document Type  therapy note (daily note)  -JR      Subjective Information  complains of being cold  -JR      Mode  of Treatment  occupational therapy  -JR      Care Plan Review  risks/benefits reviewed;patient/other agree to care plan  -JR      Patient Effort  adequate  -JR      Existing Precautions/Restrictions  fall;oxygen therapy device and L/min recent R shoulder dislocation  -JR      Recorded by [JR] Ebony Sparrow OT 02/09/19 1456      Row Name 02/09/19 1439             Vital Signs    Pre Systolic BP Rehab  151  -JR      Pre Treatment Diastolic BP  81  -JR      Post Systolic BP Rehab  168  -JR      Post Treatment Diastolic BP  92  -JR      Pretreatment Heart Rate (beats/min)  63  -JR      Posttreatment Heart Rate (beats/min)  64  -JR      Pre SpO2 (%)  95  -JR      O2 Delivery Pre Treatment  supplemental O2 3L  -JR      Post SpO2 (%)  94  -JR      O2 Delivery Post Treatment  supplemental O2  -JR      Pre Patient Position  Supine  -JR      Intra Patient Position  Supine  -JR      Post Patient Position  Supine  -JR      Recorded by [JR] Ebony Sparrow OT 02/09/19 1456      Row Name 02/09/19 1439             Cognitive Assessment/Intervention- PT/OT    Affect/Mental Status (Cognitive)  flat/blunted affect;low arousal/lethargic  -JR      Orientation Status (Cognition)  oriented x 3  -JR      Follows Commands (Cognition)  WFL  -JR      Safety Deficit (Cognitive)  mild deficit  -JR      Recorded by [JR] Ebony Sparrow OT 02/09/19 1456      Row Name 02/09/19 1439             Bed Mobility Assessment/Treatment    Comment (Bed Mobility)  Pt refused, then RN discovered pt incontinent of bowel  -JR      Recorded by [JR] Ebony Sparrow OT 02/09/19 1456      Row Name 02/09/19 1439             Therapeutic Exercise    84667 - OT Therapeutic Activity Minutes  11  -JR      Recorded by [JR] Ebony Sparrow OT 02/09/19 1456      Row Name 02/09/19 1439             Therapeutic Exercise    Upper Extremity Range of Motion (Therapeutic Exercise)  shoulder flexion/extension, left;shoulder abduction/adduction, left;elbow  flexion/extension, bilateral;forearm supination/pronation, bilateral;wrist flexion/extension, bilateral B digit flexion/extension  -JR      Exercise Type (Therapeutic Exercise)  AAROM (active assistive range of motion)  -JR      Position (Therapeutic Exercise)  supine  -JR      Sets/Reps (Therapeutic Exercise)  10 reps, 1 set  -JR      Recorded by [JR] Ebony Sparrow OT 02/09/19 1456      Row Name 02/09/19 1439             Positioning and Restraints    Pre-Treatment Position  in bed  -JR      Post Treatment Position  bed  -JR      In Bed  notified nsg;supine;call light within reach;encouraged to call for assist;exit alarm on RN and assistant present to kun with hygiene  -JR      Recorded by [JR] Ebony Sparrow, OT 02/09/19 1456      Row Name 02/09/19 1439             Pain Scale: Numbers Pre/Post-Treatment    Pain Scale: Numbers, Pretreatment  0/10 - no pain  -JR      Pain Scale: Numbers, Post-Treatment  0/10 - no pain  -JR      Recorded by [JR] Ebony Sparrow, OT 02/09/19 1456      Row Name                Wound 02/01/19 0827 Bilateral coccyx    Wound - Properties Group Date first assessed: 02/01/19 [AS] Time first assessed: 0827 [AS] Present On Admission : yes [AS] Side: Bilateral [AS] Location: coccyx [AS] Stage, Pressure Injury: unstageable [AS] Additional Comments: -- [MR], probable DTPI  Recorded by:  [AS] Lorri Sommers RN 02/01/19 1020 [MR] France Layton RN 02/01/19 1315    Row Name                Wound 02/01/19 0827 Left heel pressure injury    Wound - Properties Group Date first assessed: 02/01/19 [AS] Time first assessed: 0827 [AS] Side: Left [AS] Location: heel [AS] Type: pressure injury [AS] Stage, Pressure Injury: deep tissue injury [AS] Recorded by:  [AS] Lorri Sommers RN 02/01/19 1206    Row Name                Wound 02/01/19 1825 Other (See comments) chest incision    Wound - Properties Group Date first assessed: 02/01/19 [KS] Time first assessed: 1825 [KS] Side: Other (See comments)  [KS] Location: chest [KS] Type: incision [KS] Recorded by:  [KS] Leonela Caceres RN 02/01/19 1825    Row Name                Wound 01/16/19 1429 Left arm incision    Wound - Properties Group Date first assessed: 01/16/19 [JOSHUA] Time first assessed: 1429 [JOSHUA] Side: Left [JOSHUA] Location: arm [JOSHUA] Type: incision [JOSHUA] Recorded by:  [JOSHUA] Nilam Joshi RN 01/16/19 1429    Row Name                Wound 01/31/19 1600 Bilateral medial coccyx pressure injury    Wound - Properties Group Date first assessed: 01/31/19 [CM] Time first assessed: 1600 [CM] Present On Admission : yes [CM] Side: Bilateral [CM] Orientation: medial [CM] Location: coccyx [CM] Type: pressure injury [CM] Stage, Pressure Injury: Stage 2 [CM] Additional Comments: wound now unstageable with moderate slough / eschar covering.  [MF] Wound Outcome: Converged [MF2] Recorded by:  [CM] Trudy Cornejo RN 02/03/19 0718 [MF] Neftaly Seaman, PT 02/06/19 1326 [MF2] Neftaly Seaman, PT 02/08/19 1123    Row Name                Wound 02/04/19 2000 Right upper chest puncture    Wound - Properties Group Date first assessed: 02/04/19 [RS] Time first assessed: 2000 [RS] Present On Admission : no [RS] Side: Right [RS] Orientation: upper [RS] Location: chest [RS] Type: puncture [RS], HD cath with pigtail site (removed)  Recorded by:  [RS] Rodriguez Garcia, RN 02/04/19 2230    Row Name 02/09/19 1439             Plan of Care Review    Plan of Care Reviewed With  patient  -JR      Recorded by [JR] Ebony Sparrow OT 02/09/19 1456      Row Name 02/09/19 1439             Outcome Summary/Treatment Plan (OT)    Daily Summary of Progress (OT)  progress towards functional goals is fair  -JR      Barriers to Overall Progress (OT)  medically complex  -JR      Plan for Continued Treatment (OT)  Continue OT per POC  -JR      Anticipated Discharge Disposition (OT)  skilled nursing facility  -JR      Recorded by [JR] Ebony Sparrow OT 02/09/19 4711        User Key   (r) = Recorded By, (t) = Taken By, (c) = Cosigned By    Initials Name Effective Dates Discipline     Neftaly Seaman, PT 06/19/15 -  PT    Ebony Wheeler, OT 06/22/15 -  OT    RS Rodriguez Garcia, RN 06/30/16 -  Nurse    Nilam Martinez, RN 06/16/16 -  Nurse     LaytonFrance, RN 06/16/16 -  Nurse    Leonela Quezada, RN 05/09/17 -  Nurse    Lorri Lamb, RN 06/16/16 -  Nurse    Trudy Peterson, RN 06/21/17 -  Nurse        Wound 01/16/19 1429 Left arm incision (Active)   Closure Open to air 2/9/2019 12:00 PM       Wound 02/01/19 0827 Bilateral coccyx (Active)   Dressing Appearance dry;intact 2/9/2019 12:00 PM   Closure None 2/9/2019 12:00 PM   Base dressing in place, unable to visualize 2/9/2019 12:00 PM       Wound 02/01/19 0827 Left heel pressure injury (Active)   Dressing Appearance dry;intact 2/9/2019 12:00 PM   Base pink;dry;clean 2/9/2019 12:00 PM   Drainage Amount none 2/9/2019 12:00 PM   Dressing Care, Wound dressing changed 2/8/2019  8:00 PM       Wound 02/01/19 1825 Other (See comments) chest incision (Active)   Dressing Appearance dry;intact 2/9/2019 12:00 PM   Closure BARRY 2/9/2019 12:00 PM   Base dressing in place, unable to visualize 2/9/2019 12:00 PM       Wound 01/31/19 1600 Bilateral medial coccyx pressure injury (Active)   Dressing Appearance dry;intact 2/9/2019 12:00 PM   Closure None 2/9/2019 12:00 PM   Base dressing in place, unable to visualize 2/9/2019  6:23 AM   Periwound dry;pink 2/9/2019 12:00 PM   Dressing Care, Wound dressing changed;low-adherent 2/8/2019  8:00 PM       Wound 02/04/19 2000 Right upper chest puncture (Active)   Dressing Appearance dry;intact 2/9/2019 12:00 PM   Base pink 2/9/2019 12:00 PM   Drainage Amount none 2/9/2019 12:00 PM       Occupational Therapy Education     Title: PT OT SLP Therapies (In Progress)     Topic: Occupational Therapy (In Progress)     Point: ADL training (In Progress)     Description: Instruct learner(s) on  proper safety adaptation and remediation techniques during self care or transfers.   Instruct in proper use of assistive devices.    Learning Progress Summary           Patient Acceptance, E, NR by CL at 2/6/2019  3:36 PM    Comment:  Pt educated on appropriate safety precautions, positioning, role of OT, AE for self feeding, and benefits of therapy.    Acceptance, E, NR by CL at 2/3/2019  2:41 PM    Comment:  Pt educated on appropriate safety precautions, positioning, role of OT, AE for self feeding, and benefits of therapy.                   Point: Home exercise program (In Progress)     Description: Instruct learner(s) on appropriate technique for monitoring, assisting and/or progressing therapeutic exercises/activities.    Learning Progress Summary           Patient Acceptance, E, NR by JR at 2/9/2019  2:39 PM    Comment:  Educated pt regarding role of OT and UE HEP    Acceptance, E, NR by CL at 2/6/2019  3:36 PM    Comment:  Pt educated on appropriate safety precautions, positioning, role of OT, AE for self feeding, and benefits of therapy.    Acceptance, E, NR by CL at 2/3/2019  2:41 PM    Comment:  Pt educated on appropriate safety precautions, positioning, role of OT, AE for self feeding, and benefits of therapy.                   Point: Precautions (In Progress)     Description: Instruct learner(s) on prescribed precautions during self-care and functional transfers.    Learning Progress Summary           Patient Acceptance, E, NR by CL at 2/6/2019  3:36 PM    Comment:  Pt educated on appropriate safety precautions, positioning, role of OT, AE for self feeding, and benefits of therapy.    Acceptance, E, NR by CL at 2/3/2019  2:41 PM    Comment:  Pt educated on appropriate safety precautions, positioning, role of OT, AE for self feeding, and benefits of therapy.                   Point: Body mechanics (In Progress)     Description: Instruct learner(s) on proper positioning and spine alignment during  self-care, functional mobility activities and/or exercises.    Learning Progress Summary           Patient Acceptance, E, NR by CL at 2/6/2019  3:36 PM    Comment:  Pt educated on appropriate safety precautions, positioning, role of OT, AE for self feeding, and benefits of therapy.                               User Key     Initials Effective Dates Name Provider Type Discipline    JR 06/22/15 -  Ebony Sparrow, OT Occupational Therapist OT    CL 04/03/18 -  Liliam Valdes, OT Occupational Therapist OT                OT Recommendation and Plan  Outcome Summary/Treatment Plan (OT)  Daily Summary of Progress (OT): progress towards functional goals is fair  Barriers to Overall Progress (OT): medically complex  Plan for Continued Treatment (OT): Continue OT per POC  Anticipated Discharge Disposition (OT): skilled nursing facility  Daily Summary of Progress (OT): progress towards functional goals is fair  Plan of Care Review  Plan of Care Reviewed With: patient  Plan of Care Reviewed With: patient  Outcome Summary: Pt requiring increased motivation to participate this date. Recommend continued skilled OT services to increase independence with ADL's prior to d/c. Recommend SNF at d/c.  Outcome Measures     Row Name 02/09/19 1439 02/08/19 1105          How much help from another person do you currently need...    Turning from your back to your side while in flat bed without using bedrails?  --  2  -LS     Moving from lying on back to sitting on the side of a flat bed without bedrails?  --  2  -LS     Moving to and from a bed to a chair (including a wheelchair)?  --  2  -LS     Standing up from a chair using your arms (e.g., wheelchair, bedside chair)?  --  2  -LS     Climbing 3-5 steps with a railing?  --  1  -LS     To walk in hospital room?  --  2  -LS     AM-PAC 6 Clicks Score  --  11  -LS        How much help from another is currently needed...    Putting on and taking off regular lower body clothing?  1  -JR  --      Bathing (including washing, rinsing, and drying)  1  -JR  --     Toileting (which includes using toilet bed pan or urinal)  1  -JR  --     Putting on and taking off regular upper body clothing  1  -JR  --     Taking care of personal grooming (such as brushing teeth)  3  -JR  --     Eating meals  3  -JR  --     Score  10  -JR  --        Functional Assessment    Outcome Measure Options  AM-PAC 6 Clicks Daily Activity (OT)  -JR  AM-PAC 6 Clicks Basic Mobility (PT)  -       User Key  (r) = Recorded By, (t) = Taken By, (c) = Cosigned By    Initials Name Provider Type    JR Ebony Sparrow, OT Occupational Therapist    LS Tanesha Castro, PT Physical Therapist           Time Calculation:   Time Calculation- OT     Row Name 02/09/19 1439             Time Calculation- OT    OT Start Time  1439  -      Total Timed Code Minutes- OT  11 minute(s)  -      OT Received On  02/09/19  -         Timed Charges    13893 - OT Therapeutic Activity Minutes  11  -JR        User Key  (r) = Recorded By, (t) = Taken By, (c) = Cosigned By    Initials Name Provider Type     Ebony Sparrow, OT Occupational Therapist           Therapy Suggested Charges     Code   Minutes Charges    64645 (CPT®) Hc Ot Neuromusc Re Education Ea 15 Min      93930 (CPT®) Hc Ot Ther Proc Ea 15 Min      32042 (CPT®) Hc Ot Therapeutic Act Ea 15 Min 11 1    97375 (CPT®) Hc Ot Manual Therapy Ea 15 Min      25986 (CPT®) Hc Ot Iontophoresis Ea 15 Min      83484 (CPT®) Hc Ot Elec Stim Ea-Per 15 Min      97238 (CPT®) Hc Ot Ultrasound Ea 15 Min      16421 (CPT®) Hc Ot Self Care/Mgmt/Train Ea 15 Min      Total  11 1        Therapy Charges for Today     Code Description Service Date Service Provider Modifiers Qty    13229561246 HC OT THERAPEUTIC ACT EA 15 MIN 2/9/2019 Ebony Sparrow OT GO 1               Ebony SALAZAR. Kyara OT  2/9/2019

## 2019-02-09 NOTE — PLAN OF CARE
Problem: Patient Care Overview  Goal: Plan of Care Review  Outcome: Ongoing (interventions implemented as appropriate)   02/09/19 1105   Coping/Psychosocial   Plan of Care Reviewed With patient   Plan of Care Review   Progress improving   OTHER   Outcome Summary Consulted by MAXWELL Hdz and MD request for left chest port wound-patient has restricted dialysis port access-per MAXWELL Hdz reported possible skin tear noted during a previous dressing change. Dialysis RN Nikki present at bedside-transparent dressing removed-skin presents with mostly proximal wound (5.5 cm x 3.5 cm x 0.1 cm)-80% eschar/20% slough with red, open edges-surgical consult for Monday-due to port/limited access, area cleaned with sterile normal saline, placed Hydrofera Blue Ready dressing for drainage management and bacteriostatic properties-transparent dressing placed -dressing may stay in place for 3 days-patient on WOC follow up-please notify WOC for questions or concerns.

## 2019-02-09 NOTE — PROGRESS NOTES
"Pharmacy Consult-Vancomycin Dosing  Fabiola Pimentel is a  84 y.o. female receiving vancomycin therapy.     Indication: Sepsis  Consulting Provider: pulmonology  Goal Trough:15-20 mcg/mL    Current Antimicrobial Therapy  Vancomycin (day 10)  Merrem (day 4)  Fluconazole (day 4)    Allergies  Allergies as of 01/31/2019 - Reviewed 01/31/2019   Allergen Reaction Noted   • Benadryl [diphenhydramine]  10/12/2016   • Diltiazem  10/12/2016   • Micardis [telmisartan]  10/12/2016   • Other  10/12/2016     Labs    Results from last 7 days   Lab Units 02/09/19  0701 02/08/19  0319 02/07/19  0543   BUN mg/dL 45* 25* 41*   CREATININE mg/dL 3.69* 2.68* 3.91*     Results from last 7 days   Lab Units 02/09/19  0701 02/08/19  0319 02/07/19  0543   WBC 10*3/mm3 21.69* 19.96* 27.21*     Evaluation of Dosing     Is Patient on Dialysis or Renal Replacement: HD (TTS)    Ht - 163.8 cm (64.5\")  Wt - 66.1 kg (145 lb 11.6 oz)    Estimated Creatinine Clearance: 11.8 mL/min (A) (by C-G formula based on SCr of 3.69 mg/dL (H)).    Intake & Output (last 3 days)         02/06 0701 - 02/07 0700 02/07 0701 - 02/08 0700 02/08 0701 - 02/09 0700 02/09 0701 - 02/10 0700    P.O. 336 100 100     I.V. (mL/kg) 66 (1) 30 (0.5)      IV Piggyback 400 100  200    Total Intake(mL/kg) 802 (12.1) 230 (3.5) 100 (1.5) 200 (3)    Other  2220      Total Output  2220      Net +802 -1990 +100 +200            Urine Unmeasured Occurrence 1 x       Stool Unmeasured Occurrence   5 x           Microbiology and Radiology  Microbiology Results (last 10 days)       Procedure Component Value - Date/Time    Blood Culture - Blood, Arm, Right [[id number]] Collected:  02/06/19 1047    Lab Status:  Preliminary result Specimen:  Blood from Arm, Right Updated:  02/08/19 1116     Blood Culture No growth at 2 days          Evaluation of Level  Results from last 7 days   Lab Units 02/09/19  0701 02/06/19  0427   VANCOMYCIN RM mcg/mL 30.20 22.50   ~42 hr level    Assessment/Plan:  Patient " received last vancomycin dose of 1000 mg on 2/7 @ 1400. Level today of 30 likely to only be altered somewhat by HD. Will not send dose today. Currently, plan is for vancomycin through 2/12. Patient will receive HD today but should not need any more vancomycin doses at this point.    Pharmacy will continue to monitor and make adjustments to dose as necessary based on renal function, cultures, labs, and clinical status.     Thank you for this consult.  Annamaria Andrade, PharmD, BCPS  2/9/2019  8:39 AM

## 2019-02-09 NOTE — PLAN OF CARE
Problem: Patient Care Overview  Goal: Plan of Care Review  Outcome: Ongoing (interventions implemented as appropriate)   02/09/19 1701   OTHER   Outcome Summary Pt had hemodialysis today and was very tired. Pt had large bowel movements x4 today. VSS. Woc reconsulted for wound around dialysis access. Dressing changed by Dialysisi nurse and woc. Dr. Dugan nofiied about access and he looked and said Dr. Butler would be by on monday. Pt refused to transfer to chair. Pt also requests no more stool softeners.        Problem: Fall Risk (Adult)  Goal: Absence of Fall  Outcome: Ongoing (interventions implemented as appropriate)      Problem: Skin Injury Risk (Adult)  Goal: Skin Health and Integrity  Outcome: Ongoing (interventions implemented as appropriate)      Problem: Wound (Includes Pressure Injury) (Adult)  Goal: Signs and Symptoms of Listed Potential Problems Will be Absent, Minimized or Managed (Wound)  Outcome: Ongoing (interventions implemented as appropriate)      Problem: Hemodialysis (Adult)  Goal: Signs and Symptoms of Listed Potential Problems Will be Absent, Minimized or Managed (Hemodialysis)  Outcome: Ongoing (interventions implemented as appropriate)

## 2019-02-09 NOTE — PROGRESS NOTES
"   LOS: 9 days    Patient Care Team:  Otilio Smith DO as PCP - General (Family Medicine)    Reason For Visit:  F/U ESRD. SEEN ON DIALYSIS.  Subjective           Review of Systems:    Pulm: No soa   CV:  No CP      Objective       bisoprolol 5 mg Oral Nightly   calcitriol 0.25 mcg Oral Daily   castor oil-balsam peru  Topical Q12H   epoetin alexus 10,000 Units Subcutaneous Once per day on Tue Thu Sat   fluconazole 200 mg Intravenous Daily   heparin (porcine) 5,000 Units Subcutaneous Q8H   meropenem 500 mg Intravenous Q24H   pantoprazole 40 mg Intravenous Q12H   senna 1 tablet Oral BID   sodium chloride 3 mL Intravenous Q12H   vancomycin (dosing per levels)  Does not apply Daily       Pharmacy to dose vancomycin          Vital Signs:  Blood pressure 120/56, pulse 57, temperature 98.2 °F (36.8 °C), temperature source Axillary, resp. rate 20, height 163.8 cm (64.5\"), weight 66.1 kg (145 lb 11.6 oz), SpO2 98 %, not currently breastfeeding.    Flowsheet Rows      First Filed Value   Admission Height  163.8 cm (64.5\") Documented at 02/01/2019 0300   Admission Weight  70 kg (154 lb 4.8 oz) Documented at 01/31/2019 1607          02/08 0701 - 02/09 0700  In: 100 [P.O.:100]  Out: -     Physical Exam:    General Appearance: NAD, alert and cooperative, Ox3  Eyes: PER, conjunctivae and sclerae normal, no icterus  Lungs: respirations regular and unlabored, no crepitus, clear to auscultation  Heart/CV: regular rhythm & normal rate, no murmur, no gallop, no rub and no edema  Abdomen: not distended, soft, non-tender, no masses,  bowel sounds present  Skin: No rash, Warm and dry. TUNNELED HD CATH.    Radiology:            Labs:  Results from last 7 days   Lab Units 02/09/19  0701 02/08/19  0319 02/07/19  0543   WBC 10*3/mm3 20.46* 19.96* 27.21*   HEMOGLOBIN g/dL 7.8* 9.4* 9.9*   HEMATOCRIT % 25.5* 32.4* 32.3*   PLATELETS 10*3/mm3 290 220 263     Results from last 7 days   Lab Units 02/09/19  0701 02/08/19  0319 02/07/19  0543 " 02/06/19  0427  02/04/19  0441 02/03/19  0459   SODIUM mmol/L 144 138 138 139   < > 139 141   POTASSIUM mmol/L 3.9 3.9 3.8 3.9   < > 3.8 3.9   CHLORIDE mmol/L 110* 108 106 106   < > 107 108   CO2 mmol/L 22.0 18.0* 18.0* 23.0   < > 23.0 22.0   BUN mg/dL 45* 25* 41* 31*   < > 39* 31*   CREATININE mg/dL 3.69* 2.68* 3.91* 3.11*   < > 3.23* 2.66*   CALCIUM mg/dL 8.4* 8.2* 7.8* 7.8*   < > 6.8* 6.9*   PHOSPHORUS mg/dL 5.1  --  4.6 3.5  --   --  3.4   MAGNESIUM mg/dL  --   --  1.9  --   --   --   --    ALBUMIN g/dL 3.15*  --  2.95* 3.11*  --  2.55* 2.54*    < > = values in this interval not displayed.     Results from last 7 days   Lab Units 02/09/19  0701   GLUCOSE mg/dL 107*       Results from last 7 days   Lab Units 02/07/19  0543   ALK PHOS U/L 69   BILIRUBIN mg/dL 0.3   ALT (SGPT) U/L 10   AST (SGOT) U/L 18                 Estimated Creatinine Clearance: 11.8 mL/min (A) (by C-G formula based on SCr of 3.69 mg/dL (H)).      Assessment       PEA    ITP (idiopathic thrombocytopenic purpura)    Pulmonary hypertension (CMS/HCC)    CKD (chronic kidney disease) stage 5, GFR less than 15 ml/min (CMS/HCC)    Leukocytosis    Anemia due to chronic kidney disease    Debility    Hx of renal cell carcinoma    Chronic diastolic CHF (congestive heart failure) (CMS/HCC)    Falls frequently    Huge hiatal hernia with intrathoracic stomach    Mitral valve mass            Impression: ESRD. ANEMIA.            Recommendations: HD TODAY.      Otilio Adler MD  02/09/19  10:18 AM

## 2019-02-09 NOTE — PLAN OF CARE
Problem: Patient Care Overview  Goal: Plan of Care Review  Outcome: Ongoing (interventions implemented as appropriate)  VSS , congested cough with productive sputum, 2L NC ,  2 large bowel movements slightly red tinged, called NP, protonix added check cbc in am.     Problem: Fall Risk (Adult)  Goal: Absence of Fall  Outcome: Ongoing (interventions implemented as appropriate)      Problem: Skin Injury Risk (Adult)  Goal: Skin Health and Integrity  Outcome: Ongoing (interventions implemented as appropriate)      Problem: Wound (Includes Pressure Injury) (Adult)  Goal: Signs and Symptoms of Listed Potential Problems Will be Absent, Minimized or Managed (Wound)  Outcome: Ongoing (interventions implemented as appropriate)      Problem: Hemodialysis (Adult)  Goal: Signs and Symptoms of Listed Potential Problems Will be Absent, Minimized or Managed (Hemodialysis)  Outcome: Ongoing (interventions implemented as appropriate)

## 2019-02-10 NOTE — PROGRESS NOTES
"   LOS: 10 days    Patient Care Team:  Otilio Smith DO as PCP - General (Family Medicine)    Reason For Visit:  F/U ESRD.  Subjective           Review of Systems:    Pulm: No soa   CV:  No CP      Objective       bisoprolol 5 mg Oral Nightly   calcitriol 0.25 mcg Oral Daily   castor oil-balsam peru  Topical Q12H   epoetin alexus 10,000 Units Subcutaneous Once per day on Tue Thu Sat   fluconazole 200 mg Intravenous Daily   heparin (porcine) 5,000 Units Subcutaneous Q8H   meropenem 500 mg Intravenous Q24H   pantoprazole 40 mg Intravenous Q12H   senna 1 tablet Oral BID   sodium chloride 3 mL Intravenous Q12H   vancomycin (dosing per levels)  Does not apply Daily       Pharmacy to dose vancomycin          Vital Signs:  Blood pressure 171/65, pulse 61, temperature 97.4 °F (36.3 °C), temperature source Axillary, resp. rate 18, height 163.8 cm (64.5\"), weight 66.1 kg (145 lb 11.6 oz), SpO2 94 %, not currently breastfeeding.    Flowsheet Rows      First Filed Value   Admission Height  163.8 cm (64.5\") Documented at 02/01/2019 0300   Admission Weight  70 kg (154 lb 4.8 oz) Documented at 01/31/2019 1607          02/09 0701 - 02/10 0700  In: 200   Out: 2000     Physical Exam:    General Appearance: NAD, alert and cooperative, Ox3  Eyes: PER, conjunctivae and sclerae normal, no icterus  Lungs: OCC RHONCHI  Heart/CV: regular rhythm & normal rate, no murmur, no gallop, no rub and TR edema  Abdomen: not distended, soft, non-tender, no masses,  bowel sounds present  Skin: No rash, Warm and dry. TUNNELED HD CATH.    Radiology:            Labs:  Results from last 7 days   Lab Units 02/10/19  0401 02/09/19  0701 02/08/19  0319   WBC 10*3/mm3 20.42* 20.46* 19.96*   HEMOGLOBIN g/dL 8.0* 7.8* 9.4*   HEMATOCRIT % 25.5* 25.5* 32.4*   PLATELETS 10*3/mm3 271 290 220     Results from last 7 days   Lab Units 02/10/19  0401 02/09/19  0701 02/08/19  0319 02/07/19  0543 02/06/19  0427   SODIUM mmol/L 140 144 138 138 139   POTASSIUM mmol/L " 3.7 3.9 3.9 3.8 3.9   CHLORIDE mmol/L 105 110* 108 106 106   CO2 mmol/L 24.0 22.0 18.0* 18.0* 23.0   BUN mg/dL 26* 45* 25* 41* 31*   CREATININE mg/dL 2.37* 3.69* 2.68* 3.91* 3.11*   CALCIUM mg/dL 8.6* 8.4* 8.2* 7.8* 7.8*   PHOSPHORUS mg/dL 2.4 5.1  --  4.6 3.5   MAGNESIUM mg/dL  --   --   --  1.9  --    ALBUMIN g/dL 3.80 3.15*  --  2.95* 3.11*     Results from last 7 days   Lab Units 02/10/19  0401   GLUCOSE mg/dL 88       Results from last 7 days   Lab Units 02/07/19  0543   ALK PHOS U/L 69   BILIRUBIN mg/dL 0.3   ALT (SGPT) U/L 10   AST (SGOT) U/L 18                 Estimated Creatinine Clearance: 18.4 mL/min (A) (by C-G formula based on SCr of 2.37 mg/dL (H)).      Assessment       PEA    ITP (idiopathic thrombocytopenic purpura)    Pulmonary hypertension (CMS/HCC)    CKD (chronic kidney disease) stage 5, GFR less than 15 ml/min (CMS/HCC)    Leukocytosis    Anemia due to chronic kidney disease    Debility    Hx of renal cell carcinoma    Chronic diastolic CHF (congestive heart failure) (CMS/HCC)    Falls frequently    Huge hiatal hernia with intrathoracic stomach    Mitral valve mass            Impression: ESRD. ANEMIA.            Recommendations: NEXT HD LIKELY 2/12/19.      Otilio Adler MD  02/10/19  9:41 AM

## 2019-02-10 NOTE — PLAN OF CARE
Problem: Patient Care Overview  Goal: Plan of Care Review   02/10/19 9100   OTHER   Outcome Summary Patient is alert and oriented x4. Voice is better this am. Still having incontinence. Max assist of 2 to the chair. VSS. WBC's still elevated. no fever. Rhythm still abnormal at times. Pt has no complaints.        Problem: Fall Risk (Adult)  Goal: Absence of Fall  Outcome: Ongoing (interventions implemented as appropriate)      Problem: Skin Injury Risk (Adult)  Goal: Skin Health and Integrity  Outcome: Ongoing (interventions implemented as appropriate)      Problem: Wound (Includes Pressure Injury) (Adult)  Goal: Signs and Symptoms of Listed Potential Problems Will be Absent, Minimized or Managed (Wound)  Outcome: Ongoing (interventions implemented as appropriate)

## 2019-02-10 NOTE — THERAPY TREATMENT NOTE
Acute Care - Physical Therapy Treatment Note  [facility]     Patient Name: Fabiola Pimentel  : 1934  MRN: [id number]  Today's Date: 2/10/2019  Onset of Illness/Injury or Date of Surgery: 19  Date of Referral to PT: 19  Referring Physician: MD Radha    Admit Date: 2019    Visit Dx:    ICD-10-CM ICD-9-CM   1. Impaired functional mobility, balance, gait, and endurance Z74.09 V49.89   2. Impaired mobility and ADLs Z74.09 799.89   3. Pharyngeal dysphagia R13.13 787.23   4. PEA I46.9 427.5     Patient Active Problem List   Diagnosis   • ITP (idiopathic thrombocytopenic purpura)   • Palpitations   • Premature atrial contractions   • Hypertension   • Pulmonary hypertension (CMS/HCC)   • DJD (degenerative joint disease)   • Raynaud's phenomenon (secondary)   • Diverticular disease   • CKD (chronic kidney disease) stage 5, GFR less than 15 ml/min (CMS/HCC)   • Leukocytosis   • Anemia due to chronic kidney disease   • Debility   • Hx of renal cell carcinoma   • Chronic diastolic CHF (congestive heart failure) (CMS/HCC)   • Falls frequently   • PEA   • Huge hiatal hernia with intrathoracic stomach   • Mitral valve mass       Therapy Treatment    Rehabilitation Treatment Summary     Row Name 02/10/19 1515             Treatment Time/Intention    Discipline  physical therapist  -CHRISTOPHER      Document Type  therapy note (daily note)  -CHRISTOPHER      Subjective Information  complains of;weakness  -CHRISTOPHER      Mode of Treatment  physical therapy  -CHRISTOPHER      Care Plan Review  risks/benefits reviewed;current/potential barriers reviewed;patient/other agree to care plan  -CHRISTOPHER      Therapy Frequency (PT Clinical Impression)  daily  -CHRISTOPHER      Patient Effort  adequate  -CHRISTOPHER      Existing Precautions/Restrictions  fall;oxygen therapy device and L/min  -CHRISTOPHER      Treatment Considerations/Comments  Recent Right shoulderdislocation and reduction  (Significant)   -CHRISOTPHER      Recorded by [CHRISTOPHER] Tarsha Neri, PT 02/10/19 4112      Row Name  02/10/19 1515             Vital Signs    Pre Patient Position  Sitting  -CHRISTOPHER      Intra Patient Position  Standing  -CHRISTOPHER      Post Patient Position  Supine  -CHRISTOPHER      Recorded by [CHRISTOPHER] Tarsha Neri, PT 02/10/19 1609      Row Name 02/10/19 1515             Cognitive Assessment/Intervention- PT/OT    Orientation Status (Cognition)  oriented x 3  -CHRISTOPHER      Follows Commands (Cognition)  WFL  -CHRISTOPHER      Cognitive Function (Cognitive)  safety deficit  -CHRISTOPHER      Safety Deficit (Cognitive)  safety precautions awareness;safety precautions follow-through/compliance;awareness of need for assistance  -CHRISTOPHER      Personal Safety Interventions  fall prevention program maintained;gait belt;nonskid shoes/slippers when out of bed  -CHRISTOPHER      Recorded by [CHRISTOPHER] Tarsha Neri, PT 02/10/19 1609      Row Name 02/10/19 1515             Safety Issues, Functional Mobility    Safety Issues Affecting Function (Mobility)  awareness of need for assistance;insight into deficits/self awareness;safety precaution awareness;safety precautions follow-through/compliance  -CHRISTOPHER      Impairments Affecting Function (Mobility)  balance;endurance/activity tolerance;shortness of breath;strength  -CHRISTOPHER      Recorded by [CHRISTOPHER] Tarsha Neri, PT 02/10/19 1609      Row Name 02/10/19 1515             Bed Mobility Assessment/Treatment    Bed Mobility Assessment/Treatment  scooting/bridging;rolling left;rolling right;sit-supine  -CHRISTOPHER      Rolling Left [place] (Bed Mobility)  maximum assist (25% patient effort)  -CHRISTOPHER      Rolling Right [place] (Bed Mobility)  moderate assist (50% patient effort)  -CHRISTOPHER      Scooting/Bridging [place] (Bed Mobility)  moderate assist (50% patient effort)  -CHRISTOPHER      Sit-Supine [place] (Bed Mobility)  maximum assist (25% patient effort);2 person assist  -CHRISTOPHER      Bed Mobility, Safety Issues  decreased use of arms for pushing/pulling;decreased use of legs for bridging/pushing  -CHRISTOPHER      Assistive Device (Bed Mobility)  bed  rails;draw sheet;head of bed elevated  -CHRISTOPHER      Recorded by [CHRISTOPHER] Tarsha Neri, PT 02/10/19 1609      Row Name 02/10/19 1515             Transfer Assessment/Treatment    Transfer Assessment/Treatment  chair-bed transfer;sit-stand transfer;stand-sit transfer  -CHRISTOPHER      Recorded by [CHRISTOPHER] Tarsha Neri, PT 02/10/19 1609      Row Name 02/10/19 1515             Chair-Bed Transfer    Chair-Bed [place] (Transfers)  maximum assist (25% patient effort);2 person assist  -CHRISTOPHER      Recorded by [CHRISTOPHER] Tarsha Neri, PT 02/10/19 1609      Row Name 02/10/19 1515             Sit-Stand Transfer    Sit-Stand [place] (Transfers)  moderate assist (50% patient effort);2 person assist  -CHRISTOPHER      Recorded by [CHRISTOPHER] Tarsha Neri, PT 02/10/19 1609      Row Name 02/10/19 1515             Stand-Sit Transfer    Stand-Sit [place] (Transfers)  moderate assist (50% patient effort);2 person assist  -CHRISTOPHER      Recorded by [CHRISTOPHER] Tarsha Neri, PT 02/10/19 1609      Row Name 02/10/19 1515             Gait/Stairs Assessment/Training    [place] Level (Gait)  maximum assist (25% patient effort);2 person assist  -CHRISTOPHER      Distance in Feet (Gait)  2  -CHRISTOPHER      Comment (Gait/Stairs)  patient is only able to take pivot steps to the bed  -CHRISTOPHER      Recorded by [CHRISTOPHER] Tarsha Neri, PT 02/10/19 1609      Row Name 02/10/19 1515             Therapeutic Exercise    14101 - PT Therapeutic Activity Minutes  23  -CHRISTOPHER      Recorded by [CHRISTOPHER] Tarsha Neri, PT 02/10/19 1609      Row Name 02/10/19 1515             Lower Extremity Supine Therapeutic Exercise    Performed, Supine Lower Extremity (Therapeutic Exercise)  hip flexion/extension;knee flexion/extension;ankle dorsiflexion/plantarflexion;gluteal sets;heel slides  -CHRISTOPHER      Exercise Type, Supine Lower Extremity (Therapeutic Exercise)  AAROM (active assistive range of motion)  -CHRISTOPHER      Sets/Reps Detail, Supine Lower Extremity (Therapeutic Exercise)  1/10  -CHRISTOPHER      Recorded by  [CHRISTOPHER] Tarsha Neri, PT 02/10/19 1609      Row Name 02/10/19 1515             Static Sitting Balance    Level of [place] (Unsupported Sitting, Static Balance)  contact guard assist  -CHRISTOPHER      Sitting Position (Unsupported Sitting, Static Balance)  sitting on edge of bed  -CHRISTOPHER      Time Able to Maintain Position (Unsupported Sitting, Static Balance)  more than 5 minutes  -CHRISTOPHER      Recorded by [CHRISTOPHER] Tarsha Neri, PT 02/10/19 1609      Row Name 02/10/19 1515             Static Standing Balance    Level of [place] (Supported Standing, Static Balance)  moderate assist, 50 to 74% patient effort  -CHRISTOPHER      Time Able to Maintain Position (Supported Standing, Static Balance)  1 to 2 minutes  -CHRISTOPHER      Recorded by [CHRISTOPHER] Tarsha Neri, PT 02/10/19 1609      Row Name 02/10/19 1515             Positioning and Restraints    Pre-Treatment Position  sitting in chair/recliner  -CHRISTOPHER      Post Treatment Position  bed  -CHRISTOPHER      In Bed  notified nsg;side lying left;fowlers;call light within reach;with nsg  -CHRISTOPHER      Recorded by [CHRISTOPHER] Tarsha Neri, PT 02/10/19 1609      Row Name 02/10/19 1515             Pain Scale: Numbers Pre/Post-Treatment    Pain Scale: Numbers, Pretreatment  0/10 - no pain  -CHRISTOPHER      Pain Scale: Numbers, Post-Treatment  0/10 - no pain  -CHRISTOPHER      Recorded by [CHRISTOPHER] Tarsha Neri, PT 02/10/19 1609      Row Name                [REMOVED] Wound 02/01/19 0827 Left heel pressure injury    Wound - Properties Group Date first assessed: 02/01/19 [AS] Time first assessed: 0827 [AS] Side: Left [AS] Location: heel [AS] Type: pressure injury [AS] Stage, Pressure Injury: deep tissue injury [AS] Resolution Date: 02/10/19 [PS] Resolution Time: 1021 [PS] Recorded by:  [AS] Lorri Sommers RN 02/01/19 1206 [PS] Andreina Lisa RN 02/10/19 1021    Row Name                Wound 02/01/19 1825 Other (See comments) chest incision    Wound - Properties Group Date first assessed: 02/01/19 [KS] Time first assessed: 1825  [KS] Side: Other (See comments) [KS] Location: chest [KS] Type: incision [KS] Recorded by:  [KS] Leonela Caceres RN 02/01/19 1825    Row Name                Wound 01/16/19 1429 Left arm incision    Wound - Properties Group Date first assessed: 01/16/19 [JOSHUA] Time first assessed: 1429 [JOSHUA] Side: Left [JOSHUA] Location: arm [JOSHUA] Type: incision [JOSHUA] Recorded by:  [JOSHUA] Nilam Joshi RN 01/16/19 1429    Row Name                Wound 01/31/19 1600 Bilateral medial coccyx pressure injury    Wound - Properties Group Date first assessed: 01/31/19 [CM] Time first assessed: 1600 [CM] Present On Admission : yes [CM] Side: Bilateral [CM] Orientation: medial [CM] Location: coccyx [CM] Type: pressure injury [CM] Stage, Pressure Injury: Stage 2 [CM] Additional Comments: wound now unstageable with moderate slough / eschar covering.  [MF] Wound Outcome: Converged [MF2] Recorded by:  [CM] Trudy Cornejo RN 02/03/19 0718 [MF] Neftaly Seaman, PT 02/06/19 1326 [MF2] Neftaly Seaman, PT 02/08/19 1123    Row Name                Wound 02/04/19 2000 Right upper chest puncture    Wound - Properties Group Date first assessed: 02/04/19 [RS] Time first assessed: 2000 [RS] Present On Admission : no [RS] Side: Right [RS] Orientation: upper [RS] Location: chest [RS] Type: puncture [RS], HD cath with pigtail site (removed)  Recorded by:  [RS] Rodriguez Garcia, RN 02/04/19 2230    Row Name 02/10/19 1515             Coping    Observed Emotional State  calm;cooperative  -CHRISTOPHER      Verbalized Emotional State  acceptance  -CHRISTOPHER      Recorded by [CHRISTOPHER] Tarsha Neri, PT 02/10/19 1609      Row Name 02/10/19 1515             Plan of Care Review    Plan of Care Reviewed With  patient  -CHRISTOPHER      Recorded by [CHRISTOPHER] Tarsha Neri, PT 02/10/19 1609      Row Name 02/10/19 1518             Outcome Summary/Treatment Plan (OT)    Anticipated Discharge Disposition (OT)  skilled nursing facility  -CHRISTOPHER      Recorded by [CHRISTOPHER] Tarsha Neri, PT  02/10/19 1609      Row Name 02/10/19 1515             Outcome Summary/Treatment Plan (PT)    Daily Summary of Progress (PT)  progress towards functional goals is fair  -CHRISTOPHER      Recorded by [CHRISTOPHER] Tarsha Neri, PT 02/10/19 1609        User Key  (r) = Recorded By, (t) = Taken By, (c) = Cosigned By    Initials Name Effective Dates Discipline    CHRISTOPHER Tarsha Neri, PT 06/19/15 -  PT    MF Neftaly Seaman, PT 06/19/15 -  PT    RS Rodriguez Garcia, RN 06/30/16 -  Nurse    Nilam Martinez, RN 06/16/16 -  Nurse    Leonela Quezada, RN 05/09/17 -  Nurse    Andreina Mills, RN 09/07/17 -  Nurse    Lorri Lamb, RN 06/16/16 -  Nurse    Trudy Peterson, RN 06/21/17 -  Nurse          Wound 01/16/19 1429 Left arm incision (Active)   Closure Open to air 2/10/2019 12:00 PM       Wound 02/01/19 1825 Other (See comments) chest incision (Active)   Dressing Appearance dry;intact 2/10/2019 12:00 PM   Closure BARRY 2/10/2019 12:00 PM   Base dressing in place, unable to visualize 2/10/2019 12:00 PM   Dressing Care, Wound gauze 2/9/2019  8:00 PM       Wound 01/31/19 1600 Bilateral medial coccyx pressure injury (Active)   Dressing Appearance dry;intact 2/10/2019 12:00 PM   Closure None 2/10/2019 12:00 PM   Base dressing in place, unable to visualize 2/10/2019  6:00 AM   Periwound dry;pink 2/10/2019 12:00 PM   Drainage Characteristics/Odor serosanguineous 2/10/2019  6:00 AM   Drainage Amount scant 2/10/2019  6:00 AM   Care, Wound other (see comments) 2/10/2019  3:00 AM   Dressing Care, Wound dressing changed;low-adherent 2/9/2019 10:00 PM   Periwound Care, Wound cleansed with pH balanced cleanser;dry periwound area maintained;topical treatment applied;other (see comments) 2/9/2019 10:00 PM       Wound 02/04/19 2000 Right upper chest puncture (Active)   Dressing Appearance dry;intact 2/10/2019 12:00 PM   Base pink 2/10/2019 12:00 PM   Periwound intact 2/10/2019 12:00 PM   Drainage Amount none 2/10/2019 12:00  PM   Dressing Care, Wound transparent film 2/9/2019  8:00 PM           Physical Therapy Education     Title: PT OT SLP Therapies (In Progress)     Topic: Physical Therapy (In Progress)     Point: Mobility training (In Progress)     Learning Progress Summary           Patient Acceptance, E, NR by CHRISTOPHER at 2/10/2019  3:15 PM    Acceptance, E,D, VU,NR by LS at 2/8/2019 11:05 AM    Acceptance, E,D, NR by LS at 2/6/2019  1:08 PM    Acceptance, E, NR by KR at 2/4/2019  1:06 PM    Acceptance, E,D, NR by LS at 2/2/2019  2:48 PM                   Point: Home exercise program (In Progress)     Learning Progress Summary           Patient Acceptance, E, NR by CHRISTOPHER at 2/10/2019  3:15 PM    Acceptance, E,D, VU,NR by SANTA at 2/8/2019 11:05 AM    Acceptance, E,D, NR by SANTA at 2/6/2019  1:08 PM    Acceptance, E, NR by KR at 2/4/2019  1:06 PM    Acceptance, E,D, NR by LS at 2/2/2019  2:48 PM                   Point: Body mechanics (In Progress)     Learning Progress Summary           Patient Acceptance, E, NR by CHRISTOPHER at 2/10/2019  3:15 PM    Acceptance, E,D, VU,NR by SANTA at 2/8/2019 11:05 AM    Acceptance, E,D, NR by SANTA at 2/6/2019  1:08 PM    Acceptance, E, NR by KR at 2/4/2019  1:06 PM    Acceptance, E,D, NR by LS at 2/2/2019  2:48 PM                   Point: Precautions (In Progress)     Learning Progress Summary           Patient Acceptance, E, NR by CHRISTOPHER at 2/10/2019  3:15 PM    Acceptance, E,D, VU,NR by SANTA at 2/8/2019 11:05 AM    Acceptance, E,D, NR by SANTA at 2/6/2019  1:08 PM    Acceptance, E, NR by KR at 2/4/2019  1:06 PM    Acceptance, E,D, NR by SANTA at 2/2/2019  2:48 PM                               User Key     Initials Effective Dates Name Provider Type Discipline    CHRISTOPHER 06/19/15 -  Tarsha Neri, PT Physical Therapist PT    SANTA 06/19/15 -  Tanesha Castro, PT Physical Therapist PT    SIMIN 04/03/18 -  Gemma Ying, PT Physical Therapist PT                PT Recommendation and Plan  Therapy Frequency (PT Clinical Impression):  daily  Outcome Summary/Treatment Plan (PT)  Daily Summary of Progress (PT): progress towards functional goals is fair  Plan of Care Reviewed With: patient  Outcome Summary: patient stands with mod assist of 2 and transfers back to bed from the chair with max assist  Outcome Measures     Row Name 02/10/19 1515 02/09/19 1439 02/08/19 1105       How much help from another person do you currently need...    Turning from your back to your side while in flat bed without using bedrails?  2  -CHRISTOPHER  --  2  -LS    Moving from lying on back to sitting on the side of a flat bed without bedrails?  2  -CHRISTOPHER  --  2  -LS    Moving to and from a bed to a chair (including a wheelchair)?  2  -CHRISTOPHER  --  2  -LS    Standing up from a chair using your arms (e.g., wheelchair, bedside chair)?  2  -CHRISTOPHER  --  2  -LS    Climbing 3-5 steps with a railing?  1  -CHRISTOPHER  --  1  -LS    To walk in hospital room?  2  -CHRISTOPHER  --  2  -LS    AM-PAC 6 Clicks Score  11  -CHRISTOPHER  --  11  -LS       How much help from another is currently needed...    Putting on and taking off regular lower body clothing?  --  1  -JR  --    Bathing (including washing, rinsing, and drying)  --  1  -JR  --    Toileting (which includes using toilet bed pan or urinal)  --  1  -JR  --    Putting on and taking off regular upper body clothing  --  1  -JR  --    Taking care of personal grooming (such as brushing teeth)  --  3  -JR  --    Eating meals  --  3  -JR  --    Score  --  10  -JR  --       Functional Assessment    Outcome Measure Options  AM-PAC 6 Clicks Basic Mobility (PT)  -CHRISTOPHER  AM-PAC 6 Clicks Daily Activity (OT)  -JR  AM-PAC 6 Clicks Basic Mobility (PT)  -LS      User Key  (r) = Recorded By, (t) = Taken By, (c) = Cosigned By    Initials Name Provider Type    Tarsha Brown, PT Physical Therapist    Ebony Wheeler, OT Occupational Therapist    Tanesha Rodney, PT Physical Therapist         Time Calculation:   PT Charges     Row Name 02/10/19 1511             Time Calculation     Start Time  1515  -CHRISTOPHER      PT Received On  02/10/19  -CHRISTOPHER      PT Goal Re-Cert Due Date  02/12/19  -CHRISTOPHER         Time Calculation- PT    Total Timed Code Minutes- PT  23 minute(s)  -CHRISTOPHER         Timed Charges    27037 - PT Therapeutic Activity Minutes  23  -CHRISTOPHER        User Key  (r) = Recorded By, (t) = Taken By, (c) = Cosigned By    Initials Name Provider Type    Tarsha Brown PT Physical Therapist        Therapy Suggested Charges     Code   Minutes Charges    37936 (CPT®) Hc Pt Neuromusc Re Education Ea 15 Min      61883 (CPT®) Hc Pt Ther Proc Ea 15 Min      89222 (CPT®) Hc Gait Training Ea 15 Min      65462 (CPT®) Hc Pt Therapeutic Act Ea 15 Min 23 2    93783 (CPT®) Hc Pt Manual Therapy Ea 15 Min      77864 (CPT®) Hc Pt Iontophoresis Ea 15 Min      94880 (CPT®) Hc Pt Elec Stim Ea-Per 15 Min      33964 (CPT®) Hc Pt Ultrasound Ea 15 Min      26068 (CPT®) Hc Pt Self Care/Mgmt/Train Ea 15 Min      57848 (CPT®) Hc Pt Prosthetic (S) Train Initial Encounter, Each 15 Min      53246 (CPT®) Hc Pt Orthotic(S)/Prosthetic(S) Encounter, Each 15 Min      08093 (CPT®) Hc Orthotic(S) Mgmt/Train Initial Encounter, Each 15min      Total  23 2        Therapy Charges for Today     Code Description Service Date Service Provider Modifiers Qty    56912754847 HC PT THERAPEUTIC ACT EA 15 MIN 2/10/2019 Tarsha Neri PT GP 2          PT G-Codes  Outcome Measure Options: AM-PAC 6 Clicks Basic Mobility (PT)  AM-PAC 6 Clicks Score: 11  Score: 10    Tarsha Neri, KATE  2/10/2019

## 2019-02-10 NOTE — PROGRESS NOTES
INTENSIVIST   PROGRESS NOTE     Hospital:  LOS: 10 days      S     Ms. Fabiola Pimentel, 84 y.o. female is followed for:      PEA    ITP (idiopathic thrombocytopenic purpura)      As an Intensivist, we provide an integrated approach to the ICU patient and family, medical management of comorbid conditions, including but not limited to electrolytes, glycemic control, organ dysfunction, lead interdisciplinary rounds and coordinate the care with all other services, including those from other specialists.     Interval History:  Doing Ok.  No complaints.  She wonders when she is going to go home.  No fevers.     The patient's relevant past medical, surgical and social history were reviewed and updated in Epic as appropriate.     ROS:   Constitutional: Negative for fever.   Respiratory: Negative for dyspnea.   Cardiovascular: Negative for chest pain.   Gastrointestinal: Negative for  nausea, vomiting and diarrhea.        O     Vitals:  Temp: 97.4 °F (36.3 °C) (02/10/19 0800) Temp  Min: 97.4 °F (36.3 °C)  Max: 98.5 °F (36.9 °C)   BP: 171/65 (02/10/19 0900) BP  Min: 140/70  Max: 171/65   Pulse: 61 (02/10/19 0900) Pulse  Min: 60  Max: 66   Resp: 18 (02/10/19 0600) Resp  Min: 16  Max: 20   SpO2: 94 % (02/10/19 0900) SpO2  Min: 89 %  Max: 99 %   Device: nasal cannula (02/10/19 0900)    Flow Rate: 2 (02/10/19 0900) Flow (L/min)  Min: 2  Max: 3       Physical Examination  Telemetry:  Rhythm: normal sinus rhythm (02/10/19 1000)   Constitutional:  No acute distress.   Cardiovascular: Normal rate, regular and rhythm. Normal heart sounds.  (+) murmur  No gallop or rub.   Respiratory: No respiratory distress. Normal respiratory effort.  Normal breath sounds  Clear to auscultation and percussion.    Abdominal:  Soft. No masses. Non-tender. No distension. No HSM.   Extremities: No digital cyanosis. No clubbing.  No edema.   Neurological:   Alert. Non focal.   Lines/Drains/Airways: L IJ tunneled Dialysis Catheter - with a special dressing.        Hematology:  Results from last 7 days   Lab Units 02/10/19  0401 02/09/19  0701 02/08/19  0319   WBC 10*3/mm3 20.42* 20.46* 19.96*   HEMOGLOBIN g/dL 8.0* 7.8* 9.4*   MCV fL 92.4 94.1 97.3   PLATELETS 10*3/mm3 271 290 220       Chemistry:  Estimated Creatinine Clearance: 18.4 mL/min (A) (by C-G formula based on SCr of 2.37 mg/dL (H)).    Results from last 7 days   Lab Units 02/10/19  0401 02/09/19  0701 02/08/19  0319   SODIUM mmol/L 140 144 138   POTASSIUM mmol/L 3.7 3.9 3.9   CHLORIDE mmol/L 105 110* 108   CO2 mmol/L 24.0 22.0 18.0*   ANION GAP mmol/L 11.0 12.0* 12.0*   GLUCOSE mg/dL 88 107* 90   CALCIUM mg/dL 8.6* 8.4* 8.2*     Results from last 7 days   Lab Units 02/10/19  0401 02/09/19  0701 02/08/19 0319   BUN mg/dL 26* 45* 25*   CREATININE mg/dL 2.37* 3.69* 2.68*     Images:  No radiology results for the last day    Echo:  Results for orders placed during the hospital encounter of 01/31/19   Transthoracic Echo Complete With Contrast if Necessary Per Protocol    Narrative · Left ventricular systolic function is normal.  · Left ventricular diastolic dysfunction (grade I) consistent with   impaired relaxation.  · Estimated EF appears to be in the range of 61 - 65%.  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 Likely represents age-related   focal region of sclerosis and calcification of the chordal apparatus,   however vegetation cannot be completely excluded, clinical correlation   required.          Results: Reviewed.  I reviewed the patient's new laboratory and imaging results.  I independently reviewed the patient's new images.    Medications: Reviewed.    Assessment/Plan   A / P     84 y.o.female, admitted on 1/31/2019 with Acute renal failure (ARF) (CMS/[facility]) [N17.9]:     1. S/p PEA, while she was in the inpatient dialysis unit, quickly resuscitated (within 3 minutes), and it did not require intubation, 2/1/19  2. Mitral valve  echodense lesion, on the chordal apparatus of the anterior leaflet prob. 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  3. ESRD on HD  4. ITP  5. Leukocytosis, 02/09/19 ~ yesterday.    Lab Results   Lab Value Date/Time    WBC 20.42 (H) 02/10/2019 0401    WBC 20.46 (H) 02/09/2019 0701    WBC 19.96 (H) 02/08/2019 0319    WBC 27.21 (H) 02/07/2019 0543     6. Odynophagia and oral thrush } Imrproved.  1. R/o Esoph. candidiasis  7. HTN  8. Anemia, chronic disease.  1. EPO and IV iron during HD } As per Nephrology  9. Hiatal hernia.  10. Antibiotics: MRP and Fluconazole + Vanco IV (based on levels)  11. Glucose: No h/o DM    Lab Results   Lab Value Date/Time    HGBA1C 5.60 01/31/2019 1758       Diet: Diet Dysphagia; IV - Mechanical Soft No Mixed Consistencies; Nectar / Syrup Thick; Renal   Advance Directives: Code Status and Medical Interventions:   Ordered at: 02/01/19 1600     Limited Support to NOT Include:    Intubation     Code Status:    CPR     Medical Interventions (Level of Support Prior to Arrest):    Limited          Assessment / Plan:    1. Next HD Tue 2/12  2. Reassess vascular access by Dr. Raymond Elise (Vascular Surgery) tomorrow.  3. WBC stable at ~ 20K.  4. Continue antibiotics.  5. Disposition: Keep in ICU.    Plan of care and goals reviewed during interdisciplinary rounds.  Level of Risk is High due to:  illness with threat to life or bodily function.   I discussed the patient's findings and my recommendations with patient    Chung Ferrari MD, FACP, FCCP, CNSC  Intensive Care Medicine, Nutrition Support and Pulmonary Medicine

## 2019-02-10 NOTE — PLAN OF CARE
Problem: Patient Care Overview  Goal: Plan of Care Review  Outcome: Ongoing (interventions implemented as appropriate)   02/10/19 1659   Coping/Psychosocial   Plan of Care Reviewed With patient   Plan of Care Review   Progress improving   OTHER   Outcome Summary incontinent bowel movement x1, incontinent of urine as well, unable to measure       Problem: Fall Risk (Adult)  Goal: Absence of Fall  Outcome: Ongoing (interventions implemented as appropriate)      Problem: Skin Injury Risk (Adult)  Goal: Skin Health and Integrity  Outcome: Ongoing (interventions implemented as appropriate)      Problem: Wound (Includes Pressure Injury) (Adult)  Goal: Signs and Symptoms of Listed Potential Problems Will be Absent, Minimized or Managed (Wound)  Outcome: Ongoing (interventions implemented as appropriate)      Problem: Hemodialysis (Adult)  Goal: Signs and Symptoms of Listed Potential Problems Will be Absent, Minimized or Managed (Hemodialysis)  Outcome: Ongoing (interventions implemented as appropriate)

## 2019-02-10 NOTE — NURSING NOTE
WOC follow up for dressing to dialysis access on chest-dressing clean/dry/intact-Dr Schrader to assess site tomorrow. PT Wound Care treating coccyx and heel pressure injuries. No other concerns at this time per RNAndreina. WOC will continue to follow.

## 2019-02-10 NOTE — PLAN OF CARE
Problem: Patient Care Overview  Goal: Plan of Care Review  Outcome: Ongoing (interventions implemented as appropriate)   02/10/19 8177   Coping/Psychosocial   Plan of Care Reviewed With patient   OTHER   Outcome Summary patient stands with mod assist of 2 and transfers back to bed from the chair with max assist

## 2019-02-11 NOTE — PLAN OF CARE
Problem: Patient Care Overview  Goal: Plan of Care Review  Outcome: Ongoing (interventions implemented as appropriate)   02/11/19 3478   Coping/Psychosocial   Plan of Care Reviewed With patient   Plan of Care Review   Progress improving   OTHER   Outcome Summary Pt performed STS x3 from EOB and MIP with modA x2 and B UE support. Pt completed bed mobility with mod-modA x2. Pt denied any c/o pain. Continue to progress as appropriate.

## 2019-02-11 NOTE — PLAN OF CARE
Problem: Patient Care Overview  Goal: Plan of Care Review   02/11/19 0532   OTHER   Outcome Summary Blood pressure trending up throughout shift. Plan for Gosia to assess tunneled dialysis access today. Working on pulmonary toileting.     Goal: Individualization and Mutuality  Outcome: Ongoing (interventions implemented as appropriate)    Goal: Discharge Needs Assessment  Outcome: Ongoing (interventions implemented as appropriate)    Goal: Interprofessional Rounds/Family Conf  Outcome: Ongoing (interventions implemented as appropriate)      Problem: Fall Risk (Adult)  Goal: Absence of Fall  Outcome: Ongoing (interventions implemented as appropriate)      Problem: Skin Injury Risk (Adult)  Goal: Skin Health and Integrity  Outcome: Ongoing (interventions implemented as appropriate)      Problem: Wound (Includes Pressure Injury) (Adult)  Goal: Signs and Symptoms of Listed Potential Problems Will be Absent, Minimized or Managed (Wound)  Outcome: Ongoing (interventions implemented as appropriate)      Problem: Hemodialysis (Adult)  Goal: Signs and Symptoms of Listed Potential Problems Will be Absent, Minimized or Managed (Hemodialysis)  Outcome: Ongoing (interventions implemented as appropriate)

## 2019-02-11 NOTE — PROGRESS NOTES
"[facility] at [facility] Progress Note     LOS: 11 days   Patient Care Team:  Otilio Smith DO as PCP - General (Family Medicine)  PCP:  Otilio Smith DO    Chief Complaint:  PEA arrest    SUBJECTIVE: Lying comfortably in bed.  Requesting ice chips.  Otherwise no complaints.  Denies chest pain, dyspnea or palpitations.    Telemetry reveals sinus rhythm.  No clinical change.    Review of Systems:   All systems have been reviewed and are negative with the exception of those mentioned above.      OBJECTIVE:    Vital Sign Min/Max for last 24 hours  Temp  Min: 97.8 °F (36.6 °C)  Max: 98.8 °F (37.1 °C)   BP  Min: 149/62  Max: 182/76   Pulse  Min: 59  Max: 70   Resp  Min: 14  Max: 18   SpO2  Min: 93 %  Max: 98 %   No Data Recorded   Weight  Min: 65.8 kg (145 lb)  Max: 65.8 kg (145 lb)     Flowsheet Rows      First Filed Value   Admission Height  163.8 cm (64.5\") Documented at 02/01/2019 0300   Admission Weight  70 kg (154 lb 4.8 oz) Documented at 01/31/2019 1607          Telemetry: Sinus rhythm      Intake/Output Summary (Last 24 hours) at 2/11/2019 1658  Last data filed at 2/11/2019 0600  Gross per 24 hour   Intake 130 ml   Output 50 ml   Net 80 ml     Intake & Output (last 3 days)       02/08 0701 - 02/09 0700 02/09 0701 - 02/10 0700 02/10 0701 - 02/11 0700 02/11 0701 - 02/12 0700    P.O. 100       I.V. (mL/kg)   30 (0.5)     IV Piggyback  200 200     Total Intake(mL/kg) 100 (1.5) 200 (3) 230 (3.5)     Urine (mL/kg/hr)   50 (0)     Other  2000      Stool  0 0     Total Output  2000 50     Net +100 -1800 +180             Urine Unmeasured Occurrence  2 x 3 x     Stool Unmeasured Occurrence 5 x 4 x 2 x            Physical Exam:    General Appearance:    Alert, cooperative, no distress, appears stated age   Neck:   Supple, symmetrical, trachea midline.   Lungs:     Clear to auscultation bilaterally, respirations unlabored   Chest Wall:    No tenderness or deformity    Heart:    Regular " rate and rhythm, S1 and S2 normal, II/6 holosystolic murmur apex, rub   or gallop, normal carotid impulse bilaterally without bruit.   Extremities:   Extremities normal, atraumatic, no cyanosis or edema   Pulses:   2+ and symmetric all extremities   Skin:   Skin color, texture, turgor normal, no rashes or lesions      LABS/DIAGNOSTIC DATA:  Results from last 7 days   Lab Units 02/10/19  0401 02/09/19  0701 02/08/19  0319   WBC 10*3/mm3 20.42* 20.46* 19.96*   HEMOGLOBIN g/dL 8.0* 7.8* 9.4*   HEMATOCRIT % 25.5* 25.5* 32.4*   PLATELETS 10*3/mm3 271 290 220     No results found for: TROPONINT      Results from last 7 days   Lab Units 02/11/19  0713 02/10/19  0401 02/09/19  0701 02/07/19  0543   SODIUM mmol/L 147* 140 144   < > 138   POTASSIUM mmol/L 4.2 3.7 3.9   < > 3.8   CHLORIDE mmol/L 112* 105 110*   < > 106   CO2 mmol/L 22.0 24.0 22.0   < > 18.0*   BUN mg/dL 37* 26* 45*   < > 41*   CREATININE mg/dL 3.41* 2.37* 3.69*   < > 3.91*   CALCIUM mg/dL 8.7 8.6* 8.4*   < > 7.8*   BILIRUBIN mg/dL  --   --   --   --  0.3   ALK PHOS U/L  --   --   --   --  69   ALT (SGPT) U/L  --   --   --   --  10   AST (SGOT) U/L  --   --   --   --  18   GLUCOSE mg/dL 99 88 107*   < > 82    < > = values in this interval not displayed.                       Medication Review:     bisoprolol 5 mg Oral Nightly   calcitriol 0.25 mcg Oral Daily   castor oil-balsam peru  Topical Q12H   epoetin alexus 10,000 Units Subcutaneous Once per day on Tue Thu Sat   [START ON 2/12/2019] fluconazole 200 mg Oral Q24H   heparin (porcine) 5,000 Units Subcutaneous Q8H   hydrALAZINE 25 mg Oral Q8H   meropenem 500 mg Intravenous Q24H   [START ON 2/12/2019] pantoprazole 40 mg Oral Q AM   senna 1 tablet Oral BID   sodium chloride 3 mL Intravenous Q12H   vancomycin (dosing per levels)  Does not apply Daily        Pharmacy to dose vancomycin           PEA    ITP (idiopathic thrombocytopenic purpura)    Pulmonary hypertension (CMS/HCC)    CKD (chronic kidney disease)  stage 5, GFR less than 15 ml/min (CMS/HCC)    Leukocytosis    Anemia due to chronic kidney disease    Debility    Hx of renal cell carcinoma    Chronic diastolic CHF (congestive heart failure) (CMS/HCC)    Falls frequently    Huge hiatal hernia with intrathoracic stomach    Mitral valve mass      ASSESSMENT/PLAN:  Continue bisoprolol  Titrating antihypertensive regimen, goal less than 140/90 mmHg, added amlodipine today.  Mitral valve echodensity stable and follow-up echo today, no clinical suspicion for endocarditis.        Malachi Maxwell III, MD   02/11/19  4:58 PM

## 2019-02-11 NOTE — PROGRESS NOTES
INTENSIVIST   PROGRESS NOTE     Hospital:  LOS: 11 days      S     Ms. Fabiola Pimentel, 84 y.o. female is followed for:      PEA    ITP (idiopathic thrombocytopenic purpura)      As an Intensivist, we provide an integrated approach to the ICU patient and family, medical management of comorbid conditions, including but not limited to electrolytes, glycemic control, organ dysfunction, lead interdisciplinary rounds and coordinate the care with all other services, including those from other specialists.     Interval History:  She is doing fair.  Uneventful night.  Still leukocytosis.     The patient's relevant past medical, surgical and social history were reviewed and updated in Epic as appropriate.     ROS:   Constitutional: Negative for fever.   Respiratory: Negative for dyspnea.   Cardiovascular: Negative for chest pain.   Gastrointestinal: Negative for  nausea, vomiting and diarrhea.        O     Vitals:  Temp: 98.1 °F (36.7 °C) (02/11/19 1200) Temp  Min: 97.8 °F (36.6 °C)  Max: 98.8 °F (37.1 °C)   BP: 176/76 (02/11/19 1300) BP  Min: 149/62  Max: 182/76   Pulse: 67 (02/11/19 1400) Pulse  Min: 59  Max: 67   Resp: 16 (02/11/19 1400) Resp  Min: 14  Max: 18   SpO2: 95 % (02/11/19 1400) SpO2  Min: 93 %  Max: 98 %   Device: nasal cannula (02/11/19 1400)    Flow Rate: 2 (02/11/19 1400) Flow (L/min)  Min: 2  Max: 3       Physical Examination  Telemetry:  Rhythm: normal sinus rhythm (02/11/19 1600)   Constitutional:  No acute distress.   Cardiovascular: Normal rate, regular and rhythm. Normal heart sounds.  (+) murmur  No gallop or rub.   Respiratory: No respiratory distress. Normal respiratory effort.  Normal breath sounds  Clear to auscultation and percussion.    Abdominal:  Soft. No masses. Non-tender. No distension. No HSM.   Extremities: No digital cyanosis. No clubbing.  No edema.   Neurological:   Alert. Non focal.   Lines/Drains/Airways: L IJ tunneled Dialysis Catheter - with a special dressing.        Hematology:  Results from last 7 days   Lab Units 02/10/19  0401 02/09/19  0701 02/08/19  0319   WBC 10*3/mm3 20.42* 20.46* 19.96*   HEMOGLOBIN g/dL 8.0* 7.8* 9.4*   MCV fL 92.4 94.1 97.3   PLATELETS 10*3/mm3 271 290 220       Chemistry:  Estimated Creatinine Clearance: 11.5 mL/min (A) (by C-G formula based on SCr of 3.41 mg/dL (H)).    Results from last 7 days   Lab Units 02/11/19  0713 02/10/19  0401 02/09/19  0701   SODIUM mmol/L 147* 140 144   POTASSIUM mmol/L 4.2 3.7 3.9   CHLORIDE mmol/L 112* 105 110*   CO2 mmol/L 22.0 24.0 22.0   ANION GAP mmol/L 13.0* 11.0 12.0*   GLUCOSE mg/dL 99 88 107*   CALCIUM mg/dL 8.7 8.6* 8.4*     Results from last 7 days   Lab Units 02/11/19  0713 02/10/19  0401 02/09/19  0701   BUN mg/dL 37* 26* 45*   CREATININE mg/dL 3.41* 2.37* 3.69*     Images:  No radiology results for the last day    Echo:  Results for orders placed during the hospital encounter of 01/31/19   Adult Transthoracic Echo Limited W/ Cont if Necessary Per Protocol    Narrative · Stable echodensity noted on chordal apparatus of anterior leaflet of   mitral valve.          Results: Reviewed.  I reviewed the patient's new laboratory and imaging results.  I independently reviewed the patient's new images.    Medications: Reviewed.    Assessment/Plan   A / P     84 y.o.female, admitted on 1/31/2019 with Acute renal failure (ARF) (CMS/[facility]) [N17.9]:     1. S/p PEA, while she was in the inpatient dialysis unit, quickly resuscitated (within 3 minutes), and it did not require intubation, 2/1/19  2. 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  3. ESRD on HD  4. ITP  5. Persistent Leukocytosis, since admission. Peak on 2/7: 27K      6. Odynophagia and oral thrush } Improved.  1. R/o Esoph. candidiasis  7. HTN  8. Anemia, chronic disease.  1. EPO and IV iron during HD } As per Nephrology  9. Hiatal hernia.  10. Antibiotics: MRP and Fluconazole + Vanco IV  (based on levels)  11. Glucose: No h/o DM    Lab Results   Lab Value Date/Time    HGBA1C 5.60 01/31/2019 1758       Diet: Diet Dysphagia; IV - Mechanical Soft No Mixed Consistencies; Nectar / Syrup Thick; Renal   Advance Directives: Code Status and Medical Interventions:   Ordered at: 02/01/19 1600     Limited Support to NOT Include:    Intubation     Code Status:    CPR     Medical Interventions (Level of Support Prior to Arrest):    Limited          Assessment / Plan:    1. Next HD Tue 2/12  2. Reassess vascular access by Dr. Raymond Eilse (Vascular Surgery) today  3. Continue antibiotics.  4. Disposition: Keep in ICU.    Plan of care and goals reviewed during interdisciplinary rounds.  Level of Risk is High due to:  illness with threat to life or bodily function.   I discussed the patient's findings and my recommendations with patient    Chung Ferrari MD, FACP, FCCP, CNSC  Intensive Care Medicine, Nutrition Support and Pulmonary Medicine

## 2019-02-11 NOTE — PLAN OF CARE
Problem: Patient Care Overview  Goal: Plan of Care Review  Outcome: Ongoing (interventions implemented as appropriate)   02/11/19 1721   Coping/Psychosocial   Plan of Care Reviewed With patient   Plan of Care Review   Progress improving   OTHER   Outcome Summary Pt remains hypertensive at times. No IV access an IV antibotics changed to po. Plan dialysis tomorrow, WBC remains elevated

## 2019-02-11 NOTE — THERAPY TREATMENT NOTE
Acute Care - Physical Therapy Treatment Note  [facility]     Patient Name: Fabiola Pimentel  : 1934  MRN: [id number]  Today's Date: 2019  Onset of Illness/Injury or Date of Surgery: 19  Date of Referral to PT: 19  Referring Physician: MD Radha    Admit Date: 2019    Visit Dx:    ICD-10-CM ICD-9-CM   1. Impaired functional mobility, balance, gait, and endurance Z74.09 V49.89   2. Impaired mobility and ADLs Z74.09 799.89   3. Pharyngeal dysphagia R13.13 787.23   4. PEA I46.9 427.5     Patient Active Problem List   Diagnosis   • ITP (idiopathic thrombocytopenic purpura)   • Palpitations   • Premature atrial contractions   • Hypertension   • Pulmonary hypertension (CMS/HCC)   • DJD (degenerative joint disease)   • Raynaud's phenomenon (secondary)   • Diverticular disease   • CKD (chronic kidney disease) stage 5, GFR less than 15 ml/min (CMS/HCC)   • Leukocytosis   • Anemia due to chronic kidney disease   • Debility   • Hx of renal cell carcinoma   • Chronic diastolic CHF (congestive heart failure) (CMS/HCC)   • Falls frequently   • PEA   • Huge hiatal hernia with intrathoracic stomach   • Mitral valve mass       Therapy Treatment    Rehabilitation Treatment Summary     Row Name 19 1544 19 1000          Treatment Time/Intention    Discipline  physical therapist  -KR  physical therapist  -     Document Type  therapy note (daily note)  -KR  therapy note (daily note)  -MF     Subjective Information  no complaints  -KR  complains of;weakness;fatigue;pain  -     Mode of Treatment  physical therapy  -KR  physical therapy  -MF     Care Plan Review  care plan/treatment goals reviewed;risks/benefits reviewed;patient/other agree to care plan  -KR  --     Therapy Frequency (PT Clinical Impression)  daily  -KR  --     Patient Effort  good  -KR  --     Existing Precautions/Restrictions  fall;oxygen therapy device and L/min;other (see comments) recent dislocation of R shoulder  -KR  --      Recorded by [KR] Gemma Ying, PT 02/11/19 1628 [MF] Neftaly Seaman MARIE, PT 02/11/19 1053     Row Name 02/11/19 1544             Vital Signs    Pre Systolic BP Rehab  176  -KR      Pre Treatment Diastolic BP  76  -KR      Post Systolic BP Rehab  -- RN present  -KR      Pretreatment Heart Rate (beats/min)  68  -KR      Posttreatment Heart Rate (beats/min)  71  -KR      Pre SpO2 (%)  96  -KR      O2 Delivery Pre Treatment  supplemental O2  -KR      Post SpO2 (%)  94  -KR      O2 Delivery Post Treatment  supplemental O2  -KR      Pre Patient Position  Supine  -KR      Intra Patient Position  Standing  -KR      Post Patient Position  Supine  -KR      Recorded by [KR] Gemma Ying, PT 02/11/19 1628      Row Name 02/11/19 1544             Cognitive Assessment/Intervention    Additional Documentation  Cognitive Assessment/Intervention (Group)  -KR      Recorded by [KR] Gemma Ying, PT 02/11/19 1628      Row Name 02/11/19 1544             Cognitive Assessment/Intervention- PT/OT    Affect/Mental Status (Cognitive)  WFL  -KR      Orientation Status (Cognition)  oriented to;person;place;situation  -KR      Follows Commands (Cognition)  WFL  -KR      Cognitive Function (Cognitive)  safety deficit  -KR      Safety Deficit (Cognitive)  mild deficit;awareness of need for assistance;insight into deficits/self awareness;safety precautions awareness;safety precautions follow-through/compliance  -KR      Personal Safety Interventions  fall prevention program maintained;gait belt;nonskid shoes/slippers when out of bed  -KR      Recorded by [KR] Gemma Ying, PT 02/11/19 1628      Row Name 02/11/19 1544             Safety Issues, Functional Mobility    Safety Issues Affecting Function (Mobility)  insight into deficits/self awareness;safety precaution awareness;safety precautions follow-through/compliance  -KR      Impairments Affecting Function (Mobility)  balance;endurance/activity tolerance;shortness of  breath;strength  -KR      Recorded by [KR] Gemma Ying, PT 02/11/19 1628      Row Name 02/11/19 1544             Bed Mobility Assessment/Treatment    Bed Mobility Assessment/Treatment  rolling left;rolling right;supine-sit;sit-supine  -KR      Rolling Left [place] (Bed Mobility)  moderate assist (50% patient effort);verbal cues  -KR      Rolling Right [place] (Bed Mobility)  moderate assist (50% patient effort);verbal cues  -KR      Supine-Sit [place] (Bed Mobility)  moderate assist (50% patient effort);verbal cues  -KR      Sit-Supine [place] (Bed Mobility)  moderate assist (50% patient effort);2 person assist;verbal cues  -KR      Bed Mobility, Safety Issues  decreased use of arms for pushing/pulling;decreased use of legs for bridging/pushing  -KR      Assistive Device (Bed Mobility)  draw sheet;head of bed elevated  -KR      Comment (Bed Mobility)  VC's for sequencing. Pt rolled L and R for hygiene. Pt required assistance with trunk and BLEs when moving to EOB and back to supine position.   -KR      Recorded by [KR] Gemma Ying, PT 02/11/19 1628      Row Name 02/11/19 1544             Transfer Assessment/Treatment    Transfer Assessment/Treatment  sit-stand transfer;stand-sit transfer  -KR      Comment (Transfers)  STS x3 from EOB with PT/tech in front on either side to provide B UE support. Pt able to complete MIP at EOB. VC's for upright posture with forward weight shifting at hips.   -KR      Recorded by [KR] Gemma Ying, PT 02/11/19 1628      Row Name 02/11/19 1544             Sit-Stand Transfer    Sit-Stand [place] (Transfers)  moderate assist (50% patient effort);2 person assist;verbal cues  -KR      Assistive Device (Sit-Stand Transfers)  other (see comments) B UE support  -KR      Recorded by [KR] Gemma Ying, PT 02/11/19 1628      Row Name 02/11/19 1544             Stand-Sit Transfer    Stand-Sit [place] (Transfers)  moderate assist (50% patient effort);2  person assist;verbal cues  -KR      Assistive Device (Stand-Sit Transfers)  other (see comments) B UE support  -KR      Recorded by [KR] Gemma Ying, PT 02/11/19 1628      Row Name 02/11/19 1544             Gait/Stairs Assessment/Training    [place] Level (Gait)  not tested  -KR      Comment (Gait/Stairs)  Ambulation deferred.   -KR      Recorded by [KR] Gemma Ying, PT 02/11/19 1628      Row Name 02/11/19 1544             Motor Skills Assessment/Interventions    Additional Documentation  Balance (Group)  -KR      Recorded by [KR] Gemma Ying, PT 02/11/19 1628      Row Name 02/11/19 1544             Therapeutic Exercise    09572 - PT Therapeutic Activity Minutes  29  -KR      Recorded by [KR] Gemma Ying, PT 02/11/19 1628      Row Name 02/11/19 1544             Balance    Balance  static sitting balance;static standing balance  -KR      Recorded by [KR] Gemma Ying, PT 02/11/19 1628      Row Name 02/11/19 1544             Static Sitting Balance    Level of [place] (Unsupported Sitting, Static Balance)  supervision  -KR      Sitting Position (Unsupported Sitting, Static Balance)  sitting on edge of bed  -KR      Recorded by [KR] Gemma Ying, PT 02/11/19 1628      Row Name 02/11/19 1544             Static Standing Balance    Level of [place] (Supported Standing, Static Balance)  moderate assist, 50 to 74% patient effort  -KR      Assistive Device Utilized (Supported Standing, Static Balance)  other (see comments) B UE support  -KR      Recorded by [KR] Gemma Ying, PT 02/11/19 1628      Row Name 02/11/19 1544 02/11/19 1000          Positioning and Restraints    Pre-Treatment Position  in bed  -KR  in bed  -MF     Post Treatment Position  bed  -KR  bed  -MF     In Bed  supine;call light within reach;encouraged to call for assist;with nsg;side rails up x2;RUE elevated;LUE elevated  -KR  supine;call light within reach  -MF     Recorded by [KR] Gemma Ying, PT 02/11/19 1620  [MF] Neftaly Seaman, PT 02/11/19 1053     Row Name 02/11/19 1544 02/11/19 1000          Pain Assessment    Additional Documentation  Pain Scale: Numbers Pre/Post-Treatment (Group)  -KR  Pain Scale: FACES Pre/Post-Treatment (Group)  -MF     Recorded by [KR] Gemma Ying, PT 02/11/19 1628 [MF] Neftaly Seaman, PT 02/11/19 1053     Row Name 02/11/19 1544             Pain Scale: Numbers Pre/Post-Treatment    Pain Scale: Numbers, Pretreatment  0/10 - no pain  -KR      Pain Scale: Numbers, Post-Treatment  0/10 - no pain  -KR      Recorded by [KR] Gemma Ying, PT 02/11/19 1628      Row Name 02/11/19 1000             Pain Scale: FACES Pre/Post-Treatment    Pain: FACES Scale, Pretreatment  2-->hurts little bit  -MF      Pain: FACES Scale, Post-Treatment  4-->hurts little more  -MF      Pre/Post Treatment Pain Comment  6-7/10pain with debridement of woun  -MF      Recorded by [MF] Neftaly Seaman, PT 02/11/19 1053      Row Name                Wound 02/01/19 1825 Other (See comments) chest incision    Wound - Properties Group Date first assessed: 02/01/19 [KS] Time first assessed: 1825 [KS] Side: Other (See comments) [KS] Location: chest [KS] Type: incision [KS] Recorded by:  [KS] Leonela Caceres RN 02/01/19 1825    Row Name                Wound 01/16/19 1429 Left arm incision    Wound - Properties Group Date first assessed: 01/16/19 [JOSHUA] Time first assessed: 1429 [JOSHUA] Side: Left [JOSHUA] Location: arm [JOSHUA] Type: incision [JOSHUA] Recorded by:  [JOSHUA] Nilam Joshi RN 01/16/19 1429    Row Name 02/11/19 1000             Wound 01/31/19 1600 Bilateral medial coccyx pressure injury    Wound - Properties Group Date first assessed: 01/31/19 [CM] Time first assessed: 1600 [CM] Present On Admission : yes [CM] Side: Bilateral [CM] Orientation: medial [CM] Location: coccyx [CM] Type: pressure injury [CM] Stage, Pressure Injury: Stage 2 [CM] Additional Comments: wound now unstageable with moderate slough / eschar  covering.  [MF] Wound Outcome: Converged [MF2] Recorded by:  [CM] Trudy Cornejo, RN 02/03/19 0718 [MF] Neftaly Seaman, PT 02/06/19 1326 [MF2] Neftaly Seaman, PT 02/08/19 1123    Care, Wound  irrigated with;sterile normal saline;debrided;ultrasound therapy, non contact low frequency 8 min MIST  -MF      Recorded by [MF] Neftaly Seaman, PT 02/11/19 1053      Row Name                Wound 02/04/19 2000 Right upper chest puncture    Wound - Properties Group Date first assessed: 02/04/19 [RS] Time first assessed: 2000 [RS] Present On Admission : no [RS] Side: Right [RS] Orientation: upper [RS] Location: chest [RS] Type: puncture [RS], HD cath with pigtail site (removed)  Recorded by:  [RS] Rodriguez Garcia RN 02/04/19 2230    Row Name 02/11/19 1544             Plan of Care Review    Plan of Care Reviewed With  patient  -KR      Recorded by [KR] Gemma Ying, PT 02/11/19 1628      Row Name 02/11/19 1544             Outcome Summary/Treatment Plan (PT)    Daily Summary of Progress (PT)  progress toward functional goals as expected  -KR      Recorded by [KR] Gemma Ying, PT 02/11/19 1628        User Key  (r) = Recorded By, (t) = Taken By, (c) = Cosigned By    Initials Name Effective Dates Discipline     Neftaly Seaman, PT 06/19/15 -  PT    Rodriguez Pittman RN 06/30/16 -  Nurse    Nilam Martinez RN 06/16/16 -  Nurse    Leonela Quezada RN 05/09/17 -  Nurse    Gemma Allison, PT 04/03/18 -  PT    Trudy Peterson RN 06/21/17 -  Nurse          Wound 01/16/19 1429 Left arm incision (Active)   Closure Open to air 2/11/2019  4:00 PM   Care, Wound cleansed with;soap and water 2/11/2019 10:00 AM   Dressing Care, Wound open to air 2/11/2019 10:00 AM       Wound 02/01/19 1825 Other (See comments) chest incision (Active)   Dressing Appearance dry;intact 2/11/2019  4:00 PM   Closure BARRY 2/11/2019  4:00 PM   Base dressing in place, unable to visualize 2/11/2019  4:00 PM        Wound 01/31/19 1600 Bilateral medial coccyx pressure injury (Active)   Dressing Appearance dry;intact 2/11/2019  4:00 PM   Closure None 2/11/2019  4:00 PM   Base moist;pink;slough;white 2/11/2019  4:00 PM   Periwound dry;pink 2/11/2019  4:00 PM   Drainage Characteristics/Odor serous;yellow 2/11/2019  4:00 PM   Drainage Amount small 2/11/2019  4:00 PM   Care, Wound irrigated with;sterile normal saline;debrided;ultrasound therapy, non contact low frequency 2/11/2019 10:00 AM   Dressing Care, Wound dressing changed 2/11/2019 10:00 AM       Wound 02/04/19 2000 Right upper chest puncture (Active)   Dressing Appearance dry;intact 2/11/2019  4:00 PM   Base pink 2/11/2019  4:00 PM   Periwound intact 2/11/2019  4:00 PM   Drainage Amount none 2/11/2019  4:00 PM           Physical Therapy Education     Title: PT OT SLP Therapies (In Progress)     Topic: Physical Therapy (In Progress)     Point: Mobility training (In Progress)     Learning Progress Summary           Patient Acceptance, E, NR by KR at 2/11/2019  3:44 PM    Acceptance, E, NR by CHRISTOPHER at 2/10/2019  3:15 PM    Acceptance, E,D, VU,NR by SANTA at 2/8/2019 11:05 AM    Acceptance, E,D, NR by SANTA at 2/6/2019  1:08 PM    Acceptance, E, NR by SIMIN at 2/4/2019  1:06 PM    Acceptance, E,D, NR by LS at 2/2/2019  2:48 PM                   Point: Home exercise program (In Progress)     Learning Progress Summary           Patient Acceptance, E, NR by KR at 2/11/2019  3:44 PM    Acceptance, E, NR by CHRISTOPHER at 2/10/2019  3:15 PM    Acceptance, E,D, VU,NR by SANTA at 2/8/2019 11:05 AM    Acceptance, E,D, NR by SANTA at 2/6/2019  1:08 PM    Acceptance, E, NR by SIMIN at 2/4/2019  1:06 PM    Acceptance, E,D, NR by LS at 2/2/2019  2:48 PM                   Point: Body mechanics (In Progress)     Learning Progress Summary           Patient Acceptance, E, NR by SIMIN at 2/11/2019  3:44 PM    Acceptance, E, NR by CHRISTOPHER at 2/10/2019  3:15 PM    AcceptanceJOJO D, VU,NR by SANTA at 2/8/2019 11:05 AM    JOJO Bey D,  NR by LS at 2/6/2019  1:08 PM    Acceptance, E, NR by KR at 2/4/2019  1:06 PM    Acceptance, E,D, NR by LS at 2/2/2019  2:48 PM                   Point: Precautions (In Progress)     Learning Progress Summary           Patient Acceptance, E, NR by KR at 2/11/2019  3:44 PM    Acceptance, E, NR by CHRISTOPHER at 2/10/2019  3:15 PM    Acceptance, E,D, VU,NR by LS at 2/8/2019 11:05 AM    Acceptance, E,D, NR by LS at 2/6/2019  1:08 PM    Acceptance, E, NR by KR at 2/4/2019  1:06 PM    Acceptance, E,D, NR by LS at 2/2/2019  2:48 PM                               User Key     Initials Effective Dates Name Provider Type Discipline    CHRISTOPHER 06/19/15 -  aTrsha Neri, PT Physical Therapist PT    SANTA 06/19/15 -  Tanesha Castro, PT Physical Therapist PT    SIMIN 04/03/18 -  Gemma Ying, PT Physical Therapist PT                PT Recommendation and Plan  Therapy Frequency (PT Clinical Impression): daily  Outcome Summary/Treatment Plan (PT)  Daily Summary of Progress (PT): progress toward functional goals as expected  Plan of Care Reviewed With: patient  Progress: improving  Outcome Summary: Pt performed STS x3 from EOB and MIP with modA x2 and B UE support. Pt completed bed mobility with mod-modA x2. Pt denied any c/o pain. Continue to progress as appropriate.   Outcome Measures     Row Name 02/11/19 1544 02/10/19 1515 02/09/19 1439       How much help from another person do you currently need...    Turning from your back to your side while in flat bed without using bedrails?  2  -KR  2  -CHRISTOPHER  --    Moving from lying on back to sitting on the side of a flat bed without bedrails?  2  -KR  2  -CHRISTOPHER  --    Moving to and from a bed to a chair (including a wheelchair)?  2  -KR  2  -CHRISTOPHER  --    Standing up from a chair using your arms (e.g., wheelchair, bedside chair)?  2  -KR  2  -CHRISTOPHER  --    Climbing 3-5 steps with a railing?  1  -KR  1  -CHRISTOPHER  --    To walk in hospital room?  2  -KR  2  -CHRISTOPHER  --    AM-PAC 6 Clicks Score  11  -KR  11  -CHRISTOPHER  --        How much help from another is currently needed...    Putting on and taking off regular lower body clothing?  --  --  1  -JR    Bathing (including washing, rinsing, and drying)  --  --  1  -JR    Toileting (which includes using toilet bed pan or urinal)  --  --  1  -JR    Putting on and taking off regular upper body clothing  --  --  1  -JR    Taking care of personal grooming (such as brushing teeth)  --  --  3  -JR    Eating meals  --  --  3  -JR    Score  --  --  10  -JR       Functional Assessment    Outcome Measure Options  AM-PAC 6 Clicks Basic Mobility (PT)  -KR  AM-PAC 6 Clicks Basic Mobility (PT)  -CHRISTOPHER  AM-PAC 6 Clicks Daily Activity (OT)  -JR      User Key  (r) = Recorded By, (t) = Taken By, (c) = Cosigned By    Initials Name Provider Type    Tarsha Brown, PT Physical Therapist    Ebony Wheeler, OT Occupational Therapist    Gemma Allison, PT Physical Therapist         Time Calculation:   PT Charges     Row Name 02/11/19 1544 02/11/19 1000          Time Calculation    Start Time  1544  -KR  1000  -MF     PT Received On  02/11/19  -KR  --     PT Goal Re-Cert Due Date  02/12/19  -KR  02/12/19  -        Time Calculation- PT    Total Timed Code Minutes- PT  29 minute(s)  -KR  --        Timed Charges    15012 - PT Therapeutic Activity Minutes  29  -KR  --       User Key  (r) = Recorded By, (t) = Taken By, (c) = Cosigned By    Initials Name Provider Type    Neftaly Moore, PT Physical Therapist    Gemma Allison, PT Physical Therapist        Therapy Suggested Charges     Code   Minutes Charges    37449 (CPT®) Hc Pt Neuromusc Re Education Ea 15 Min      28191 (CPT®) Hc Pt Ther Proc Ea 15 Min      17245 (CPT®) Hc Gait Training Ea 15 Min      52210 (CPT®) Hc Pt Therapeutic Act Ea 15 Min 29 2    07822 (CPT®) Hc Pt Manual Therapy Ea 15 Min      94210 (CPT®) Hc Pt Iontophoresis Ea 15 Min      09189 (CPT®) Hc Pt Elec Stim Ea-Per 15 Min      24214 (CPT®) Hc Pt Ultrasound Ea 15 Min       35633 (CPT®) Hc Pt Self Care/Mgmt/Train Ea 15 Min      27956 (CPT®) Hc Pt Prosthetic (S) Train Initial Encounter, Each 15 Min      42549 (CPT®) Hc Pt Orthotic(S)/Prosthetic(S) Encounter, Each 15 Min      71194 (CPT®) Hc Orthotic(S) Mgmt/Train Initial Encounter, Each 15min      Total  29 2        Therapy Charges for Today     Code Description Service Date Service Provider Modifiers Qty    15219938570 HC PT THERAPEUTIC ACT EA 15 MIN 2/11/2019 Gemma Ying, PT GP 2    [id number]  PT THER SUPP EA 15 MIN 2/11/2019 Gemma Ying, PT GP 2          PT G-Codes  Outcome Measure Options: AM-PAC 6 Clicks Basic Mobility (PT)  AM-PAC 6 Clicks Score: 11  Score: 10    Corry Ying PT  2/11/2019

## 2019-02-11 NOTE — PROGRESS NOTES
Continued Stay Note  [facility]     Patient Name: Fabiola Pimentel  MRN: [id number]  Today's Date: 2/11/2019    Admit Date: 1/31/2019    Discharge Plan     Row Name 02/11/19 1237       Plan    Plan  [facility]    Plan Comments  Spoke with Willie with [facility] and they continue to follow patient. Pre-cert will be required.   Discussed in rounds, HD access to be assessed by MD today.  CM will continue to follow.        Discharge Codes    No documentation.       Expected Discharge Date and Time     Expected Discharge Date Expected Discharge Time    Feb 16, 2019             Thea Singleton RN

## 2019-02-11 NOTE — THERAPY WOUND CARE TREATMENT
Acute Care - Wound/Debridement Treatment Note  [facility]     Patient Name: Fabiola Pimentel  : 1934  MRN: [id number]  Today's Date: 2019  Onset of Illness/Injury or Date of Surgery: 19   Date of Referral to PT: 19   Referring Physician: MD Radha       Admit Date: 2019    Visit Dx:    ICD-10-CM ICD-9-CM   1. Impaired functional mobility, balance, gait, and endurance Z74.09 V49.89   2. Impaired mobility and ADLs Z74.09 799.89   3. Pharyngeal dysphagia R13.13 787.23   4. PEA I46.9 427.5       Patient Active Problem List   Diagnosis   • ITP (idiopathic thrombocytopenic purpura)   • Palpitations   • Premature atrial contractions   • Hypertension   • Pulmonary hypertension (CMS/HCC)   • DJD (degenerative joint disease)   • Raynaud's phenomenon (secondary)   • Diverticular disease   • CKD (chronic kidney disease) stage 5, GFR less than 15 ml/min (CMS/[facility])   • Leukocytosis   • Anemia due to chronic kidney disease   • Debility   • Hx of renal cell carcinoma   • Chronic diastolic CHF (congestive heart failure) (CMS/[facility])   • Falls frequently   • PEA   • Huge hiatal hernia with intrathoracic stomach   • Mitral valve mass           Wound 19 1429 Left arm incision (Active)   Closure Open to air 2019 10:00 AM   Care, Wound cleansed with;soap and water 2019 10:00 AM   Dressing Care, Wound open to air 2019 10:00 AM       Wound 19 1825 Other (See comments) chest incision (Active)   Dressing Appearance dry;intact 2019 10:00 AM   Closure BARRY 2019 10:00 AM   Base dressing in place, unable to visualize 2019 10:00 AM       Wound 19 1600 Bilateral medial coccyx pressure injury (Active)   Dressing Appearance dry;intact 2019 10:00 AM   Closure None 2019 10:00 AM   Base moist;pink;slough;white 2019 10:00 AM   Periwound dry;pink 2019 10:00 AM   Drainage Characteristics/Odor serous;yellow 2019 10:00 AM   Drainage Amount small 2019  10:00 AM   Care, Wound irrigated with;sterile normal saline;debrided;ultrasound therapy, non contact low frequency 2/11/2019 10:00 AM   Dressing Care, Wound dressing changed 2/11/2019 10:00 AM       Wound 02/04/19 2000 Right upper chest puncture (Active)   Dressing Appearance dry;intact 2/11/2019 10:00 AM   Base pink 2/11/2019 10:00 AM   Periwound intact 2/11/2019 10:00 AM   Drainage Amount none 2/11/2019 10:00 AM         WOUND DEBRIDEMENT  Total area of Debridement: ~3cm2  Debridement Site 1  Location- Site 1: sacrum  Selective Debridement- Site 1: Wound Surface <20cmsq  Instruments- Site 1: tweezers, scapel, #15  Excised Tissue Description- Site 1: minimum, slough  Bleeding- Site 1: seeping, held pressure, 1 minute            Therapy Treatment    Rehabilitation Treatment Summary     Row Name 02/11/19 1000             Treatment Time/Intention    Discipline  physical therapist  -      Document Type  therapy note (daily note)  -      Subjective Information  complains of;weakness;fatigue;pain  -      Mode of Treatment  physical therapy  -      Recorded by [] Neftaly Seaman, PT 02/11/19 1053      Row Name 02/11/19 1000             Positioning and Restraints    Pre-Treatment Position  in bed  -      Post Treatment Position  bed  -      In Bed  supine;call light within reach  -      Recorded by [] Neftaly Seaman, PT 02/11/19 1053      Row Name 02/11/19 1000             Pain Assessment    Additional Documentation  Pain Scale: FACES Pre/Post-Treatment (Group)  -      Recorded by [] Neftaly Seaman, PT 02/11/19 1053      Row Name 02/11/19 1000             Pain Scale: FACES Pre/Post-Treatment    Pain: FACES Scale, Pretreatment  2-->hurts little bit  -      Pain: FACES Scale, Post-Treatment  4-->hurts little more  -      Pre/Post Treatment Pain Comment  6-7/10pain with debridement of woun  -      Recorded by [] Neftaly Seaman, PT 02/11/19 1053      Row Name                Wound  02/01/19 1825 Other (See comments) chest incision    Wound - Properties Group Date first assessed: 02/01/19 [KS] Time first assessed: 1825 [KS] Side: Other (See comments) [KS] Location: chest [KS] Type: incision [KS] Recorded by:  [KS] Leonela Caceres RN 02/01/19 1825    Row Name                Wound 01/16/19 1429 Left arm incision    Wound - Properties Group Date first assessed: 01/16/19 [JOSHUA] Time first assessed: 1429 [JOSHUA] Side: Left [JOSHUA] Location: arm [JOSHUA] Type: incision [JOSHUA] Recorded by:  [JOSHUA] Nilam Joshi RN 01/16/19 1429    Row Name 02/11/19 1000             Wound 01/31/19 1600 Bilateral medial coccyx pressure injury    Wound - Properties Group Date first assessed: 01/31/19 [CM] Time first assessed: 1600 [CM] Present On Admission : yes [CM] Side: Bilateral [CM] Orientation: medial [CM] Location: coccyx [CM] Type: pressure injury [CM] Stage, Pressure Injury: Stage 2 [CM] Additional Comments: wound now unstageable with moderate slough / eschar covering.  [MF] Wound Outcome: Converged [MF2] Recorded by:  [CM] Trudy Cornejo RN 02/03/19 0718 [MF] Neftaly Seaman, PT 02/06/19 1326 [MF2] Neftaly Seaman, PT 02/08/19 1123    Care, Wound  irrigated with;sterile normal saline;debrided;ultrasound therapy, non contact low frequency 8 min MIST  -MF      Recorded by [MF] Neftaly Seaman, PT 02/11/19 1053      Row Name                Wound 02/04/19 2000 Right upper chest puncture    Wound - Properties Group Date first assessed: 02/04/19 [RS] Time first assessed: 2000 [RS] Present On Admission : no [RS] Side: Right [RS] Orientation: upper [RS] Location: chest [RS] Type: puncture [RS], HD cath with pigtail site (removed)  Recorded by:  [RS] Rodriguez Garcia RN 02/04/19 2230      User Key  (r) = Recorded By, (t) = Taken By, (c) = Cosigned By    Initials Name Effective Dates Discipline    Neftaly Moore, PT 06/19/15 -  PT    Rodriguez Pittman, RN 06/30/16 -  Nurse    JOSHUA Joshi,  Nilam MILLER RN 06/16/16 -  Nurse    Leonela Quezada RN 05/09/17 -  Nurse    Trudy Peterson RN 06/21/17 -  Nurse          Physical Therapy Education     Title: PT OT SLP Therapies (In Progress)     Topic: Physical Therapy (In Progress)     Point: Mobility training (In Progress)     Learning Progress Summary           Patient Acceptance, E, NR by CHRISTOPHER at 2/10/2019  3:15 PM    Acceptance, E,D, VU,NR by SANTA at 2/8/2019 11:05 AM    Acceptance, E,D, NR by SANTA at 2/6/2019  1:08 PM    Acceptance, E, NR by KR at 2/4/2019  1:06 PM    Acceptance, E,D, NR by LS at 2/2/2019  2:48 PM                   Point: Home exercise program (In Progress)     Learning Progress Summary           Patient Acceptance, E, NR by CHRISTOPHER at 2/10/2019  3:15 PM    Acceptance, E,D, VU,NR by SANTA at 2/8/2019 11:05 AM    Acceptance, E,D, NR by SANTA at 2/6/2019  1:08 PM    Acceptance, E, NR by KR at 2/4/2019  1:06 PM    Acceptance, E,D, NR by SANTA at 2/2/2019  2:48 PM                   Point: Body mechanics (In Progress)     Learning Progress Summary           Patient Acceptance, E, NR by CHRISTOPHER at 2/10/2019  3:15 PM    Acceptance, E,D, VU,NR by SANTA at 2/8/2019 11:05 AM    Acceptance, E,D, NR by SANTA at 2/6/2019  1:08 PM    Acceptance, E, NR by SIMIN at 2/4/2019  1:06 PM    Acceptance, E,D, NR by SANTA at 2/2/2019  2:48 PM                   Point: Precautions (In Progress)     Learning Progress Summary           Patient Acceptance, E, NR by CHRISTOPHER at 2/10/2019  3:15 PM    Acceptance, E,D, VU,NR by SANTA at 2/8/2019 11:05 AM    Acceptance, E,D, NR by SANTA at 2/6/2019  1:08 PM    Acceptance, E, NR by KR at 2/4/2019  1:06 PM    Acceptance, E,D, NR by SANTA at 2/2/2019  2:48 PM                               User Key     Initials Effective Dates Name Provider Type Discipline    CHRISTOPHER 06/19/15 -  Tarsha Neri, PT Physical Therapist PT    LS 06/19/15 -  Tanesha Castro, PT Physical Therapist PT    KR 04/03/18 -  Gemma Ying, PT Physical Therapist PT                  PT Recommendation  and Plan  Anticipated Discharge Disposition (PT): skilled nursing facility  Planned Therapy Interventions (PT Eval): bed mobility training, balance training, gait training, home exercise program, patient/family education, strengthening, transfer training  Therapy Frequency (PT Clinical Impression): daily            Outcome Summary/Treatment Plan (PT)  Daily Summary of Progress (PT): unable to show any progress toward functional goals(pt with moderate necrotic tissue covering wound)  Barriers to Overall Progress (PT): limited mobility and poor PO intake   Plan for Continued Treatment (PT): cont with mist 2-3 x/ week with debridement prn   Daily Summary of Progress (PT): unable to show any progress toward functional goals(pt with moderate necrotic tissue covering wound)  Plan for Continued Treatment (PT): cont with mist 2-3 x/ week with debridement prn   Outcome Summary: PT was able to debride only minimal amounts of slough today due to pt's c/o increased discomfort with SL, but PT noted small pocket of fluid under layer of slough.  PT will cont with mist and debridement as pt is able to tommy to help decrease necrotic covering to improve wound healing.  Plan of Care Reviewed With: patient    Outcome Measures     Row Name 02/10/19 1515 02/09/19 1439 02/08/19 1105       How much help from another person do you currently need...    Turning from your back to your side while in flat bed without using bedrails?  2  -CHRISTOPHER  --  2  -LS    Moving from lying on back to sitting on the side of a flat bed without bedrails?  2  -CHRISTOPHER  --  2  -LS    Moving to and from a bed to a chair (including a wheelchair)?  2  -CHRISTOPHER  --  2  -LS    Standing up from a chair using your arms (e.g., wheelchair, bedside chair)?  2  -CHRISTOPHER  --  2  -LS    Climbing 3-5 steps with a railing?  1  -CHRISTOPHER  --  1  -LS    To walk in hospital room?  2  -CHRISTOPHER  --  2  -LS    AM-PAC 6 Clicks Score  11  -CHRISTOPHER  --  11  -LS       How much help from another is currently needed...     Putting on and taking off regular lower body clothing?  --  1  -JR  --    Bathing (including washing, rinsing, and drying)  --  1  -JR  --    Toileting (which includes using toilet bed pan or urinal)  --  1  -JR  --    Putting on and taking off regular upper body clothing  --  1  -JR  --    Taking care of personal grooming (such as brushing teeth)  --  3  -JR  --    Eating meals  --  3  -JR  --    Score  --  10  -JR  --       Functional Assessment    Outcome Measure Options  AM-PAC 6 Clicks Basic Mobility (PT)  -CHRISTOPHER  AM-PAC 6 Clicks Daily Activity (OT)  -JR  AM-PAC 6 Clicks Basic Mobility (PT)  -LS      User Key  (r) = Recorded By, (t) = Taken By, (c) = Cosigned By    Initials Name Provider Type    Tarsha Brown, PT Physical Therapist    JR Ebony Sparrow, OT Occupational Therapist    Tanesha Rodney, PT Physical Therapist              Time Calculation  PT Charges     Row Name 02/11/19 1000             Time Calculation    Start Time  1000  -MF      PT Goal Re-Cert Due Date  02/12/19  -        User Key  (r) = Recorded By, (t) = Taken By, (c) = Cosigned By    Initials Name Provider Type    Neftaly Moore, PT Physical Therapist         Therapy Suggested Charges     Code   Minutes Charges    09723 (CPT®) Hc Pt Neuromusc Re Education Ea 15 Min      47848 (CPT®) Hc Pt Ther Proc Ea 15 Min      59164 (CPT®) Hc Gait Training Ea 15 Min      29747 (CPT®) Hc Pt Therapeutic Act Ea 15 Min 23 2    63788 (CPT®) Hc Pt Manual Therapy Ea 15 Min      38353 (CPT®) Hc Pt Iontophoresis Ea 15 Min      06089 (CPT®) Hc Pt Elec Stim Ea-Per 15 Min      77695 (CPT®) Hc Pt Ultrasound Ea 15 Min      80073 (CPT®) Hc Pt Self Care/Mgmt/Train Ea 15 Min      81685 (CPT®) Hc Pt Prosthetic (S) Train Initial Encounter, Each 15 Min      64039 (CPT®) Hc Pt Orthotic(S)/Prosthetic(S) Encounter, Each 15 Min      94158 (CPT®) Hc Orthotic(S) Mgmt/Train Initial Encounter, Each 15min      Total  23 2          Therapy Charges for Today      Code Description Service Date Service Provider Modifiers Qty    49383666022  PAM DEBRIDE OPEN WOUND UP TO 20CM 2/11/2019 Neftaly Seaman, PT GP 1    [id number] HC PT NLFU MIST 2/11/2019 Neftaly Seaman, PT GP 1            PT G-Codes  Outcome Measure Options: AM-PAC 6 Clicks Basic Mobility (PT)  AM-PAC 6 Clicks Score: 11  Score: 10        Neftaly Seaman, PT  2/11/2019

## 2019-02-11 NOTE — PROGRESS NOTES
"   LOS: 11 days    Patient Care Team:  Otilio Smith DO as PCP - General (Family Medicine)    Reason For Visit:  F/U ESRD.  Subjective           Review of Systems:    Pulm: No soa   CV:  No CP      Objective       bisoprolol 5 mg Oral Nightly   calcitriol 0.25 mcg Oral Daily   castor oil-balsam peru  Topical Q12H   epoetin alexus 10,000 Units Subcutaneous Once per day on Tue Thu Sat   fluconazole 200 mg Intravenous Daily   heparin (porcine) 5,000 Units Subcutaneous Q8H   hydrALAZINE 25 mg Oral Q8H   meropenem 500 mg Intravenous Q24H   pantoprazole 40 mg Intravenous Q12H   senna 1 tablet Oral BID   sodium chloride 3 mL Intravenous Q12H   vancomycin (dosing per levels)  Does not apply Daily       Pharmacy to dose vancomycin          Vital Signs:  Blood pressure 166/79, pulse 67, temperature 97.8 °F (36.6 °C), temperature source Axillary, resp. rate 18, height 162.6 cm (64\"), weight 65.8 kg (145 lb), SpO2 96 %, not currently breastfeeding.    Flowsheet Rows      First Filed Value   Admission Height  163.8 cm (64.5\") Documented at 02/01/2019 0300   Admission Weight  70 kg (154 lb 4.8 oz) Documented at 01/31/2019 1607          02/10 0701 - 02/11 0700  In: 230 [I.V.:30]  Out: 50 [Urine:50]    Physical Exam:    General Appearance: NAD, alert and cooperative, Ox3  Eyes: PER, conjunctivae and sclerae normal, no icterus  Lungs: FEW CRACKLES.  Heart/CV: regular rhythm & normal rate, no murmur, no gallop, no rub and no edema  Abdomen: not distended, soft, non-tender, no masses,  bowel sounds present  Skin: No rash, Warm and dry. TUNNELED HD CATH ( EXIT SITE WITH SOME PURULENCE ).    Radiology:            Labs:  Results from last 7 days   Lab Units 02/10/19  0401 02/09/19  0701 02/08/19  0319   WBC 10*3/mm3 20.42* 20.46* 19.96*   HEMOGLOBIN g/dL 8.0* 7.8* 9.4*   HEMATOCRIT % 25.5* 25.5* 32.4*   PLATELETS 10*3/mm3 271 290 220     Results from last 7 days   Lab Units 02/11/19  0713 02/10/19  0401 02/09/19  0701 02/08/19  0319 " 02/07/19  0543   SODIUM mmol/L 147* 140 144 138 138   POTASSIUM mmol/L 4.2 3.7 3.9 3.9 3.8   CHLORIDE mmol/L 112* 105 110* 108 106   CO2 mmol/L 22.0 24.0 22.0 18.0* 18.0*   BUN mg/dL 37* 26* 45* 25* 41*   CREATININE mg/dL 3.41* 2.37* 3.69* 2.68* 3.91*   CALCIUM mg/dL 8.7 8.6* 8.4* 8.2* 7.8*   PHOSPHORUS mg/dL 3.6 2.4 5.1  --  4.6   MAGNESIUM mg/dL  --   --   --   --  1.9   ALBUMIN g/dL 3.54 3.80 3.15*  --  2.95*     Results from last 7 days   Lab Units 02/11/19  0713   GLUCOSE mg/dL 99       Results from last 7 days   Lab Units 02/07/19  0543   ALK PHOS U/L 69   BILIRUBIN mg/dL 0.3   ALT (SGPT) U/L 10   AST (SGOT) U/L 18                 Estimated Creatinine Clearance: 11.5 mL/min (A) (by C-G formula based on SCr of 3.41 mg/dL (H)).      Assessment       PEA    ITP (idiopathic thrombocytopenic purpura)    Pulmonary hypertension (CMS/HCC)    CKD (chronic kidney disease) stage 5, GFR less than 15 ml/min (CMS/HCC)    Leukocytosis    Anemia due to chronic kidney disease    Debility    Hx of renal cell carcinoma    Chronic diastolic CHF (congestive heart failure) (CMS/HCC)    Falls frequently    Huge hiatal hernia with intrathoracic stomach    Mitral valve mass            Impression: ESRD. ANEMIA. ? HD CATH INFECTION.             Recommendations: HD 2/12/19. MAY NEED SURGERY TO LOOK AT CATH.      Otilio Adler MD  02/11/19  11:13 AM

## 2019-02-11 NOTE — PLAN OF CARE
Problem: Patient Care Overview  Goal: Plan of Care Review  Outcome: Ongoing (interventions implemented as appropriate)   02/11/19 1000   Coping/Psychosocial   Plan of Care Reviewed With patient   OTHER   Outcome Summary PT was able to debride only minimal amounts of slough today due to pt's c/o increased discomfort with SL, but PT noted small pocket of fluid under layer of slough. PT will cont with mist and debridement as pt is able to tommy to help decrease necrotic covering to improve wound healing.

## 2019-02-11 NOTE — PROGRESS NOTES
Clinical Nutrition     Nutrition Assessment  Reason for Visit:   MDR, Follow-up protocol    Patient Name: Fabiola Pimentel  YOB: 1934  MRN: [id number]  Date of Encounter: 02/11/19 11:48 AM  Admission date: 1/31/2019        Poor po intake since admission, rec supplemental EN: Novasource  Renal @35ml, Prostat 2x/day, free water @25ml/hr     Pt may benefit from MVI to aid in wound healing as well    May need IVF's if hypernatremia persists    Nutrition Assessment   Admission Diagnosis         PEA    ITP (idiopathic thrombocytopenic purpura)    Pulmonary hypertension (CMS/HCC)    CKD (chronic kidney disease) stage 5, GFR less than 15 ml/min (CMS/HCC)    Leukocytosis    Anemia due to chronic kidney disease    Debility    Hx of renal cell carcinoma    Chronic diastolic CHF (congestive heart failure) (CMS/HCC)    Falls frequently    Huge hiatal hernia with intrathoracic stomach    Mitral valve mass        PMH: She  has a past medical history of Chronic ITP (idiopathic thrombocytopenia) (CMS/HCC), Chronic renal insufficiency, CKD (chronic kidney disease), Diverticular disease, Dizziness, DJD (degenerative joint disease), Hip fracture (CMS/HCC), History of uterine cancer, Hypertension, Lower extremity edema, Palpitations, Pelvic fracture (CMS/HCC), Premature atrial contractions, Pulmonary hypertension (CMS/HCC), Raynaud's phenomenon (secondary), and Stroke (CMS/HCC).   PSxH: She  has a past surgical history that includes Incisional hernia repair (01/02/2008); Nephrectomy (Right, 2001); Total abdominal hysterectomy w/ bilateral salpingoophorectomy (2001); Other surgical history; Appendectomy; Cholecystectomy (1958); Cataract extraction w/  intraocular lens implant; Colectomy (10/2005); Splenectomy, total; Dilation and curettage of uterus; Sympathectomy (Left); HEMODIALYSIS CATHETER INSERTION (N/A, 2/1/2019); and LEFT UPPER EXTREMITY ARTERIOVENOUS FISTULA FORMATION, BRACHIO-AXILLARY SHUNT WITH  HELIOGRAFT (Left, 1/16/2019).       Reported/Observed/Food/Nutrition Related History:     Pt resting in bed, reports she is feeling better, throat is better, dislikes the food, dislikes her supplements    Per RN: Surgery to evaluate her access site  :     Labs reviewed     Results from last 7 days   Lab Units 02/11/19  0713  02/07/19  0543   SODIUM mmol/L 147*   < > 138   POTASSIUM mmol/L 4.2   < > 3.8   CHLORIDE mmol/L 112*   < > 106   CO2 mmol/L 22.0   < > 18.0*   BUN mg/dL 37*   < > 41*   CREATININE mg/dL 3.41*   < > 3.91*   CALCIUM mg/dL 8.7   < > 7.8*   BILIRUBIN mg/dL  --   --  0.3   ALK PHOS U/L  --   --  69   ALT (SGPT) U/L  --   --  10   AST (SGOT) U/L  --   --  18   GLUCOSE mg/dL 99   < > 82    < > = values in this interval not displayed.           Lab Results   Lab Value Date/Time    HGBA1C 5.60 01/31/2019 1758         Medications reviewed   Pertinent: abx,  diflucan, protonix           GI: wnl    SKIN: sacral DTI,  L. heel DTI,  HD access site/ chest incision--- to be evaluated per Surgery    Current Nutrition Prescription     PO: Diet Dysphagia; IV - Mechanical Soft No Mixed Consistencies; Nectar / Syrup Thick; Renal  RFB 3x/day  gic Cups 3x/day    Intake:8% of 6 meals       Nutrition Diagnosis        Problem Inadequate oral intake   Etiology Per clinical Status   Signs/Symptoms Poor Po Intake- 8%      Problem Increased nutrient needs   Etiology Poor Skin Integrity   Signs/Symptoms Sacral ulcer, L. Heel DTI, Chest Incision        Nutrition Intervention     Interventions Goal    General: Nutrition support treatment  Increase Intake    Nutrition Interventions   1.  Follow treatment progress, Interview for preferences, Menu adjusted, Adjusted supplement  Nepro 3x/day      Poor po intake since admission,  if intake does not improve, rec supplemental EN: Novasource  Renal @35ml, Prostat 2x/day, free water @25ml/hr     Pt may benefit from MVI to aid in wound healing as well    May need IVF's if hypernatremia  persists       At Target Goal Volume     % Est needs   Volume  700ml     Energy/kcals 1600kcals 100%   Protein 93g pro 100%   Fiber 0g             Fluid via EN 504mL     Total Fluid  1004     Meets 100% RDI NO             Monitor/ Evaluation    Per protocol, I&O, PO intake, Supplement intake, Pertinent labs, Skin status, GI status, Swallow function      Nora Gomez, RD  Time Spent: 30min

## 2019-02-12 NOTE — PROGRESS NOTES
"   LOS: 12 days    Patient Care Team:  Otilio Smith DO as PCP - General (Family Medicine)    Reason For Visit:  F/U ESRD. SEEN ON DIALYSIS.  Subjective           Review of Systems:    Pulm: No soa   CV:  No CP      Objective       bisoprolol 5 mg Oral Nightly   calcitriol 0.25 mcg Oral Daily   castor oil-balsam peru  Topical Q12H   epoetin alexus 10,000 Units Subcutaneous Once per day on Tue Thu Sat   fluconazole 200 mg Oral Q24H   heparin (porcine) 5,000 Units Subcutaneous Q8H   hydrALAZINE 25 mg Oral Q8H   meropenem 500 mg Intravenous Q24H   pantoprazole 40 mg Oral Q AM   senna 1 tablet Oral BID   sodium chloride 3 mL Intravenous Q12H   sodium hypochlorite  Topical BID   vancomycin (dosing per levels)  Does not apply Daily       Pharmacy to dose vancomycin          Vital Signs:  Blood pressure 126/69, pulse 71, temperature 98 °F (36.7 °C), temperature source Axillary, resp. rate 18, height 162.6 cm (64\"), weight 65.8 kg (145 lb), SpO2 97 %, not currently breastfeeding.    Flowsheet Rows      First Filed Value   Admission Height  163.8 cm (64.5\") Documented at 02/01/2019 0300   Admission Weight  70 kg (154 lb 4.8 oz) Documented at 01/31/2019 1607          02/11 0701 - 02/12 0700  In: -   Out: 50 [Urine:50]    Physical Exam:    General Appearance: NAD, alert and cooperative, Ox3  Eyes: PER, conjunctivae and sclerae normal, no icterus  Lungs: OCC RHONCHI  Heart/CV: regular rhythm & normal rate, no murmur, no gallop, no rub and no edema  Abdomen: not distended, soft, non-tender, no masses,  bowel sounds present  Skin: No rash, Warm and dry. TUNNELED HD CATH.    Radiology:            Labs:  Results from last 7 days   Lab Units 02/10/19  0401 02/09/19  0701 02/08/19  0319   WBC 10*3/mm3 20.42* 20.46* 19.96*   HEMOGLOBIN g/dL 8.0* 7.8* 9.4*   HEMATOCRIT % 25.5* 25.5* 32.4*   PLATELETS 10*3/mm3 271 290 220     Results from last 7 days   Lab Units 02/11/19  0713 02/10/19  0401 02/09/19  0701 02/08/19  0319 " 02/07/19  0543   SODIUM mmol/L 147* 140 144 138 138   POTASSIUM mmol/L 4.2 3.7 3.9 3.9 3.8   CHLORIDE mmol/L 112* 105 110* 108 106   CO2 mmol/L 22.0 24.0 22.0 18.0* 18.0*   BUN mg/dL 37* 26* 45* 25* 41*   CREATININE mg/dL 3.41* 2.37* 3.69* 2.68* 3.91*   CALCIUM mg/dL 8.7 8.6* 8.4* 8.2* 7.8*   PHOSPHORUS mg/dL 3.6 2.4 5.1  --  4.6   MAGNESIUM mg/dL  --   --   --   --  1.9   ALBUMIN g/dL 3.54 3.80 3.15*  --  2.95*     Results from last 7 days   Lab Units 02/11/19  0713   GLUCOSE mg/dL 99       Results from last 7 days   Lab Units 02/07/19  0543   ALK PHOS U/L 69   BILIRUBIN mg/dL 0.3   ALT (SGPT) U/L 10   AST (SGOT) U/L 18                 Estimated Creatinine Clearance: 11.5 mL/min (A) (by C-G formula based on SCr of 3.41 mg/dL (H)).      Assessment       PEA    ITP (idiopathic thrombocytopenic purpura)    Pulmonary hypertension (CMS/HCC)    CKD (chronic kidney disease) stage 5, GFR less than 15 ml/min (CMS/HCC)    Leukocytosis    Anemia due to chronic kidney disease    Debility    Hx of renal cell carcinoma    Chronic diastolic CHF (congestive heart failure) (CMS/HCC)    Falls frequently    Huge hiatal hernia with intrathoracic stomach    Mitral valve mass            Impression: ESRD. ANEMIA.            Recommendations: HD TODAY.      Otilio Adler MD  02/12/19  10:04 AM

## 2019-02-12 NOTE — SIGNIFICANT NOTE
02/12/19 1426   SLP Deferred Reason   SLP Deferred Reason (Pt unavailable for tx. Dialysis undwerway. SLP to follow up on 2/13/19.)

## 2019-02-12 NOTE — PROGRESS NOTES
"                  Clinical Nutrition     Nutrition Assessment  Reason for Visit:   MDR, Follow-up protocol    Patient Name: Fabiola Pimentel  YOB: 1934  MRN: [id number]  Date of Encounter: 02/12/19 9:56 AM  Admission date: 1/31/2019    Comments: Poor PO intake during hospital stay (since admission 1/31) despite lots of encouragement from RD and nursing staff.  Multiple supplement offered, without success.  Patient also reported poor intake on admission with ? weight loss.  If documented weight accurate, pt with 10# weight loss since admission.      This was discussed with patient at time of visit. Patient feels she ate a little better this morning, does not recall receiving supplement.  Meal options for lunch discussed and communicated to kitchen staff.  RD emphasized importance of nutrition to help with overall recovery. Patient reported understanding.  Option of tube feeding discussed with patient (short term feeding until intake improved) patient refused. Indicated she does not want want a \"tube down her nose\".  Aware continued poor intake can lead to malnutrition and could reflect her overall recovery.      The above was also dicussed with the intensivisit.  Aware of patient refusal for tube feeding.      The patient was also disused with SLP for possible diet advancement as patient c/o of disliking modified diet. SLP plans to re-evalute patient, however they do not anticipate any improvement due to on going poor intake and weakness.  Will monitor assessment.    Limited nutrition intervention at this time due to lack of appetite and an patient refusal to have tube feeding placed.      Nutrition Assessment     Admission diagnosis  Leukocytosis  Rt arm fistula not working    Additional applicable diagnosis/conditions/procedures  (2/1) HD-ESRD       S/p PEA (Pulseless electrical activity) (CMS/HCC)  Skin: unstageable pressure ulcer to coccyx; dialysis site wound.   Anemia   Huge hiatal hernia with " "intrathoracic stomach  Mitral valve mass     Applicable PMH:  ESRD       (1/16/19) Temporary dialysis cath placed  S/p right nephrectomy   Hx of renal cell carcinoma  Uterine cancer  S/p SALVATORE, BSO  ITP (idiopathic thrombocytopenic purpura)  Pulmonary hypertension (CMS/HCC)  PVC's (On Propafanone)  Anemia due to chronic kidney disease  Debility  Falls frequently  Chronic diastolic CHF (congestive heart failure) (CMS/HCC)  Colectomy     Reported/Observed/Food/Nutrition Related History:   Few bites of food pre RN.  Per nursing documentation patient not eating any of the food provided.     Anthropometrics     Height: 162.6 cm (64\")  Last filed wt: Weight: 65.8 kg (145 lb) (02/11/19 0957)  Weight Method: Bed scale    BMI: BMI (Calculated): 24.9  Overweight: 25.0-29.9kg/m2     Ideal Body Weight (IBW) (kg): 55     Weight Change   UBW: 150lb   Weight change: 3 lb    % wt change:  2%  Time frame of weight loss: unknown amount of time      Labs reviewed     Results from last 7 days   Lab Units 02/11/19  0713 02/10/19  0401 02/09/19  0701 02/07/19  0543   GLUCOSE mg/dL 99 88 107* 82   BUN mg/dL 37* 26* 45* 41*   CREATININE mg/dL 3.41* 2.37* 3.69* 3.91*   SODIUM mmol/L 147* 140 144 138   CHLORIDE mmol/L 112* 105 110* 106   POTASSIUM mmol/L 4.2 3.7 3.9 3.8   PHOSPHORUS mg/dL 3.6 2.4 5.1 4.6   MAGNESIUM mg/dL  --   --   --  1.9   ALT (SGPT) U/L  --   --   --  10    < > = values in this interval not displayed.     Medications reviewed   Pertinent:  ABX, epogen, diflucan, protonix, senokot.     Intake/Ouptut 24 hrs (7:00AM - 6:59 AM)     Intake & Output (last day)       02/11 0701 - 02/12 0700 02/12 0701 - 02/13 0700    I.V. (mL/kg)      IV Piggyback      Total Intake(mL/kg)      Urine (mL/kg/hr) 50 (0)     Stool 0     Total Output 50     Net -50           Urine Unmeasured Occurrence 1 x     Stool Unmeasured Occurrence 3 x         Current Nutrition Prescription     PO: Diet Dysphagia; IV - Mechanical Soft No Mixed Consistencies; " Nectar / Syrup Thick; Renal   Supplement: Nepro x 3 (Started 12/11)  Intake: mostly 0% of all meals x 1 week.     Nutrition Diagnosis     2/1, 2/12  Problem Inadequate oral intake   Etiology GI status/procedures   Signs/Symptoms NPO r/t abdominal appearance via XR chest today, NPO planned for tunneled dialysis catheter    Status- improved; KUB of the abd (2/1) suggests normal bowel pas patterns.     2/12  Problem Inadequate oral intake   Etiology PO intake    Signs/Symptoms Poor intake since admission - refusing most meals due to poor appetite and modified diet.      2/1/  Problem Increased kcal and protein needs   Etiology Skin integrity    Signs/Symptoms Unstageable pressure ulcer to coccyx    Status - on going     Nutrition Intervention   1.  Follow treatment progress, Care plan reviewed, Interview for preferences, Menu provided, Encourage intake, Supplement provided    Goal:   General: Nutrition support treatment  PO: Increase intake       Monitoring/Evaluation:   Per protocol, I&O, GI status      Will Continue to follow per protocol      Nighat Cuba RD  Time Spent: 30min

## 2019-02-12 NOTE — PROGRESS NOTES
VASCULAR SURGERY PROGRESS NOTE     02/11/19    Fabiola Pimentel  [id number]  1934    The patient's left chest catheter exit site exhibit some areas of dermal necrosis.    I recommend using Dakin-soaked gauze dressings twice a day.    If this treatment does not result in satisfactory healing, the catheter may need to be removed and placed on the right side.      Raymond Elise MD  02/11/19  8:25 PM

## 2019-02-12 NOTE — PLAN OF CARE
Problem: Patient Care Overview  Goal: Plan of Care Review   02/12/19 0623   Coping/Psychosocial   Plan of Care Reviewed With patient   Plan of Care Review   Progress no change   OTHER   Outcome Summary VVS stable; afebrile. 3X BM's incontinent. Scarlett D/C'fabrizio. Dr. Elise assessed dialysis site/wound. No IV access, all meds PO. LS remain coarse and diminished. 2L NC. Dialysis planned for today.      Goal: Individualization and Mutuality  Outcome: Ongoing (interventions implemented as appropriate)    Goal: Discharge Needs Assessment  Outcome: Ongoing (interventions implemented as appropriate)    Goal: Interprofessional Rounds/Family Conf  Outcome: Ongoing (interventions implemented as appropriate)      Problem: Fall Risk (Adult)  Goal: Absence of Fall  Outcome: Ongoing (interventions implemented as appropriate)      Problem: Skin Injury Risk (Adult)  Goal: Skin Health and Integrity  Outcome: Ongoing (interventions implemented as appropriate)      Problem: Wound (Includes Pressure Injury) (Adult)  Goal: Signs and Symptoms of Listed Potential Problems Will be Absent, Minimized or Managed (Wound)  Outcome: Ongoing (interventions implemented as appropriate)      Problem: Hemodialysis (Adult)  Goal: Signs and Symptoms of Listed Potential Problems Will be Absent, Minimized or Managed (Hemodialysis)  Outcome: Ongoing (interventions implemented as appropriate)

## 2019-02-12 NOTE — PROGRESS NOTES
INTENSIVIST   PROGRESS NOTE     Hospital:  LOS: 12 days      S     Ms. Fabiola Pimentel, 84 y.o. female is followed for:      PEA    ITP (idiopathic thrombocytopenic purpura)      As an Intensivist, we provide an integrated approach to the ICU patient and family, medical management of comorbid conditions, including but not limited to electrolytes, glycemic control, organ dysfunction, lead interdisciplinary rounds and coordinate the care with all other services, including those from other specialists.     Interval History:  They were having problems with lack of IV access, but it was finally obtained this morning.  Initially, also problems with dialysis catheter, not enough flow, for dialysis, but then it started working, able to do her HD treatment today.  Overall doing well. No fevers. She wonders when she is going to go home.     The patient's relevant past medical, surgical and social history were reviewed and updated in Epic as appropriate.     ROS:   Constitutional: Negative for fever.   Respiratory: Negative for dyspnea.   Cardiovascular: Negative for chest pain.   Gastrointestinal: Negative for  nausea, vomiting and diarrhea.        O     Vitals:  Temp: 97.7 °F (36.5 °C) (02/12/19 1400) Temp  Min: 97.3 °F (36.3 °C)  Max: 99.5 °F (37.5 °C)   BP: 155/69 (02/12/19 1500) BP  Min: 90/53  Max: 165/69   Pulse: 65 (02/12/19 1500) Pulse  Min: 59  Max: 82   Resp: 18 (02/12/19 1304) Resp  Min: 16  Max: 22   SpO2: 99 % (02/12/19 1500) SpO2  Min: 92 %  Max: 100 %   Device: nasal cannula (02/12/19 1500)    Flow Rate: 2 (02/12/19 1500) Flow (L/min)  Min: 2  Max: 2       Physical Examination  Telemetry:  Rhythm: normal sinus rhythm (02/12/19 1200)   Constitutional:  No acute distress.   Cardiovascular: Normal rate, regular and rhythm. Normal heart sounds.  (+) murmur  No gallop or rub.   Respiratory: No respiratory distress. Normal respiratory effort.  Normal breath sounds  Clear to auscultation and percussion.    Abdominal:   Soft. No masses. Non-tender. No distension. No HSM.   Extremities: No digital cyanosis. No clubbing.  No edema.   Neurological:   Alert. Non focal.   Lines/Drains/Airways: L IJ tunneled Dialysis Catheter - with a special dressing.       Hematology:  Results from last 7 days   Lab Units 02/10/19  0401 02/09/19  0701 02/08/19  0319   WBC 10*3/mm3 20.42* 20.46* 19.96*   HEMOGLOBIN g/dL 8.0* 7.8* 9.4*   MCV fL 92.4 94.1 97.3   PLATELETS 10*3/mm3 271 290 220       Chemistry:  Estimated Creatinine Clearance: 10.2 mL/min (A) (by C-G formula based on SCr of 3.83 mg/dL (H)).    Results from last 7 days   Lab Units 02/12/19  0958 02/11/19  0713 02/10/19  0401   SODIUM mmol/L 146 147* 140   POTASSIUM mmol/L 3.6 4.2 3.7   CHLORIDE mmol/L 110* 112* 105   CO2 mmol/L 22.0 22.0 24.0   ANION GAP mmol/L 14.0* 13.0* 11.0   GLUCOSE mg/dL 121* 99 88   CALCIUM mg/dL 8.8 8.7 8.6*     Results from last 7 days   Lab Units 02/12/19  0958 02/11/19  0713 02/10/19  0401   BUN mg/dL 49* 37* 26*   CREATININE mg/dL 3.83* 3.41* 2.37*     Images:  No radiology results for the last day    Echo:  Results for orders placed during the hospital encounter of 01/31/19   Adult Transthoracic Echo Limited W/ Cont if Necessary Per Protocol    Narrative · Stable echodensity noted on chordal apparatus of anterior leaflet of   mitral valve.          Results: Reviewed.  I reviewed the patient's new laboratory and imaging results.  I independently reviewed the patient's new images.    Medications: Reviewed.    Assessment/Plan   A / P     84 y.o.female, admitted on 1/31/2019 with Acute renal failure (ARF) (CMS/[facility]) [N17.9]:     1. S/p PEA, while she was in the inpatient dialysis unit, quickly resuscitated (within 3 minutes), and it did not require intubation, 2/1/19  2. 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  3. ESRD on HD  4. ITP  5. Persistent Leukocytosis, since admission. Peak on 2/7:  27K      6. Odynophagia and oral thrush } Resolved  1. R/o Esoph. candidiasis  7. HTN  8. Anemia, chronic disease.  1. EPO and IV iron during HD } As per Nephrology  9. Hiatal hernia.  10. Antibiotics: MRP and Fluconazole + Vanco IV (based on levels)  11. Glucose: No h/o DM    Lab Results   Lab Value Date/Time    HGBA1C 5.60 01/31/2019 1758       Diet: Diet Dysphagia; IV - Mechanical Soft No Mixed Consistencies; Nectar / Syrup Thick; Renal   Advance Directives: Code Status and Medical Interventions:   Ordered at: 02/01/19 1600     Limited Support to NOT Include:    Intubation     Code Status:    CPR     Medical Interventions (Level of Support Prior to Arrest):    Limited          Assessment / Plan:    1. Vascular access wth some area ofdermal necrosis, wound care for now, but if not improvement, then it will be have to be changed again, as per Dr. Raymond Elise (Vascular Surgery) today  2. Follow up WBC  3. Continue antibiotics.  4. Disposition: Keep in ICU.    Plan of care and goals reviewed during interdisciplinary rounds.  Level of Risk is High due to:  illness with threat to life or bodily function.   I discussed the patient's findings and my recommendations with patient    Chung Ferrari MD, FACP, FCCP, CNSC  Intensive Care Medicine, Nutrition Support and Pulmonary Medicine

## 2019-02-13 NOTE — SIGNIFICANT NOTE
02/13/19 1607   SLP Deferred Reason   SLP Deferred Reason Routine  (Dysphagia re-eval requested. SLP reviewed pt's hx-- silent aspiration with thins on 2/7/19 FEES. Pt must have FEES in order to be re-evaluated for liquid upgrade to thins. Pt will be scheduled for earliest FEES possible tomorrow. Rec: FEES tomorrow. )

## 2019-02-13 NOTE — PLAN OF CARE
Problem: Patient Care Overview  Goal: Plan of Care Review  Outcome: Ongoing (interventions implemented as appropriate)   02/13/19 0910   Coping/Psychosocial   Plan of Care Reviewed With patient   Plan of Care Review   Progress improving   OTHER   Outcome Summary Pt demonstrated ability to progress ambulation distance to 6 total ft with mod x2 A; cont to be limited by difficulty weightshifting and advancing LEs. Improved motivation to participate today. Progressing well towards established goals; anticipate slow progress due to complex medical status but goals established at initial eval cont to remain appropriate. Would benefit from further skilled PT services to promote PLOF and further progress towards goals.

## 2019-02-13 NOTE — PROGRESS NOTES
Clinical Nutrition     Multidisciplinary Rounds    Time: 20min  Patient Name: Fabiola Pimentel  Date of Encounter: 02/13/19 10:08 AM  MRN: [id number]  Admission date: 1/31/2019      Reason for visit: MDR. RD to continue to follow per protocol.     Additional information obtained during MDR:  Walked with PT this morning.  Attempting to eat her meals per RN.  SLP unable to evaluate yesterday due to patient receiving HD at time of attempted visit.        Breakfast tray in room at murray of visit, only 5% of meal consumed.  Nepro drink at bedside, patient indicated she does not like Nepro. Would like a different supplement.  Family came in during visit with some outside food. Aware of poor appetite, have also been encouraging intake.  Family aware of patient food consistency restriction.    Patient without any meaningful PO intake during stay, poor appetite started 2 weeks prior to admission per family.  Patient has refused tube feeding placement.  Refer to RD note from 2/12 for more information.      Current diet: Diet Dysphagia; IV - Mechanical Soft No Mixed Consistencies; Nectar / Syrup Thick; Renal  Supplement: Nepro TID   Intake: 25% x 1 meal     EMR reviewed     Intervention:  Follow treatment plan  Care plan reviewed  Review menu options  Encourage intake   Adjust supplement     Follow up:   Per protocol      Nighat Cuba RD  10:08 AM

## 2019-02-13 NOTE — DISCHARGE PLACEMENT REQUEST
"JeromeFabiola sabillon (84 y.o. Female)   Please call back to  at [phone].  Thea Singleton RN    Date of Birth Social Security Number Address Home Phone MRN    [id number]  [id number] [place] [address] [place] [address] [phone] [id number]    Yarsani Marital Status          [place]        Admission Date Admission Type Admitting Provider Attending Provider Department, Room/Bed    1/31/19 Urgent Maalchi Stephens MD Villaran, Yuri, MD [facility] 2A ICU, N210/1    Discharge Date Discharge Disposition Discharge Destination                       Attending Provider:  Chung Ferrari MD    Allergies:  Benadryl [Diphenhydramine], Diltiazem, Micardis [Telmisartan], Other    Isolation:  None   Infection:  None   Code Status:  CPR    Ht:  162.6 cm (64\")   Wt:  65.8 kg (145 lb)    Admission Cmt:  None   Principal Problem:  PEA [I46.9] More...                 Active Insurance as of 1/31/2019     Primary Coverage     Payor Plan Insurance Group Employer/Plan Group    [facility] MEDICARE REPLACEMENT [facility] [id number]     Payor Plan Address Payor Plan Phone Number Payor Plan Fax Number Effective Dates    [address] [address]   1/1/2016 - None Entered    [facility] [address]       Subscriber Name Subscriber Birth Date Member ID       FABIOLA ORTIZ [id number]                 Emergency Contacts      (Rel.) Home Phone Work Phone Mobile Phone    Ang Ortiz (Son) [phone]            Insurance Information                [employer] MEDICARE REPLACEMENT/[employer] Phone:     Subscriber: Fabiola Ortiz Subscriber#: [id number]    Group#: [id number] Precert#: [id number]          Hospital Medications (active)       Dose Frequency Start End    acetaminophen (TYLENOL) tablet 650 mg 650 mg Every 6 Hours PRN 2/3/2019     Sig - Route: Take 2 tablets by mouth Every 6 (Six) Hours As Needed for Mild Pain . - Oral    albumin human 25 % IV SOLN " 12.5 g 12.5 g As Needed 2/12/2019 2/12/2019    Sig - Route: Infuse 50 mL into a venous catheter As Needed (Hypotension During Dialysis). - Intravenous    alteplase ((CATHFLO/ACTIVASE)) injection 2 mg 2 mg As Needed 2/5/2019     Sig - Route: 2 mL by Intracatheter route As Needed (Dialysis). - Intracatheter    Notes to Pharmacy: Arterial: 1.9 mL  Venous: 1.9 mL    bisoprolol (ZEBeta) tablet 2.5 mg 2.5 mg Nightly 2/13/2019     Sig - Route: Take 0.5 tablets by mouth Every Night. - Oral    calcitriol (ROCALTROL) capsule 0.25 mcg 0.25 mcg Daily 1/31/2019     Sig - Route: Take 1 capsule by mouth Daily. - Oral    castor oil-balsam peru (VENELEX) ointment  Every 12 Hours Scheduled 2/1/2019     Sig - Route: Apply  topically to the appropriate area as directed Every 12 (Twelve) Hours. - Topical    Cosign for Ordering: Accepted by Malachi Stephens MD on 2/1/2019  3:41 PM    dextrose (D50W) 25 g/ 50mL Intravenous Solution 25 mL 25 mL Every 1 Hour PRN 2/1/2019     Sig - Route: Infuse 25 mL into a venous catheter Every 1 (One) Hour As Needed for Low Blood Sugar. - Intravenous    epoetin alexus (EPOGEN,PROCRIT) injection 10,000 Units 10,000 Units 3 Times Weekly 2/5/2019     Sig - Route: Inject 1 mL under the skin into the appropriate area as directed Once per day on Tue Thu Sat. - Subcutaneous    fluconazole (DIFLUCAN) tablet 200 mg 200 mg Every 24 Hours 2/12/2019 2/19/2019    Sig - Route: Take 1 tablet by mouth Daily. - Oral    heparin (porcine) 5000 UNIT/ML injection 5,000 Units 5,000 Units Every 8 Hours Scheduled 2/7/2019     Sig - Route: Inject 1 mL under the skin into the appropriate area as directed Every 8 (Eight) Hours. - Subcutaneous    hydrALAZINE (APRESOLINE) tablet 25 mg 25 mg Every 8 Hours Scheduled 2/11/2019     Sig - Route: Take 1 tablet by mouth Every 8 (Eight) Hours. - Oral    melatonin sublingual tablet 5 mg 5 mg Nightly PRN 2/2/2019     Sig - Route: Place 1 tablet under the tongue At Night As Needed for  Sleep. - Sublingual    ondansetron (ZOFRAN) injection 4 mg 4 mg Every 6 Hours PRN 2/4/2019     Sig - Route: Infuse 2 mL into a venous catheter Every 6 (Six) Hours As Needed for Nausea or Vomiting. - Intravenous    pantoprazole (PROTONIX) EC tablet 40 mg 40 mg Every Early Morning 2/12/2019     Sig - Route: Take 1 tablet by mouth Every Morning. - Oral    senna (SENOKOT) tablet 8.6 mg 1 tablet 2 Times Daily 2/7/2019     Sig - Route: Take 1 tablet by mouth 2 (Two) Times a Day. - Oral    sodium chloride 0.9 % flush 3 mL 3 mL Every 12 Hours Scheduled 1/31/2019     Sig - Route: Infuse 3 mL into a venous catheter Every 12 (Twelve) Hours. - Intravenous    sodium chloride 0.9 % flush 3-10 mL 3-10 mL As Needed 1/31/2019     Sig - Route: Infuse 3-10 mL into a venous catheter As Needed for Line Care. - Intravenous    sodium hypochlorite (DAKIN'S) 0.025 % topical solution solution  2 Times Daily 2/11/2019     Sig - Route: Apply  topically to the appropriate area as directed 2 (Two) Times a Day. - Topical    sterile water (preservative free) injection  - ADS Override Pull   2/12/2019 2/12/2019    Notes to Pharmacy: Created by cabinet override    bisoprolol (ZEBeta) tablet 5 mg (Discontinued) 5 mg Nightly 1/31/2019 2/13/2019    Sig - Route: Take 1 tablet by mouth Every Night. - Oral    meropenem (MERREM) 500mg/100 mL 0.9% NS IVPB (mbp) (Discontinued) 500 mg Every 24 Hours Scheduled 2/7/2019 2/12/2019    Sig - Route: Infuse 100 mL into a venous catheter Daily. - Intravenous    Notes to Pharmacy: On dialysis days, give dose after dialysis.             Physician Progress Notes (last 24 hours) (Notes from 2/12/2019  1:44 PM through 2/13/2019  1:44 PM)      Otilio Adler MD at 2/13/2019 10:42 AM             LOS: 13 days    Patient Care Team:  Otilio Smith DO as PCP - General (Family Medicine)    Reason For Visit:  F/U ESRD.  Subjective           Review of Systems:    Pulm: No soa   CV:  No CP      Objective  "      bisoprolol 5 mg Oral Nightly   calcitriol 0.25 mcg Oral Daily   castor oil-balsam peru  Topical Q12H   epoetin alexus 10,000 Units Subcutaneous Once per day on Tue Thu Sat   fluconazole 200 mg Oral Q24H   heparin (porcine) 5,000 Units Subcutaneous Q8H   hydrALAZINE 25 mg Oral Q8H   pantoprazole 40 mg Oral Q AM   senna 1 tablet Oral BID   sodium chloride 3 mL Intravenous Q12H   sodium hypochlorite  Topical BID            Vital Signs:  Blood pressure 135/55, pulse 63, temperature 97.9 °F (36.6 °C), temperature source Oral, resp. rate 20, height 162.6 cm (64\"), weight 65.8 kg (145 lb), SpO2 93 %, not currently breastfeeding.    Flowsheet Rows      First Filed Value   Admission Height  163.8 cm (64.5\") Documented at 02/01/2019 0300   Admission Weight  70 kg (154 lb 4.8 oz) Documented at 01/31/2019 1607          02/12 0701 - 02/13 0700  In: 390 [P.O.:210; I.V.:30]  Out: 2030     Physical Exam:    General Appearance: NAD, alert and cooperative, Ox3  Eyes: PER, conjunctivae and sclerae normal, no icterus  Lungs: SCATTERED RHONCHI  Heart/CV: regular rhythm & normal rate, no murmur, no gallop, no rub and no edema  Abdomen: not distended, soft, non-tender, no masses,  bowel sounds present  Skin: No rash, Warm and dry. TUNNELED HD CATH.    Radiology:            Labs:  Results from last 7 days   Lab Units 02/13/19  0535 02/12/19  1719 02/10/19  0401   WBC 10*3/mm3 12.78* 15.90* 20.42*   HEMOGLOBIN g/dL 7.4* 7.3* 8.0*   HEMATOCRIT % 23.5* 23.1* 25.5*   PLATELETS 10*3/mm3 346 299 271     Results from last 7 days   Lab Units 02/13/19  0535 02/12/19  0958 02/11/19  0713 02/10/19  0401 02/09/19  0701  02/07/19  0543   SODIUM mmol/L 147* 146 147* 140 144   < > 138   POTASSIUM mmol/L 3.9 3.6 4.2 3.7 3.9   < > 3.8   CHLORIDE mmol/L 109 110* 112* 105 110*   < > 106   CO2 mmol/L 26.0 22.0 22.0 24.0 22.0   < > 18.0*   BUN mg/dL 26* 49* 37* 26* 45*   < > 41*   CREATININE mg/dL 2.38* 3.83* 3.41* 2.37* 3.69*   < > 3.91*   CALCIUM mg/dL " 8.7 8.8 8.7 8.6* 8.4*   < > 7.8*   PHOSPHORUS mg/dL 2.0*  --  3.6 2.4 5.1  --  4.6   MAGNESIUM mg/dL  --   --   --   --   --   --  1.9   ALBUMIN g/dL 3.62  --  3.54 3.80 3.15*  --  2.95*    < > = values in this interval not displayed.     Results from last 7 days   Lab Units 02/13/19  0535   GLUCOSE mg/dL 84       Results from last 7 days   Lab Units 02/07/19  0543   ALK PHOS U/L 69   BILIRUBIN mg/dL 0.3   ALT (SGPT) U/L 10   AST (SGOT) U/L 18                 Estimated Creatinine Clearance: 16.4 mL/min (A) (by C-G formula based on SCr of 2.38 mg/dL (H)).      Assessment       PEA    ITP (idiopathic thrombocytopenic purpura)    Pulmonary hypertension (CMS/HCC)    CKD (chronic kidney disease) stage 5, GFR less than 15 ml/min (CMS/HCC)    Leukocytosis    Anemia due to chronic kidney disease    Debility    Hx of renal cell carcinoma    Chronic diastolic CHF (congestive heart failure) (CMS/HCC)    Falls frequently    Huge hiatal hernia with intrathoracic stomach    Mitral valve mass            Impression: ESRD. ANEMIA.            Recommendations: HD 2/14/19.      Otilio Adler MD  02/13/19  10:42 AM          Electronically signed by Otilio Adler MD at 2/13/2019 10:44 AM     Chung Ferrari MD at 2/12/2019  4:10 PM          INTENSIVIST   PROGRESS NOTE     Hospital:  LOS: 12 days   Subjective   S     Ms. Fabiola Pimentel, 84 y.o. female is followed for:      PEA    ITP (idiopathic thrombocytopenic purpura)      As an Intensivist, we provide an integrated approach to the ICU patient and family, medical management of comorbid conditions, including but not limited to electrolytes, glycemic control, organ dysfunction, lead interdisciplinary rounds and coordinate the care with all other services, including those from other specialists.     Interval History:  They were having problems with lack of IV access, but it was finally obtained this morning.  Initially, also problems with dialysis catheter, not enough  flow, for dialysis, but then it started working, able to do her HD treatment today.  Overall doing well. No fevers. She wonders when she is going to go home.     The patient's relevant past medical, surgical and social history were reviewed and updated in Epic as appropriate.     ROS:   Constitutional: Negative for fever.   Respiratory: Negative for dyspnea.   Cardiovascular: Negative for chest pain.   Gastrointestinal: Negative for  nausea, vomiting and diarrhea.     Objective   O     Vitals:  Temp: 97.7 °F (36.5 °C) (02/12/19 1400) Temp  Min: 97.3 °F (36.3 °C)  Max: 99.5 °F (37.5 °C)   BP: 155/69 (02/12/19 1500) BP  Min: 90/53  Max: 165/69   Pulse: 65 (02/12/19 1500) Pulse  Min: 59  Max: 82   Resp: 18 (02/12/19 1304) Resp  Min: 16  Max: 22   SpO2: 99 % (02/12/19 1500) SpO2  Min: 92 %  Max: 100 %   Device: nasal cannula (02/12/19 1500)    Flow Rate: 2 (02/12/19 1500) Flow (L/min)  Min: 2  Max: 2       Physical Examination  Telemetry:  Rhythm: normal sinus rhythm (02/12/19 1200)   Constitutional:  No acute distress.   Cardiovascular: Normal rate, regular and rhythm. Normal heart sounds.  (+) murmur  No gallop or rub.   Respiratory: No respiratory distress. Normal respiratory effort.  Normal breath sounds  Clear to auscultation and percussion.    Abdominal:  Soft. No masses. Non-tender. No distension. No HSM.   Extremities: No digital cyanosis. No clubbing.  No edema.   Neurological:   Alert. Non focal.   Lines/Drains/Airways: L IJ tunneled Dialysis Catheter - with a special dressing.       Hematology:  Results from last 7 days   Lab Units 02/10/19  0401 02/09/19  0701 02/08/19  0319   WBC 10*3/mm3 20.42* 20.46* 19.96*   HEMOGLOBIN g/dL 8.0* 7.8* 9.4*   MCV fL 92.4 94.1 97.3   PLATELETS 10*3/mm3 271 290 220       Chemistry:  Estimated Creatinine Clearance: 10.2 mL/min (A) (by C-G formula based on SCr of 3.83 mg/dL (H)).    Results from last 7 days   Lab Units 02/12/19  0958 02/11/19  0713 02/10/19  0401   SODIUM  mmol/L 146 147* 140   POTASSIUM mmol/L 3.6 4.2 3.7   CHLORIDE mmol/L 110* 112* 105   CO2 mmol/L 22.0 22.0 24.0   ANION GAP mmol/L 14.0* 13.0* 11.0   GLUCOSE mg/dL 121* 99 88   CALCIUM mg/dL 8.8 8.7 8.6*     Results from last 7 days   Lab Units 02/12/19  0958 02/11/19  0713 02/10/19  0401   BUN mg/dL 49* 37* 26*   CREATININE mg/dL 3.83* 3.41* 2.37*     Images:  No radiology results for the last day    Echo:  Results for orders placed during the hospital encounter of 01/31/19   Adult Transthoracic Echo Limited W/ Cont if Necessary Per Protocol    Narrative · Stable echodensity noted on chordal apparatus of anterior leaflet of   mitral valve.          Results: Reviewed.  I reviewed the patient's new laboratory and imaging results.  I independently reviewed the patient's new images.    Medications: Reviewed.    Assessment/Plan   A / P     84 y.o.female, admitted on 1/31/2019 with Acute renal failure (ARF) (CMS/LTAC, located within [facility]) [N17.9]:     1. S/p PEA, while she was in the inpatient dialysis unit, quickly resuscitated (within 3 minutes), and it did not require intubation, 2/1/19  2. 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  3. ESRD on HD  4. ITP  5. Persistent Leukocytosis, since admission. Peak on 2/7: 27K      6. Odynophagia and oral thrush } Resolved  1. R/o Esoph. candidiasis  7. HTN  8. Anemia, chronic disease.  1. EPO and IV iron during HD } As per Nephrology  9. Hiatal hernia.  10. Antibiotics: MRP and Fluconazole + Vanco IV (based on levels)  11. Glucose: No h/o DM    Lab Results   Lab Value Date/Time    HGBA1C 5.60 01/31/2019 1758       Diet: Diet Dysphagia; IV - Mechanical Soft No Mixed Consistencies; Nectar / Syrup Thick; Renal   Advance Directives: Code Status and Medical Interventions:   Ordered at: 02/01/19 1600     Limited Support to NOT Include:    Intubation     Code Status:    CPR     Medical Interventions (Level of Support Prior to Arrest):     Limited          Assessment / Plan:    1. Vascular access wth some area ofdermal necrosis, wound care for now, but if not improvement, then it will be have to be changed again, as per Dr. Raymond Elise (Vascular Surgery) today  2. Follow up WBC  3. Continue antibiotics.  4. Disposition: Keep in ICU.    Plan of care and goals reviewed during interdisciplinary rounds.  Level of Risk is High due to:  illness with threat to life or bodily function.   I discussed the patient's findings and my recommendations with patient    Chung Ferrari MD, FACP, FCCP, CNSC  Intensive Care Medicine, Nutrition Support and Pulmonary Medicine         Electronically signed by Chung Ferrari MD at 2/12/2019  4:19 PM          Nutrition Notes (most recent note)      Nighat Cuba RD at 2/13/2019 10:08 AM                            Clinical Nutrition     Multidisciplinary Rounds    Time: 20min  Patient Name: Fabiola Pimentel  Date of Encounter: 02/13/19 10:08 AM  MRN: [id number]  Admission date: 1/31/2019      Reason for visit: MDR. RD to continue to follow per protocol.     Additional information obtained during MDR:  Walked with PT this morning.  Attempting to eat her meals per RN.  SLP unable to evaluate yesterday due to patient receiving HD at time of attempted visit.        Breakfast tray in room at murray of visit, only 5% of meal consumed.  Nepro drink at bedside, patient indicated she does not like Nepro. Would like a different supplement.  Family came in during visit with some outside food. Aware of poor appetite, have also been encouraging intake.  Family aware of patient food consistency restriction.    Patient without any meaningful PO intake during stay, poor appetite started 2 weeks prior to admission per family.  Patient has refused tube feeding placement.  Refer to RD note from 2/12 for more information.      Current diet: Diet Dysphagia; IV - Mechanical Soft No Mixed Consistencies; Nectar / Syrup Thick; Renal  Supplement:  Nepro TID   Intake: 25% x 1 meal     EMR reviewed     Intervention:  Follow treatment plan  Care plan reviewed  Review menu options  Encourage intake   Adjust supplement     Follow up:   Per protocol      Nighat Cuba RD  10:08 AM           Electronically signed by Nighat Cuba RD at 2019 10:26 AM          Physical Therapy Notes (most recent note)      Gemma Ying, PT at 2019  4:30 PM  Version 1 of 1         Acute Care - Physical Therapy Treatment Note   [place]     Patient Name: Fabiola Pimentel  : 1934  MRN: [id number]  Today's Date: 2019  Onset of Illness/Injury or Date of Surgery: 19  Date of Referral to PT: 19  Referring Physician: MD Radha    Admit Date: 2019    Visit Dx:    ICD-10-CM ICD-9-CM   1. Impaired functional mobility, balance, gait, and endurance Z74.09 V49.89   2. Impaired mobility and ADLs Z74.09 799.89   3. Pharyngeal dysphagia R13.13 787.23   4. PEA I46.9 427.5     Patient Active Problem List   Diagnosis   • ITP (idiopathic thrombocytopenic purpura)   • Palpitations   • Premature atrial contractions   • Hypertension   • Pulmonary hypertension (CMS/HCC)   • DJD (degenerative joint disease)   • Raynaud's phenomenon (secondary)   • Diverticular disease   • CKD (chronic kidney disease) stage 5, GFR less than 15 ml/min (CMS/HCC)   • Leukocytosis   • Anemia due to chronic kidney disease   • Debility   • Hx of renal cell carcinoma   • Chronic diastolic CHF (congestive heart failure) (CMS/HCC)   • Falls frequently   • PEA   • Huge hiatal hernia with intrathoracic stomach   • Mitral valve mass       Therapy Treatment    Rehabilitation Treatment Summary     Row Name 19 1544 19 1000          Treatment Time/Intention    Discipline  physical therapist  -KR  physical therapist  -MF     Document Type  therapy note (daily note)  -KR  therapy note (daily note)  -MF     Subjective Information  no complaints  -SIMIN  complains of;weakness;fatigue;pain   -     Mode of Treatment  physical therapy  -KR  physical therapy  -MF     Care Plan Review  care plan/treatment goals reviewed;risks/benefits reviewed;patient/other agree to care plan  -KR  --     Therapy Frequency (PT Clinical Impression)  daily  -KR  --     Patient Effort  good  -KR  --     Existing Precautions/Restrictions  fall;oxygen therapy device and L/min;other (see comments) recent dislocation of R shoulder  -KR  --     Recorded by [KR] Gemma Ying, PT 02/11/19 1628 [MF] Neftaly Seaman, PT 02/11/19 1053     Row Name 02/11/19 1544             Vital Signs    Pre Systolic BP Rehab  176  -KR      Pre Treatment Diastolic BP  76  -KR      Post Systolic BP Rehab  -- RN present  -KR      Pretreatment Heart Rate (beats/min)  68  -KR      Posttreatment Heart Rate (beats/min)  71  -KR      Pre SpO2 (%)  96  -KR      O2 Delivery Pre Treatment  supplemental O2  -KR      Post SpO2 (%)  94  -KR      O2 Delivery Post Treatment  supplemental O2  -KR      Pre Patient Position  Supine  -KR      Intra Patient Position  Standing  -KR      Post Patient Position  Supine  -KR      Recorded by [KR] Gemma Ying, PT 02/11/19 1628      Row Name 02/11/19 1544             Cognitive Assessment/Intervention    Additional Documentation  Cognitive Assessment/Intervention (Group)  -KR      Recorded by [KR] Gemma Ying, PT 02/11/19 1628      Row Name 02/11/19 1544             Cognitive Assessment/Intervention- PT/OT    Affect/Mental Status (Cognitive)  WFL  -KR      Orientation Status (Cognition)  oriented to;person;place;situation  -KR      Follows Commands (Cognition)  WFL  -KR      Cognitive Function (Cognitive)  safety deficit  -KR      Safety Deficit (Cognitive)  mild deficit;awareness of need for assistance;insight into deficits/self awareness;safety precautions awareness;safety precautions follow-through/compliance  -KR      Personal Safety Interventions  fall prevention program maintained;gait belt;nonskid  shoes/slippers when out of bed  -KR      Recorded by [KR] Gemma Ying, PT 02/11/19 1628      Row Name 02/11/19 1544             Safety Issues, Functional Mobility    Safety Issues Affecting Function (Mobility)  insight into deficits/self awareness;safety precaution awareness;safety precautions follow-through/compliance  -KR      Impairments Affecting Function (Mobility)  balance;endurance/activity tolerance;shortness of breath;strength  -KR      Recorded by [KR] Gemma Ying, PT 02/11/19 1628      Row Name 02/11/19 1544             Bed Mobility Assessment/Treatment    Bed Mobility Assessment/Treatment  rolling left;rolling right;supine-sit;sit-supine  -KR      Rolling Left [place] (Bed Mobility)  moderate assist (50% patient effort);verbal cues  -KR      Rolling Right [place] (Bed Mobility)  moderate assist (50% patient effort);verbal cues  -KR      Supine-Sit [place] (Bed Mobility)  moderate assist (50% patient effort);verbal cues  -KR      Sit-Supine [place] (Bed Mobility)  moderate assist (50% patient effort);2 person assist;verbal cues  -KR      Bed Mobility, Safety Issues  decreased use of arms for pushing/pulling;decreased use of legs for bridging/pushing  -KR      Assistive Device (Bed Mobility)  draw sheet;head of bed elevated  -KR      Comment (Bed Mobility)  VC's for sequencing. Pt rolled L and R for hygiene. Pt required assistance with trunk and BLEs when moving to EOB and back to supine position.   -KR      Recorded by [KR] Gemma Ying, PT 02/11/19 1628      Row Name 02/11/19 1544             Transfer Assessment/Treatment    Transfer Assessment/Treatment  sit-stand transfer;stand-sit transfer  -KR      Comment (Transfers)  STS x3 from EOB with PT/tech in front on either side to provide B UE support. Pt able to complete MIP at EOB. VC's for upright posture with forward weight shifting at hips.   -KR      Recorded by [KR] Gemma Ying, PT 02/11/19 1628      Row Name  02/11/19 1544             Sit-Stand Transfer    Sit-Stand [place] (Transfers)  moderate assist (50% patient effort);2 person assist;verbal cues  -KR      Assistive Device (Sit-Stand Transfers)  other (see comments) B UE support  -KR      Recorded by [KR] Gemma Ying, PT 02/11/19 1628      Row Name 02/11/19 1544             Stand-Sit Transfer    Stand-Sit [place] (Transfers)  moderate assist (50% patient effort);2 person assist;verbal cues  -KR      Assistive Device (Stand-Sit Transfers)  other (see comments) B UE support  -KR      Recorded by [KR] Gemma Ying, PT 02/11/19 1628      Row Name 02/11/19 1544             Gait/Stairs Assessment/Training    [place] Level (Gait)  not tested  -KR      Comment (Gait/Stairs)  Ambulation deferred.   -KR      Recorded by [KR] Gemma Ying, PT 02/11/19 1628      Row Name 02/11/19 1544             Motor Skills Assessment/Interventions    Additional Documentation  Balance (Group)  -KR      Recorded by [KR] Gemma Ying, PT 02/11/19 1628      Row Name 02/11/19 1544             Therapeutic Exercise    75750 - PT Therapeutic Activity Minutes  29  -KR      Recorded by [KR] Gemma Ying, PT 02/11/19 1628      Row Name 02/11/19 1544             Balance    Balance  static sitting balance;static standing balance  -KR      Recorded by [KR] Gemma Ying, PT 02/11/19 1628      Row Name 02/11/19 1544             Static Sitting Balance    Level of [place] (Unsupported Sitting, Static Balance)  supervision  -KR      Sitting Position (Unsupported Sitting, Static Balance)  sitting on edge of bed  -KR      Recorded by [KR] Gemma Ying, PT 02/11/19 1628      Row Name 02/11/19 1544             Static Standing Balance    Level of [place] (Supported Standing, Static Balance)  moderate assist, 50 to 74% patient effort  -KR      Assistive Device Utilized (Supported Standing, Static Balance)  other (see comments) B UE support  -KR      Recorded by [KR]  Gemma Ying, PT 02/11/19 1628      Row Name 02/11/19 1544 02/11/19 1000          Positioning and Restraints    Pre-Treatment Position  in bed  -KR  in bed  -MF     Post Treatment Position  bed  -KR  bed  -MF     In Bed  supine;call light within reach;encouraged to call for assist;with nsg;side rails up x2;RUE elevated;LUE elevated  -KR  supine;call light within reach  -     Recorded by [KR] Gemma Ying, PT 02/11/19 1628 [MF] Neftaly Seaman, PT 02/11/19 1053     Row Name 02/11/19 1544 02/11/19 1000          Pain Assessment    Additional Documentation  Pain Scale: Numbers Pre/Post-Treatment (Group)  -KR  Pain Scale: FACES Pre/Post-Treatment (Group)  -     Recorded by [KR] Gemma Ying, PT 02/11/19 1628 [MF] Neftaly Seaman, PT 02/11/19 1053     Row Name 02/11/19 1544             Pain Scale: Numbers Pre/Post-Treatment    Pain Scale: Numbers, Pretreatment  0/10 - no pain  -KR      Pain Scale: Numbers, Post-Treatment  0/10 - no pain  -KR      Recorded by [KR] Gemma Ying, PT 02/11/19 1628      Row Name 02/11/19 1000             Pain Scale: FACES Pre/Post-Treatment    Pain: FACES Scale, Pretreatment  2-->hurts little bit  -      Pain: FACES Scale, Post-Treatment  4-->hurts little more  -      Pre/Post Treatment Pain Comment  6-7/10pain with debridement of woun  -MF      Recorded by [MF] Neftlay Seaman, PT 02/11/19 1053      Row Name                Wound 02/01/19 1825 Other (See comments) chest incision    Wound - Properties Group Date first assessed: 02/01/19 [KS] Time first assessed: 1825 [KS] Side: Other (See comments) [KS] Location: chest [KS] Type: incision [KS] Recorded by:  [KS] Leonela Caceres RN 02/01/19 1825    Row Name                Wound 01/16/19 1429 Left arm incision    Wound - Properties Group Date first assessed: 01/16/19 [JOSHUA] Time first assessed: 1429 [JOSHUA] Side: Left [JOSHUA] Location: arm [JOSHUA] Type: incision [JOSHUA] Recorded by:  [JOSHUA] Nilam Joshi RN 01/16/19 2280     Row Name 02/11/19 1000             Wound 01/31/19 1600 Bilateral medial coccyx pressure injury    Wound - Properties Group Date first assessed: 01/31/19 [CM] Time first assessed: 1600 [CM] Present On Admission : yes [CM] Side: Bilateral [CM] Orientation: medial [CM] Location: coccyx [CM] Type: pressure injury [CM] Stage, Pressure Injury: Stage 2 [CM] Additional Comments: wound now unstageable with moderate slough / eschar covering.  [MF] Wound Outcome: Converged [MF2] Recorded by:  [CM] Trudy Cornejo RN 02/03/19 0718 [MF] Neftaly Seaman, PT 02/06/19 1326 [MF2] Neftaly Seaman, PT 02/08/19 1123    Care, Wound  irrigated with;sterile normal saline;debrided;ultrasound therapy, non contact low frequency 8 min MIST  -MF      Recorded by [MF] Neftaly Seaman, PT 02/11/19 1053      Row Name                Wound 02/04/19 2000 Right upper chest puncture    Wound - Properties Group Date first assessed: 02/04/19 [RS] Time first assessed: 2000 [RS] Present On Admission : no [RS] Side: Right [RS] Orientation: upper [RS] Location: chest [RS] Type: puncture [RS], HD cath with pigtail site (removed)  Recorded by:  [RS] Rodriguez Garcia, RN 02/04/19 2230    Row Name 02/11/19 1544             Plan of Care Review    Plan of Care Reviewed With  patient  -KR      Recorded by [KR] Gemma Ying, PT 02/11/19 1628      Row Name 02/11/19 1544             Outcome Summary/Treatment Plan (PT)    Daily Summary of Progress (PT)  progress toward functional goals as expected  -KR      Recorded by [KR] Gemma Ying, PT 02/11/19 1628        User Key  (r) = Recorded By, (t) = Taken By, (c) = Cosigned By    Initials Name Effective Dates Discipline     Neftaly Seaman, PT 06/19/15 -  PT    Rodriguez Pittman RN 06/30/16 -  Nurse    Nilam Martinez RN 06/16/16 -  Nurse    Leonela Quezada RN 05/09/17 -  Nurse    Gemma Allison, PT 04/03/18 -  PT    Trudy Peterson RN 06/21/17 -  Nurse           Wound 01/16/19 1429 Left arm incision (Active)   Closure Open to air 2/11/2019  4:00 PM   Care, Wound cleansed with;soap and water 2/11/2019 10:00 AM   Dressing Care, Wound open to air 2/11/2019 10:00 AM       Wound 02/01/19 1825 Other (See comments) chest incision (Active)   Dressing Appearance dry;intact 2/11/2019  4:00 PM   Closure BARRY 2/11/2019  4:00 PM   Base dressing in place, unable to visualize 2/11/2019  4:00 PM       Wound 01/31/19 1600 Bilateral medial coccyx pressure injury (Active)   Dressing Appearance dry;intact 2/11/2019  4:00 PM   Closure None 2/11/2019  4:00 PM   Base moist;pink;slough;white 2/11/2019  4:00 PM   Periwound dry;pink 2/11/2019  4:00 PM   Drainage Characteristics/Odor serous;yellow 2/11/2019  4:00 PM   Drainage Amount small 2/11/2019  4:00 PM   Care, Wound irrigated with;sterile normal saline;debrided;ultrasound therapy, non contact low frequency 2/11/2019 10:00 AM   Dressing Care, Wound dressing changed 2/11/2019 10:00 AM       Wound 02/04/19 2000 Right upper chest puncture (Active)   Dressing Appearance dry;intact 2/11/2019  4:00 PM   Base pink 2/11/2019  4:00 PM   Periwound intact 2/11/2019  4:00 PM   Drainage Amount none 2/11/2019  4:00 PM           Physical Therapy Education     Title: PT OT SLP Therapies (In Progress)     Topic: Physical Therapy (In Progress)     Point: Mobility training (In Progress)     Learning Progress Summary           Patient Acceptance, E, NR by KR at 2/11/2019  3:44 PM    Acceptance, E, NR by CHRISTOPHER at 2/10/2019  3:15 PM    Acceptance, E,D, VU,NR by SANTA at 2/8/2019 11:05 AM    Acceptance, E,D, NR by SANTA at 2/6/2019  1:08 PM    Acceptance, E, NR by SIMIN at 2/4/2019  1:06 PM    Acceptance, E,D, NR by SANTA at 2/2/2019  2:48 PM                   Point: Home exercise program (In Progress)     Learning Progress Summary           Patient Acceptance, E, NR by SIMIN at 2/11/2019  3:44 PM    Acceptance, JOJO, NR by CHRISTOPHER at 2/10/2019  3:15 PM    Acceptance, ASHELY URIBE, MERLYN,NR by SANTA at  2/8/2019 11:05 AM    Acceptance, E,D, NR by LS at 2/6/2019  1:08 PM    Acceptance, E, NR by KR at 2/4/2019  1:06 PM    Acceptance, E,D, NR by LS at 2/2/2019  2:48 PM                   Point: Body mechanics (In Progress)     Learning Progress Summary           Patient Acceptance, E, NR by KR at 2/11/2019  3:44 PM    Acceptance, E, NR by CHRISTOPHER at 2/10/2019  3:15 PM    Acceptance, E,D, VU,NR by LS at 2/8/2019 11:05 AM    Acceptance, E,D, NR by LS at 2/6/2019  1:08 PM    Acceptance, E, NR by KR at 2/4/2019  1:06 PM    Acceptance, E,D, NR by LS at 2/2/2019  2:48 PM                   Point: Precautions (In Progress)     Learning Progress Summary           Patient Acceptance, E, NR by KR at 2/11/2019  3:44 PM    Acceptance, E, NR by CHRISTOPHER at 2/10/2019  3:15 PM    Acceptance, E,D, VU,NR by LS at 2/8/2019 11:05 AM    Acceptance, E,D, NR by LS at 2/6/2019  1:08 PM    Acceptance, E, NR by KR at 2/4/2019  1:06 PM    Acceptance, E,D, NR by LS at 2/2/2019  2:48 PM                               User Key     Initials Effective Dates Name Provider Type Discipline    CHRISTOPHER 06/19/15 -  Tarsha Neri, PT Physical Therapist PT     06/19/15 -  Tanesha Castro, PT Physical Therapist PT    SIMIN 04/03/18 -  Gemma Ying, PT Physical Therapist PT                PT Recommendation and Plan  Therapy Frequency (PT Clinical Impression): daily  Outcome Summary/Treatment Plan (PT)  Daily Summary of Progress (PT): progress toward functional goals as expected  Plan of Care Reviewed With: patient  Progress: improving  Outcome Summary: Pt performed STS x3 from EOB and MIP with modA x2 and B UE support. Pt completed bed mobility with mod-modA x2. Pt denied any c/o pain. Continue to progress as appropriate.   Outcome Measures     Row Name 02/11/19 1544 02/10/19 1515 02/09/19 1436       How much help from another person do you currently need...    Turning from your back to your side while in flat bed without using bedrails?  2  -KR  2  -CHRISTOPHER  --    Moving  from lying on back to sitting on the side of a flat bed without bedrails?  2  -KR  2  -CHRISTOPHER  --    Moving to and from a bed to a chair (including a wheelchair)?  2  -KR  2  -CHRISTOPHER  --    Standing up from a chair using your arms (e.g., wheelchair, bedside chair)?  2  -KR  2  -CHRISTOPHER  --    Climbing 3-5 steps with a railing?  1  -KR  1  -CHRISTOPHER  --    To walk in hospital room?  2  -KR  2  -CHRISTOPHER  --    AM-PAC 6 Clicks Score  11  -KR  11  -CHRISTOPHER  --       How much help from another is currently needed...    Putting on and taking off regular lower body clothing?  --  --  1  -JR    Bathing (including washing, rinsing, and drying)  --  --  1  -JR    Toileting (which includes using toilet bed pan or urinal)  --  --  1  -JR    Putting on and taking off regular upper body clothing  --  --  1  -JR    Taking care of personal grooming (such as brushing teeth)  --  --  3  -JR    Eating meals  --  --  3  -JR    Score  --  --  10  -JR       Functional Assessment    Outcome Measure Options  AM-PAC 6 Clicks Basic Mobility (PT)  -KR  AM-PAC 6 Clicks Basic Mobility (PT)  -CHRISTOPHER  AM-PAC 6 Clicks Daily Activity (OT)  -JR      User Key  (r) = Recorded By, (t) = Taken By, (c) = Cosigned By    Initials Name Provider Type    Tarsha Brown, PT Physical Therapist    Ebony Wheeler, OT Occupational Therapist    Gemma Allison, PT Physical Therapist         Time Calculation:   PT Charges     Row Name 02/11/19 1544 02/11/19 1000          Time Calculation    Start Time  1544  -KR  1000  -MF     PT Received On  02/11/19  -KR  --     PT Goal Re-Cert Due Date  02/12/19  -KR  02/12/19  -MF        Time Calculation- PT    Total Timed Code Minutes- PT  29 minute(s)  -KR  --        Timed Charges    06028 - PT Therapeutic Activity Minutes  29  -KR  --       User Key  (r) = Recorded By, (t) = Taken By, (c) = Cosigned By    Initials Name Provider Type     Neftaly Seaman, PT Physical Therapist    Gemma Allison, PT Physical Therapist        Therapy  Suggested Charges     Code   Minutes Charges    54042 (CPT®) Hc Pt Neuromusc Re Education Ea 15 Min      15944 (CPT®) Hc Pt Ther Proc Ea 15 Min      24408 (CPT®) Hc Gait Training Ea 15 Min      85408 (CPT®) Hc Pt Therapeutic Act Ea 15 Min 29 2    49011 (CPT®) Hc Pt Manual Therapy Ea 15 Min      70753 (CPT®) Hc Pt Iontophoresis Ea 15 Min      07590 (CPT®) Hc Pt Elec Stim Ea-Per 15 Min      18411 (CPT®) Hc Pt Ultrasound Ea 15 Min      16288 (CPT®) Hc Pt Self Care/Mgmt/Train Ea 15 Min      53681 (CPT®) Hc Pt Prosthetic (S) Train Initial Encounter, Each 15 Min      16897 (CPT®) Hc Pt Orthotic(S)/Prosthetic(S) Encounter, Each 15 Min      98978 (CPT®) Hc Orthotic(S) Mgmt/Train Initial Encounter, Each 15min      Total  29 2        Therapy Charges for Today     Code Description Service Date Service Provider Modifiers Qty    67900882633 HC PT THERAPEUTIC ACT EA 15 MIN 2/11/2019 Gemma Ying, PT GP 2    11631768111 HC PT THER SUPP EA 15 MIN 2/11/2019 Gemma Ying, PT GP 2          PT G-Codes  Outcome Measure Options: AM-PAC 6 Clicks Basic Mobility (PT)  AM-PAC 6 Clicks Score: 11  Score: 10    Coryr Ying PT  2/11/2019         Electronically signed by Gemma Ying, PT at 2/11/2019  4:30 PM          Occupational Therapy Notes (most recent note)      Ebony Sparrow OT at 2/9/2019  2:57 PM          Problem: Patient Care Overview  Goal: Plan of Care Review  Outcome: Ongoing (interventions implemented as appropriate)   02/09/19 7578   Coping/Psychosocial   Plan of Care Reviewed With patient   OTHER   Outcome Summary Pt requiring increased motivation to participate this date. Recommend continued skilled OT services to increase independence with ADL's prior to d/c. Recommend SNF at d/c.           Electronically signed by Ebony Sparrow OT at 2/9/2019  2:57 PM

## 2019-02-13 NOTE — THERAPY PROGRESS REPORT/RE-CERT
Acute Care - Physical Therapy Progress Note  [facility]     Patient Name: Fabiola Pimentel  : 1934  MRN: [id number]  Today's Date: 2019  Onset of Illness/Injury or Date of Surgery: 19  Date of Referral to PT: 19  Referring Physician: MD Radha    Admit Date: 2019    Visit Dx:    ICD-10-CM ICD-9-CM   1. Impaired functional mobility, balance, gait, and endurance Z74.09 V49.89   2. Impaired mobility and ADLs Z74.09 799.89   3. Pharyngeal dysphagia R13.13 787.23   4. PEA I46.9 427.5     Patient Active Problem List   Diagnosis   • ITP (idiopathic thrombocytopenic purpura)   • Palpitations   • Premature atrial contractions   • Hypertension   • Pulmonary hypertension (CMS/HCC)   • DJD (degenerative joint disease)   • Raynaud's phenomenon (secondary)   • Diverticular disease   • CKD (chronic kidney disease) stage 5, GFR less than 15 ml/min (CMS/HCC)   • Leukocytosis   • Anemia due to chronic kidney disease   • Debility   • Hx of renal cell carcinoma   • Chronic diastolic CHF (congestive heart failure) (CMS/HCC)   • Falls frequently   • PEA   • Huge hiatal hernia with intrathoracic stomach   • Mitral valve mass       Therapy Treatment    Rehabilitation Treatment Summary     Row Name 19 0910             Treatment Time/Intention    Discipline  physical therapist  -LS      Document Type  progress note/recertification  -LS      Subjective Information  no complaints  -LS      Mode of Treatment  physical therapy  -LS      Patient Effort  good  -LS      Existing Precautions/Restrictions  fall  -LS      Treatment Considerations/Comments  Recent R shoulder dislocation  (Significant)   -LS      Recorded by [LS] Tanesha Castro, PT 19 1401      Row Name 19 0910             Vital Signs    Pre Systolic BP Rehab  160  -LS      Pre Treatment Diastolic BP  73  -LS      Post Systolic BP Rehab  111  -LS      Post Treatment Diastolic BP  70  -LS      Posttreatment Heart Rate (beats/min)  77  -LS       Pre SpO2 (%)  97  -LS      O2 Delivery Pre Treatment  room air  -LS      Post SpO2 (%)  96  -LS      O2 Delivery Post Treatment  room air  -LS      Pre Patient Position  Supine  -LS      Intra Patient Position  Standing  -LS      Post Patient Position  Sitting  -LS      Recorded by [LS] Tanesha Castro, PT 02/13/19 1401      Row Name 02/13/19 0910             Cognitive Assessment/Intervention- PT/OT    Affect/Mental Status (Cognitive)  WFL  -LS      Orientation Status (Cognition)  oriented x 4  -LS      Follows Commands (Cognition)  follows one step commands;over 90% accuracy;verbal cues/prompting required  -LS      Cognitive Function (Cognitive)  safety deficit  -LS      Safety Deficit (Cognitive)  mild deficit;awareness of need for assistance;insight into deficits/self awareness;safety precautions awareness  -LS      Personal Safety Interventions  fall prevention program maintained;gait belt;nonskid shoes/slippers when out of bed  -LS      Recorded by [LS] Tanesha Castro, PT 02/13/19 1401      Row Name 02/13/19 0910             Bed Mobility Assessment/Treatment    Supine-Sit [place] (Bed Mobility)  moderate assist (50% patient effort);verbal cues  -LS      Assistive Device (Bed Mobility)  bed rails;draw sheet;head of bed elevated  -LS      Recorded by [LS] Tanesha Castro, PT 02/13/19 1401      Row Name 02/13/19 0910             Transfer Assessment/Treatment    Comment (Transfers)  STS x4 throughout session: x2 with use of RWx.   -LS      Recorded by [LS] Tanesha Castro, PT 02/13/19 1401      Row Name 02/13/19 0910             Sit-Stand Transfer    Sit-Stand [place] (Transfers)  moderate assist (50% patient effort);2 person assist;verbal cues  -LS      Assistive Device (Sit-Stand Transfers)  walker, front-wheeled  -LS      Recorded by [LS] Tanesha Castro, PT 02/13/19 1401      Row Name 02/13/19 0910             Stand-Sit Transfer    Stand-Sit [place] (Transfers)  moderate assist (50% patient  effort);2 person assist;verbal cues  -LS      Assistive Device (Stand-Sit Transfers)  walker, front-wheeled  -LS      Recorded by [LS] Tanesha Castro, PT 02/13/19 1401      Row Name 02/13/19 0910             Gait/Stairs Assessment/Training    18771 - Gait Training Minutes   5  -LS      Gait/Stairs Assessment/Training  gait/ambulation independence  -LS      [place] Level (Gait)  moderate assist (50% patient effort);2 person assist;verbal cues  -LS      Assistive Device (Gait)  -- PT/tech on either side of pt for BUE support/ A at gait belt  -LS      Distance in Feet (Gait)  6  -LS      Bilateral Gait Deviations  weight shift ability decreased;heel strike decreased;forward flexed posture  -LS      Comment (Gait/Stairs)  Noted difficulty weightshifting and advancing LLE. VC's for sequencing. Further ambulation deferred due to fatigue (EOB to recliner only).   -LS      Recorded by [LS] Tanesha Castro, PT 02/13/19 1401      Row Name 02/13/19 0910             Therapeutic Exercise    Therapeutic Exercise  seated, lower extremities  -LS      91987 - PT Therapeutic Exercise Minutes  10  -LS      62129 - PT Therapeutic Activity Minutes  9  -LS      Recorded by [LS] Tanesha Castro, PT 02/13/19 1401      Row Name 02/13/19 0910             Upper Extremity Seated Therapeutic Exercise    Performed, Seated Upper Extremity (Therapeutic Exercise)  shoulder abduction/adduction;elbow flexion/extension;digit flexion/extension  -LS      Exercise Type, Seated Upper Extremity (Therapeutic Exercise)  AROM (active range of motion)  -LS      Sets/Reps Detail, Seated Upper Extremity (Therapeutic Exercise)  1/10  -LS      Recorded by [LS] Tanesha Castro, PT 02/13/19 1401      Row Name 02/13/19 0910             Lower Extremity Seated Therapeutic Exercise    Performed, Seated Lower Extremity (Therapeutic Exercise)  hip flexion/extension;LAQ (long arc quad), knee extension;ankle dorsiflexion/plantarflexion  -LS      Exercise Type, Seated  Lower Extremity (Therapeutic Exercise)  AROM (active range of motion)  -LS      Sets/Reps Detail, Seated Lower Extremity (Therapeutic Exercise)  1/10  -LS      Recorded by [LS] Tanesha Castro, PT 02/13/19 1401      Row Name 02/13/19 0910             Static Sitting Balance    Level of [place] (Unsupported Sitting, Static Balance)  contact guard assist  -LS      Sitting Position (Unsupported Sitting, Static Balance)  sitting on edge of bed  -LS      Recorded by [LS] Tanesha Castro, PT 02/13/19 1401      Row Name 02/13/19 0910             Static Standing Balance    Level of [place] (Supported Standing, Static Balance)  minimal assist, 75% patient effort;2 person assist  -LS      Time Able to Maintain Position (Supported Standing, Static Balance)  30 to 45 seconds  -LS      Assistive Device Utilized (Supported Standing, Static Balance)  walker, rolling  -LS      Comment (Supported Standing, Static Balance)  progressed to R/L lateral weightshift. 20s x2  -LS      Recorded by [LS] Tanesha Castro, PT 02/13/19 1401      Row Name 02/13/19 0910             Positioning and Restraints    Pre-Treatment Position  in bed  -LS      Post Treatment Position  chair  -LS      In Chair  notified nsg;reclined;call light within reach;encouraged to call for assist;exit alarm on;RUE elevated;LUE elevated;waffle cushion;on mechanical lift sling;legs elevated;heels elevated;with nsg  -LS      Recorded by [LS] Tanesha Castro, PT 02/13/19 1401      Row Name 02/13/19 0910             Pain Scale: Numbers Pre/Post-Treatment    Pain Scale: Numbers, Pretreatment  0/10 - no pain  -LS      Pain Scale: Numbers, Post-Treatment  0/10 - no pain  -LS      Recorded by [LS] Tanesha Castro, PT 02/13/19 1401      Row Name                Wound 02/01/19 1825 Other (See comments) chest incision    Wound - Properties Group Date first assessed: 02/01/19 [KS] Time first assessed: 1825 [KS] Side: Other (See comments) [KS] Location: chest [KS] Type: incision  [KS] Recorded by:  [KS] Leonela Caceres RN 02/01/19 1825    Row Name                Wound 01/16/19 1429 Left arm incision    Wound - Properties Group Date first assessed: 01/16/19 [JOSHUA] Time first assessed: 1429 [JOSHUA] Side: Left [JOSHUA] Location: arm [JOSHUA] Type: incision [JOSHUA] Recorded by:  [JOSHUA] Nilam Joshi RN 01/16/19 1429    Row Name                Wound 01/31/19 1600 Bilateral medial coccyx pressure injury    Wound - Properties Group Date first assessed: 01/31/19 [CM] Time first assessed: 1600 [CM] Present On Admission : yes [CM] Side: Bilateral [CM] Orientation: medial [CM] Location: coccyx [CM] Type: pressure injury [CM] Stage, Pressure Injury: Stage 2 [CM] Additional Comments: wound now unstageable with moderate slough / eschar covering.  [MF] Wound Outcome: Converged [MF2] Recorded by:  [CM] Trudy Cornejo RN 02/03/19 0718 [MF] Neftaly Seaman, PT 02/06/19 1326 [MF2] Neftaly Seaman, PT 02/08/19 1123    Row Name                Wound 02/04/19 2000 Right upper chest puncture    Wound - Properties Group Date first assessed: 02/04/19 [RS] Time first assessed: 2000 [RS] Present On Admission : no [RS] Side: Right [RS] Orientation: upper [RS] Location: chest [RS] Type: puncture [RS], HD cath with pigtail site (removed)  Recorded by:  [RS] Rodriguez Garcia RN 02/04/19 2230    Row Name 02/13/19 0910             Outcome Summary/Treatment Plan (PT)    Daily Summary of Progress (PT)  progress toward functional goals is good  -LS      Plan for Continued Treatment (PT)  Cont progression towards established goals.   -LS      Anticipated Discharge Disposition (PT)  skilled nursing facility  -LS      Recorded by [LS] Tanesha Castro, PT 02/13/19 1401        User Key  (r) = Recorded By, (t) = Taken By, (c) = Cosigned By    Initials Name Effective Dates Discipline     Neftaly Seaman, PT 06/19/15 -  PT    Tanesha Rodney, PT 06/19/15 -  PT    Rodriguez Pittman, RN 06/30/16 -  Nurse    JOSHUA  Nilam Joshi, RN 06/16/16 -  Nurse    Leonela Quezada RN 05/09/17 -  Nurse    Trudy Peterson RN 06/21/17 -  Nurse          Wound 01/16/19 1429 Left arm incision (Active)   Closure Open to air 2/13/2019 12:00 PM   Dressing Care, Wound open to air 2/13/2019  6:00 AM       Wound 02/01/19 1825 Other (See comments) chest incision (Active)   Dressing Appearance intact;dry 2/13/2019 12:00 PM   Closure Adhesive bandage 2/13/2019 12:00 PM   Base dressing in place, unable to visualize 2/13/2019 12:00 PM   Periwound intact;pink 2/13/2019 12:00 PM   Edges irregular 2/13/2019 12:00 PM   Drainage Amount moderate 2/13/2019 12:00 PM   Care, Wound cleansed with;wound cleanser 2/13/2019  6:00 AM   Dressing Care, Wound dressing changed;gauze, wet-to-dry;low-adherent 2/13/2019  6:00 AM       Wound 01/31/19 1600 Bilateral medial coccyx pressure injury (Active)   Dressing Appearance dry;intact;moist drainage 2/13/2019 12:00 PM   Base dressing in place, unable to visualize 2/13/2019 12:00 PM   Periwound dry;pink 2/13/2019 12:00 PM   Drainage Characteristics/Odor serous;yellow 2/13/2019 12:00 PM   Drainage Amount small 2/13/2019 12:00 PM   Dressing Care, Wound low-adherent;foam 2/13/2019  6:00 AM       Wound 02/04/19 2000 Right upper chest puncture (Active)   Dressing Appearance dry;intact 2/13/2019 12:00 PM   Base pink 2/13/2019 12:00 PM   Periwound intact 2/13/2019 12:00 PM   Dressing Care, Wound low-adherent;foam 2/13/2019  6:00 AM       Rehab Goal Summary     Row Name 02/13/19 0910             Bed Mobility Goal 1 (PT)    Progress/Outcomes (Bed Mobility Goal 1, PT)  goal ongoing;continuing progress toward goal  -LS         Transfer Goal 1 (PT)    Progress/Outcome (Transfer Goal 1, PT)  goal ongoing;continuing progress toward goal  -LS         Gait Training Goal 1 (PT)    Progress/Outcome (Gait Training Goal 1, PT)  goal ongoing;continuing progress toward goal  -LS        User Key  (r) = Recorded By, (t) = Taken By,  (c) = Cosigned By    Initials Name Provider Type Discipline    Tanesha Rodney PT Physical Therapist PT          Physical Therapy Education     Title: PT OT SLP Therapies (In Progress)     Topic: Physical Therapy (In Progress)     Point: Mobility training (In Progress)     Learning Progress Summary           Patient Acceptance, E,D, NR by LS at 2/13/2019  9:10 AM    Acceptance, E, NR by KR at 2/11/2019  3:44 PM    Acceptance, E, NR by CHRISTOPHER at 2/10/2019  3:15 PM    Acceptance, E,D, VU,NR by LS at 2/8/2019 11:05 AM    Acceptance, E,D, NR by SANTA at 2/6/2019  1:08 PM    Acceptance, E, NR by KR at 2/4/2019  1:06 PM    Acceptance, E,D, NR by LS at 2/2/2019  2:48 PM                   Point: Home exercise program (In Progress)     Learning Progress Summary           Patient Acceptance, E,D, NR by SANTA at 2/13/2019  9:10 AM    Acceptance, E, NR by KR at 2/11/2019  3:44 PM    Acceptance, E, NR by CHRISTOPHER at 2/10/2019  3:15 PM    Acceptance, E,D, VU,NR by LS at 2/8/2019 11:05 AM    Acceptance, E,D, NR by SANTA at 2/6/2019  1:08 PM    Acceptance, E, NR by KR at 2/4/2019  1:06 PM    Acceptance, E,D, NR by LS at 2/2/2019  2:48 PM                   Point: Body mechanics (In Progress)     Learning Progress Summary           Patient Acceptance, E,D, NR by SANTA at 2/13/2019  9:10 AM    Acceptance, E, NR by KR at 2/11/2019  3:44 PM    Acceptance, E, NR by CHRISTOPHER at 2/10/2019  3:15 PM    Acceptance, E,D, VU,NR by SANTA at 2/8/2019 11:05 AM    Acceptance, E,D, NR by SANTA at 2/6/2019  1:08 PM    Acceptance, E, NR by KR at 2/4/2019  1:06 PM    Acceptance, E,D, NR by LS at 2/2/2019  2:48 PM                   Point: Precautions (In Progress)     Learning Progress Summary           Patient Acceptance, E,D, NR by SANTA at 2/13/2019  9:10 AM    Acceptance, E, NR by KR at 2/11/2019  3:44 PM    Acceptance, JOJO, NR by CHRISTOPHER at 2/10/2019  3:15 PM    Acceptance, ASHELY URIBE, MERLYN,NR by SANTA at 2/8/2019 11:05 AM    Acceptance, ASHELY URIBE, NR by SANTA at 2/6/2019  1:08 PM    Acceptance, E, NR by SIMIN  at 2/4/2019  1:06 PM    Acceptance, E,D, NR by SANTA at 2/2/2019  2:48 PM                               User Key     Initials Effective Dates Name Provider Type Discipline    CHRISTOPHER 06/19/15 -  Tarsha Neri, PT Physical Therapist PT    SANTA 06/19/15 -  Tanesha Castro, PT Physical Therapist PT    KR 04/03/18 -  Gemma Ying, PT Physical Therapist PT                PT Recommendation and Plan  Anticipated Discharge Disposition (PT): skilled nursing facility  Planned Therapy Interventions (PT Eval): bed mobility training, balance training, gait training, home exercise program, patient/family education, strengthening, transfer training  Therapy Frequency (PT Clinical Impression): daily  Outcome Summary/Treatment Plan (PT)  Daily Summary of Progress (PT): progress toward functional goals is good  Plan for Continued Treatment (PT): Cont progression towards established goals.   Anticipated Discharge Disposition (PT): skilled nursing facility  Referral Needed to Another Service (PT): occupational therapy  Plan of Care Reviewed With: patient  Progress: improving  Outcome Summary: Pt demonstrated ability to progress ambulation distance to 6 total ft with mod x2 A; cont to be limited by difficulty weightshifting and advancing LEs. Improved motivation to participate today. Progressing well towards established goals; anticipate slow progress due to complex medical status but goals established at initial eval cont to remain appropriate. Would benefit from further skilled PT services to promote PLOF and further progress towards goals.   Outcome Measures     Row Name 02/13/19 0910 02/11/19 1544 02/10/19 1515       How much help from another person do you currently need...    Turning from your back to your side while in flat bed without using bedrails?  2  -LS  2  -KR  2  -CHRISTOPHER    Moving from lying on back to sitting on the side of a flat bed without bedrails?  2  -LS  2  -KR  2  -CHRISTOPHER    Moving to and from a bed to a chair (including a  wheelchair)?  2  -LS  2  -KR  2  -CHRISTOPHER    Standing up from a chair using your arms (e.g., wheelchair, bedside chair)?  2  -LS  2  -KR  2  -CHRISTOPHER    Climbing 3-5 steps with a railing?  1  -LS  1  -KR  1  -CHRISTOPHER    To walk in hospital room?  2  -LS  2  -KR  2  -CHRISTOPHER    AM-PAC 6 Clicks Score  11  -LS  11  -KR  11  -CHRISTOPHER       Functional Assessment    Outcome Measure Options  AM-PAC 6 Clicks Basic Mobility (PT)  -LS  AM-PAC 6 Clicks Basic Mobility (PT)  -KR  AM-PAC 6 Clicks Basic Mobility (PT)  -CHRISTOPHER      User Key  (r) = Recorded By, (t) = Taken By, (c) = Cosigned By    Initials Name Provider Type    Tarsha Brown, PT Physical Therapist    Tanesha Rodney, PT Physical Therapist    Gemma Allison, PT Physical Therapist         Time Calculation:   PT Charges     Row Name 02/13/19 0910             Time Calculation    Start Time  0910  -      PT Received On  02/13/19  -      PT Goal Re-Cert Due Date  02/23/19  -         Time Calculation- PT    Total Timed Code Minutes- PT  24 minute(s)  -         Timed Charges    58667 - PT Therapeutic Exercise Minutes  10  -      88307 - Gait Training Minutes   5  -LS      33234 - PT Therapeutic Activity Minutes  9  -LS        User Key  (r) = Recorded By, (t) = Taken By, (c) = Cosigned By    Initials Name Provider Type    Tanesha Rodney, PT Physical Therapist        Therapy Suggested Charges     Code   Minutes Charges    84813 (CPT®)  Pt Neuromusc Re Education Ea 15 Min      46986 (CPT®) Hc Pt Ther Proc Ea 15 Min 10 1    22546 (CPT®) Hc Gait Training Ea 15 Min 5     41251 (CPT®) Hc Pt Therapeutic Act Ea 15 Min 9 1    56509 (CPT®) Hc Pt Manual Therapy Ea 15 Min      37017 (CPT®) Hc Pt Iontophoresis Ea 15 Min      41655 (CPT®) Hc Pt Elec Stim Ea-Per 15 Min      48772 (CPT®) Hc Pt Ultrasound Ea 15 Min      07937 (CPT®) Hc Pt Self Care/Mgmt/Train Ea 15 Min      56261 (CPT®) Hc Pt Prosthetic (S) Train Initial Encounter, Each 15 Min      67981 (CPT®) Hc Pt  Orthotic(S)/Prosthetic(S) Encounter, Each 15 Min      44294 (CPT®) Hc Orthotic(S) Mgmt/Train Initial Encounter, Each 15min      Total  24 2        Therapy Charges for Today     Code Description Service Date Service Provider Modifiers Qty    48462974226 HC PT THERAPEUTIC ACT EA 15 MIN 2/13/2019 Tanesha Castro, PT GP 1    37088242208 HC PT THER PROC EA 15 MIN 2/13/2019 Tanesha Castro, PT GP 1    70674182098 HC PT THER SUPP EA 15 MIN 2/13/2019 Tanesha Castro, PT GP 2          PT G-Codes  Outcome Measure Options: AM-PAC 6 Clicks Basic Mobility (PT)  AM-PAC 6 Clicks Score: 11  Score: 10    Tanesha Castro, PT  2/13/2019

## 2019-02-13 NOTE — PLAN OF CARE
Problem: Patient Care Overview  Goal: Plan of Care Review  Outcome: Ongoing (interventions implemented as appropriate)   02/13/19 9218   Coping/Psychosocial   Plan of Care Reviewed With patient   OTHER   Outcome Summary sacral wound cont to have significant slough covering base with no improvement noted. Poor PO intake limiting healing potential.

## 2019-02-13 NOTE — PROGRESS NOTES
Continued Stay Note  [facility]     Patient Name: Fabiola Pimentel  MRN: [id number]  Today's Date: 2/13/2019    Admit Date: 1/31/2019    Discharge Plan     Row Name 02/13/19 1119       Plan    Plan  [facility]    Plan Comments  Spoke with patient at bedside.  Patient still plans to go to [facility].  Call placed to [facility] with [facility], message left to return call to .   will continue to follow.        Discharge Codes    No documentation.       Expected Discharge Date and Time     Expected Discharge Date Expected Discharge Time    Feb 16, 2019             Thea Singleton RN

## 2019-02-13 NOTE — THERAPY WOUND CARE TREATMENT
Acute Care - Wound/Debridement Treatment Note  [facility]     Patient Name: Fabiola Pimentel  : 1934  MRN: [id number]  Today's Date: 2019  Onset of Illness/Injury or Date of Surgery: 19   Date of Referral to PT: 19   Referring Physician: MD Radha       Admit Date: 2019    Visit Dx:    ICD-10-CM ICD-9-CM   1. Impaired functional mobility, balance, gait, and endurance Z74.09 V49.89   2. Impaired mobility and ADLs Z74.09 799.89   3. Pharyngeal dysphagia R13.13 787.23   4. PEA I46.9 427.5       Patient Active Problem List   Diagnosis   • ITP (idiopathic thrombocytopenic purpura)   • Palpitations   • Premature atrial contractions   • Hypertension   • Pulmonary hypertension (CMS/HCC)   • DJD (degenerative joint disease)   • Raynaud's phenomenon (secondary)   • Diverticular disease   • CKD (chronic kidney disease) stage 5, GFR less than 15 ml/min (CMS/[facility])   • Leukocytosis   • Anemia due to chronic kidney disease   • Debility   • Hx of renal cell carcinoma   • Chronic diastolic CHF (congestive heart failure) (CMS/[facility])   • Falls frequently   • PEA   • Huge hiatal hernia with intrathoracic stomach   • Mitral valve mass           Wound 19 1429 Left arm incision (Active)   Closure Open to air 2019  2:00 PM   Dressing Care, Wound open to air 2019  6:00 AM       Wound 19 1825 Other (See comments) chest incision (Active)   Dressing Appearance intact;dry 2019  2:00 PM   Closure Adhesive bandage 2019  2:00 PM   Base dressing in place, unable to visualize 2019  2:00 PM   Periwound intact;pink 2019  2:00 PM   Edges irregular 2019  2:00 PM   Drainage Amount moderate 2019  2:00 PM   Care, Wound cleansed with;wound cleanser 2019  6:00 AM   Dressing Care, Wound dressing changed;gauze, wet-to-dry;low-adherent 2019  6:00 AM       Wound 19 1600 Bilateral medial coccyx pressure injury (Active)   Dressing Appearance dry;intact;moist drainage  2/13/2019  2:00 PM   Base dressing in place, unable to visualize 2/13/2019  2:00 PM   Periwound dry;pink 2/13/2019  2:00 PM   Drainage Characteristics/Odor serous;yellow 2/13/2019  2:00 PM   Drainage Amount small 2/13/2019  2:00 PM   Care, Wound irrigated with;sterile normal saline;debrided;ultrasound therapy, non contact low frequency 2/13/2019  1:45 PM   Dressing Care, Wound foam;low-adherent 2/13/2019  1:45 PM       Wound 02/04/19 2000 Right upper chest puncture (Active)   Dressing Appearance dry;intact 2/13/2019  2:00 PM   Base pink 2/13/2019  2:00 PM   Periwound intact 2/13/2019  2:00 PM   Dressing Care, Wound low-adherent;foam 2/13/2019  6:00 AM         WOUND DEBRIDEMENT  Total area of Debridement: ~3cm2  Debridement Site 1  Location- Site 1: sacrum  Selective Debridement- Site 1: Wound Surface <20cmsq  Instruments- Site 1: tweezers, scapel, #15  Excised Tissue Description- Site 1: minimum, slough  Bleeding- Site 1: none            Therapy Treatment    Rehabilitation Treatment Summary     Row Name 02/13/19 1345 02/13/19 0910          Treatment Time/Intention    Discipline  physical therapist  -  physical therapist  -LS     Document Type  therapy note (daily note);wound care  -  progress note/recertification  -     Subjective Information  complains of;weakness;pain;fatigue  -  no complaints  -     Mode of Treatment  --  physical therapy  -     Patient Effort  --  good  -LS     Existing Precautions/Restrictions  --  fall  -LS     Treatment Considerations/Comments  --  Recent R shoulder dislocation  (Significant)   -LS     Recorded by [MF] Neftaly Seaman, PT 02/13/19 1633 [LS] Tanesha Castro, PT 02/13/19 1401     Row Name 02/13/19 0910             Vital Signs    Pre Systolic BP Rehab  160  -LS      Pre Treatment Diastolic BP  73  -LS      Post Systolic BP Rehab  111  -LS      Post Treatment Diastolic BP  70  -LS      Posttreatment Heart Rate (beats/min)  77  -LS      Pre SpO2 (%)  97  -LS      O2  Delivery Pre Treatment  room air  -LS      Post SpO2 (%)  96  -LS      O2 Delivery Post Treatment  room air  -LS      Pre Patient Position  Supine  -LS      Intra Patient Position  Standing  -LS      Post Patient Position  Sitting  -LS      Recorded by [LS] Tanesha Castro, PT 02/13/19 1401      Row Name 02/13/19 0910             Cognitive Assessment/Intervention- PT/OT    Affect/Mental Status (Cognitive)  WFL  -LS      Orientation Status (Cognition)  oriented x 4  -LS      Follows Commands (Cognition)  follows one step commands;over 90% accuracy;verbal cues/prompting required  -LS      Cognitive Function (Cognitive)  safety deficit  -LS      Safety Deficit (Cognitive)  mild deficit;awareness of need for assistance;insight into deficits/self awareness;safety precautions awareness  -LS      Personal Safety Interventions  fall prevention program maintained;gait belt;nonskid shoes/slippers when out of bed  -LS      Recorded by [LS] Tanesha Castro, PT 02/13/19 1401      Row Name 02/13/19 0910             Bed Mobility Assessment/Treatment    Supine-Sit [place] (Bed Mobility)  moderate assist (50% patient effort);verbal cues  -LS      Assistive Device (Bed Mobility)  bed rails;draw sheet;head of bed elevated  -LS      Recorded by [LS] Tanesha Castro, PT 02/13/19 1401      Row Name 02/13/19 0910             Transfer Assessment/Treatment    Comment (Transfers)  STS x4 throughout session: x2 with use of RWx.   -LS      Recorded by [LS] Tanesha Castro, PT 02/13/19 1401      Row Name 02/13/19 0910             Sit-Stand Transfer    Sit-Stand [place] (Transfers)  moderate assist (50% patient effort);2 person assist;verbal cues  -LS      Assistive Device (Sit-Stand Transfers)  walker, front-wheeled  -LS      Recorded by [LS] Tanesha Castro, PT 02/13/19 1401      Row Name 02/13/19 0910             Stand-Sit Transfer    Stand-Sit [place] (Transfers)  moderate assist (50% patient effort);2 person assist;verbal cues   -LS      Assistive Device (Stand-Sit Transfers)  walker, front-wheeled  -LS      Recorded by [LS] Tanesha Castro, PT 02/13/19 1401      Row Name 02/13/19 0910             Gait/Stairs Assessment/Training    94833 - Gait Training Minutes   5  -LS      Gait/Stairs Assessment/Training  gait/ambulation independence  -LS      [place] Level (Gait)  moderate assist (50% patient effort);2 person assist;verbal cues  -LS      Assistive Device (Gait)  -- PT/tech on either side of pt for BUE support/ A at gait belt  -LS      Distance in Feet (Gait)  6  -LS      Bilateral Gait Deviations  weight shift ability decreased;heel strike decreased;forward flexed posture  -LS      Comment (Gait/Stairs)  Noted difficulty weightshifting and advancing LLE. VC's for sequencing. Further ambulation deferred due to fatigue (EOB to recliner only).   -LS      Recorded by [LS] Tanesha Castro, PT 02/13/19 1401      Row Name 02/13/19 0910             Therapeutic Exercise    Therapeutic Exercise  seated, lower extremities  -LS      09403 - PT Therapeutic Exercise Minutes  10  -LS      75521 - PT Therapeutic Activity Minutes  9  -LS      Recorded by [LS] Tanesha Castro, PT 02/13/19 1401      Row Name 02/13/19 0910             Upper Extremity Seated Therapeutic Exercise    Performed, Seated Upper Extremity (Therapeutic Exercise)  shoulder abduction/adduction;elbow flexion/extension;digit flexion/extension  -      Exercise Type, Seated Upper Extremity (Therapeutic Exercise)  AROM (active range of motion)  -LS      Sets/Reps Detail, Seated Upper Extremity (Therapeutic Exercise)  1/10  -LS      Recorded by [LS] Tanesha Castro, PT 02/13/19 1401      Row Name 02/13/19 0910             Lower Extremity Seated Therapeutic Exercise    Performed, Seated Lower Extremity (Therapeutic Exercise)  hip flexion/extension;LAQ (long arc quad), knee extension;ankle dorsiflexion/plantarflexion  -      Exercise Type, Seated Lower Extremity (Therapeutic Exercise)   AROM (active range of motion)  -LS      Sets/Reps Detail, Seated Lower Extremity (Therapeutic Exercise)  1/10  -LS      Recorded by [LS] Tanesha Castro, PT 02/13/19 1401      Row Name 02/13/19 0910             Static Sitting Balance    Level of [place] (Unsupported Sitting, Static Balance)  contact guard assist  -LS      Sitting Position (Unsupported Sitting, Static Balance)  sitting on edge of bed  -LS      Recorded by [LS] Tanesha Castro, PT 02/13/19 1401      Row Name 02/13/19 0910             Static Standing Balance    Level of [place] (Supported Standing, Static Balance)  minimal assist, 75% patient effort;2 person assist  -LS      Time Able to Maintain Position (Supported Standing, Static Balance)  30 to 45 seconds  -LS      Assistive Device Utilized (Supported Standing, Static Balance)  walker, rolling  -LS      Comment (Supported Standing, Static Balance)  progressed to R/L lateral weightshift. 20s x2  -LS      Recorded by [LS] Tanesha Castro, PT 02/13/19 1401      Row Name 02/13/19 1345 02/13/19 0910          Positioning and Restraints    Pre-Treatment Position  in bed  -  in bed  -LS     Post Treatment Position  bed  -  chair  -LS     In Bed  supine;call light within reach  -  --     In Chair  --  notified nsg;reclined;call light within reach;encouraged to call for assist;exit alarm on;RUE elevated;LUE elevated;waffle cushion;on mechanical lift sling;legs elevated;heels elevated;with nsg  -LS     Recorded by [MF] Neftaly Seaman, PT 02/13/19 1633 [LS] Tanesha Castro, PT 02/13/19 1401     Row Name 02/13/19 1345             Pain Assessment    Additional Documentation  Pain Scale: FACES Pre/Post-Treatment (Group)  -      Recorded by [MF] Neftaly Seaman, PT 02/13/19 1633      Row Name 02/13/19 0910             Pain Scale: Numbers Pre/Post-Treatment    Pain Scale: Numbers, Pretreatment  0/10 - no pain  -LS      Pain Scale: Numbers, Post-Treatment  0/10 - no pain  -LS      Recorded by  [LS] Tanesha Castro, PT 02/13/19 1401      Row Name 02/13/19 1345             Pain Scale: FACES Pre/Post-Treatment    Pain: FACES Scale, Pretreatment  4-->hurts little more  -MF      Pain: FACES Scale, Post-Treatment  4-->hurts little more  -MF      Recorded by [MF] Neftaly Seaman, PT 02/13/19 1633      Row Name                Wound 02/01/19 1825 Other (See comments) chest incision    Wound - Properties Group Date first assessed: 02/01/19 [KS] Time first assessed: 1825 [KS] Side: Other (See comments) [KS] Location: chest [KS] Type: incision [KS] Recorded by:  [KS] Leonela Caceres RN 02/01/19 1825    Row Name                Wound 01/16/19 1429 Left arm incision    Wound - Properties Group Date first assessed: 01/16/19 [JOSHUA] Time first assessed: 1429 [JOSHUA] Side: Left [JOSHUA] Location: arm [JOSHUA] Type: incision [JOSHUA] Recorded by:  [JOSHUA] Nilam Joshi RN 01/16/19 1429    Row Name 02/13/19 1345             Wound 01/31/19 1600 Bilateral medial coccyx pressure injury    Wound - Properties Group Date first assessed: 01/31/19 [CM] Time first assessed: 1600 [CM] Present On Admission : yes [CM] Side: Bilateral [CM] Orientation: medial [CM] Location: coccyx [CM] Type: pressure injury [CM] Stage, Pressure Injury: Stage 2 [CM] Additional Comments: wound now unstageable with moderate slough / eschar covering.  [MF] Wound Outcome: Converged [MF2] Recorded by:  [CM] Turdy Cornejo, RN 02/03/19 0718 [MF] Neftaly Seaman, PT 02/06/19 1326 [MF2] Neftaly Seaman, PT 02/08/19 1123    Dressing Appearance  dry;intact;moist drainage  -MF      Base  moist;necrotic;yellow;slough  -MF      Periwound  dry;pink  -MF      Drainage Characteristics/Odor  serous;yellow  -MF      Drainage Amount  small  -MF      Care, Wound  irrigated with;sterile normal saline;debrided;ultrasound therapy, non contact low frequency  -MF      Dressing Care, Wound  foam;low-adherent dakins packed 2x2 with optifoam  -MF      Recorded by [MF] Jurgen,  Neftaly SALAZAR, PT 02/13/19 1633      Row Name                Wound 02/04/19 2000 Right upper chest puncture    Wound - Properties Group Date first assessed: 02/04/19 [RS] Time first assessed: 2000 [RS] Present On Admission : no [RS] Side: Right [RS] Orientation: upper [RS] Location: chest [RS] Type: puncture [RS], HD cath with pigtail site (removed)  Recorded by:  [RS] Rodriguez Garcia, RN 02/04/19 2230    Row Name 02/13/19 1345             Coping    Observed Emotional State  calm;cooperative  -MF      Verbalized Emotional State  acceptance  -MF      Recorded by [MF] Neftaly Seaman, PT 02/13/19 1633      Row Name 02/13/19 1345             Plan of Care Review    Plan of Care Reviewed With  patient  -MF      Recorded by [MF] Neftaly Seaman, PT 02/13/19 1633      Row Name 02/13/19 0910             Outcome Summary/Treatment Plan (PT)    Daily Summary of Progress (PT)  progress toward functional goals is good  -      Plan for Continued Treatment (PT)  Cont progression towards established goals.   -LS      Anticipated Discharge Disposition (PT)  skilled nursing facility  -LS      Recorded by [LS] Tanesha Castro, PT 02/13/19 1401        User Key  (r) = Recorded By, (t) = Taken By, (c) = Cosigned By    Initials Name Effective Dates Discipline     Neftaly Seaman, PT 06/19/15 -  PT    LS Tanesha Castro, PT 06/19/15 -  PT    RS Rodriguez Garcia, RN 06/30/16 -  Nurse    Nilam Martinez RN 06/16/16 -  Nurse    Leonela Quezada RN 05/09/17 -  Nurse    Trudy Peterson RN 06/21/17 -  Nurse        Rehab Goal Summary     Row Name 02/13/19 0910             Bed Mobility Goal 1 (PT)    Progress/Outcomes (Bed Mobility Goal 1, PT)  goal ongoing;continuing progress toward goal  -LS         Transfer Goal 1 (PT)    Progress/Outcome (Transfer Goal 1, PT)  goal ongoing;continuing progress toward goal  -LS         Gait Training Goal 1 (PT)    Progress/Outcome (Gait Training Goal 1, PT)  goal  ongoing;continuing progress toward goal  -        User Key  (r) = Recorded By, (t) = Taken By, (c) = Cosigned By    Initials Name Provider Type Discipline    Tanesha Rodney PT Physical Therapist PT        Physical Therapy Education     Title: PT OT SLP Therapies (In Progress)     Topic: Physical Therapy (In Progress)     Point: Mobility training (In Progress)     Learning Progress Summary           Patient Acceptance, E,D, NR by LS at 2/13/2019  9:10 AM    Acceptance, E, NR by KR at 2/11/2019  3:44 PM    Acceptance, E, NR by CHRISTOPHER at 2/10/2019  3:15 PM    Acceptance, E,D, VU,NR by LS at 2/8/2019 11:05 AM    Acceptance, E,D, NR by SANTA at 2/6/2019  1:08 PM    Acceptance, E, NR by KR at 2/4/2019  1:06 PM    Acceptance, E,D, NR by LS at 2/2/2019  2:48 PM                   Point: Home exercise program (In Progress)     Learning Progress Summary           Patient Acceptance, E,D, NR by SANTA at 2/13/2019  9:10 AM    Acceptance, E, NR by KR at 2/11/2019  3:44 PM    Acceptance, E, NR by CHRISTOPHER at 2/10/2019  3:15 PM    Acceptance, E,D, VU,NR by LS at 2/8/2019 11:05 AM    Acceptance, E,D, NR by LS at 2/6/2019  1:08 PM    Acceptance, E, NR by KR at 2/4/2019  1:06 PM    Acceptance, E,D, NR by LS at 2/2/2019  2:48 PM                   Point: Body mechanics (In Progress)     Learning Progress Summary           Patient Acceptance, E,D, NR by SANTA at 2/13/2019  9:10 AM    Acceptance, E, NR by KR at 2/11/2019  3:44 PM    Acceptance, E, NR by CHRISTOPHER at 2/10/2019  3:15 PM    Acceptance, E,D, VU,NR by SANTA at 2/8/2019 11:05 AM    Acceptance, E,D, NR by LS at 2/6/2019  1:08 PM    Acceptance, E, NR by KR at 2/4/2019  1:06 PM    Acceptance, E,D, NR by LS at 2/2/2019  2:48 PM                   Point: Precautions (In Progress)     Learning Progress Summary           Patient Acceptance, E,D, NR by SANTA at 2/13/2019  9:10 AM    Acceptance, JOJO NR by SIMIN at 2/11/2019  3:44 PM    Acceptance, JOJO NR by CHRISTOPHER at 2/10/2019  3:15 PM    Acceptance, ASHELY URIBE, MERLYN,NR by SANTA at  2/8/2019 11:05 AM    Acceptance, E,D, NR by LS at 2/6/2019  1:08 PM    Acceptance, E, NR by KR at 2/4/2019  1:06 PM    Acceptance, E,D, NR by SANTA at 2/2/2019  2:48 PM                               User Key     Initials Effective Dates Name Provider Type Discipline    CHRISTOPHER 06/19/15 -  Tarsha Neri, PT Physical Therapist PT    LS 06/19/15 -  Tanesha Castro, PT Physical Therapist PT    KR 04/03/18 -  Gemma Ying, PT Physical Therapist PT                  PT Recommendation and Plan  Anticipated Discharge Disposition (PT): skilled nursing facility  Planned Therapy Interventions (PT Eval): bed mobility training, balance training, gait training, home exercise program, patient/family education, strengthening, transfer training  Therapy Frequency (PT Clinical Impression): daily            Outcome Summary/Treatment Plan (PT)  Daily Summary of Progress (PT): unable to show any progress toward functional goals(pt with moderate necrotic tissue covering wound)  Barriers to Overall Progress (PT): limited mobility and poor PO intake   Plan for Continued Treatment (PT): cont with mist 2-3 x/ week with debridement prn   Daily Summary of Progress (PT): unable to show any progress toward functional goals(pt with moderate necrotic tissue covering wound)  Plan for Continued Treatment (PT): cont with mist 2-3 x/ week with debridement prn   Outcome Summary: sacral wound cont to have significant slough covering base with no improvement noted.  Poor PO intake limiting healing potential.   Plan of Care Reviewed With: patient    Outcome Measures     Row Name 02/13/19 0910 02/11/19 1544          How much help from another person do you currently need...    Turning from your back to your side while in flat bed without using bedrails?  2  -LS  2  -KR     Moving from lying on back to sitting on the side of a flat bed without bedrails?  2  -LS  2  -KR     Moving to and from a bed to a chair (including a wheelchair)?  2  -LS  2  -KR      Standing up from a chair using your arms (e.g., wheelchair, bedside chair)?  2  -LS  2  -KR     Climbing 3-5 steps with a railing?  1  -LS  1  -KR     To walk in hospital room?  2  -LS  2  -KR     AM-PAC 6 Clicks Score  11  -LS  11  -KR        Functional Assessment    Outcome Measure Options  AM-PAC 6 Clicks Basic Mobility (PT)  -LS  AM-PAC 6 Clicks Basic Mobility (PT)  -KR       User Key  (r) = Recorded By, (t) = Taken By, (c) = Cosigned By    Initials Name Provider Type    Tanesha Rodney, PT Physical Therapist    Gemma Allison, PT Physical Therapist              Time Calculation  PT Charges     Row Name 02/13/19 1345 02/13/19 0910          Time Calculation    Start Time  1345  -  0910  -     PT Received On  --  02/13/19  -     PT Goal Re-Cert Due Date  02/23/19  -  02/23/19  -        Time Calculation- PT    Total Timed Code Minutes- PT  --  24 minute(s)  -        Timed Charges    01331 - PT Therapeutic Exercise Minutes  --  10  -     84761 - Gait Training Minutes   --  5  -     30517 - PT Therapeutic Activity Minutes  --  9  -LS       User Key  (r) = Recorded By, (t) = Taken By, (c) = Cosigned By    Initials Name Provider Type    Neftaly Moore, PT Physical Therapist    Tanesha Rodney, PT Physical Therapist         Therapy Suggested Charges     Code   Minutes Charges    51753 (CPT®) Hc Pt Neuromusc Re Education Ea 15 Min      40470 (CPT®) Hc Pt Ther Proc Ea 15 Min 10 1    05890 (CPT®) Hc Gait Training Ea 15 Min 5     17335 (CPT®) Hc Pt Therapeutic Act Ea 15 Min 9 1    59160 (CPT®) Hc Pt Manual Therapy Ea 15 Min      25130 (CPT®) Hc Pt Iontophoresis Ea 15 Min      96203 (CPT®) Hc Pt Elec Stim Ea-Per 15 Min      15164 (CPT®) Hc Pt Ultrasound Ea 15 Min      67345 (CPT®) Hc Pt Self Care/Mgmt/Train Ea 15 Min      97519 (CPT®) Hc Pt Prosthetic (S) Train Initial Encounter, Each 15 Min      31463 (CPT®) Hc Pt Orthotic(S)/Prosthetic(S) Encounter, Each 15 Min      46074 (CPT®) Hc  Orthotic(S) Mgmt/Train Initial Encounter, Each 15min      Total  24 2          Therapy Charges for Today     Code Description Service Date Service Provider Modifiers Qty    05887092287 HC PT NLFU MIST 2/13/2019 Neftaly Seaman, PT GP 1    [id number] HC PAM DEBRIDE OPEN WOUND UP TO 20CM 2/13/2019 Neftaly Seaman, PT GP 1            PT G-Codes  Outcome Measure Options: AM-PAC 6 Clicks Basic Mobility (PT)  AM-PAC 6 Clicks Score: 11  Score: 10        Neftaly Seaman, PT  2/13/2019

## 2019-02-13 NOTE — PROGRESS NOTES
"   LOS: 13 days    Patient Care Team:  Otilio Smith DO as PCP - General (Family Medicine)    Reason For Visit:  F/U ESRD.  Subjective           Review of Systems:    Pulm: No soa   CV:  No CP      Objective       bisoprolol 5 mg Oral Nightly   calcitriol 0.25 mcg Oral Daily   castor oil-balsam peru  Topical Q12H   epoetin alexus 10,000 Units Subcutaneous Once per day on Tue Thu Sat   fluconazole 200 mg Oral Q24H   heparin (porcine) 5,000 Units Subcutaneous Q8H   hydrALAZINE 25 mg Oral Q8H   pantoprazole 40 mg Oral Q AM   senna 1 tablet Oral BID   sodium chloride 3 mL Intravenous Q12H   sodium hypochlorite  Topical BID            Vital Signs:  Blood pressure 135/55, pulse 63, temperature 97.9 °F (36.6 °C), temperature source Oral, resp. rate 20, height 162.6 cm (64\"), weight 65.8 kg (145 lb), SpO2 93 %, not currently breastfeeding.    Flowsheet Rows      First Filed Value   Admission Height  163.8 cm (64.5\") Documented at 02/01/2019 0300   Admission Weight  70 kg (154 lb 4.8 oz) Documented at 01/31/2019 1607          02/12 0701 - 02/13 0700  In: 390 [P.O.:210; I.V.:30]  Out: 2030     Physical Exam:    General Appearance: NAD, alert and cooperative, Ox3  Eyes: PER, conjunctivae and sclerae normal, no icterus  Lungs: SCATTERED RHONCHI  Heart/CV: regular rhythm & normal rate, no murmur, no gallop, no rub and no edema  Abdomen: not distended, soft, non-tender, no masses,  bowel sounds present  Skin: No rash, Warm and dry. TUNNELED HD CATH.    Radiology:            Labs:  Results from last 7 days   Lab Units 02/13/19  0535 02/12/19  1719 02/10/19  0401   WBC 10*3/mm3 12.78* 15.90* 20.42*   HEMOGLOBIN g/dL 7.4* 7.3* 8.0*   HEMATOCRIT % 23.5* 23.1* 25.5*   PLATELETS 10*3/mm3 346 299 271     Results from last 7 days   Lab Units 02/13/19  0535 02/12/19  0958 02/11/19  0713 02/10/19  0401 02/09/19  0701  02/07/19  0543   SODIUM mmol/L 147* 146 147* 140 144   < > 138   POTASSIUM mmol/L 3.9 3.6 4.2 3.7 3.9   < > 3.8 "   CHLORIDE mmol/L 109 110* 112* 105 110*   < > 106   CO2 mmol/L 26.0 22.0 22.0 24.0 22.0   < > 18.0*   BUN mg/dL 26* 49* 37* 26* 45*   < > 41*   CREATININE mg/dL 2.38* 3.83* 3.41* 2.37* 3.69*   < > 3.91*   CALCIUM mg/dL 8.7 8.8 8.7 8.6* 8.4*   < > 7.8*   PHOSPHORUS mg/dL 2.0*  --  3.6 2.4 5.1  --  4.6   MAGNESIUM mg/dL  --   --   --   --   --   --  1.9   ALBUMIN g/dL 3.62  --  3.54 3.80 3.15*  --  2.95*    < > = values in this interval not displayed.     Results from last 7 days   Lab Units 02/13/19  0535   GLUCOSE mg/dL 84       Results from last 7 days   Lab Units 02/07/19  0543   ALK PHOS U/L 69   BILIRUBIN mg/dL 0.3   ALT (SGPT) U/L 10   AST (SGOT) U/L 18                 Estimated Creatinine Clearance: 16.4 mL/min (A) (by C-G formula based on SCr of 2.38 mg/dL (H)).      Assessment       PEA    ITP (idiopathic thrombocytopenic purpura)    Pulmonary hypertension (CMS/HCC)    CKD (chronic kidney disease) stage 5, GFR less than 15 ml/min (CMS/HCC)    Leukocytosis    Anemia due to chronic kidney disease    Debility    Hx of renal cell carcinoma    Chronic diastolic CHF (congestive heart failure) (CMS/HCC)    Falls frequently    Huge hiatal hernia with intrathoracic stomach    Mitral valve mass            Impression: ESRD. ANEMIA.            Recommendations: HD 2/14/19.      Otilio Adler MD  02/13/19  10:42 AM

## 2019-02-13 NOTE — PROGRESS NOTES
INTENSIVIST   PROGRESS NOTE     Hospital:  LOS: 13 days      S     Ms. Fabiola Pimentel, 84 y.o. female is followed for:      PEA    ITP (idiopathic thrombocytopenic purpura)      As an Intensivist, we provide an integrated approach to the ICU patient and family, medical management of comorbid conditions, including but not limited to electrolytes, glycemic control, organ dysfunction, lead interdisciplinary rounds and coordinate the care with all other services, including those from other specialists.     Interval History:  Improving.  She wants to go home, but plan is for rehab unit first.  Episode of bradycardia, last night.  Walking with PT  On ambient air.     The patient's relevant past medical, surgical and social history were reviewed and updated in Epic as appropriate.     ROS:   Constitutional: Negative for fever.   Respiratory: Negative for dyspnea.   Cardiovascular: Negative for chest pain.   Gastrointestinal: Negative for  nausea, vomiting and diarrhea.        O     Vitals:  Temp: 98.4 °F (36.9 °C) (02/13/19 0800) Temp  Min: 97.6 °F (36.4 °C)  Max: 98.4 °F (36.9 °C)   BP: 132/53 (02/13/19 1600) BP  Min: 106/51  Max: 160/73   Pulse: 67 (02/13/19 1600) Pulse  Min: 59  Max: 76   Resp: 20 (02/13/19 1600) Resp  Min: 16  Max: 20   SpO2: 96 % (02/13/19 1600) SpO2  Min: 93 %  Max: 100 %   Device: room air (02/13/19 1600)    Flow Rate: 2 (02/13/19 0200) Flow (L/min)  Min: 2  Max: 2       Physical Examination  Telemetry:  Rhythm: normal sinus rhythm (02/13/19 1600)   Constitutional:  No acute distress.   Cardiovascular: Normal rate, regular and rhythm. Normal heart sounds.  (+) murmur  No gallop or rub.   Respiratory: No respiratory distress. Normal respiratory effort.  Normal breath sounds  Clear to auscultation and percussion.    Abdominal:  Soft. No masses. Non-tender. No distension. No HSM.   Extremities: No digital cyanosis. No clubbing.  No edema.   Neurological:   Alert. Non focal.   Lines/Drains/Airways: L IJ  tunneled Dialysis Catheter - with a special dressing.       Hematology:  Results from last 7 days   Lab Units 02/13/19  0535 02/12/19  1719 02/10/19  0401   WBC 10*3/mm3 12.78* 15.90* 20.42*   HEMOGLOBIN g/dL 7.4* 7.3* 8.0*   MCV fL 94.0 92.4 92.4   PLATELETS 10*3/mm3 346 299 271       Chemistry:  Estimated Creatinine Clearance: 16.4 mL/min (A) (by C-G formula based on SCr of 2.38 mg/dL (H)).    Results from last 7 days   Lab Units 02/13/19  0535 02/12/19  0958 02/11/19  0713   SODIUM mmol/L 147* 146 147*   POTASSIUM mmol/L 3.9 3.6 4.2   CHLORIDE mmol/L 109 110* 112*   CO2 mmol/L 26.0 22.0 22.0   ANION GAP mmol/L 12.0* 14.0* 13.0*   GLUCOSE mg/dL 84 121* 99   CALCIUM mg/dL 8.7 8.8 8.7     Results from last 7 days   Lab Units 02/13/19  0535 02/12/19  0958 02/11/19  0713   BUN mg/dL 26* 49* 37*   CREATININE mg/dL 2.38* 3.83* 3.41*     Images:  No radiology results for the last day    Echo:  Results for orders placed during the hospital encounter of 01/31/19   Adult Transthoracic Echo Limited W/ Cont if Necessary Per Protocol    Narrative · Stable echodensity noted on chordal apparatus of anterior leaflet of   mitral valve.          Results: Reviewed.  I reviewed the patient's new laboratory and imaging results.  I independently reviewed the patient's new images.    Medications: Reviewed.    Assessment/Plan   A / P     84 y.o.female, admitted on 1/31/2019 with Acute renal failure (ARF) (CMS/[facility]) [N17.9]:     1. S/p PEA, while she was in the inpatient dialysis unit, quickly resuscitated (within 3 minutes), and it did not require intubation, 2/1/19  2. 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  3. ESRD on HD  4. ITP  5. Leukocytosis, since admission. Peak on 2/7: 27K. Now (02/13/19), lowest it has ever been (12.7K)      6. Odynophagia and oral thrush } Resolved  1. R/o Esoph. candidiasis  7. HTN  8. Anemia, chronic disease.  1. EPO and IV iron during  HD } As per Nephrology  9. Hiatal hernia.  10. Antibiotics: Fluconazole.   1. Last dose of Vanco was 2/7/19. Last dose of MRP was 2/12/19  11. Glucose: No h/o DM    Lab Results   Lab Value Date/Time    HGBA1C 5.60 01/31/2019 1758       Diet: Diet Dysphagia; IV - Mechanical Soft No Mixed Consistencies; Nectar / Syrup Thick; Renal   Advance Directives: Code Status and Medical Interventions:   Ordered at: 02/01/19 1600     Limited Support to NOT Include:    Intubation     Code Status:    CPR     Medical Interventions (Level of Support Prior to Arrest):    Limited          Assessment / Plan:    1. Wound care, dialysis catheter site.  1. WBC trending down.  2. Awaiting rehab bed. Probable Friday 2/[facility]  3. Fluconazole until 2/19.  4. Labs in AM  5. Disposition: Transfer to Telemetry Unit.    Plan of care and goals reviewed during interdisciplinary rounds.  Level of Risk is High due to:  illness with threat to life or bodily function.   I discussed the patient's findings and my recommendations with patient     OK to floor.  We will follow as needed once out of the Intensive Care Unit.  Hospitalist Team will assist with medical management once on the Floor.    Thank you.     Chung Ferrari MD, FACP, FCCP, CNSC  Intensive Care Medicine, Nutrition Support and Pulmonary Medicine

## 2019-02-13 NOTE — PLAN OF CARE
"Problem: Patient Care Overview  Goal: Plan of Care Review  Outcome: Ongoing (interventions implemented as appropriate)   02/13/19 0615   Coping/Psychosocial   Plan of Care Reviewed With patient   Plan of Care Review   Progress no change   OTHER   Outcome Summary VS stable. Held ordered BP meds due to heart rate dropping into the 30's on occasion. Dressings on coccyx and left upper chest changed per orders. Pt states that she is \"leaving by tomorrow, one way or another\" and \"wants to go home\".       Problem: Fall Risk (Adult)  Goal: Absence of Fall  Outcome: Ongoing (interventions implemented as appropriate)      Problem: Skin Injury Risk (Adult)  Goal: Skin Health and Integrity  Outcome: Ongoing (interventions implemented as appropriate)      Problem: Wound (Includes Pressure Injury) (Adult)  Goal: Signs and Symptoms of Listed Potential Problems Will be Absent, Minimized or Managed (Wound)  Outcome: Ongoing (interventions implemented as appropriate)        "

## 2019-02-13 NOTE — PROGRESS NOTES
Continued Stay Note  [facility]     Patient Name: Fabiola Pimentel  MRN: [id number]  Today's Date: 2/13/2019    Admit Date: 1/31/2019    Discharge Plan     Row Name 02/13/19 1345       Plan    Plan  [facility]    Plan Comments  Call received from [facility] with [facility].  Bed available Friday, updates faxed, Pre-cert initiated.  Spoke with Jovana with [facility] and updated on bed availability on Friday.  Jovana has requested most recent dialysis flowsheet, progress notes and labs.  Updated faxed to [phone].  [facility]    Row Name 02/13/19 1119       Plan    Plan  [facility]    Plan Comments  Spoke with patient at bedside.  Patient still plans to go to [facility].  Call placed to Briansharonda with [facility], message left to return call to .  CM will continue to follow.        Discharge Codes    No documentation.       Expected Discharge Date and Time     Expected Discharge Date Expected Discharge Time    Feb 16, 2019             Thea Singleton RN

## 2019-02-14 NOTE — PROGRESS NOTES
INTENSIVIST   PROGRESS NOTE     Hospital:  LOS: 14 days      S     Ms. Fabiola Pimentel, 84 y.o. female is followed for:      PEA    ITP (idiopathic thrombocytopenic purpura)      As an Intensivist, we provide an integrated approach to the ICU patient and family, medical management of comorbid conditions, including but not limited to electrolytes, glycemic control, organ dysfunction, lead interdisciplinary rounds and coordinate the care with all other services, including those from other specialists.     Interval History:  Doing well.  Anxious to go home (SNF, she calls it home).  No distress.     The patient's relevant past medical, surgical and social history were reviewed and updated in Epic as appropriate.     ROS:   Constitutional: Negative for fever.   Respiratory: Negative for dyspnea.   Cardiovascular: Negative for chest pain.   Gastrointestinal: Negative for  nausea, vomiting and diarrhea.        O     Vitals:  Temp: 98.1 °F (36.7 °C) (02/14/19 1217) Temp  Min: 97.4 °F (36.3 °C)  Max: 98.1 °F (36.7 °C)   BP: 130/65 (02/14/19 1217) BP  Min: 76/65  Max: 159/82   Pulse: 73 (02/14/19 1217) Pulse  Min: 63  Max: 91   Resp: 22 (02/14/19 1217) Resp  Min: 16  Max: 28   SpO2: 100 % (02/14/19 1106) SpO2  Min: 93 %  Max: 100 %   Device: nasal cannula, humidified (02/14/19 1106)    Flow Rate: 2 (02/14/19 1106) Flow (L/min)  Min: 2  Max: 2       Physical Examination  Telemetry:  Rhythm: normal sinus rhythm (02/14/19 1052)   Constitutional:  No acute distress.   Cardiovascular: Normal rate, regular and rhythm. Normal heart sounds.  (+) murmur  No gallop or rub.   Respiratory: No respiratory distress. Normal respiratory effort.  Normal breath sounds  Clear to auscultation and percussion.    Abdominal:  Soft. No masses. Non-tender. No distension. No HSM.   Extremities: No digital cyanosis. No clubbing.  No edema.   Neurological:   Alert. Non focal.   Lines/Drains/Airways: L IJ tunneled Dialysis Catheter - with a special  dressing.       Hematology:  Results from last 7 days   Lab Units 02/14/19  0642 02/13/19  0535 02/12/19  1719   WBC 10*3/mm3 13.27* 12.78* 15.90*   HEMOGLOBIN g/dL 7.6* 7.4* 7.3*   MCV fL 93.5 94.0 92.4   PLATELETS 10*3/mm3 403 346 299       Chemistry:  Estimated Creatinine Clearance: 9.9 mL/min (A) (by C-G formula based on SCr of 3.64 mg/dL (H)).    Results from last 7 days   Lab Units 02/14/19  0642 02/13/19  0535 02/12/19  0958   SODIUM mmol/L 146 147* 146   POTASSIUM mmol/L 4.2 3.9 3.6   CHLORIDE mmol/L 108 109 110*   CO2 mmol/L 25.0 26.0 22.0   ANION GAP mmol/L 13.0* 12.0* 14.0*   GLUCOSE mg/dL 100 84 121*   CALCIUM mg/dL 9.0 8.7 8.8     Results from last 7 days   Lab Units 02/14/19  0642 02/13/19  0535 02/12/19  0958   BUN mg/dL 46* 26* 49*   CREATININE mg/dL 3.64* 2.38* 3.83*     Images:  No radiology results for the last day    Lab Results   Lab Value Date/Time    PROCALCITO 0.78 (H) 02/14/2019 0940    PROCALCITO 1.08 (H) 02/06/2019 0427       Echo:  Results for orders placed during the hospital encounter of 01/31/19   Adult Transthoracic Echo Limited W/ Cont if Necessary Per Protocol    Narrative · Stable echodensity noted on chordal apparatus of anterior leaflet of   mitral valve.          Results: Reviewed.  I reviewed the patient's new laboratory and imaging results.  I independently reviewed the patient's new images.    Medications: Reviewed.    Assessment/Plan   A / P     84 y.o.female, admitted on 1/31/2019 with Acute renal failure (ARF) (CMS/[facility]) [N17.9]:     1. S/p PEA, while she was in the inpatient dialysis unit, quickly resuscitated (within 3 minutes), and it did not require intubation, 2/1/19  2. 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  3. ESRD on HD  4. ITP  5. Leukocytosis, since admission. Peak on 2/7: 27K. Then on 02/13/19, lowest it has ever been (12.7K), and up a little bit on 2/14/19.      6. Odynophagia and oral  thrush } Resolved  1. R/o Esoph. candidiasis  7. HTN  8. Anemia, chronic disease.  1. EPO and IV iron during HD } As per Nephrology  9. Hiatal hernia.  10. Antibiotics: Fluconazole.   1. Last dose of Vanco was 2/7/19. Last dose of MRP was 2/12/19  11. Glucose: No h/o DM    Lab Results   Lab Value Date/Time    HGBA1C 5.60 01/31/2019 1758       Diet: Diet Dysphagia; IV - Mechanical Soft No Mixed Consistencies; Nectar / Syrup Thick; Renal   Advance Directives: Code Status and Medical Interventions:   Ordered at: 02/01/19 1600     Limited Support to NOT Include:    Intubation     Code Status:    CPR     Medical Interventions (Level of Support Prior to Arrest):    Limited          Assessment / Plan:    1. Discussed with Dr. Villanueva who is planning for outpatient antibiotics.  2. Fluconazole until 2/19.  3. Labs in AM  4. Awaiting floor bed since yesterday.  5. Disposition: Transfer to Telemetry Unit. }Awaiting rehab bed. Probable Friday 2/15 at [facility]    Plan of care and goals reviewed during interdisciplinary rounds.  Level of Risk is High due to:  illness with threat to life or bodily function.   I discussed the patient's findings and my recommendations with patient     OK to floor.  We will follow as needed once out of the Intensive Care Unit.  Hospitalist Team will assist with medical management once on the Floor.    Thank you.     Chung Ferrari MD, FACP, FCCP, [facility]  Intensive Care Medicine, Nutrition Support and Pulmonary Medicine

## 2019-02-14 NOTE — PROGRESS NOTES
"   LOS: 14 days    Patient Care Team:  Otilio Smith DO as PCP - General (Family Medicine)    Reason For Visit:    Subjective     Patient seen/examined on dialysis. Tolerating treatment.       Review of Systems:    Pulm: No soa   CV:  No CP      Objective       bisoprolol 2.5 mg Oral Nightly   calcitriol 0.25 mcg Oral Daily   castor oil-balsam peru  Topical Q12H   epoetin alexus 10,000 Units Subcutaneous Once per day on Tue Thu Sat   fluconazole 200 mg Oral Q24H   heparin (porcine) 5,000 Units Subcutaneous Q8H   hydrALAZINE 25 mg Oral Q8H   pantoprazole 40 mg Oral Q AM   senna 1 tablet Oral BID   sodium chloride 3 mL Intravenous Q12H   sodium hypochlorite  Topical BID            Vital Signs:  Blood pressure 115/61, pulse 68, temperature 97.5 °F (36.4 °C), temperature source Oral, resp. rate 28, height 162.6 cm (64\"), weight 54.7 kg (120 lb 9.5 oz), SpO2 100 %, not currently breastfeeding.    Flowsheet Rows      First Filed Value   Admission Height  163.8 cm (64.5\") Documented at 02/01/2019 0300   Admission Weight  70 kg (154 lb 4.8 oz) Documented at 01/31/2019 1607          02/13 0701 - 02/14 0700  In: 660 [P.O.:660]  Out: -     Physical Exam:    General Appearance: NAD, alert and cooperative, Ox3  Eyes: PER, conjunctivae and sclerae normal, no icterus  Lungs: respirations regular and unlabored, no crepitus, clear to auscultation  Heart/CV: regular rhythm & normal rate, no murmur, no gallop, no rub and no edema  Abdomen: not distended, soft, non-tender, no masses,  bowel sounds present  Skin: No rash, Warm and dry, tunneled cath    Radiology:      Renal Imaging:        Labs:  Results from last 7 days   Lab Units 02/14/19  0642 02/13/19  0535 02/12/19  1719   WBC 10*3/mm3 13.27* 12.78* 15.90*   HEMOGLOBIN g/dL 7.6* 7.4* 7.3*   HEMATOCRIT % 24.3* 23.5* 23.1*   PLATELETS 10*3/mm3 403 346 299     Results from last 7 days   Lab Units 02/14/19  0642 02/13/19  0535 02/12/19  0958 02/11/19  0713 02/10/19  0401 " 02/09/19  0701   SODIUM mmol/L 146 147* 146 147* 140 144   POTASSIUM mmol/L 4.2 3.9 3.6 4.2 3.7 3.9   CHLORIDE mmol/L 108 109 110* 112* 105 110*   CO2 mmol/L 25.0 26.0 22.0 22.0 24.0 22.0   BUN mg/dL 46* 26* 49* 37* 26* 45*   CREATININE mg/dL 3.64* 2.38* 3.83* 3.41* 2.37* 3.69*   CALCIUM mg/dL 9.0 8.7 8.8 8.7 8.6* 8.4*   PHOSPHORUS mg/dL  --  2.0*  --  3.6 2.4 5.1   ALBUMIN g/dL  --  3.62  --  3.54 3.80 3.15*     Results from last 7 days   Lab Units 02/14/19  0642   GLUCOSE mg/dL 100                       Estimated Creatinine Clearance: 9.9 mL/min (A) (by C-G formula based on SCr of 3.64 mg/dL (H)).      Assessment       PEA    ITP (idiopathic thrombocytopenic purpura)    Pulmonary hypertension (CMS/HCC)    CKD (chronic kidney disease) stage 5, GFR less than 15 ml/min (CMS/HCC)    Leukocytosis    Anemia due to chronic kidney disease    Debility    Hx of renal cell carcinoma    Chronic diastolic CHF (congestive heart failure) (CMS/HCC)    Falls frequently    Huge hiatal hernia with intrathoracic stomach    Mitral valve mass            Impression:   New ESRD  Anemia of renal failure  HTN        Recommendations:   HD now  Start ferrlicit 125 mg times 8 doses  Epo 56857 units with HD  Recheck ankit Salcido DO  02/14/19  10:41 AM

## 2019-02-14 NOTE — PROGRESS NOTES
Clinical Nutrition     Multidisciplinary Rounds    Time: 20min  Patient Name: Fabiola Pimentel  Date of Encounter: 02/14/19 9:34 AM  MRN: [id number]  Admission date: 1/31/2019      Reason for visit: MDR. RD to continue to follow per protocol.     Additional information obtained during MDR:  Order to transfer out of the ICU.  Continue to have poor intake, refused tube feeding, family/nursing staff and RD continue to encourage intake without success.   Plan for FEES today to re-evaluate sallow status.      Breakfast tray at bedside, unconsumed.      Dicussed with intensivisit after rounds, ? Appetite stimulant.      Current diet: Diet Dysphagia; IV - Mechanical Soft No Mixed Consistencies; Nectar / Syrup Thick; Renal      Dietary Nutrition Supplements Magic Cup BID     EMR reviewed     Intervention:   Follow treatment plan  Care plan reviewed  Review menu options  Supplement with meals   Consider an appetite stimulant due to on going poor intake during hospital stay without any improvement.       Follow up:   Per protocol    Nighat Cuba RD  9:34 AM

## 2019-02-14 NOTE — THERAPY TREATMENT NOTE
Acute Care - Occupational Therapy Treatment Note  [facility]     Patient Name: Fabiola Pimentel  : 1934  MRN: [id number]  Today's Date: 2019  Onset of Illness/Injury or Date of Surgery: 19  Date of Referral to OT: 19  Referring Physician: MD Radha    Admit Date: 2019       ICD-10-CM ICD-9-CM   1. Impaired functional mobility, balance, gait, and endurance Z74.09 V49.89   2. Impaired mobility and ADLs Z74.09 799.89   3. Pharyngeal dysphagia R13.13 787.23   4. PEA I46.9 427.5     Patient Active Problem List   Diagnosis   • ITP (idiopathic thrombocytopenic purpura)   • Palpitations   • Premature atrial contractions   • Hypertension   • Pulmonary hypertension (CMS/HCC)   • DJD (degenerative joint disease)   • Raynaud's phenomenon (secondary)   • Diverticular disease   • CKD (chronic kidney disease) stage 5, GFR less than 15 ml/min (CMS/HCC)   • Leukocytosis   • Anemia due to chronic kidney disease   • Debility   • Hx of renal cell carcinoma   • Chronic diastolic CHF (congestive heart failure) (CMS/HCC)   • Falls frequently   • PEA   • Huge hiatal hernia with intrathoracic stomach   • Mitral valve mass     Past Medical History:   Diagnosis Date   • Chronic ITP (idiopathic thrombocytopenia) (CMS/HCC)    • Chronic renal insufficiency    • CKD (chronic kidney disease)    • Diverticular disease    • Dizziness     Dizziness with increase of propafenone to t.i.d.; however, palpitations improved, patient wishes to stay on current dose.   • DJD (degenerative joint disease)    • Hip fracture (CMS/HCC)     Recent hip and pelvic fracture.   • History of uterine cancer     Initial diagnosis, , status post SALVATORE/BSO. Recurrence documented , status post radiation therapy and implants.   • Hypertension    • Lower extremity edema     Lower extremity venous duplex, 2013:  No evidence of deep venous thrombosis. Negative VQ scan for pulmonary embolus, findings suggest congestive heart failure,  10/11/2013.No evidence of deep venous thrombosis by bilateral duplex, 03/25/2015.Mild   • Palpitations    • Pelvic fracture (CMS/HCC)     Recent hip and pelvic fracture.   • Premature atrial contractions     Premature atrial contractions, postoperative from incisional hernia repair:A 2D echocardiogram 01/03/2008:  Mild MR, mild to moderate TR, small pericardial effusion, EF 65%.   • Pulmonary hypertension (CMS/HCC)    • Raynaud's phenomenon (secondary)     Raynaud’s phenomenon involving the left upper extremity.     • Stroke (CMS/HCC)     Right optic nerve stroke.     Past Surgical History:   Procedure Laterality Date   • APPENDECTOMY     • ARTERIOVENOUS FISTULA/SHUNT SURGERY Left 1/16/2019    Procedure: LEFT UPPER EXTREMITY ARTERIOVENOUS FISTULA FORMATION, BRACHIO-AXILLARY SHUNT WITH ARTEGRAFT;  Surgeon: Dale Ramirez MD;  [facility];  Service: Vascular   • CATARACT EXTRACTION WITH INTRAOCULAR LENS IMPLANT      Cataract surgery with lens implantation.   • CHOLECYSTECTOMY  1958   • COLON RESECTION  10/2005    Colon resection, October 2005, with associated splenectomy.   • DILATATION AND CURETTAGE     • INCISIONAL HERNIA REPAIR  01/02/2008    With mesh   • INSERTION HEMODIALYSIS CATHETER N/A 2/1/2019    Procedure: HEMODIALYSIS CATHETER INSERTION;  Surgeon: Raymond Elise MD;  [facility];  Service: General   • NEPHRECTOMY Right 2001   • OTHER SURGICAL HISTORY      Recurrence documented 2003, status post radiation therapy and implants.   • SPLENECTOMY     • SYMPATHECTOMY Left     Left axilla sympathetectomy secondary to Raynaud’s phenomenon.   • TOTAL ABDOMINAL HYSTERECTOMY WITH SALPINGO OOPHORECTOMY  2001       Therapy Treatment    Rehabilitation Treatment Summary     Row Name 02/14/19 1116             Treatment Time/Intention    Discipline  occupational therapist  -CL      Document Type  progress note/recertification  -CL2      Subjective Information  complains of;weakness;fatigue   -CL      Patient Effort  fair  -CL      Existing Precautions/Restrictions  fall  -CL      Treatment Considerations/Comments  Pt just finished HD, preparing to t/f to floor, deferred any OOB activity.   -CL      Recorded by [CL] Liliam Valdes, OT 02/14/19 1621  [CL2] Liliam Valdes OT 02/14/19 1623      Row Name 02/14/19 1116             Vital Signs    Pre Systolic BP Rehab  -- VSS, RN cleared for tx.   -CL      Recorded by [CL] Liliam Valdes OT 02/14/19 1621      Row Name 02/14/19 1116             Cognitive Assessment/Intervention- PT/OT    Affect/Mental Status (Cognitive)  WFL  -CL      Orientation Status (Cognition)  oriented x 4  -CL      Follows Commands (Cognition)  follows one step commands;over 90% accuracy;verbal cues/prompting required;repetition of directions required  -CL      Cognitive Function (Cognitive)  safety deficit  -CL      Safety Deficit (Cognitive)  mild deficit;awareness of need for assistance;safety precautions awareness  -CL      Personal Safety Interventions  fall prevention program maintained;gait belt;nonskid shoes/slippers when out of bed  -CL      Recorded by [CL] Liliam Valdes OT 02/14/19 1621      Row Name 02/14/19 1116             ADL Assessment/Intervention    51020 - OT Self Care/Mgmt Minutes  8  -CL      BADL Assessment/Intervention  feeding  -CL      Recorded by [CL] Liliam Valdes OT 02/14/19 1621      Row Name 02/14/19 1116             Self-Feeding Assessment/Training    Lee Level (Feeding)  liquids to mouth;scoop food and bring to mouth;supervision;verbal cues  -CL      Assistive Devices (Feeding)  built-up handle utensils;adapted cup  -CL      Position (Self-Feeding)  sitting up in bed  -CL      Comment (Feeding)  Pt simulated w/ no assist needed, reports improvement w/ LH utensils.   -CL      Recorded by [CL] Liliam Valdes OT 02/14/19 1621      Row Name 02/14/19 1116             Therapeutic Exercise    99366 - OT Therapeutic Exercise Minutes  4  -CL      Recorded by  [CL] Liliam Valdes, LAILA 02/14/19 1621      Row Name 02/14/19 1116             Upper Extremity Seated Therapeutic Exercise    Performed, Seated Upper Extremity (Therapeutic Exercise)  elbow flexion/extension;forearm supination/pronation;wrist flexion/extension;digit flexion/extension  -CL      Exercise Type, Seated Upper Extremity (Therapeutic Exercise)  AROM (active range of motion)  -CL      Sets/Reps Detail, Seated Upper Extremity (Therapeutic Exercise)  1/10  -CL      Comment, Seated Upper Extremity (Therapeutic Exercise)  RUE  -CL      Recorded by [CL] Liliam Valdes, LAILA 02/14/19 1621      Row Name 02/14/19 1116             Positioning and Restraints    Pre-Treatment Position  in bed  -CL      Post Treatment Position  bed  -CL      In Bed  notified nsg;fowlers;call light within reach;encouraged to call for assist;exit alarm on;with family/caregiver;RUE elevated;LUE elevated;legs elevated;heels elevated  -CL      Recorded by [CL] Liliam Valdes OT 02/14/19 1621      Row Name 02/14/19 1116             Pain Scale 2: FACES Pre/Post-Treatment    Pain 2: FACES Scale, Pretreatment  2-->hurts little bit  -CL      Pain 2: FACES Scale, Post-Treatment  2-->hurts little bit  -CL      Pain Location 2 - Orientation  generalized  -CL      Pre/Post Treatment Pain 2 Comment  Tolerated.   -CL      Pain Intervention(s) 2  Repositioned;Ambulation/increased activity  -CL      Recorded by [CL] Liliam Valdes, LAILA 02/14/19 1621      Row Name                Wound 02/01/19 1825 Other (See comments) chest incision    Wound - Properties Group Date first assessed: 02/01/19 [KS] Time first assessed: 1825 [KS] Side: Other (See comments) [KS] Location: chest [KS] Type: incision [KS] Recorded by:  [KS] Leonela Caceres RN 02/01/19 1825    Row Name                Wound 01/16/19 1429 Left arm incision    Wound - Properties Group Date first assessed: 01/16/19 [JOSHUA] Time first assessed: 1429 [JOSHUA] Side: Left [JOSHUA] Location: arm [JOSHUA] Type: incision [JOSHUA]  Recorded by:  [JOSHUA] Nilam Joshi RN 01/16/19 1429    Row Name                Wound 01/31/19 1600 Bilateral medial coccyx pressure injury    Wound - Properties Group Date first assessed: 01/31/19 [CM] Time first assessed: 1600 [CM] Present On Admission : yes [CM] Side: Bilateral [CM] Orientation: medial [CM] Location: coccyx [CM] Type: pressure injury [CM] Stage, Pressure Injury: Stage 2 [CM] Additional Comments: wound now unstageable with moderate slough / eschar covering.  [MF] Wound Outcome: Converged [MF2] Recorded by:  [CM] Trudy Cornejo RN 02/03/19 0718 [MF] Neftaly Seaman, PT 02/06/19 1326 [MF2] Neftaly Seaman, PT 02/08/19 1123    Row Name                Wound 02/04/19 2000 Right upper chest puncture    Wound - Properties Group Date first assessed: 02/04/19 [RS] Time first assessed: 2000 [RS] Present On Admission : no [RS] Side: Right [RS] Orientation: upper [RS] Location: chest [RS] Type: puncture [RS], HD cath with pigtail site (removed)  Recorded by:  [RS] Rodriguez Garcia RN 02/04/19 2230      User Key  (r) = Recorded By, (t) = Taken By, (c) = Cosigned By    Initials Name Effective Dates Discipline     Neftaly Seaman, PT 06/19/15 -  PT    Rodriguez Pittmna RN 06/30/16 -  Nurse    Nilam Martinez RN 06/16/16 -  Nurse    Leonela Quezada RN 05/09/17 -  Nurse    Liliam Read OT 04/03/18 -  OT    Trudy Peterson RN 06/21/17 -  Nurse        Wound 01/16/19 1429 Left arm incision (Active)   Dressing Appearance open to air 2/14/2019 12:30 PM   Closure Open to air 2/14/2019 12:30 PM   Dressing Care, Wound open to air 2/14/2019 12:30 PM       Wound 02/01/19 1825 Other (See comments) chest incision (Active)   Dressing Appearance intact;dry 2/14/2019 12:30 PM   Closure None 2/14/2019 12:30 PM   Base yellow;pink 2/14/2019 12:30 PM   Periwound intact;pink 2/14/2019 12:30 PM   Periwound Temperature warm 2/14/2019 12:30 PM   Periwound Skin Turgor soft 2/14/2019  12:30 PM   Edges irregular 2/14/2019 12:30 PM   Drainage Amount none 2/14/2019 12:00 PM   Care, Wound dressing removed 2/14/2019 10:00 AM   Dressing Care, Wound gauze 2/14/2019 12:30 PM       Wound 01/31/19 1600 Bilateral medial coccyx pressure injury (Active)   Dressing Appearance dry;intact 2/14/2019 12:30 PM   Closure None 2/14/2019 12:30 PM   Base yellow;subcutaneous;red/granulating 2/14/2019 12:30 PM   Periwound dry;pink 2/14/2019 12:30 PM   Drainage Characteristics/Odor serosanguineous 2/14/2019 12:30 PM   Drainage Amount scant 2/14/2019 12:30 PM   Dressing Care, Wound foam;low-adherent 2/14/2019 12:30 PM   Periwound Care, Wound barrier ointment applied 2/14/2019 10:00 AM       Wound 02/04/19 2000 Right upper chest puncture (Active)   Dressing Appearance dry;intact 2/14/2019 12:30 PM   Closure None 2/14/2019 12:30 PM   Base pink;yellow 2/14/2019 12:30 PM   Periwound intact;pink 2/14/2019 12:30 PM   Drainage Characteristics/Odor serous 2/14/2019 12:00 PM   Drainage Amount none 2/14/2019 12:30 PM   Dressing Care, Wound foam;multi-layer wrap 2/14/2019 12:30 PM     Rehab Goal Summary     Row Name 02/14/19 1116             Transfer Goal 1 (OT)    Progress/Outcome (Transfer Goal 1, OT)  goal ongoing  -CL         Dressing Goal 1 (OT)    Progress/Outcome (Dressing Goal 1, OT)  goal ongoing  -CL         Self-Feeding Goal 1 (OT)    Progress/Outcomes (Self-Feeding Goal 1, OT)  goal ongoing  -CL         Strength Goal 1 (OT)    Progress/Outcome (Strength Goal 1, OT)  goal ongoing  -CL        User Key  (r) = Recorded By, (t) = Taken By, (c) = Cosigned By    Initials Name Provider Type Discipline    CL Liliam Valdes OT Occupational Therapist OT        Occupational Therapy Education     Title: PT OT SLP Therapies (In Progress)     Topic: Occupational Therapy (In Progress)     Point: ADL training (In Progress)     Description: Instruct learner(s) on proper safety adaptation and remediation techniques during self care or  transfers.   Instruct in proper use of assistive devices.    Learning Progress Summary           Patient Acceptance, E, NR by CL at 2/14/2019  4:21 PM    Comment:  Pt educated on appropriate safety precautions, positioning, AE use for self feeding, HEP, and benefits of therapy.    Acceptance, E, NR by CL at 2/6/2019  3:36 PM    Comment:  Pt educated on appropriate safety precautions, positioning, role of OT, AE for self feeding, and benefits of therapy.    Acceptance, E, NR by CL at 2/3/2019  2:41 PM    Comment:  Pt educated on appropriate safety precautions, positioning, role of OT, AE for self feeding, and benefits of therapy.                   Point: Home exercise program (In Progress)     Description: Instruct learner(s) on appropriate technique for monitoring, assisting and/or progressing therapeutic exercises/activities.    Learning Progress Summary           Patient Acceptance, E, NR by CL at 2/14/2019  4:21 PM    Comment:  Pt educated on appropriate safety precautions, positioning, AE use for self feeding, HEP, and benefits of therapy.    Acceptance, E, NR by JR at 2/9/2019  2:39 PM    Comment:  Educated pt regarding role of OT and UE HEP    Acceptance, E, NR by CL at 2/6/2019  3:36 PM    Comment:  Pt educated on appropriate safety precautions, positioning, role of OT, AE for self feeding, and benefits of therapy.    Acceptance, E, NR by CL at 2/3/2019  2:41 PM    Comment:  Pt educated on appropriate safety precautions, positioning, role of OT, AE for self feeding, and benefits of therapy.                   Point: Precautions (In Progress)     Description: Instruct learner(s) on prescribed precautions during self-care and functional transfers.    Learning Progress Summary           Patient Acceptance, E, NR by CL at 2/14/2019  4:21 PM    Comment:  Pt educated on appropriate safety precautions, positioning, AE use for self feeding, HEP, and benefits of therapy.    Acceptance, E, NR by CL at 2/6/2019  3:36 PM     Comment:  Pt educated on appropriate safety precautions, positioning, role of OT, AE for self feeding, and benefits of therapy.    Acceptance, E, NR by CL at 2/3/2019  2:41 PM    Comment:  Pt educated on appropriate safety precautions, positioning, role of OT, AE for self feeding, and benefits of therapy.                   Point: Body mechanics (In Progress)     Description: Instruct learner(s) on proper positioning and spine alignment during self-care, functional mobility activities and/or exercises.    Learning Progress Summary           Patient Acceptance, E, NR by CL at 2/14/2019  4:21 PM    Comment:  Pt educated on appropriate safety precautions, positioning, AE use for self feeding, HEP, and benefits of therapy.    Acceptance, E, NR by CL at 2/6/2019  3:36 PM    Comment:  Pt educated on appropriate safety precautions, positioning, role of OT, AE for self feeding, and benefits of therapy.                               User Key     Initials Effective Dates Name Provider Type Discipline     06/22/15 -  Ebony Sparrow, OT Occupational Therapist OT    CL 04/03/18 -  Liliam Valdes OT Occupational Therapist OT                OT Recommendation and Plan  Outcome Summary/Treatment Plan (OT)  Anticipated Equipment Needs at Discharge (OT): (TBA further)  Anticipated Discharge Disposition (OT): skilled nursing facility  Therapy Frequency (OT Eval): daily  Plan of Care Review  Plan of Care Reviewed With: patient  Plan of Care Reviewed With: patient  Outcome Summary: Pt educated on AE to improve independence w/ self feeding and encouraged to perform RUE elbow/wrist/hand ROM to promote ADL performance. Pt conts to be limited d/t c/o fatigue and weakness. Recommend cont skilled IPOT POC.   Outcome Measures     Row Name 02/14/19 1116 02/13/19 0910          How much help from another person do you currently need...    Turning from your back to your side while in flat bed without using bedrails?  --  2  -LS     Moving  from lying on back to sitting on the side of a flat bed without bedrails?  --  2  -LS     Moving to and from a bed to a chair (including a wheelchair)?  --  2  -LS     Standing up from a chair using your arms (e.g., wheelchair, bedside chair)?  --  2  -LS     Climbing 3-5 steps with a railing?  --  1  -LS     To walk in hospital room?  --  2  -LS     AM-PAC 6 Clicks Score  --  11  -LS        How much help from another is currently needed...    Putting on and taking off regular lower body clothing?  1  -CL  --     Bathing (including washing, rinsing, and drying)  1  -CL  --     Toileting (which includes using toilet bed pan or urinal)  1  -CL  --     Putting on and taking off regular upper body clothing  2  -CL  --     Taking care of personal grooming (such as brushing teeth)  2  -CL  --     Eating meals  3  -CL  --     Score  10  -CL  --        Functional Assessment    Outcome Measure Options  AM-PAC 6 Clicks Daily Activity (OT)  -CL  AM-PAC 6 Clicks Basic Mobility (PT)  -LS       User Key  (r) = Recorded By, (t) = Taken By, (c) = Cosigned By    Initials Name Provider Type    LS Tanesha Castro, PT Physical Therapist    CL Liliam Valdes, OT Occupational Therapist           Time Calculation:   Time Calculation- OT     Row Name 02/14/19 1116             Time Calculation- OT    OT Start Time  1116  -CL      OT Received On  02/14/19  -CL      OT Goal Re-Cert Due Date  02/24/19  -CL         Timed Charges    16375 - OT Therapeutic Exercise Minutes  4  -CL      59754 - OT Self Care/Mgmt Minutes  8  -CL        User Key  (r) = Recorded By, (t) = Taken By, (c) = Cosigned By    Initials Name Provider Type    CL Liliam Valdes, OT Occupational Therapist           Therapy Suggested Charges     Code   Minutes Charges    41905 (CPT®) Hc Ot Neuromusc Re Education Ea 15 Min      78383 (CPT®) Hc Ot Ther Proc Ea 15 Min 4     57776 (CPT®) Hc Ot Therapeutic Act Ea 15 Min      30307 (CPT®) Hc Ot Manual Therapy Ea 15 Min      25348 (CPT®) Hc  Ot Iontophoresis Ea 15 Min      16407 (CPT®) Hc Ot Elec Stim Ea-Per 15 Min      74147 (CPT®) Hc Ot Ultrasound Ea 15 Min      44811 (CPT®) Hc Ot Self Care/Mgmt/Train Ea 15 Min 8 1    Total  12 1        Therapy Charges for Today     Code Description Service Date Service Provider Modifiers Qty    64448433795 HC OT SELF CARE/MGMT/TRAIN EA 15 MIN 2/14/2019 Liliam Valdes, OT GO 1               Liliam Valdes OT  2/14/2019

## 2019-02-14 NOTE — PLAN OF CARE
Problem: Patient Care Overview  Goal: Plan of Care Review  Outcome: Ongoing (interventions implemented as appropriate)   02/14/19 9304   Coping/Psychosocial   Plan of Care Reviewed With patient   SLP re-evaluation completed. Will continue to address pharyngeal dysphagia in tx. Please see note for further details and recommendations.

## 2019-02-14 NOTE — MBS/VFSS/FEES
Acute Care - Speech Language Pathology   Swallow Re-Evaluation [facility]   Fiberoptic Endoscopic Evaluation of Swallowing (FEES)     Patient Name: Fabiola Pimentel  : 1934  MRN: [id number]  Today's Date: 2019  Onset of Illness/Injury or Date of Surgery: 19     Referring Physician: MD Radha      Admit Date: 2019    Visit Dx:     ICD-10-CM ICD-9-CM   1. Impaired functional mobility, balance, gait, and endurance Z74.09 V49.89   2. Impaired mobility and ADLs Z74.09 799.89   3. Pharyngeal dysphagia R13.13 787.23   4. PEA I46.9 427.5     Patient Active Problem List   Diagnosis   • ITP (idiopathic thrombocytopenic purpura)   • Palpitations   • Premature atrial contractions   • Hypertension   • Pulmonary hypertension (CMS/HCC)   • DJD (degenerative joint disease)   • Raynaud's phenomenon (secondary)   • Diverticular disease   • CKD (chronic kidney disease) stage 5, GFR less than 15 ml/min (CMS/HCC)   • Leukocytosis   • Anemia due to chronic kidney disease   • Debility   • Hx of renal cell carcinoma   • Chronic diastolic CHF (congestive heart failure) (CMS/HCC)   • Falls frequently   • PEA   • Huge hiatal hernia with intrathoracic stomach   • Mitral valve mass     Past Medical History:   Diagnosis Date   • Chronic ITP (idiopathic thrombocytopenia) (CMS/HCC)    • Chronic renal insufficiency    • CKD (chronic kidney disease)    • Diverticular disease    • Dizziness     Dizziness with increase of propafenone to t.i.d.; however, palpitations improved, patient wishes to stay on current dose.   • DJD (degenerative joint disease)    • Hip fracture (CMS/HCC)     Recent hip and pelvic fracture.   • History of uterine cancer     Initial diagnosis, , status post SALVATORE/BSO. Recurrence documented , status post radiation therapy and implants.   • Hypertension    • Lower extremity edema     Lower extremity venous duplex, 2013:  No evidence of deep venous thrombosis. Negative VQ scan for pulmonary  embolus, findings suggest congestive heart failure, 10/11/2013.No evidence of deep venous thrombosis by bilateral duplex, 03/25/2015.Mild   • Palpitations    • Pelvic fracture (CMS/HCC)     Recent hip and pelvic fracture.   • Premature atrial contractions     Premature atrial contractions, postoperative from incisional hernia repair:A 2D echocardiogram 01/03/2008:  Mild MR, mild to moderate TR, small pericardial effusion, EF 65%.   • Pulmonary hypertension (CMS/HCC)    • Raynaud's phenomenon (secondary)     Raynaud’s phenomenon involving the left upper extremity.     • Stroke (CMS/HCC)     Right optic nerve stroke.     Past Surgical History:   Procedure Laterality Date   • APPENDECTOMY     • ARTERIOVENOUS FISTULA/SHUNT SURGERY Left 1/16/2019    Procedure: LEFT UPPER EXTREMITY ARTERIOVENOUS FISTULA FORMATION, BRACHIO-AXILLARY SHUNT WITH ARTEGRAFT;  Surgeon: Dale Ramirez MD;  Location: [facility] OR;  Service: Vascular   • CATARACT EXTRACTION WITH INTRAOCULAR LENS IMPLANT      Cataract surgery with lens implantation.   • CHOLECYSTECTOMY  1958   • COLON RESECTION  10/2005    Colon resection, October 2005, with associated splenectomy.   • DILATATION AND CURETTAGE     • INCISIONAL HERNIA REPAIR  01/02/2008    With mesh   • INSERTION HEMODIALYSIS CATHETER N/A 2/1/2019    Procedure: HEMODIALYSIS CATHETER INSERTION;  Surgeon: Raymond Elise MD;  Location: [facility] OR;  Service: General   • NEPHRECTOMY Right 2001   • OTHER SURGICAL HISTORY      Recurrence documented 2003, status post radiation therapy and implants.   • SPLENECTOMY     • SYMPATHECTOMY Left     Left axilla sympathetectomy secondary to Raynaud’s phenomenon.   • TOTAL ABDOMINAL HYSTERECTOMY WITH SALPINGO OOPHORECTOMY  2001        SWALLOW EVALUATION (last 72 hours)      [facility] Adult Swallow Evaluation     Row Name 02/14/19 1400                   Rehab Evaluation    Document Type  re-evaluation  -AC        Subjective Information  complains of;pain;fatigue   -AC        Patient Observations  alert;cooperative  -AC        Patient/Family Observations  No family present.  -AC        Patient Effort  adequate  -AC           General Information    Patient Profile Reviewed  yes  -AC        Pertinent History Of Current Problem  Adm w/ PEA s/p brief resuscitation, no intubation. Sepsis. Hx CKD - on HD, OA, HH, pulmonary hypertension.  -AC        Current Method of Nutrition  mechanical soft, no mixed consistencies;nectar/syrup-thick liquids  -AC        Precautions/Limitations, Vision  WFL;for purposes of eval  -AC        Precautions/Limitations, Hearing  WFL;for purposes of eval  -AC        Prior Level of Function-Communication  WFL  -AC        Prior Level of Function-Swallowing  no diet consistency restrictions  -AC        Plans/Goals Discussed with  patient;agreed upon  -AC        Barriers to Rehab  medically complex  -AC        Patient's Goals for Discharge  return to regular diet  -AC           Pain Scale: FACES Pre/Post-Treatment    Pain: FACES Scale, Pretreatment  4-->hurts little more  -AC        Pain: FACES Scale, Post-Treatment  2-->hurts little bit  -AC        Pre/Post Treatment Pain Comment  pain @ wound site on back side - repositioned by RN/PCT  -AC           Oral Motor and Function    Dentition Assessment  upper dentures/partial in place;lower dentures/partial in place  -AC           General Eating/Swallowing Observations    Eating/Swallowing Skills  fed by staff/caregiver;self-fed  -AC        Positioning During Eating  upright 90 degree;upright in bed  -AC           Fiberoptic Endoscopic Evaluation of Swallowing (FEES)    Risks/Benefits Reviewed  risks/benefits explained;patient;agreed to eval  -AC        Nasal Entry  right:  -AC        Special Considerations  Scope #[id number]  -           Anatomy and Physiology    Anatomic Considerations  no anatomic structural deviation  -AC        Velopharyngeal  WFL  -AC        Base of Tongue  symmetrical;range reduced  -AC         Epiglottis  WFL  -AC        Laryngeal Function Breathing  symmetrical  -AC        Laryngeal Function Phonation  symmetrical  -AC        Laryngeal Function to Breath Hold  incomplete closure  -AC        Secretion Rating Scale (Deandre et al. 1996)  0- normal, no visible secretions  -AC        Spontaneous Swallow  frequency reduced  -AC        Sensory  reduced sensation  -AC        Utensils Used  Spoon;Cup;Straw  -AC        Consistencies Trialed  thin liquids;nectar-thick liquids;Regular textures  -AC           FEES Interpretation    Oral Phase  WFL  -AC           Initiation of Pharyngeal Swallow    Initiation of Pharyngeal Swallow  bolus in pyriform sinuses  -AC        Pharyngeal Phase  impaired pharyngeal phase of swallowing  -AC        Aspiration After the Swallow  thin liquids;secondary to residue;in pyriform sinuses  -AC        Response to Aspiration  no response, silent aspiration;other (see comments) weak cued cough  -AC        Rosenbek's Scale  thin:;8-->Level 8;nectar:;regular textures:;1-->Level 1  -AC        Residue  thin liquids;nectar-thick liquids;diffuse within pharynx;secondary to reduced base of tongue retraction;secondary to reduced posterior pharyngeal wall stripping;secondary to reduced laryngeal elevation;secondary to reduced hyolaryngeal excursion;other (see comments) mild w/ nectar, moderate w/ thin  -AC        Response to Residue  other (see comments) partial clearance w/ cued mult swallows  -AC        Attempted Compensatory Maneuvers  chin tuck;effortful (hard swallow);multiple swallows  -AC        Response to Attempted Compensatory Maneuvers  did not prevent aspiration  -AC        FEES Summary  Pt cont to demonstrate moderate diffuse pharyngeal residue w/ thin liquid that she was u/a to completely clear even when cued to use variety of compensatory strategies. This resulted in aspiration of thin liquid via cup/straw after the swallow. Aspiration was silent. Weak cued cough. No  penetration/aspiration w/ single ice chip, 1/2 tsp thin, or daniel cracker. At one point, noted possible retrograde flow above the level of the UES. Pt denied hx GERD.  -AC           Clinical Impression    SLP Swallowing Diagnosis  mild-moderate;pharyngeal dysfunction  -        Functional Impact  risk of aspiration/pneumonia;risk of malnutrition;risk of dehydration  -AC        Rehab Potential/Prognosis, Swallowing  good, to achieve stated therapy goals  -AC        Swallow Criteria for Skilled Therapeutic Interventions Met  demonstrates skilled criteria  -AC           Recommendations    Therapy Frequency (Swallow)  5 days per week  -AC        Predicted Duration Therapy Intervention (Days)  until discharge  -AC        SLP Diet Recommendation  mechanical soft with no mixed consistencies;nectar thick liquids;other (see comments) may have single icechips&1/2 tsp thin H2O b/t meals w/superv  -AC        Recommended Diagnostics  other (see comments) may consider MBS for repeat study  -        Recommended Precautions and Strategies  upright posture during/after eating;other (see comments) general aspiration prctns; oral care BID/PRN  -AC        SLP Rec. for Method of Medication Administration  meds whole;with thick liquids;with pudding or applesauce;as tolerated  -        Monitor for Signs of Aspiration  yes;notify SLP if any concerns  -AC        Anticipated Dischage Disposition  anticipate therapy at next level of care  -          User Key  (r) = Recorded By, (t) = Taken By, (c) = Cosigned By    Initials Name Effective Dates    Deisi Smith, MS CCC-SLP 07/27/17 -           EDUCATION  The patient has been educated in the following areas:   Dysphagia (Swallowing Impairment) Oral Care/Hydration Modified Diet Instruction.    SLP Recommendation and Plan  SLP Swallowing Diagnosis: mild-moderate, pharyngeal dysfunction  SLP Diet Recommendation: mechanical soft with no mixed consistencies, nectar thick liquids, other  (see comments)(may have single icechips&1/2 tsp thin H2O b/t meals w/superv)  Recommended Precautions and Strategies: upright posture during/after eating, other (see comments)(general aspiration prctns; oral care BID/PRN)     Monitor for Signs of Aspiration: yes, notify SLP if any concerns  Recommended Diagnostics: other (see comments)(may consider MBS for repeat study)  Swallow Criteria for Skilled Therapeutic Interventions Met: demonstrates skilled criteria  Anticipated Dischage Disposition: anticipate therapy at next level of care  Rehab Potential/Prognosis, Swallowing: good, to achieve stated therapy goals  Therapy Frequency (Swallow): 5 days per week  Predicted Duration Therapy Intervention (Days): until discharge       Plan of Care Reviewed With: patient  Plan of Care Review  Plan of Care Reviewed With: patient  Daily Summary of Progress (SLP): progress toward functional goals is good  Plan for Continued Treatment (SLP): Cont dysphagia level IV diet (mech soft, no mixed consistencies), nectar-thick liquid. General aspiration precautions & oral care BID/PRN. Pt would benefit from cont'd dysphagia tx.     SLP GOALS     Row Name 02/14/19 1400             Oral Nutrition/Hydration Goal 1 (SLP)    Oral Nutrition/Hydration Goal 1, SLP  LTG:  Patient will tolerate reg diet and thin liquids with no s/s of pen/asp   -AC      Time Frame (Oral Nutrition/Hydration Goal 1, SLP)  by discharge  -AC      Progress/Outcomes (Oral Nutrition/Hydration Goal 1, SLP)  goal ongoing  -AC         Oral Nutrition/Hydration Goal 2 (SLP)    Oral Nutrition/Hydration Goal 2, SLP  Pt will tolerate trials of soft solids and nectar-thick liquids w/o s/sxs aspiration w/ 100% acc w/o cues.  -AC      Time Frame (Oral Nutrition/Hydration Goal 2, SLP)  short term goal (STG);by discharge  -AC      Progress/Outcomes (Oral Nutrition/Hydration Goal 2, SLP)  goal ongoing  -AC         Oral Nutrition/Hydration Goal (SLP)    Oral Nutrition/Hydration Goal,  SLP  Pt will tolerate therapeutic trials of H2O w/o s/sxs aspiration w/ 70% acc w/o cues.  -AC      Time Frame (Oral Nutrition/Hydration Goal, SLP)  short term goal (STG);by discharge  -AC      Progress/Outcomes (Oral Nutrition/Hydration Goal, SLP)  goal ongoing  -AC         Pharyngeal Strengthening Exercise Goal 1 (SLP)    Activity (Pharyngeal Strengthening Goal 1, SLP)  increase superior movement of the hyolaryngeal complex;increase anterior movement of the hyolaryngeal complex;increase squeeze/positive pressure generation;increase tongue base retraction  -AC      Increase Superior Movement of the Hyolaryngeal Complex  supraglottic swallow;Mendelsohn;effortful pitch glide (falsetto + pharyngeal squeeze)  -AC      Increase Anterior Movement of the Hyolaryngeal Complex  chin tuck against resistance (CTAR)  -AC      Increase Squeeze/Positive Pressure Generation  hard effortful swallow  -AC      Increase Tongue Base Retraction  theodora  -AC      [place]/Accuracy (Pharyngeal Strengthening Goal 1, SLP)  with minimal cues (75-90% accuracy)  -AC      Time Frame (Pharyngeal Strengthening Goal 1, SLP)  short term goal (STG);by discharge  -AC      Progress/Outcomes (Pharyngeal Strengthening Goal 1, SLP)  goal revised this date  -        User Key  (r) = Recorded By, (t) = Taken By, (c) = Cosigned By    Initials Name Provider Type    Deisi Smith MS CCC-SLP Speech and Language Pathologist             Time Calculation:   Time Calculation- SLP     Row Name 02/14/19 1723             Time Calculation- SLP    SLP Start Time  1400  -      SLP Received On  02/14/19  -        User Key  (r) = Recorded By, (t) = Taken By, (c) = Cosigned By    Initials Name Provider Type    Deisi Smith MS CCC-SLP Speech and Language Pathologist          Therapy Charges for Today     Code Description Service Date Service Provider Modifiers Qty    [id number] HC ST FIBEROPTIC ENDO EVAL SWALL 7 2/14/2019 Deisi Nava MS CCC-SLP JUSTIN  1               Deisi Nava, MS CCC-SLP  2/14/2019

## 2019-02-14 NOTE — CONSULTS
INFECTIOUS DISEASE CONSULT/INITIAL HOSPITAL VISIT    Fabiola Pimentel  1934  [id number]    Date of consult: 2/14/2019    Admit date: 1/31/2019    Requesting Provider: Willis Amaya MD  Evaluating physician: Luis Antonio Villanueva MD  Reason for Consultation: Leukocytosis, neutrophilic, echodense lesion on mitral valve, possible line infection, or hemodialysis site infection  Chief Complaint: Above      Subjective   History of present illness:  Patient is a 84 y.o.  Yr old female with hypertension, DJD, diverticular disease, chronic diastolic CHF, hiatal hernia large, ITP, PVCs, Raynaud's phenomenon, pulmonary hypertension, renal cell carcinoma status post nephrectomy, hypertension, end-stage renal disease on hemodialysis (Tuesday, Thursday, Saturday), hyperlipidemia, chronic diarrhea, who was transferred to [facility] for admission on 1/31/19 from [facility] for right arm fistula not working.  The patient had a dialysis fistula placed in the left forearm by Dr. Ramirez on 1/16/19.  Right IJ catheter was placed.  Patient had been treated with vancomycin and Zosyn for leukocytosis (30,000) and possible sepsis.  Recent fall with dislocation of right shoulder also impacted her ability to ambulate.  She has a history of multiple falls.  The patient had a PEA cardiac arrest on 2/1/19 that lasted less than 3 minutes.  She was transferred to the ICU.  The patient was treated with antibiotics including vancomycin (1/31 until last dose 2/7), fluconazole (for possible esophageal candidiasis with oral thrush, resolved, 2/7 to present), and meropenem (2/6 until last dose 2/12), Zosyn from 1/31 till 2/6.  White blood cell count was increasing and decreasing.  Recent peak was 27,000 on 2/7/19.  Left tunneled hemodialysis catheter chest exit site with some dermal necrosis (placed on 2/1/19).  Was followed by Dr. Raymond Elise from surgery and treated with Dakin's solution. Blood cultures obtained on 2/6  negative, 2/14 negative.  The patient is a poor historian.  No known antecedent infectious disease exposures, TB, HIV, zoonotic exposures, or immunocompromised state.  I was consulted on 2/14/19 by Dr. Ferrari for further evaluation and treatment.  Recent laboratory evaluation reveals pro-calcitonin 0.78 (1.08 on 2/6), creatinine 3.64, BUN 46, sodium 146, potassium 4.2, bicarbonate 25, WBC 13.27, hemoglobin 7.6, platelets 403.  Normal differential.  Liver function tests from 2/7 unremarkable.  Urinalysis 2/10 WBCs.  WBCs on admission 25.51.  This was on 1/31/19.    Past Medical History:   Diagnosis Date   • Chronic ITP (idiopathic thrombocytopenia) (CMS/HCC)    • Chronic renal insufficiency    • CKD (chronic kidney disease)    • Diverticular disease    • Dizziness     Dizziness with increase of propafenone to t.i.d.; however, palpitations improved, patient wishes to stay on current dose.   • DJD (degenerative joint disease)    • Hip fracture (CMS/HCC)     Recent hip and pelvic fracture.   • History of uterine cancer     Initial diagnosis, 2001, status post SALVATORE/BSO. Recurrence documented 2003, status post radiation therapy and implants.   • Hypertension    • Lower extremity edema     Lower extremity venous duplex, 09/23/2013:  No evidence of deep venous thrombosis. Negative VQ scan for pulmonary embolus, findings suggest congestive heart failure, 10/11/2013.No evidence of deep venous thrombosis by bilateral duplex, 03/25/2015.Mild   • Palpitations    • Pelvic fracture (CMS/HCC)     Recent hip and pelvic fracture.   • Premature atrial contractions     Premature atrial contractions, postoperative from incisional hernia repair:A 2D echocardiogram 01/03/2008:  Mild MR, mild to moderate TR, small pericardial effusion, EF 65%.   • Pulmonary hypertension (CMS/HCC)    • Raynaud's phenomenon (secondary)     Raynaud’s phenomenon involving the left upper extremity.     • Stroke (CMS/HCC)     Right optic nerve stroke.        Past Surgical History:   Procedure Laterality Date   • APPENDECTOMY     • ARTERIOVENOUS FISTULA/SHUNT SURGERY Left 1/16/2019    Procedure: LEFT UPPER EXTREMITY ARTERIOVENOUS FISTULA FORMATION, BRACHIO-AXILLARY SHUNT WITH ARTEGRAFT;  Surgeon: Dale Ramirez MD;  [facility];  Service: Vascular   • CATARACT EXTRACTION WITH INTRAOCULAR LENS IMPLANT      Cataract surgery with lens implantation.   • CHOLECYSTECTOMY  1958   • COLON RESECTION  10/2005    Colon resection, October 2005, with associated splenectomy.   • DILATATION AND CURETTAGE     • INCISIONAL HERNIA REPAIR  01/02/2008    With mesh   • INSERTION HEMODIALYSIS CATHETER N/A 2/1/2019    Procedure: HEMODIALYSIS CATHETER INSERTION;  Surgeon: Raymond Elise MD;  [facility];  Service: General   • NEPHRECTOMY Right 2001   • OTHER SURGICAL HISTORY      Recurrence documented 2003, status post radiation therapy and implants.   • SPLENECTOMY     • SYMPATHECTOMY Left     Left axilla sympathetectomy secondary to Raynaud’s phenomenon.   • TOTAL ABDOMINAL HYSTERECTOMY WITH SALPINGO OOPHORECTOMY  2001       Pediatric History   Patient Guardian Status   • Not on file     Other Topics Concern   • Not on file   Social History Narrative   • Not on file   , 2 children, son lives next door    family history includes Heart attack in her mother; No Known Problems in her father.    Allergies   Allergen Reactions   • Benadryl [Diphenhydramine]      Unknown reaction   • Diltiazem      Edema     • Micardis [Telmisartan]      Hyperkalemia     • Other      DIURETICS, worsening renal insufficiency.           There is no immunization history on file for this patient.    Medication:    Current Facility-Administered Medications:   •  acetaminophen (TYLENOL) tablet 650 mg, 650 mg, Oral, Q6H PRN, Kate Perry, APRN, 650 mg at 02/14/19 1028  •  albumin human 25 % IV SOLN 25 g, 25 g, Intravenous, PRN, Otilio Adler MD, 25 g at 02/14/19  0807  •  alteplase ((CATHFLO/ACTIVASE)) injection 2 mg, 2 mg, Intracatheter, PRN, Otilio Adler MD  •  bisoprolol (ZEBeta) tablet 2.5 mg, 2.5 mg, Oral, Nightly, Malachi Maxwell III, MD, 2.5 mg at 02/13/19 2127  •  calcitriol (ROCALTROL) capsule 0.25 mcg, 0.25 mcg, Oral, Daily, Tammi Brown APRN, 0.25 mcg at 02/14/19 1115  •  castor oil-balsam peru (VENELEX) ointment, , Topical, Q12H, Malachi Stephens MD  •  dextrose (D50W) 25 g/ 50mL Intravenous Solution 25 mL, 25 mL, Intravenous, Q1H PRN, Raymond Nicole MD, 25 mL at 02/01/19 0802  •  epoetin alexus (EPOGEN,PROCRIT) injection 10,000 Units, 10,000 Units, Subcutaneous, Once per day on Tue Thu Sat, Otilio Adler MD, 10,000 Units at 02/14/19 1002  •  ferric gluconate (FERRLECIT) 125 mg in sodium chloride 0.9 % 110 mL IVPB, 125 mg, Intravenous, Daily, Lydia Salcido, DO  •  fluconazole (DIFLUCAN) tablet 200 mg, 200 mg, Oral, Q24H, Chung Ferrari MD, 200 mg at 02/14/19 1115  •  heparin (porcine) 5000 UNIT/ML injection 5,000 Units, 5,000 Units, Subcutaneous, Q8H, Chung Ferrari MD, 5,000 Units at 02/14/19 0621  •  hydrALAZINE (APRESOLINE) tablet 25 mg, 25 mg, Oral, Q8H, Malachi Maxwell III, MD, Stopped at 02/14/19 0622  •  melatonin sublingual tablet 5 mg, 5 mg, Sublingual, Nightly PRN, Heladio Souza APRN, 5 mg at 02/08/19 2044  •  ondansetron (ZOFRAN) injection 4 mg, 4 mg, Intravenous, Q6H PRN, Kenia Monsivais APRN, 4 mg at 02/04/19 2036  •  pantoprazole (PROTONIX) EC tablet 40 mg, 40 mg, Oral, Q AM, Chung Ferrari MD, 40 mg at 02/14/19 0621  •  senna (SENOKOT) tablet 8.6 mg, 1 tablet, Oral, BID, Chung Ferrari MD, 8.6 mg at 02/14/19 1115  •  sodium chloride 0.9 % flush 3 mL, 3 mL, Intravenous, Q12H, Tammi Brown APRN, 3 mL at 02/13/19 2128  •  sodium chloride 0.9 % flush 3-10 mL, 3-10 mL, Intravenous, PRN, Tammi Brown APRN  •  sodium hypochlorite (DAKIN'S) 0.025 % topical solution solution, , Topical, BID, Schwarcz,  "Raymond MUÑOZ MD    Please refer to the medical record for a full medication list    Review of Systems:    Constitutional-- No Fever, chills or sweats.  Appetite fair, and no malaise. No fatigue.  HEENT-- No new vision, hearing or throat complaints.  No epistaxis or oral sores.  Denies odynophagia or dysphagia.  No odynophagia or dysphagia. No headache, photophobia or neck stiffness.  CV-- No chest pain, palpitation or syncope  Resp-- No SOB/cough/Hemoptysis  GI- No nausea, vomiting, or diarrhea.  No hematochezia, melena, or hematemesis. Denies jaundice or chronic liver disease.  -- No dysuria, hematuria, or flank pain.  Denies hesitancy, urgency or flank pain.  Lymph- no swollen lymph nodes in neck/axilla or groin.   Heme- No active bruising or bleeding; no Hx of DVT or PE.  MS-- no swelling or pain in the bones or joints of arms/legs.  No new back pain.  Neuro-- No acute focal weakness or numbness in the arms or legs.  No seizures.  Skin--No rashes or lesions, except sacral wound and left chest wall catheter site exit wound as per history of present illness    Physical Exam:   Vital Signs   Temp:  [97.4 °F (36.3 °C)-98 °F (36.7 °C)] 97.4 °F (36.3 °C)  Heart Rate:  [63-91] 70  Resp:  [16-28] 24  BP: ()/() 112/57    Temp  Min: 97.4 °F (36.3 °C)  Max: 98 °F (36.7 °C)  BP  Min: 76/65  Max: 159/82  Pulse  Min: 63  Max: 91  Resp  Min: 16  Max: 28  SpO2  Min: 93 %  Max: 100 %    Blood pressure 112/57, pulse 70, temperature 97.4 °F (36.3 °C), temperature source Oral, resp. rate 24, height 162.6 cm (64\"), weight 54.7 kg (120 lb 9.5 oz), SpO2 100 %, not currently breastfeeding.  GENERAL: Awake and alert, in moderate distress. Appears older than stated age.  HEENT:  Normocephalic, atraumatic.  Oropharynx without thrush. Dentition in good repair. No cervical adenopathy. No neck masses.  Ears externally normal, Nose externally normal.  EYES: PERRL. No conjunctival injection. No icterus. EOM full.  LYMPHATICS: No " lymphadenopathy of the neck or axillary or inguinal regions.   HEART: No murmur, gallop, or pericardial friction rub. Reg rate rhythm, No JVD at 45 degrees.  LUNGS: Clear to auscultation and percussion. No respiratory distress, no use of accessory muscles.  ABDOMEN: Soft, nontender, nondistended. No appreciable HSM.  Bowel sounds normal.  GENITAL: No external lesions, breasts without masses, back straight, no CVAT, rectal external without lesions.   SKIN: Warm and dry without cutaneous eruptions.  No nodules.  Left chest tunneled catheter exit site with ulceration into the dermis and local skin necrosis approximately 4 x 2 cm without fluctuance, or crepitus.  Sacral wound with 3 x 4 cm x 0.5 cm into the subcutaneous tissue with slough without surrounding crepitus or bullae.  PSYCHIATRIC: Mental status lucid. No confusion.  EXT:  No cellulitic change.  NEURO: Oriented to name, CN 2 to 12 intact, DTR 1 + and symmetric, sensory intact to LT upper and lower extremitiy, motor 5/5 upper and lower extremity, cerebellar and gait not tested.      Results Review:   I reviewed the patient's new clinical results.  I reviewed the patient's new imaging results and agree with the interpretation.  I reviewed the patient's other test results and agree with the interpretation    Results from last 7 days   Lab Units 02/14/19  0642 02/13/19  0535 02/12/19  1719   WBC 10*3/mm3 13.27* 12.78* 15.90*   HEMOGLOBIN g/dL 7.6* 7.4* 7.3*   HEMATOCRIT % 24.3* 23.5* 23.1*   PLATELETS 10*3/mm3 403 346 299     Results from last 7 days   Lab Units 02/14/19  0642   SODIUM mmol/L 146   POTASSIUM mmol/L 4.2   CHLORIDE mmol/L 108   CO2 mmol/L 25.0   BUN mg/dL 46*   CREATININE mg/dL 3.64*   GLUCOSE mg/dL 100   CALCIUM mg/dL 9.0                 Results from last 7 days   Lab Units 02/09/19  0701   VANCOMYCIN RM mcg/mL 30.20         Estimated Creatinine Clearance: 9.9 mL/min (A) (by C-G formula based on SCr of 3.64 mg/dL  (H)).    Microbiology:  Microbiology Results Abnormal     Procedure Component Value - Date/Time    Blood Culture - Blood, Arm, Right [[id number]] Collected:  02/06/19 1047    Lab Status:  Final result Specimen:  Blood from Arm, Right Updated:  02/11/19 1115     Blood Culture No growth at 5 days          Radiology:  Imaging Results (last 72 hours)     ** No results found for the last 72 hours. **          IMPRESSION:     1.  Leukocytosis, neutrophilic with multiple possibilities with antibiotic treatment off and on since 1/31/19.  No significant culture data from blood cultures obtained on 2/6 and 2/14.  Possible sites of infection include left chest wall and sacral wound.  Also at risk for hospital associated infection.  Some of the leukocytosis may be stress related given procedures, and PEA arrest.  Less likely occult intra-abdominal process.  2.  Left chest wall catheter skin necrosis with increased risk for development of hemodialysis catheter site infection and dialysis catheter infection with sepsis.  Usual organisms include staphylococcal species and gram-negative rods from [facility].  3.  Stage III decubitus sacrum with increased risk for development of underlying osteomyelitis over time.  4.  PEA arrest 2/1/19 lasting less than 3 minutes.  5.  End-stage renal disease on hemodialysis Tuesday, Thursday, Saturday since approximately December 2018.  Previously difficult non-performing right IJ access removed.  New left IJ tunneled catheter hemodialysis access placed 2/1/19 by Dr. Elise.  Left forearm AV fistula 1/16/19.  6.  Encephalopathy, metabolic off and on.  7.  Acute hypoxic respiratory failure.  8.  Anemia, chronic disease.    RECOMMENDATIONS:    1.  Diagnostically, continue to follow patient's physical exam, CBC, CMP, CRP, cultures from the left chest site and sacrum, urinalysis and urine culture.  2.  Therapeutically, consider post-hemodialysis antibiotics with vancomycin 500 mg IV, and  Fortaz 1 g IV post each hemodialysis until 2/28/19 pending further cultures.  3.  Continue local wound care.    4.  Oxygen support therapy as needed.  5.  Dialysis continues.  6.  Rehabilitation or nursing home placement.    I discussed the patients findings and my recommendations with patient, nursing staff, primary care team and consulting provider    Thank you for asking me to see Fabiola MUÑOZ Margarito.  Our group would be pleased to follow this patient over the course of their hospitalization and assist with outpatient antimicrobial therapy, as indicated.  Further recommendations depend on the results of the cultures and clinical course.  Lites and symptoms of infection and side effects of antibiotics discussed with the patient.  Also discussed with Dr. Ferrari and pharmacy.      : Please arrange for post-hemodialysis antibiotics as an outpatient with vancomycin 500 mg IV post each hemodialysis on Tuesday, Thursday, Saturday, and Fortaz 1 g IV post each hemodialysis on the same days, to continue until 2/28/19.  Check CBC, CMP, CRP, vancomycin trough post each hemodialysis.  Fax orders to [phone], and call office at [phone] with final arrangements.  Please arrange a follow-up with me in one to 2 weeks post discharge.    Luis Antonio Villanueva MD  2/14/2019

## 2019-02-14 NOTE — PLAN OF CARE
Problem: Patient Care Overview  Goal: Plan of Care Review  Outcome: Ongoing (interventions implemented as appropriate)   02/14/19 1826   Coping/Psychosocial   Plan of Care Reviewed With patient   Plan of Care Review   Progress improving   OTHER   Outcome Summary Pt educated on AE to improve independence w/ self feeding and encouraged to perform RUE elbow/wrist/hand ROM to promote ADL performance. Pt conts to be limited d/t c/o fatigue and weakness. Recommend cont skilled IPOT POC.

## 2019-02-14 NOTE — NURSING NOTE
Continue with Dakin's for wound dressing of left chest sidra cath site. Please contact if further needs arise. Thanks

## 2019-02-14 NOTE — PROGRESS NOTES
Continued Stay Note  [facility]     Patient Name: Fabiola Pimentel  MRN: [id number]  Today's Date: 2/14/2019    Admit Date: 1/31/2019    Discharge Plan     Row Name 02/14/19 1455       Plan    Plan  [facility]    Patient/Family in Agreement with Plan  yes    Plan Comments  Received call from Willie with [facility] and pre-cert has been approved.  Consult received from ID.  Spoke with Jovana with [facility] at [phone].  Copy of consult faxed to Jovana at [phone].  Jovana is checking to see if will be able to order Fortaz to have by Saturday and she will let CM know.  Consult faxed to ID at [phone].        Discharge Codes    No documentation.       Expected Discharge Date and Time     Expected Discharge Date Expected Discharge Time    Feb 16, 2019             Thea Singleton RN

## 2019-02-14 NOTE — PLAN OF CARE
Problem: Patient Care Overview  Goal: Plan of Care Review   02/13/19 2017   OTHER   Outcome Summary Pt continues to have poor appetite. UP in chair with pt but for only 45min. Pt c/o pain from coccyx wound. Pt awaiting bed on tele floor.        Problem: Fall Risk (Adult)  Goal: Absence of Fall  Outcome: Ongoing (interventions implemented as appropriate)      Problem: Skin Injury Risk (Adult)  Goal: Skin Health and Integrity  Outcome: Ongoing (interventions implemented as appropriate)      Problem: Wound (Includes Pressure Injury) (Adult)  Goal: Signs and Symptoms of Listed Potential Problems Will be Absent, Minimized or Managed (Wound)  Outcome: Ongoing (interventions implemented as appropriate)

## 2019-02-14 NOTE — DISCHARGE INSTR - DIET
MBS/VFSS/FEES 2/14/19    Reason for Referral  Patient was referred for a FEES to assess the efficiency of his/her swallow function, rule out aspiration and make recommendations regarding safe dietary consistencies, effective compensatory strategies, and safe eating environment.             Recommendations/Treatment            Risks/Benefits Reviewed: risks/benefits explained, patient, agreed to eval  Nasal Entry: right:  Special Considerations: Scope #837    SLP Swallowing Diagnosis: mild-moderate, pharyngeal dysfunction  Functional Impact: risk of aspiration/pneumonia, risk of malnutrition, risk of dehydration  Rehab Potential/Prognosis, Swallowing: good, to achieve stated therapy goals  Swallow Criteria for Skilled Therapeutic Interventions Met: demonstrates skilled criteria    Therapy Frequency (Swallow): 5 days per week  Predicted Duration Therapy Intervention (Days): until discharge  SLP Diet Recommendation: mechanical soft with no mixed consistencies, nectar thick liquids, other (see comments)(may have single icechips&1/2 tsp thin H2O b/t meals w/superv)  Recommended Diagnostics: other (see comments)(may consider MBS for repeat study)  Recommended Precautions and Strategies: upright posture during/after eating, other (see comments)(general aspiration prctns; oral care BID/PRN)  SLP Rec. for Method of Medication Administration: meds whole, with thick liquids, with pudding or applesauce, as tolerated  Monitor for Signs of Aspiration: yes, notify SLP if any concerns  Anticipated Dischage Disposition: anticipate therapy at next level of care      Oral Preparation/ Oral Phase       Oral Phase: WFL    Pharyngeal Phase       Initiation of Pharyngeal Swallow: bolus in pyriform sinuses  Pharyngeal Phase: impaired pharyngeal phase of swallowing  Aspiration After the Swallow: thin liquids, secondary to residue, in pyriform sinuses  Response to Aspiration: no response, silent aspiration, other (see comments)(weak cued  cough)  Rosenbek's Scale: thin:, 8-->Level 8, nectar:, regular textures:, 1-->Level 1  Residue: thin liquids, nectar-thick liquids, diffuse within pharynx, secondary to reduced base of tongue retraction, secondary to reduced posterior pharyngeal wall stripping, secondary to reduced laryngeal elevation, secondary to reduced hyolaryngeal excursion, other (see comments)(mild w/ nectar, moderate w/ thin)  Response to Residue: other (see comments)(partial clearance w/ cued mult swallows)  Attempted Compensatory Maneuvers: chin tuck, effortful (hard swallow), multiple swallows  Response to Attempted Compensatory Maneuvers: did not prevent aspiration  FEES Summary: Pt cont to demonstrate moderate diffuse pharyngeal residue w/ thin liquid that she was u/a to completely clear even when cued to use variety of compensatory strategies. This resulted in aspiration of thin liquid via cup/straw after the swallow. Aspiration was silent. Weak cued cough. No penetration/aspiration w/ single ice chip, 1/2 tsp thin, or daniel cracker. At one point, noted possible retrograde flow above the level of the UES. Pt denied hx GERD.

## 2019-02-14 NOTE — PLAN OF CARE
"Problem: Patient Care Overview  Goal: Plan of Care Review  Outcome: Ongoing (interventions implemented as appropriate)   02/14/19 0630   Coping/Psychosocial   Plan of Care Reviewed With patient   Plan of Care Review   Progress no change   OTHER   Outcome Summary Poor appetite and disdain for physical activity continues, complains that food \"tastes too sweet\". Tele orders in place, awaiting bed. Pt c/o of pain related to coccyx wound. Ready to go home.       Problem: Fall Risk (Adult)  Goal: Absence of Fall  Outcome: Ongoing (interventions implemented as appropriate)      Problem: Skin Injury Risk (Adult)  Goal: Skin Health and Integrity  Outcome: Ongoing (interventions implemented as appropriate)      Problem: Wound (Includes Pressure Injury) (Adult)  Goal: Signs and Symptoms of Listed Potential Problems Will be Absent, Minimized or Managed (Wound)  Outcome: Ongoing (interventions implemented as appropriate)        "

## 2019-02-15 PROBLEM — N18.6 ESRD (END STAGE RENAL DISEASE) (HCC): Status: ACTIVE | Noted: 2019-01-01

## 2019-02-15 PROBLEM — Z99.2 HEMODIALYSIS PATIENT (HCC): Status: ACTIVE | Noted: 2019-01-01

## 2019-02-15 PROBLEM — I46.9 PEA (PULSELESS ELECTRICAL ACTIVITY) (HCC): Status: RESOLVED | Noted: 2019-01-01 | Resolved: 2019-01-01

## 2019-02-15 NOTE — THERAPY TREATMENT NOTE
Acute Care - Physical Therapy Treatment Note  [facility]     Patient Name: Fabiola Pimentel  : 1934  MRN: [id number]  Today's Date: 2/15/2019  Onset of Illness/Injury or Date of Surgery: 19  Date of Referral to PT: 19  Referring Physician: MD Radha    Admit Date: 2019    Visit Dx:    ICD-10-CM ICD-9-CM   1. Impaired functional mobility, balance, gait, and endurance Z74.09 V49.89   2. Impaired mobility and ADLs Z74.09 799.89   3. Pharyngeal dysphagia R13.13 787.23   4. PEA I46.9 427.5     Patient Active Problem List   Diagnosis   • ITP (idiopathic thrombocytopenic purpura)   • Palpitations   • Premature atrial contractions   • Hypertension   • Pulmonary hypertension (CMS/HCC)   • DJD (degenerative joint disease)   • Raynaud's phenomenon (secondary)   • Diverticular disease   • CKD (chronic kidney disease) stage 5, GFR less than 15 ml/min (CMS/HCC)   • Leukocytosis   • Anemia due to chronic kidney disease   • Debility   • Hx of renal cell carcinoma   • Chronic diastolic CHF (congestive heart failure) (CMS/HCC)   • Falls frequently   • PEA   • Huge hiatal hernia with intrathoracic stomach   • Mitral valve mass       Therapy Treatment    Rehabilitation Treatment Summary     Row Name 02/15/19 1001             Treatment Time/Intention    Discipline  physical therapist  -EJ      Document Type  therapy note (daily note)  -EJ      Subjective Information  complains of;fatigue itching, Reports she gets rashes often  -EJ      Mode of Treatment  physical therapy  -EJ      Patient/Family Observations  no family present  -EJ      Care Plan Review  care plan/treatment goals reviewed  -EJ      Patient Effort  adequate  -EJ      Comment  Pt requires encouragement for PT.  -EJ      Existing Precautions/Restrictions  fall  -EJ      Recorded by [EJ] Martha Gould, PT 02/15/19 1034      Row Name 02/15/19 1001             Cognitive Assessment/Intervention- PT/OT    Affect/Mental Status (Cognitive)  WFL  -EJ       Orientation Status (Cognition)  oriented x 4  -EJ      Follows Commands (Cognition)  follows one step commands;verbal cues/prompting required;75-90% accuracy  -EJ      Cognitive Function (Cognitive)  safety deficit  -EJ      Safety Deficit (Cognitive)  insight into deficits/self awareness;safety precautions awareness;safety precautions follow-through/compliance;awareness of need for assistance;moderate deficit  -EJ      Personal Safety Interventions  fall prevention program maintained;gait belt;nonskid shoes/slippers when out of bed  -EJ      Recorded by [EJ] Martha Gould, PT 02/15/19 1034      Row Name 02/15/19 1001             Safety Issues, Functional Mobility    Safety Issues Affecting Function (Mobility)  safety precaution awareness;safety precautions follow-through/compliance;insight into deficits/self awareness  -EJ      Impairments Affecting Function (Mobility)  balance;endurance/activity tolerance;postural/trunk control  -EJ      Recorded by [EJ] Martha Gould, PT 02/15/19 1034      Row Name 02/15/19 1001             Bed Mobility Assessment/Treatment    Supine-Sit Carney (Bed Mobility)  moderate assist (50% patient effort)  -EJ      Sit-Supine Carney (Bed Mobility)  moderate assist (50% patient effort);2 person assist  -EJ      Assistive Device (Bed Mobility)  bed rails;draw sheet;head of bed elevated  -EJ      Comment (Bed Mobility)  VC for sequencing.  -EJ      Recorded by [EJ] Martha Gould, PT 02/15/19 1034      Row Name 02/15/19 1001             Transfer Assessment/Treatment    Transfer Assessment/Treatment  sit-stand transfer;stand-sit transfer  -EJ      Comment (Transfers)  3 reps STS total  -EJ      Recorded by [EJ] Martha Gould, PT 02/15/19 1034      Row Name 02/15/19 1001             Sit-Stand Transfer    Sit-Stand Carney (Transfers)  moderate assist (50% patient effort);2 person assist  -EJ      Recorded by [EJ] Martha Gould, PT 02/15/19 1034       Row Name 02/15/19 1001             Gait/Stairs Assessment/Training    86108 - Gait Training Minutes   5  -EJ      Bulloch Level (Gait)  moderate assist (50% patient effort);2 person assist  -EJ      Assistive Device (Gait)  -- BUE support  -EJ      Distance in Feet (Gait)  8  -EJ      Bilateral Gait Deviations  weight shift ability decreased;heel strike decreased;forward flexed posture  -EJ      Comment (Gait/Stairs)  Pt has posterior lean, knee buckling noted. limited by fatigue.  -EJ      Recorded by [EJ] Martha Gould, PT 02/15/19 1034      Row Name 02/15/19 1001             Positioning and Restraints    Pre-Treatment Position  in bed  -EJ      Post Treatment Position  bed  -EJ      In Bed  notified nsg;call light within reach;encouraged to call for assist;exit alarm on;side lying right;LUE elevated elevated slightly on pillow for R shoulder protection  -EJ      Recorded by [EJ] Martha Gould, PT 02/15/19 1034      Row Name 02/15/19 1001             Pain Scale: Numbers Pre/Post-Treatment    Pain Scale: Numbers, Pretreatment  0/10 - no pain  -EJ      Pain Scale: Numbers, Post-Treatment  0/10 - no pain  -EJ      Recorded by [EJ] Martha Gould, PT 02/15/19 1034      Row Name                Wound 02/01/19 1825 Other (See comments) chest incision    Wound - Properties Group Date first assessed: 02/01/19 [KS] Time first assessed: 1825 [KS] Side: Other (See comments) [KS] Location: chest [KS] Type: incision [KS] Recorded by:  [KS] Leonela Caceres RN 02/01/19 1825    Row Name                Wound 01/16/19 1429 Left arm incision    Wound - Properties Group Date first assessed: 01/16/19 [JOSHUA] Time first assessed: 1429 [JOSHUA] Side: Left [JOSHUA] Location: arm [JOSHUA] Type: incision [JOSHUA] Recorded by:  [JOSHUA] Nilam Joshi RN 01/16/19 1429    Row Name                Wound 01/31/19 1600 Bilateral medial coccyx pressure injury    Wound - Properties Group Date first assessed: 01/31/19 [CM] Time first  assessed: 1600 [CM] Present On Admission : yes [CM] Side: Bilateral [CM] Orientation: medial [CM] Location: coccyx [CM] Type: pressure injury [CM] Stage, Pressure Injury: Stage 2 [CM] Additional Comments: wound now unstageable with moderate slough / eschar covering.  [MF] Wound Outcome: Converged [MF2] Recorded by:  [CM] Trudy Cornejo RN 02/03/19 0718 [MF] Neftaly Seaman, PT 02/06/19 1326 [MF2] Neftaly Seaman, PT 02/08/19 1123    Row Name                Wound 02/04/19 2000 Right upper chest puncture    Wound - Properties Group Date first assessed: 02/04/19 [RS] Time first assessed: 2000 [RS] Present On Admission : no [RS] Side: Right [RS] Orientation: upper [RS] Location: chest [RS] Type: puncture [RS], HD cath with pigtail site (removed)  Recorded by:  [RS] Rodriguez Garcia RN 02/04/19 2230    Row Name 02/15/19 1001             Coping    Observed Emotional State  calm  -EJ      Verbalized Emotional State  acceptance  -EJ      Recorded by [EJ] Martha Gould, PT 02/15/19 1034      Row Name 02/15/19 1001             Outcome Summary/Treatment Plan (PT)    Daily Summary of Progress (PT)  progress towards functional goals is fair  -EJ      Anticipated Discharge Disposition (PT)  skilled nursing facility  -EJ      Recorded by [EJ] Martha Gould, PT 02/15/19 1034        User Key  (r) = Recorded By, (t) = Taken By, (c) = Cosigned By    Initials Name Effective Dates Discipline     Neftaly Seaman, PT 06/19/15 -  PT    EJ Martha Gould, PT 11/20/18 -  PT    RS Rodriguez Garcia, RN 06/30/16 -  Nurse    Nilam Martinez RN 06/16/16 -  Nurse    Leonela Quezada RN 05/09/17 -  Nurse    Trudy Peterson RN 06/21/17 -  Nurse          Wound 01/16/19 1429 Left arm incision (Active)   Dressing Appearance open to air 2/14/2019  4:00 PM   Closure Open to air 2/15/2019  8:00 AM   Dressing Care, Wound open to air 2/14/2019  4:00 PM       Wound 02/01/19 4764 Other (See comments)  chest incision (Active)   Dressing Appearance intact;dry 2/15/2019  8:00 AM   Closure BARRY 2/15/2019  8:00 AM   Base dressing in place, unable to visualize 2/15/2019  8:00 AM   Periwound moist;pink 2/15/2019  6:00 AM   Periwound Temperature warm 2/14/2019 12:30 PM   Periwound Skin Turgor soft 2/14/2019 12:30 PM   Edges irregular 2/14/2019 12:30 PM   Drainage Amount scant 2/15/2019  6:00 AM   Dressing Care, Wound gauze, wet-to-dry 2/15/2019  6:00 AM       Wound 01/31/19 1600 Bilateral medial coccyx pressure injury (Active)   Dressing Appearance dry;intact 2/15/2019  8:00 AM   Closure BARRY 2/15/2019  8:00 AM   Base dressing in place, unable to visualize 2/15/2019  8:00 AM   Periwound moist;pink 2/15/2019  6:00 AM   Drainage Characteristics/Odor serosanguineous 2/15/2019  6:00 AM   Drainage Amount small 2/15/2019  6:00 AM   Care, Wound barrier applied 2/15/2019  6:00 AM   Dressing Care, Wound dressing changed;foam;low-adherent;dressing applied 2/15/2019  6:00 AM   Periwound Care, Wound dry periwound area maintained 2/14/2019  4:00 PM       Wound 02/04/19 2000 Right upper chest puncture (Active)   Dressing Appearance dry;intact 2/15/2019  8:00 AM   Closure None 2/14/2019  4:00 PM   Base pink 2/15/2019  8:00 AM   Periwound intact;pink 2/14/2019 12:30 PM   Drainage Characteristics/Odor serous 2/14/2019 12:00 PM   Drainage Amount none 2/14/2019 12:30 PM   Dressing Care, Wound foam;low-adherent 2/14/2019  4:00 PM           Physical Therapy Education     Title: PT OT SLP Therapies (In Progress)     Topic: Physical Therapy (Done)     Point: Mobility training (Done)     Learning Progress Summary           Patient Acceptance, E, VU,NR by EJ at 2/15/2019 10:36 AM    Acceptance, E,D, NR by LS at 2/13/2019  9:10 AM    Acceptance, E, NR by SIMIN at 2/11/2019  3:44 PM    Acceptance, E, NR by CHRISTOPHER at 2/10/2019  3:15 PM    Acceptance, ASHELY URIBE VU,NR by SANTA at 2/8/2019 11:05 AM    Acceptance, ASHELY URIBE NR by SANTA at 2/6/2019  1:08 PM    Acceptance, JUDY URIBE  by SIMIN at 2/4/2019  1:06 PM    Acceptance, E,D, NR by SANTA at 2/2/2019  2:48 PM                   Point: Home exercise program (Done)     Learning Progress Summary           Patient Acceptance, E, VU,NR by GERALDINE at 2/15/2019 10:36 AM    Acceptance, E,D, NR by SANTA at 2/13/2019  9:10 AM    Acceptance, E, NR by SIMIN at 2/11/2019  3:44 PM    Acceptance, E, NR by CHRISTOPHER at 2/10/2019  3:15 PM    Acceptance, E,D, VU,NR by SANTA at 2/8/2019 11:05 AM    Acceptance, E,D, NR by SANTA at 2/6/2019  1:08 PM    Acceptance, E, NR by KR at 2/4/2019  1:06 PM    Acceptance, E,D, NR by SANTA at 2/2/2019  2:48 PM                   Point: Body mechanics (Done)     Learning Progress Summary           Patient Acceptance, E, VU,NR by GERALDINE at 2/15/2019 10:36 AM    Acceptance, E,D, NR by SANTA at 2/13/2019  9:10 AM    Acceptance, E, NR by SIMIN at 2/11/2019  3:44 PM    Acceptance, E, NR by CHRISTOPHER at 2/10/2019  3:15 PM    Acceptance, E,D, VU,NR by SANTA at 2/8/2019 11:05 AM    Acceptance, E,D, NR by SANTA at 2/6/2019  1:08 PM    Acceptance, E, NR by SIMIN at 2/4/2019  1:06 PM    Acceptance, E,D, NR by SANTA at 2/2/2019  2:48 PM                   Point: Precautions (Done)     Learning Progress Summary           Patient Acceptance, E, VU,NR by GERALDINE at 2/15/2019 10:36 AM    Acceptance, E,D, NR by SANTA at 2/13/2019  9:10 AM    Acceptance, E, NR by SIMIN at 2/11/2019  3:44 PM    Acceptance, E, NR by CHRISTOPHER at 2/10/2019  3:15 PM    Acceptance, E,D, VU,NR by SANTA at 2/8/2019 11:05 AM    Acceptance, E,D, NR by SANTA at 2/6/2019  1:08 PM    Acceptance, E, NR by SIMIN at 2/4/2019  1:06 PM    Acceptance, E,D, NR by SANTA at 2/2/2019  2:48 PM                               User Key     Initials Effective Dates Name Provider Type Discipline    CHRISTOPHER 06/19/15 -  Tarsha Neri, PT Physical Therapist PT    EJ 11/20/18 -  Martha Gould, PT Physical Therapist PT    LS 06/19/15 -  Tanesha Castro, PT Physical Therapist PT    KR 04/03/18 -  Gemma Ying, PT Physical Therapist PT                PT Recommendation and  Plan  Anticipated Discharge Disposition (PT): skilled nursing facility  Outcome Summary/Treatment Plan (PT)  Daily Summary of Progress (PT): progress towards functional goals is fair  Anticipated Discharge Disposition (PT): skilled nursing facility  Plan of Care Reviewed With: patient  Outcome Summary: Pt ambulates in room x 8 ft with MOD A x2. T/f and bed mobility require MOD A x2. Pt has SBA sitting balance once positioned. Pt expectes to d/c to SNF this date for further therapy.  Outcome Measures     Row Name 02/15/19 1001 02/14/19 1116 02/13/19 0910       How much help from another person do you currently need...    Turning from your back to your side while in flat bed without using bedrails?  3  -EJ  --  2  -LS    Moving from lying on back to sitting on the side of a flat bed without bedrails?  2  -EJ  --  2  -LS    Moving to and from a bed to a chair (including a wheelchair)?  2  -EJ  --  2  -LS    Standing up from a chair using your arms (e.g., wheelchair, bedside chair)?  2  -EJ  --  2  -LS    Climbing 3-5 steps with a railing?  1  -EJ  --  1  -LS    To walk in hospital room?  2  -EJ  --  2  -LS    AM-PAC 6 Clicks Score  12  -EJ  --  11  -LS       How much help from another is currently needed...    Putting on and taking off regular lower body clothing?  --  1  -CL  --    Bathing (including washing, rinsing, and drying)  --  1  -CL  --    Toileting (which includes using toilet bed pan or urinal)  --  1  -CL  --    Putting on and taking off regular upper body clothing  --  2  -CL  --    Taking care of personal grooming (such as brushing teeth)  --  2  -CL  --    Eating meals  --  3  -CL  --    Score  --  10  -CL  --       Functional Assessment    Outcome Measure Options  AM-PAC 6 Clicks Basic Mobility (PT)  -EJ  AM-PAC 6 Clicks Daily Activity (OT)  -CL  AM-PAC 6 Clicks Basic Mobility (PT)  -LS      User Key  (r) = Recorded By, (t) = Taken By, (c) = Cosigned By    Initials Name Provider Type    GERALDINE Gould  Martha NICHOLSON, PT Physical Therapist    Tanesha Rodney, PT Physical Therapist    CL Liliam Valdes, OT Occupational Therapist         Time Calculation:   PT Charges     Row Name 02/15/19 1001             Time Calculation    Start Time  1001  -EJ         Time Calculation- PT    Total Timed Code Minutes- PT  19 minute(s)  -EJ         Timed Charges    78118 - Gait Training Minutes   5  -EJ      55735 - PT Manual Therapy Minutes  --  -EJ      67098 - PT Therapeutic Activity Minutes  14  -EJ        User Key  (r) = Recorded By, (t) = Taken By, (c) = Cosigned By    Initials Name Provider Type    EJ Martha Gould, PT Physical Therapist        Therapy Suggested Charges     Code   Minutes Charges    64078 (CPT®) Hc Pt Neuromusc Re Education Ea 15 Min      04800 (CPT®) Hc Pt Ther Proc Ea 15 Min      91893 (CPT®) Hc Gait Training Ea 15 Min 5     82204 (CPT®) Hc Pt Therapeutic Act Ea 15 Min 14 1    42399 (CPT®) Hc Pt Manual Therapy Ea 15 Min      21247 (CPT®) Hc Pt Iontophoresis Ea 15 Min      27894 (CPT®) Hc Pt Elec Stim Ea-Per 15 Min      33530 (CPT®) Hc Pt Ultrasound Ea 15 Min      56803 (CPT®) Hc Pt Self Care/Mgmt/Train Ea 15 Min      55102 (CPT®) Hc Pt Prosthetic (S) Train Initial Encounter, Each 15 Min      78494 (CPT®) Hc Pt Orthotic(S)/Prosthetic(S) Encounter, Each 15 Min      02673 (CPT®) Hc Orthotic(S) Mgmt/Train Initial Encounter, Each 15min      Total  19 1        Therapy Charges for Today     Code Description Service Date Service Provider Modifiers Qty    63834295915 HC PT THERAPEUTIC ACT EA 15 MIN 2/15/2019 Martha Gould, PT GP 1    15968962100 HC PT THER SUPP EA 15 MIN 2/15/2019 Martha Gould, PT GP 1          PT G-Codes  Outcome Measure Options: AM-PAC 6 Clicks Basic Mobility (PT)  AM-PAC 6 Clicks Score: 12  Score: 10    Martha Carvajal, PT  2/15/2019

## 2019-02-15 NOTE — PROGRESS NOTES
"   LOS: 15 days    Patient Care Team:  Otilio Smith DO as PCP - General (Family Medicine)    Reason For Visit:    Subjective     No acute events overnight. No new complaints.       Review of Systems:    Pulm: No soa   CV:  No CP      Objective       bisoprolol 2.5 mg Oral Nightly   calcitriol 0.25 mcg Oral Daily   castor oil-balsam peru  Topical Q12H   cefTAZidime 1 g Intravenous Once per day on Tue Thu Sat   epoetin alexus 10,000 Units Subcutaneous Once per day on Tue Thu Sat   ferric gluconate (FERRLECIT) IVPB 125 mg Intravenous Daily   fluconazole 200 mg Oral Q24H   heparin (porcine) 5,000 Units Subcutaneous Q8H   hydrALAZINE 25 mg Oral Q8H   pantoprazole 40 mg Oral Q AM   senna 1 tablet Oral BID   sodium chloride 3 mL Intravenous Q12H   sodium hypochlorite  Topical BID   vancomycin 500 mg Intravenous Once per day on Tue Thu Sat            Vital Signs:  Blood pressure 128/64, pulse 67, temperature 98.1 °F (36.7 °C), temperature source Oral, resp. rate 18, height 154.9 cm (61\"), weight 60.3 kg (133 lb), SpO2 97 %, not currently breastfeeding.    Flowsheet Rows      First Filed Value   Admission Height  163.8 cm (64.5\") Documented at 02/01/2019 0300   Admission Weight  70 kg (154 lb 4.8 oz) Documented at 01/31/2019 1607          02/14 0701 - 02/15 0700  In: 400   Out: 2970 [Urine:900]    Physical Exam:    General Appearance: NAD, alert and cooperative, Ox3  Eyes: PER, conjunctivae and sclerae normal, no icterus  Lungs: respirations regular and unlabored, no crepitus, clear to auscultation  Heart/CV: regular rhythm & normal rate, no murmur, no gallop, no rub and no edema  Abdomen: not distended, soft, non-tender, no masses,  bowel sounds present  Skin: No rash, Warm and dry, tunneled cath    Radiology:      Renal Imaging:        Labs:  Results from last 7 days   Lab Units 02/15/19  0421 02/14/19  0642 02/13/19  0535   WBC 10*3/mm3 19.04* 13.27* 12.78*   HEMOGLOBIN g/dL 8.2* 7.6* 7.4*   HEMATOCRIT % 26.9* 24.3* " 23.5*   PLATELETS 10*3/mm3 399 403 346     Results from last 7 days   Lab Units 02/15/19  0421 02/14/19  0642 02/13/19  0535 02/12/19  0958 02/11/19  0713 02/10/19  0401   SODIUM mmol/L 142 146 147* 146 147* 140   POTASSIUM mmol/L 4.0 4.2 3.9 3.6 4.2 3.7   CHLORIDE mmol/L 106 108 109 110* 112* 105   CO2 mmol/L 20.0 25.0 26.0 22.0 22.0 24.0   BUN mg/dL 21 46* 26* 49* 37* 26*   CREATININE mg/dL 2.32* 3.64* 2.38* 3.83* 3.41* 2.37*   CALCIUM mg/dL 9.6 9.0 8.7 8.8 8.7 8.6*   PHOSPHORUS mg/dL 1.7*  --  2.0*  --  3.6 2.4   ALBUMIN g/dL 4.42  --  3.62  --  3.54 3.80     Results from last 7 days   Lab Units 02/15/19  0421   GLUCOSE mg/dL 84       Results from last 7 days   Lab Units 02/15/19  0421   ALK PHOS U/L 66   BILIRUBIN mg/dL 0.8   ALT (SGPT) U/L 4*   AST (SGOT) U/L 23           Results from last 7 days   Lab Units 02/14/19  1547   COLOR UA  Yellow   CLARITY UA  Cloudy*   PH, URINE  8.0   SPECIFIC GRAVITY, URINE  1.016   GLUCOSE UA  Negative   KETONES UA  Trace*   BILIRUBIN UA  Negative   PROTEIN UA  30 mg/dL (1+)*   BLOOD UA  Negative   LEUKOCYTES UA  Negative   NITRITE UA  Negative       Estimated Creatinine Clearance: 15 mL/min (A) (by C-G formula based on SCr of 2.32 mg/dL (H)).      Assessment       PEA    ITP (idiopathic thrombocytopenic purpura)    Pulmonary hypertension (CMS/HCC)    CKD (chronic kidney disease) stage 5, GFR less than 15 ml/min (CMS/HCC)    Leukocytosis    Anemia due to chronic kidney disease    Debility    Hx of renal cell carcinoma    Chronic diastolic CHF (congestive heart failure) (CMS/HCC)    Falls frequently    Huge hiatal hernia with intrathoracic stomach    Mitral valve mass            Impression:   New ESRD  Anemia of renal failure  HTN    Recommendations:   HD tomorrow per schedule. UF as tolerated.   Iv iron  Epo with HD  Replete Phosphorus.       Isaac Proctor MD  02/15/19  9:01 AM

## 2019-02-15 NOTE — PROGRESS NOTES
"                  Clinical Nutrition     Nutrition Assessment  Reason for Visit:   Follow-up protocol    Patient Name: Fabiola Pimentel  YOB: 1934  MRN: [id number]  Date of Encounter: 02/15/19 2:57 PM  Admission date: 1/31/2019    Nutrition Assessment     Admission diagnosis  Leukocytosis  Rt arm fistula not working    Additional applicable diagnosis/conditions/procedures  (2/1) HD-ESRD       S/p PEA (Pulseless electrical activity) (CMS/HCC)  Skin: unstageable pressure ulcer to coccyx; dialysis site wound.   Anemia   Huge hiatal hernia with intrathoracic stomach  Mitral valve mass     Applicable PMH:  ESRD       (1/16/19) Temporary dialysis cath placed  S/p right nephrectomy   Hx of renal cell carcinoma  Uterine cancer  S/p SALVATORE, BSO  ITP (idiopathic thrombocytopenic purpura)  Pulmonary hypertension (CMS/HCC)  PVC's (On Propafanone)  Anemia due to chronic kidney disease  Debility  Falls frequently  Chronic diastolic CHF (congestive heart failure) (CMS/HCC)  Colectomy     Reported/Observed/Food/Nutrition Related History:   Pt stated she believes that she ate better this morning than she has her entire admission. She stated that she was able to eat half of a banana this AM. Pt reported she has had no appetite and this is why she has been eating very little. Pt believes she is eating half of each magic cup she is receiving (~1 full magic cup per day). Provided pt with dysphagia menu to assist in providing pt with options available to her within her diet modifications.      Anthropometrics     Height: 154.9 cm (61\")  Last filed wt: Weight: 60.3 kg (133 lb) (02/15/19 0500)  Weight Method: Bed scale    BMI: 25.13   Overweight: 25.0-29.9kg/m2     Ideal Body Weight (IBW) (kg): 48.15     Weight Change (Per RD note 2/12):   UBW: 150lb   Weight change: 3 lb    % wt change: 2% weight loss  Time frame of weight loss: unknown amount of time      Labs reviewed     Results from last 7 days   Lab Units 02/11/19  0713 " 02/10/19  0401 02/09/19  0701 02/07/19  0543   GLUCOSE mg/dL 99 88 107* 82   BUN mg/dL 37* 26* 45* 41*   CREATININE mg/dL 3.41* 2.37* 3.69* 3.91*   SODIUM mmol/L 147* 140 144 138   CHLORIDE mmol/L 112* 105 110* 106   POTASSIUM mmol/L 4.2 3.7 3.9 3.8   PHOSPHORUS mg/dL 3.6 2.4 5.1 4.6   MAGNESIUM mg/dL  --   --   --  1.9   ALT (SGPT) U/L  --   --   --  10        Medications reviewed   Pertinent: Calcitriol, protonix, IV ABX, senokot, ferrlecit    Current Nutrition Prescription     PO: Diet Dysphagia; IV - Mechanical Soft No Mixed Consistencies; Nectar / Syrup Thick; Renal   Supplement: Magic Cup BID  Intake: 4% of 6 meals (very minimal PO intake)  25% of breakfast this AM  Nutrition Diagnosis     2/1, 2/12  Problem Inadequate oral intake   Etiology GI status/procedures   Signs/Symptoms NPO r/t abdominal appearance via XR chest today, NPO planned for tunneled dialysis catheter    Status: improved; KUB of the abd (2/1) suggests normal bowel pas patterns.     2/12; updated 2/15  Problem Inadequate oral intake   Etiology PO intake    Signs/Symptoms Poor intake since admission - 4% of 6 meals; 25% of breakfast this AM- somewhat improvement.    Status: ongoing    2/1  Problem Increased kcal and protein needs   Etiology Skin integrity    Signs/Symptoms Unstageable pressure ulcer to coccyx    Status: ongoing     Nutrition Intervention   1.  Follow treatment progress, Care plan reviewed, Interview for preferences, Menu provided, Encourage intake    Goal:   General: Nutrition support treatment  PO: Increase intake     Monitoring/Evaluation:   Per protocol, I&O, GI status      Will Continue to follow per protocol      Clau Matthew RD  Time Spent: 30 minutes

## 2019-02-15 NOTE — DISCHARGE SUMMARY
[facility] Medicine Services  DISCHARGE SUMMARY    Patient Name: Fabiola Pimentel  : 1934  MRN: [id number]    Date of Admission: 2019  Date of Discharge: 2/15/2019  Primary Care Physician: Otilio Smith DO    Consults     Date and Time Order Name Status Description    2019 0756 Inpatient Infectious Diseases Consult Completed     2019 0855 Inpatient Cardiology Consult Completed     2019 0030 Inpatient General Surgery Consult Completed     2019 1655 Inpatient Nephrology Consult Completed           Hospital Course     Presenting Problem:   Acute renal failure (ARF) (CMS/Formerly [facility]) [N17.9]    Active Hospital Problems    Diagnosis Date Noted   • ESRD (end stage renal disease) (CMS/Formerly [facility]) [N18.6] 02/15/2019   • Hemodialysis patient (CMS/Formerly [facility]) [Z99.2] 02/15/2019   • 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] 2019   • Huge hiatal hernia with intrathoracic stomach [K44.9] 2019   • Leukocytosis [D72.829] 2019   • Anemia due to chronic kidney disease [N18.9, D63.1] 2019   • Debility [R53.81] 2019   • Hx of renal cell carcinoma [Z85.528] 2019   • Chronic diastolic CHF (congestive heart failure) (CMS/Formerly [facility]) [I50.32] 2019   • Falls frequently [R29.6] 2019   • Acute renal failure (ARF) (CMS/Formerly [facility]) [N17.9] 2019   • Pulmonary hypertension (CMS/Formerly [facility]) [I27.20]    • CKD (chronic kidney disease) stage 5, GFR less than 15 ml/min (CMS/Formerly [facility]) [N18.5]       Resolved Hospital Problems    Diagnosis Date Noted Date Resolved   • **PEA [I46.9] 2019 02/15/2019   • PVC's (On Propafanone) [I49.3] 2017   • Chronic renal insufficiency [N18.9]  2019   • ITP (idiopathic thrombocytopenic purpura) [D69.3] 2016 02/15/2019          Hospital Course:  Fabiola Pimentel is a 84 y.o. female  with past medical history of stage V chronic kidney disease, essential hypertension, degenerative disc disease, diverticulosis, chronic diastolic congestive  heart failure, hiatal hernia, ITP, PVCs, Raynaud's phenomenon, pulmonary hypertension, renal cell carcinoma status post nephrectomy, essential hypertension presented from outside hospital [place] after reportedly being hospitalized and newly placed on hemodialysis.  Patient had dialysis access issues and was transferred here for surgical evaluation.  She has a fistula is not mature yet reportedly.  Dialysis access was placed and patient on 2/1/19 had a cardiac arrest PEA code that reportedly lasted 3 minutes.  She was transferred from the floor to the intensive care unit.  Notably she has a chronic leukocytosis that was more elevated than previous.  Infectious disease evaluated and she was placed on vancomycin, fluconazole, and other broad-spectrum antibiotics.  Infectious workup was done and no clear source could be determined.  Patient did have chest wound and she received topical wound care treatment with Dakin's twice daily to this wound.  She had elevated pro-calcitonin and other electrolyte abnormalities.  She was stabilized in the intensive care unit and transferred to Hospital medicine service on 2/15/19.  She has now received placement at a skilled rehabilitation facility for subacute rehabilitation and is planning to transfer today.  Patient feels comfortable with this plan and feels much improved.  Also of note she was seen by cardiology consult team who feels that her code was due to hypotension and not cardiac arrhythmia.  Cardiology did adjust her cardiac medications and she will continue them at discharge.  Cardiology recommended she discontinue her Propafenone  which reportedly she was taking for PVCs.  They recommended she continue at current dose of beta blocker at 2.5 mg.  I also recommend she have 10 additional days of magnesium oxide.  She is currently receiving IV iron infusions and needs 5 additional doses after hemodialysis.  Her antibiotics are dosed below as per infectious disease  "recommendations.  She reportedly has chronically improved significantly over the past few days.  She is now felt to be end-stage renal disease and is receiving dialysis long-term Tuesday Thursday Saturday.  She needs to follow up with primary care physician within one week after discharge from skilled facility.  As per below infectious disease recommending follow up in their clinic in 2 weeks.  Recommend cardiology follow-up within approximately one month.    Is also note when I discussed with patient this morning she says that next time her heart stops she may decide to change her CODE STATUS and not want CPR.  She did not want to change this quite at this time but plans to discuss with her family and readdress CODE STATUS at her skilled facility in the future.  Please make sure this is addressed.    Discharge Follow Up Recommendations for labs/diagnostics:  Per infectious disease Dr. Villanueva:  \"Please arrange for post-hemodialysis antibiotics as an outpatient with vancomycin 500 mg IV post each hemodialysis on Tuesday, Thursday, Saturday, and Fortaz 1 g IV post each hemodialysis on the same days, to continue until 2/28/19.  Check CBC, CMP, CRP, vancomycin trough post each hemodialysis.  Fax orders to [phone], and call office at [phone] with final arrangements.  Please arrange a follow-up with me in one to 2 weeks post discharge.\"    Day of Discharge     HPI:   Patient says she is doing very well today.  She is eager to go to rehabilitation.  She feels like her strength will gradually get better.  She denies any chest pain or palpitations.  She says she is tolerating hemodialysis well.  She says next time her heart stops or she may decide to change her CODE STATUS and did not want CPR in the future.  She plans discussed with her family and discuss this further with her skilled facility.    Review of Systems  No current fevers or chills  No current shortness of breath or cough  No current nausea, vomiting, or " diarrhea  No current chest pain or palpitations      Vital Signs:   Temp:  [98.1 °F (36.7 °C)-99.8 °F (37.7 °C)] 98.1 °F (36.7 °C)  Heart Rate:  [67-73] 67  Resp:  [18-22] 18  BP: (124-136)/(56-67) 128/64     Physical Exam:  Constitutional:Awake, alert  HENT: NCAT, mucous membranes moist, neck supple  Respiratory: Clear to auscultation bilaterally, respiratory effort normal, nonlabored breathing   Cardiovascular: RRR, no murmurs, rubs, or gallops, no lower extremity edema  Gastrointestinal: Positive bowel sounds, soft, nontender, nondistended  Musculoskeletal: Elderly, frail, and chronically ill-appearing, BMI 25  Psychiatric: Appropriate affect, cooperative, conversational  Neurologic: No slurred speech or facial droop, follows commands, oriented to person, place, and year, poor historian but pleasant      Pertinent  and/or Most Recent Results     Results from last 7 days   Lab Units 02/15/19  0421 02/14/19  0642 02/13/19  0535 02/12/19  1719 02/12/19  0958 02/11/19  0713 02/10/19  0401 02/09/19  0701   WBC 10*3/mm3 19.04* 13.27* 12.78* 15.90*  --   --  20.42* 20.46*   HEMOGLOBIN g/dL 8.2* 7.6* 7.4* 7.3*  --   --  8.0* 7.8*   HEMATOCRIT % 26.9* 24.3* 23.5* 23.1*  --   --  25.5* 25.5*   PLATELETS 10*3/mm3 399 403 346 299  --   --  271 290   SODIUM mmol/L 142 146 147*  --  146 147* 140 144   POTASSIUM mmol/L 4.0 4.2 3.9  --  3.6 4.2 3.7 3.9   CHLORIDE mmol/L 106 108 109  --  110* 112* 105 110*   CO2 mmol/L 20.0 25.0 26.0  --  22.0 22.0 24.0 22.0   BUN mg/dL 21 46* 26*  --  49* 37* 26* 45*   CREATININE mg/dL 2.32* 3.64* 2.38*  --  3.83* 3.41* 2.37* 3.69*   GLUCOSE mg/dL 84 100 84  --  121* 99 88 107*   CALCIUM mg/dL 9.6 9.0 8.7  --  8.8 8.7 8.6* 8.4*     Results from last 7 days   Lab Units 02/15/19  0421   BILIRUBIN mg/dL 0.8   ALK PHOS U/L 66   ALT (SGPT) U/L 4*   AST (SGOT) U/L 23           Invalid input(s): TG, LDLCALC, LDLREALC        Brief Urine Lab Results  (Last result in the past 365 days)      Color    Clarity   Blood   Leuk Est   Nitrite   Protein   CREAT   Urine HCG        02/14/19 1547 Yellow Cloudy Negative Negative Negative 30 mg/dL (1+)               Microbiology Results Abnormal     Procedure Component Value - Date/Time    Wound Culture - Wound, Spine, Sacral [[id number]] Collected:  02/14/19 1401    Lab Status:  Preliminary result Specimen:  Wound from Spine, Sacral Updated:  02/15/19 1047     Wound Culture Culture in progress     Gram Stain No WBCs or organisms seen    Blood Culture - Blood, Blood, Central Line [[id number]] Collected:  02/14/19 0940    Lab Status:  Preliminary result Specimen:  Blood, Central Line Updated:  02/15/19 1015     Blood Culture No growth at 24 hours    Urine Culture - Urine, Urine, Catheter In/Out [[id number]]  (Normal) Collected:  02/14/19 1547    Lab Status:  Preliminary result Specimen:  Urine, Catheter In/Out Updated:  02/15/19 0806     Urine Culture No growth    Wound Culture - Surgical Site, Chest, Left [[id number]] Collected:  02/14/19 1359    Lab Status:  Preliminary result Specimen:  Surgical Site from Chest, Left Updated:  02/15/19 0707     Wound Culture No growth at less than 24 hours     Gram Stain No WBCs or organisms seen    Blood Culture - Blood, Arm, Right [[id number]] Collected:  02/14/19 1433    Lab Status:  Preliminary result Specimen:  Blood from Arm, Right Updated:  02/15/19 0303     Blood Culture No growth at less than 24 hours    Blood Culture - Blood, Hand, Right [[id number]] Collected:  02/14/19 1430    Lab Status:  Preliminary result Specimen:  Blood from Hand, Right Updated:  02/15/19 0303     Blood Culture No growth at less than 24 hours    Blood Culture - Blood, Arm, Right [[id number]] Collected:  02/06/19 1047    Lab Status:  Final result Specimen:  Blood from Arm, Right Updated:  02/11/19 1115     Blood Culture No growth at 5 days          Imaging Results (all)     Procedure Component Value Units Date/Time    XR Chest 1 View [[id number]] Collected:   02/14/19 1434     Updated:  02/14/19 1616    Narrative:       EXAMINATION: XR CHEST 1 VW-02/14/2019:      INDICATION: Leukocytosis; Z74.09-Other reduced mobility; Z74.09-Other  reduced mobility; R13.13-Dysphagia, pharyngeal phase; I46.9-Cardiac  arrest, cause unspecified.      COMPARISON: 02/07/2019.     FINDINGS: Portable chest reveals the heart to be enlarged. Patchy  ill-defined opacification seen within left lung base. Dialysis catheter  identified on the left. Degenerative changes seen within the spine.  No  pleural effusion or pneumothorax.           Impression:       The heart is enlarged with ill-defined opacification seen at  the left lung base. Dialysis catheter in satisfactory position. The  right lung is clear.     D:  02/14/2019  E:  02/14/2019     This report was finalized on 2/14/2019 4:14 PM by Dr. Modesta Chris MD.       Fiberoptic Endo (fees) [[id number]] Resulted:  02/14/19 1518     Updated:  02/14/19 1518    Narrative:       This procedure was auto-finalized with no dictation required.    Fiberoptic Endo (fees) [[id number]] Resulted:  02/08/19 0854     Updated:  02/08/19 0854    Narrative:       This procedure was auto-finalized with no dictation required.    XR Chest 1 View [[id number]] Collected:  02/07/19 0904     Updated:  02/07/19 1202    Narrative:       EXAMINATION: XR CHEST 1 VW-      INDICATION: Leukocytosis; Z74.09-Other reduced mobility;  R13.13-Dysphagia, pharyngeal phase.      COMPARISON: 02/05/2019.     FINDINGS: Large bore central venous catheter is well positioned. There  is a large hiatal hernia. There are minimal areas of bilateral  atelectasis but there is no consolidation or mass.           Impression:       There are no acute findings and there has been no  significant change when compared to previous examination of 02/05/2019.     D:  02/07/2019  E:  02/07/2019     This report was finalized on 2/7/2019 12:00 PM by Dr. Raymond Billings MD.       XR Chest 1 View [[id number]]  Collected:  02/05/19 1047     Updated:  02/05/19 2128    Narrative:       EXAMINATION: XR CHEST 1 VW- 02/05/2019      INDICATION: s/p PEA; Z74.09-Other reduced mobility     COMPARISON: 02/02/2019     FINDINGS: Dialysis catheter is again noted in the distal SVC. Heart is  mildly enlarged. Vasculature is slightly cephalized. Mild hazy  interstitial opacity of the lungs appears stable. Air shadow  superimposed over the heart is presumably a large hiatal hernia  unchanged. No pneumothorax or other new chest disease is seen.        Impression:       Mild pulmonary vascular congestion and large hiatal hernia  unchanged.     D:  02/05/2019  E:  02/05/2019     This report was finalized on 2/5/2019 9:25 PM by DR. Cruzito Piña MD.       XR Chest 1 View [[id number]] Collected:  02/02/19 1311     Updated:  02/02/19 1530    Narrative:       EXAMINATION: XR CHEST 1 VW - 2/2/2019     INDICATION: Respiratory distress.     TECHNIQUE: Single frontal view of the chest.      COMPARISONS: 2/1/2019     FINDINGS:  Stable support hardware. Large hiatal hernia. Left basal  atelectasis. Hazy right basilar opacity. No pneumothorax. Cardiomegaly.        Impression:       No significant interval change.     DICTATED:   2/2/2019  EDITED/ls :   2/2/2019      This report was finalized on 2/2/2019 3:28 PM by Jose Juan Mckeon.       FL C Arm During Surgery [[id number]] Collected:  02/02/19 1115     Updated:  02/02/19 1303    Narrative:       EXAMINATION: FL C ARM DURING SURGERY - 2/1/2019     INDICATION: Dialysis catheter placement.     TECHNIQUE:  Fluoroscopic images were provided for a clinical service  without radiologist present.  The fluoroscopy time was 3.7 seconds. 3  images were provided.     COMPARISONS:  Chest radiograph 12 hours prior.       Impression:       FINDINGS/IMPRESSION: Left IJ tunnel dialysis catheter placement.  See  procedure note for details.     DICTATED:   2/2/2019  EDITED/ls :   2/2/2019         This report was finalized on  2/2/2019 1:01 PM by Jose Juan Mckeon.       XR Chest 1 View [[id number]] Collected:  02/01/19 1933     Updated:  02/01/19 2120    Narrative:       EXAM:    XR Chest, 1 View     EXAM DATE/TIME:    2/1/2019 7:33 PM     CLINICAL HISTORY:    84 years old, female; Device placement; Other: See notes; Additional info:   Left internal jugular tunneled dialysis catheter     TECHNIQUE:    XR of the chest, 1 view.     COMPARISON:   CR - XR CHEST 1 VW 2/1/2019 7:56:08 AM    FINDINGS:     The tip of the left internal jugular dialysis catheter projects toward the   lower superior vena cava or cavoatrial junction region. There is been interval   removal of a right sided central venous catheter with at least one linear   density of uncertain significance again seen slightly distal and medial to the   level of the new left internal jugular dialysis catheter tip.    No interval cardiac enlargement.  Calcified aortic plaque.    Possible atelectatic versus inflammatory changes in some slight mid to lower   lungs.    At least a left pleural effusion may again be present.    No pneumothorax bilaterally.     Clips again project project over the left hemithorax, the upper abdomen, and   lateral to the upper left abdomen.    Grossly stable prominent lucency projecting over the mid to lower left   hemithorax that may again reflect a large hiatal hernia versus other area of   gas with what again appears to be a markedly distended stomach below the   diaphragm.    Stable visualized skeletal structures.        Impression:       1. The tip of the left internal jugular dialysis catheter projects toward the   lower superior vena cava or cavoatrial junction region. Again seen slightly   distal and medial to its tip is at least one linear density which is of   uncertain significance.  2. Other findings as above.    THIS DOCUMENT HAS BEEN ELECTRONICALLY SIGNED BY KAHLIL JAY MD    XR Abdomen KUB [[id number]] Collected:  02/01/19 1143     Updated:   02/01/19 1147    Narrative:          EXAMINATION: XR ABDOMEN KUB-      INDICATION: Gastric distention      COMPARISON: Abdominal CT scan of 8/2/2017     FINDINGS: There is marked gaseous distention of the stomach. There is a  normal amount of bowel gas elsewhere, mostly in normal caliber colon and  a few nondistended distal small bowel loops. No bowel wall edema or  pneumatosis is seen. No supine signs of free air are identified.  Surgical clips are seen adjacent the lumbar spine from previous  nephrectomy.           Impression:       Marked gaseous distention of the stomach, and normal bowel  gas pattern elsewhere.     This report was finalized on 2/1/2019 11:45 AM by DR. Cruzito Piña MD.       XR Shoulder 2+ View Right [[id number]] Collected:  02/01/19 0955     Updated:  02/01/19 1002    Narrative:       EXAMINATION: XR SHOULDER 2+ VW RIGHT- 01/31/2019     INDICATION: recent dislocation/arm swelling     TECHNIQUE:  3 views right shoulder     COMPARISONS:  None.     FINDINGS:  No acute fracture. Mild AC arthrosis. The humeral head is  high riding relative to the glenoid, perhaps related to rotator cuff  pathology. There is no evidence of anterior-inferior glenohumeral  dislocation. Right subclavian central line noted. Small right pleural  effusion noted.       Impression:       No acute finding. Possible rotator pathology.     D:  02/01/2019  E:  02/01/2019     This report was finalized on 2/1/2019 10:00 AM by Jose Juan Mckeon.       XR Hips Bilateral With or Without Pelvis 3-4 View [[id number]] Collected:  02/01/19 0956     Updated:  02/01/19 1002    Narrative:       EXAMINATION: XR HIPS BILATERAL W OR WO PELVIS 3-4 VIEW- 01/31/2019     INDICATION: bilateral hip pain/unable to walk     TECHNIQUE:  Frontal view pelvis and lateral views both hips     COMPARISONS:  CT abdomen/pelvis 08/02/2017     FINDINGS:  No acute fracture, dislocation or malalignment. Old inferior  left pubic ramus and acetabular fractures. There is  abnormal sclerosis  on both sides of the sacroiliac joint with irregularity of the joint  spaces. Sacral arcuate lines are obscured. Extensive atherosclerosis  noted.       Impression:       Old left-sided pelvis fracture. No acute finding.  Extensive irregularities on both sides of the sacroiliac joints are also  stable.     D:  02/01/2019  E:  02/01/2019     This report was finalized on 2/1/2019 9:59 AM by Jose Juan Mckeon.       XR Chest 1 View [[id number]] Collected:  02/01/19 0957     Updated:  02/01/19 1001    Narrative:       EXAMINATION: XR CHEST 1 VW- 01/31/2019     INDICATION: CHF     TECHNIQUE:  Single view frontal chest.     COMPARISONS: 01/01/2008     FINDINGS:  Right subclavian central line terminates in the right atrium.  Bilateral pleural effusions, cardiomegaly and interstitial edema.  Reversed left glenohumeral replacement. Surgical clips along the left  paratracheal stripe. Large hiatal hernia.       Impression:       1. Large hiatal hernia.  2. Interstitial edema and effusions.     D:  02/01/2019  E:  02/01/2019     This report was finalized on 2/1/2019 9:59 AM by Jsoe Juan Mckeon.       XR Chest 1 View [[id number]] Collected:  02/01/19 0827     Updated:  02/01/19 0931    Narrative:       EXAMINATION: XR CHEST 1 VW-      INDICATION: Status post code.     TECHNIQUE:  Single view frontal chest.     COMPARISONS: 01/31/2019.     FINDINGS:  Unchanged support and operative hardware. Large hiatal  hernia. Trace effusions and edema. Cardiomegaly. No pneumothorax.       Impression:       No significant interval change in trace edema and  effusions.     NOTE: Discussed with requesting clinician at 8:17 AM on 02/01/2019.     D:  02/01/2019  E:  02/01/2019     This report was finalized on 2/1/2019 9:29 AM by Jose Juan Mckeon.                       Results for orders placed during the hospital encounter of 01/31/19   Adult Transthoracic Echo Limited W/ Cont if Necessary Per Protocol    Narrative · Stable echodensity noted on  chordal apparatus of anterior leaflet of   mitral valve.           Order Current Status    Blood Culture - Blood, Arm, Right Preliminary result    Blood Culture - Blood, Blood, Central Line Preliminary result    Blood Culture - Blood, Hand, Right Preliminary result    Urine Culture - Urine, Urine, Catheter In/Out Preliminary result    Wound Culture - Surgical Site, Chest, Left Preliminary result    Wound Culture - Wound, Spine, Sacral Preliminary result        Discharge Details        Discharge Medications      New Medications      Instructions Start Date   acetaminophen 325 MG tablet  Commonly known as:  TYLENOL   650 mg, Oral, Every 6 Hours PRN      ceftazidime  Commonly known as:  FORTAZ   1 g, Intravenous, 3 Times Weekly, Give Tuesday Thursday Saturday after dialysis.      epoetin alexus 84579 UNIT/ML injection  Commonly known as:  EPOGEN,PROCRIT   10,000 Units, Subcutaneous, 3 Times Weekly      famotidine 20 MG tablet  Commonly known as:  PEPCID   20 mg, Oral, 2 Times Daily PRN      ferric gluconate 125 mg in sodium chloride 0.9 % 100 mL IVPB   125 mg, Intravenous, 3 Times Weekly      fluconazole 200 MG tablet  Commonly known as:  DIFLUCAN   200 mg, Oral, Every 24 Hours      magnesium oxide 400 MG tablet  Commonly known as:  MAG-OX   400 mg, Oral, Daily      melatonin 5 MG sublingual tablet sublingual tablet   5 mg, Sublingual, Nightly PRN      sodium hypochlorite 0.025 % solution topical solution  Commonly known as:  DAKIN'S   5 mL, Topical, 2 Times Daily, Apply to gauze and chest wound twice daily.      vancomycin   500 mg, Intravenous, 3 Times Weekly, Please give 3 times a week with dialysis Tuesday, Thursday, Saturday.         Changes to Medications      Instructions Start Date   bisoprolol 5 MG tablet  Commonly known as:  ZEBeta  What changed:  how much to take   2.5 mg, Oral, Nightly         Continue These Medications      Instructions Start Date   calcitriol 0.25 MCG capsule  Commonly known as:   "ROCALTROL   0.25 mcg, Oral, Daily      hydrALAZINE 10 MG tablet  Commonly known as:  APRESOLINE   10 mg, Oral, 2 Times Daily      hydrocortisone 2.5 % cream   1 application, Topical, 2 Times Daily PRN      LOMOTIL 2.5-0.025 MG per tablet  Generic drug:  diphenoxylate-atropine   1 tablet, Oral, 2 Times Daily PRN         Stop These Medications    furosemide 40 MG tablet  Commonly known as:  LASIX     PROLIA 60 MG/ML solution syringe  Generic drug:  denosumab     propafenone 150 MG tablet  Commonly known as:  RYTHMOL          Please note recommend magnesium oxide for only 10 days.    IV iron recommended for only 5 more doses.  Per infectious disease physician Dr. Villanueva:  \"Please arrange for post-hemodialysis antibiotics as an outpatient with vancomycin 500 mg IV post each hemodialysis on Tuesday, Thursday, Saturday, and Fortaz 1 g IV post each hemodialysis on the same days, to continue until 2/28/19.  Check CBC, CMP, CRP, vancomycin trough post each hemodialysis.  Fax orders to [phone], and call office at [phone] with final arrangements.  Please arrange a follow-up with me in one to 2 weeks post discharge.\"          Allergies   Allergen Reactions   • Benadryl [Diphenhydramine]      Unknown reaction   • Diltiazem      Edema     • Micardis [Telmisartan]      Hyperkalemia     • Other      DIURETICS, worsening renal insufficiency.           Discharge Disposition:  Skilled Nursing Facility (DC - External)    Discharge Diet:  Diet Order   Procedures   • Diet Dysphagia; IV - Mechanical Soft No Mixed Consistencies; Nectar / Syrup Thick; Renal         Discharge Activity:   Activity Instructions     Activity as Tolerated                 CODE STATUS:    Code Status and Medical Interventions:   Ordered at: 02/01/19 1600     Limited Support to NOT Include:    Intubation     Code Status:    CPR     Medical Interventions (Level of Support Prior to Arrest):    Limited         Future Appointments   Date Time Provider Department " Center   2/16/2019  7:00 AM [facility]   2/19/2019  3:00 PM Malachi Maxwell III, MD [facility] None   Cardiology appointment above is being rescheduled.    Additional Instructions for the Follow-ups that You Need to Schedule     Discharge Follow-up with PCP   As directed       Currently Documented PCP:    Otilio Smith DO    PCP Phone Number:    [phone]     Follow Up Details:  Follow-up with primary care 1 week after discharge from skilled facility.         Discharge Follow-up with Specified Provider: Follow-up with your nephrologist and dialysis as previously recommended.   As directed      To:  Follow-up with your nephrologist and dialysis as previously recommended.         Discharge Follow-up with Specified Provider: Recommend cardiology follow-up within 1 month.; 1 Month   As directed      To:  Recommend cardiology follow-up within 1 month.    Follow Up:  1 Month               Time Spent on Discharge:  40 minutes    Electronically signed by Pedro Jama MD, 02/15/19, 11:57 AM.

## 2019-02-15 NOTE — PROGRESS NOTES
INFECTIOUS DISEASE PROGRESS NOTE    Fabiola Pimentel  1934  [id number]    Admit date: 1/31/2019    Requesting Provider: Willis Amaya MD  Evaluating physician: Luis Antonio Villanueva MD  Reason for Consultation: Leukocytosis, neutrophilic, echodense lesion on mitral valve, possible line infection, or hemodialysis site infection  Chief Complaint: Above      Subjective   History of present illness:  Patient is a 84 y.o.  Yr old female with hypertension, DJD, diverticular disease, chronic diastolic CHF, hiatal hernia large, ITP, PVCs, Raynaud's phenomenon, pulmonary hypertension, renal cell carcinoma status post nephrectomy, hypertension, end-stage renal disease on hemodialysis (Tuesday, Thursday, Saturday), hyperlipidemia, chronic diarrhea, who was transferred to [facility] for admission on 1/31/19 from [facility] for right arm fistula not working.  The patient had a dialysis fistula placed in the left forearm by Dr. Ramirez on 1/16/19.  Right IJ catheter was placed.  Patient had been treated with vancomycin and Zosyn for leukocytosis (30,000) and possible sepsis.  Recent fall with dislocation of right shoulder also impacted her ability to ambulate.  She has a history of multiple falls.  The patient had a PEA cardiac arrest on 2/1/19 that lasted less than 3 minutes.  She was transferred to the ICU.  The patient was treated with antibiotics including vancomycin (1/31 until last dose 2/7), fluconazole (for possible esophageal candidiasis with oral thrush, resolved, 2/7 to present), and meropenem (2/6 until last dose 2/12), Zosyn from 1/31 till 2/6.  White blood cell count was increasing and decreasing.  Recent peak was 27,000 on 2/7/19.  Left tunneled hemodialysis catheter chest exit site with some dermal necrosis (placed on 2/1/19).  Was followed by Dr. Raymond Elise from surgery and treated with Dakin's solution. Blood cultures obtained on 2/6 negative, 2/14 negative.  The patient is a poor  historian.  No known antecedent infectious disease exposures, TB, HIV, zoonotic exposures, or immunocompromised state.  I was consulted on 2/14/19 by Dr. Ferrari for further evaluation and treatment.  Recent laboratory evaluation reveals pro-calcitonin 0.78 (1.08 on 2/6), creatinine 3.64, BUN 46, sodium 146, potassium 4.2, bicarbonate 25, WBC 13.27, hemoglobin 7.6, platelets 403.  Normal differential.  Liver function tests from 2/7 unremarkable.  Urinalysis 2/10 WBCs.  WBCs on admission 25.51.  This was on 1/31/19.    2/15/19 history reviewed.  Leukocytosis with severe skin exit site necrosis left dialysis catheter site, and stage III sacral decubitus.  Moved out of the ICU onto the floor.  Pain associated with her skin ulcers.  No high fever.  On vancomycin and Fortaz IV post hemodialysis until 2/28/19.    Past Medical History:   Diagnosis Date   • Chronic ITP (idiopathic thrombocytopenia) (CMS/HCC)    • Chronic renal insufficiency    • CKD (chronic kidney disease)    • Diverticular disease    • Dizziness     Dizziness with increase of propafenone to t.i.d.; however, palpitations improved, patient wishes to stay on current dose.   • DJD (degenerative joint disease)    • Hip fracture (CMS/HCC)     Recent hip and pelvic fracture.   • History of uterine cancer     Initial diagnosis, 2001, status post SALVATORE/BSO. Recurrence documented 2003, status post radiation therapy and implants.   • Hypertension    • Lower extremity edema     Lower extremity venous duplex, 09/23/2013:  No evidence of deep venous thrombosis. Negative VQ scan for pulmonary embolus, findings suggest congestive heart failure, 10/11/2013.No evidence of deep venous thrombosis by bilateral duplex, 03/25/2015.Mild   • Palpitations    • Pelvic fracture (CMS/HCC)     Recent hip and pelvic fracture.   • Premature atrial contractions     Premature atrial contractions, postoperative from incisional hernia repair:A 2D echocardiogram 01/03/2008:  Mild MR, mild to  moderate TR, small pericardial effusion, EF 65%.   • Pulmonary hypertension (CMS/HCC)    • Raynaud's phenomenon (secondary)     Raynaud’s phenomenon involving the left upper extremity.     • Stroke (CMS/HCC)     Right optic nerve stroke.       Past Surgical History:   Procedure Laterality Date   • APPENDECTOMY     • ARTERIOVENOUS FISTULA/SHUNT SURGERY Left 1/16/2019    Procedure: LEFT UPPER EXTREMITY ARTERIOVENOUS FISTULA FORMATION, BRACHIO-AXILLARY SHUNT WITH ARTEGRAFT;  Surgeon: Dale Ramirez MD;  Location: [facility] OR;  Service: Vascular   • CATARACT EXTRACTION WITH INTRAOCULAR LENS IMPLANT      Cataract surgery with lens implantation.   • CHOLECYSTECTOMY  1958   • COLON RESECTION  10/2005    Colon resection, October 2005, with associated splenectomy.   • DILATATION AND CURETTAGE     • INCISIONAL HERNIA REPAIR  01/02/2008    With mesh   • INSERTION HEMODIALYSIS CATHETER N/A 2/1/2019    Procedure: HEMODIALYSIS CATHETER INSERTION;  Surgeon: Raymond Elise MD;  Location: [facility] OR;  Service: General   • NEPHRECTOMY Right 2001   • OTHER SURGICAL HISTORY      Recurrence documented 2003, status post radiation therapy and implants.   • SPLENECTOMY     • SYMPATHECTOMY Left     Left axilla sympathetectomy secondary to Raynaud’s phenomenon.   • TOTAL ABDOMINAL HYSTERECTOMY WITH SALPINGO OOPHORECTOMY  2001       Pediatric History   Patient Guardian Status   • Not on file     Other Topics Concern   • Not on file   Social History Narrative   • Not on file   , 2 children, son lives next door    family history includes Heart attack in her mother; No Known Problems in her father.    Allergies   Allergen Reactions   • Benadryl [Diphenhydramine]      Unknown reaction   • Diltiazem      Edema     • Micardis [Telmisartan]      Hyperkalemia     • Other      DIURETICS, worsening renal insufficiency.           There is no immunization history on file for this patient.    Medication:    Current Facility-Administered  Medications:   •  acetaminophen (TYLENOL) tablet 650 mg, 650 mg, Oral, Q6H PRN, Kate Perry, APRN, 650 mg at 02/14/19 1028  •  albumin human 25 % IV SOLN 25 g, 25 g, Intravenous, PRN, Otilio Adler MD, 25 g at 02/14/19 0807  •  alteplase ((CATHFLO/ACTIVASE)) injection 2 mg, 2 mg, Intracatheter, PRN, Otilio Adler MD  •  bisoprolol (ZEBeta) tablet 2.5 mg, 2.5 mg, Oral, Nightly, Malachi Maxwell III, MD, 2.5 mg at 02/14/19 2112  •  calcitriol (ROCALTROL) capsule 0.25 mcg, 0.25 mcg, Oral, Daily, Tammi Brown APRN, 0.25 mcg at 02/15/19 0806  •  castor oil-balsam peru (VENELEX) ointment, , Topical, Q12H, Malachi Stephens MD  •  cefTAZidime (FORTAZ) 1 g/100 mL 0.9% NS IVPB (mpb), 1 g, Intravenous, Once per day on Tue Thu Sat, Luis Antonio Villanueva MD, 1 g at 02/14/19 1533  •  dextrose (D50W) 25 g/ 50mL Intravenous Solution 25 mL, 25 mL, Intravenous, Q1H PRN, Raymond Nicole MD, 25 mL at 02/01/19 0802  •  epoetin alexus (EPOGEN,PROCRIT) injection 10,000 Units, 10,000 Units, Subcutaneous, Once per day on Tue Thu Sat, Otilio Adler MD, 10,000 Units at 02/14/19 1002  •  ferric gluconate (FERRLECIT) 125 mg in sodium chloride 0.9 % 110 mL IVPB, 125 mg, Intravenous, Daily, Lydia Salcido DO, Last Rate: 55 mL/hr at 02/14/19 1312, 125 mg at 02/14/19 1312  •  fluconazole (DIFLUCAN) tablet 200 mg, 200 mg, Oral, Q24H, Chung Ferrari MD, 200 mg at 02/15/19 1209  •  heparin (porcine) 5000 UNIT/ML injection 5,000 Units, 5,000 Units, Subcutaneous, Q8H, Chung Ferrari MD, 5,000 Units at 02/15/19 1409  •  hydrALAZINE (APRESOLINE) tablet 25 mg, 25 mg, Oral, Q8H, Malachi Maxwell III, MD, 25 mg at 02/15/19 1408  •  melatonin sublingual tablet 5 mg, 5 mg, Sublingual, Nightly PRN, Heladio Souza, NBA, 5 mg at 02/08/19 2044  •  ondansetron (ZOFRAN) injection 4 mg, 4 mg, Intravenous, Q6H PRN, Kenia Monsivais APRCARLOS, 4 mg at 02/04/19 2036  •  pantoprazole (PROTONIX) EC tablet 40 mg, 40 mg,  Oral, Q AM, Chung Ferrari MD, 40 mg at 02/15/19 0556  •  potassium & sodium phosphates (PHOS-NAK) oral packet, 1 packet, Oral, TID AC, Isaac Proctor MD, 1 packet at 02/15/19 1215  •  senna (SENOKOT) tablet 8.6 mg, 1 tablet, Oral, BID, Chung Ferrari MD, 8.6 mg at 02/15/19 0805  •  sodium chloride 0.9 % flush 3 mL, 3 mL, Intravenous, Q12H, Tammi Brown APRN, 3 mL at 02/14/19 2114  •  sodium chloride 0.9 % flush 3-10 mL, 3-10 mL, Intravenous, PRN, Tammi Brown APRN  •  sodium hypochlorite (DAKIN'S) 0.025 % topical solution solution, , Topical, BID, Raymond Elise MD  •  vancomycin 500 mg/100 mL 0.9% NS IVPB (mbp), 500 mg, Intravenous, Once per day on Tue Thu Rich Hernandez Daniel C, MD, 500 mg at 02/14/19 0541    Please refer to the medical record for a full medication list    Review of Systems:    Constitutional-- No Fever, chills or sweats.  Appetite poor, and no malaise. No fatigue.  HEENT-- No new vision, hearing or throat complaints.  No epistaxis or oral sores.  Denies odynophagia or dysphagia.  No odynophagia or dysphagia. No headache, photophobia or neck stiffness.  CV-- No chest pain, palpitation or syncope  Resp-- No SOB/cough/Hemoptysis  GI- No nausea, vomiting, or diarrhea.  No hematochezia, melena, or hematemesis. Denies jaundice or chronic liver disease.  -- No dysuria, hematuria, or flank pain.  Denies hesitancy, urgency or flank pain.  Lymph- no swollen lymph nodes in neck/axilla or groin.   Heme- No active bruising or bleeding; no Hx of DVT or PE.  MS-- no swelling or pain in the bones or joints of arms/legs.  No new back pain.  Neuro-- No acute focal weakness or numbness in the arms or legs.  No seizures.  Skin--No rashes or lesions, except sacral wound and left chest wall catheter site exit wound as per history of present illness, pain associated with her left chest wall and sacral wounds    Physical Exam:   Vital Signs   Temp:  [98.1 °F (36.7 °C)-99.8 °F (37.7 °C)] 98.1 °F  "(36.7 °C)  Heart Rate:  [67-73] 67  Resp:  [18] 18  BP: (124-136)/(60-67) 128/64    Temp  Min: 98.1 °F (36.7 °C)  Max: 99.8 °F (37.7 °C)  BP  Min: 124/67  Max: 136/60  Pulse  Min: 67  Max: 73  Resp  Min: 18  Max: 18  SpO2  Min: 97 %  Max: 97 %    Blood pressure 128/64, pulse 67, temperature 98.1 °F (36.7 °C), temperature source Oral, resp. rate 18, height 154.9 cm (61\"), weight 60.3 kg (133 lb), SpO2 97 %, not currently breastfeeding.  GENERAL: Awake and alert, in moderate distress. Appears older than stated age.  HEENT:  Normocephalic, atraumatic.  Oropharynx without thrush. Dentition in good repair. No cervical adenopathy. No neck masses.  Ears externally normal, Nose externally normal.  EYES: PERRL. No conjunctival injection. No icterus. EOM full.  LYMPHATICS: No lymphadenopathy of the neck or axillary or inguinal regions.   HEART: No murmur, gallop, or pericardial friction rub. Reg rate rhythm, No JVD at 45 degrees.  LUNGS: Few crackles left base. No respiratory distress, no use of accessory muscles.  ABDOMEN: Soft, nontender, nondistended. No appreciable HSM.  Bowel sounds normal.  GENITAL: No external lesions, breasts without masses, back straight, no CVAT, rectal external without lesions.   SKIN: Warm and dry without cutaneous eruptions.  No nodules.  Left chest tunneled catheter exit site with ulceration into the dermis and local skin necrosis approximately 4 x 2 cm without fluctuance, or crepitus.  Sacral wound with 3 x 4 cm x 0.5 cm into the subcutaneous tissue with slough without surrounding crepitus or bullae.  PSYCHIATRIC: Mental status lucid. No confusion.  EXT:  No cellulitic change.  NEURO: Oriented to name, nonfocal    Results Review:   I reviewed the patient's new clinical results.  I reviewed the patient's new imaging results and agree with the interpretation.  I reviewed the patient's other test results and agree with the interpretation    Results from last 7 days   Lab Units 02/15/19  0421 " 02/14/19  0642 02/13/19  0535   WBC 10*3/mm3 19.04* 13.27* 12.78*   HEMOGLOBIN g/dL 8.2* 7.6* 7.4*   HEMATOCRIT % 26.9* 24.3* 23.5*   PLATELETS 10*3/mm3 399 403 346     Results from last 7 days   Lab Units 02/15/19  0421   SODIUM mmol/L 142   POTASSIUM mmol/L 4.0   CHLORIDE mmol/L 106   CO2 mmol/L 20.0   BUN mg/dL 21   CREATININE mg/dL 2.32*   GLUCOSE mg/dL 84   CALCIUM mg/dL 9.6     Results from last 7 days   Lab Units 02/15/19  0421   ALK PHOS U/L 66   BILIRUBIN mg/dL 0.8   ALT (SGPT) U/L 4*   AST (SGOT) U/L 23             Results from last 7 days   Lab Units 02/09/19  0701   VANCOMYCIN RM mcg/mL 30.20     Results from last 7 days   Lab Units 02/15/19  0421   LACTATE mmol/L 1.3     Estimated Creatinine Clearance: 15 mL/min (A) (by C-G formula based on SCr of 2.32 mg/dL (H)).    Microbiology:  Microbiology Results Abnormal     Procedure Component Value - Date/Time    Wound Culture - Wound, Spine, Sacral [[id number]] Collected:  02/14/19 1401    Lab Status:  Preliminary result Specimen:  Wound from Spine, Sacral Updated:  02/15/19 1047     Wound Culture Culture in progress     Gram Stain No WBCs or organisms seen    Blood Culture - Blood, Blood, Central Line [[id number]] Collected:  02/14/19 0940    Lab Status:  Preliminary result Specimen:  Blood, Central Line Updated:  02/15/19 1015     Blood Culture No growth at 24 hours    Urine Culture - Urine, Urine, Catheter In/Out [[id number]]  (Normal) Collected:  02/14/19 1547    Lab Status:  Preliminary result Specimen:  Urine, Catheter In/Out Updated:  02/15/19 0806     Urine Culture No growth    Wound Culture - Surgical Site, Chest, Left [[id number]] Collected:  02/14/19 1359    Lab Status:  Preliminary result Specimen:  Surgical Site from Chest, Left Updated:  02/15/19 0707     Wound Culture No growth at less than 24 hours     Gram Stain No WBCs or organisms seen    Blood Culture - Blood, Arm, Right [[id number]] Collected:  02/14/19 1433    Lab Status:  Preliminary result  Specimen:  Blood from Arm, Right Updated:  02/15/19 0303     Blood Culture No growth at less than 24 hours    Blood Culture - Blood, Hand, Right [[id number]] Collected:  02/14/19 1430    Lab Status:  Preliminary result Specimen:  Blood from Hand, Right Updated:  02/15/19 0303     Blood Culture No growth at less than 24 hours    Blood Culture - Blood, Arm, Right [[id number]] Collected:  02/06/19 1047    Lab Status:  Final result Specimen:  Blood from Arm, Right Updated:  02/11/19 1115     Blood Culture No growth at 5 days          Radiology:  Imaging Results (last 72 hours)     ** No results found for the last 72 hours. **          IMPRESSION:     1.  Leukocytosis, neutrophilic with multiple possibilities with antibiotic treatment off and on since 1/31/19.  No significant culture data from blood cultures obtained on 2/6 and 2/14.  Possible sites of infection include left chest wall and sacral wound.  Also at risk for hospital associated infection.  Some of the leukocytosis may be stress related given procedures, and PEA arrest.  Less likely occult intra-abdominal process.  Despite antibiotics it is worse.  2.  Left chest wall catheter skin necrosis with increased risk for development of hemodialysis catheter site infection and dialysis catheter infection with sepsis.  Usual organisms include staphylococcal species and gram-negative rods from [facility].  Cultures are pending.  3.  Stage III decubitus sacrum with increased risk for development of underlying osteomyelitis over time.  4.  PEA arrest 2/1/19 lasting less than 3 minutes.  5.  End-stage renal disease on hemodialysis Tuesday, Thursday, Saturday since approximately December 2018.  Previously difficult non-performing right IJ access removed.  New left IJ tunneled catheter hemodialysis access placed 2/1/19 by Dr. Elise.  Left forearm AV fistula 1/16/19.  6.  Encephalopathy, metabolic off and on.  7.  Acute hypoxic respiratory failure.  8.  Anemia,  chronic disease.    Plan:    1.  Diagnostically, continue to follow patient's physical exam, CBC, CMP, CRP, cultures from the left chest site and sacrum, urinalysis and urine culture.  2.  Therapeutically, consider post-hemodialysis antibiotics with vancomycin 500 mg IV, and Fortaz 1 g IV post each hemodialysis until 2/28/19 pending further cultures.  3.  Continue local wound care.    4.  Oxygen support therapy as needed.  5.  Dialysis continues.  6.  Rehabilitation or nursing home placement.    I discussed the patients findings and my recommendations with patient, nursing staff, primary care team and consulting provider.    Our group would be pleased to follow this patient over the course of their hospitalization and assist with outpatient antimicrobial therapy, as indicated.  Further recommendations depend on the results of the cultures and clinical course.  Signs and symptoms of infection and side effects of antibiotics discussed with the patient.  Also discussed with Dr. Ferrari and pharmacy.      : Please arrange for post-hemodialysis antibiotics as an outpatient with vancomycin 500 mg IV post each hemodialysis on Tuesday, Thursday, Saturday, and Fortaz 1 g IV post each hemodialysis on the same days, to continue until 2/28/19.  Check CBC, CMP, CRP, vancomycin trough post each hemodialysis.  Fax orders to [phone], and call office at [phone] with final arrangements.  Please arrange a follow-up with me in one to 2 weeks post discharge.    Luis Antonio Villanueva MD  2/15/2019

## 2019-02-15 NOTE — PROGRESS NOTES
Case Management Discharge Note    Final Note: Pt to transfer to [facility] today.  RN to call report to [phone].  CM will fax DC summary.  Family to transport.  CM has confirmed with Dina at [facility] that Vancomycin and Fortaz infusions have been arranged and will be available on pt's initial OP HD tx on Saturday.  Pt will dialyze T, T, S at 1030 am.  SNF has arranged transportation to HD appts.      Destination - Selection Complete      Service Provider Request Status Selected Services Address Phone Number Fax Number    [facility] Selected Skilled Nursing [address] [facility] [address] [place] [address] [phone]      Durable Medical Equipment      No service has been selected for the patient.      Dialysis/Infusion      No service has been selected for the patient.      Home Medical Care      No service has been selected for the patient.      Community Resources      No service has been selected for the patient.             Final Discharge Disposition Code: 03 - skilled nursing facility (SNF)

## 2019-02-15 NOTE — PLAN OF CARE
Problem: Patient Care Overview  Goal: Plan of Care Review  Outcome: Ongoing (interventions implemented as appropriate)   02/15/19 1001   Coping/Psychosocial   Plan of Care Reviewed With patient   OTHER   Outcome Summary Pt ambulates in room x 8 ft with MOD A x2. T/f and bed mobility require MOD A x2. Pt has SBA sitting balance once positioned. Pt expectes to d/c to SNF this date for further therapy.

## 2019-02-15 NOTE — PLAN OF CARE
Problem: Patient Care Overview  Goal: Plan of Care Review  Outcome: Ongoing (interventions implemented as appropriate)   02/14/19 1800   Coping/Psychosocial   Plan of Care Reviewed With patient   Plan of Care Review   Progress no change   OTHER   Outcome Summary Pt transferred out of 2A ICU today. HELENA, NSR. I&O cathed today d/t urinary retention. 900mL out, bladder scan showed 999 prior to scan. abdomen was distended. pt denied need to void. educated on importance of voiding q4-6 hrs and dangers of urinary retention. VSS. Dressing changed per orders. Sometimes disorientation with time.

## 2019-02-15 NOTE — PLAN OF CARE
Problem: Patient Care Overview  Goal: Plan of Care Review   02/15/19 0424   Coping/Psychosocial   Plan of Care Reviewed With patient   Plan of Care Review   Progress no change   OTHER   Outcome Summary VSS. Pt c/o pain in her buttocks area. On specialty bed and turned Q2 H. Bladder scan at oo30 revealed only 144 mL. Due to void.        Problem: Fall Risk (Adult)  Goal: Absence of Fall  Outcome: Ongoing (interventions implemented as appropriate)      Problem: Skin Injury Risk (Adult)  Goal: Skin Health and Integrity  Outcome: Ongoing (interventions implemented as appropriate)

## 2019-02-19 PROBLEM — L89.159 SACRAL DECUBITUS ULCER: Status: ACTIVE | Noted: 2019-01-01

## 2019-02-28 PROBLEM — I95.89 HYPOTENSION DUE TO HYPOVOLEMIA: Status: ACTIVE | Noted: 2019-01-01

## 2019-02-28 PROBLEM — I95.3 HEMODIALYSIS-ASSOCIATED HYPOTENSION: Status: ACTIVE | Noted: 2019-01-01

## 2019-02-28 PROBLEM — E86.1 HYPOTENSION DUE TO HYPOVOLEMIA: Status: ACTIVE | Noted: 2019-01-01

## 2019-02-28 PROBLEM — Z87.01 HISTORY OF RECENT PNEUMONIA: Status: ACTIVE | Noted: 2019-01-01

## 2019-03-01 PROBLEM — Z74.09 IMPAIRED MOBILITY AND ADLS: Status: ACTIVE | Noted: 2019-01-01

## 2019-03-01 PROBLEM — Z78.9 IMPAIRED MOBILITY AND ADLS: Status: ACTIVE | Noted: 2019-01-01

## 2019-03-01 NOTE — PLAN OF CARE
Problem: Patient Care Overview  Goal: Plan of Care Review  Outcome: Ongoing (interventions implemented as appropriate)   03/01/19 5182   Coping/Psychosocial   Plan of Care Reviewed With patient   Plan of Care Review   Progress no change   OTHER   Outcome Summary Patient consult for wounds to [facility] cath site-patient known to WOC from previous admission. Coccyx unstageable pressure injury with thick slough in wound bed-Dakins 1/4 strength previously ordered for cleaning and packing-modified order to include Thera Honey for autolytic debridement and to decrease bioburden of wound. Venelex ordered for periwound skin. Will consult PT Wound Care for debridement. [place] cath site with orders for Dakins 1/4 strength per APRN-WOC will defer. Skin tear noted on left shoulder-cleaned with sterile normal saline, covered with xeroform gauze and foam dressing. Patient on dolphin bed. See wound care orders and LDA's. WOC will continue to follow.

## 2019-03-01 NOTE — NURSING NOTE
Pt continues with low karen score, limited mobility, advanced age and high risk for skin breakdown.  [facility] [employer] [phone].  - Thank you

## 2019-03-01 NOTE — PLAN OF CARE
Problem: Patient Care Overview  Goal: Plan of Care Review  Outcome: Ongoing (interventions implemented as appropriate)   03/01/19 0343   Coping/Psychosocial   Plan of Care Reviewed With patient   Plan of Care Review   Progress improving   OTHER   Outcome Summary pt sleeping without distress noted, IV abx given, VSS, continue monitoring       Problem: Fall Risk (Adult)  Goal: Absence of Fall  Outcome: Ongoing (interventions implemented as appropriate)      Problem: Skin Injury Risk (Adult)  Goal: Identify Related Risk Factors and Signs and Symptoms  Outcome: Ongoing (interventions implemented as appropriate)    Goal: Skin Health and Integrity  Outcome: Ongoing (interventions implemented as appropriate)      Problem: Hemodialysis (Adult)  Goal: Signs and Symptoms of Listed Potential Problems Will be Absent, Minimized or Managed (Hemodialysis)  Outcome: Ongoing (interventions implemented as appropriate)

## 2019-03-01 NOTE — PROGRESS NOTES
"Pharmacy Consult-Vancomycin Dosing  Fabiola Pimentel is a  84 y.o. female receiving vancomycin therapy.     Indication: Sepsis  Consulting Provider: Luis Antonio Villanueva MD  ID Consult: Y    Goal Trough: 15-20 mcg/mL    Current Antimicrobial Therapy  Vancomycin 500mg IV after HD  Meropenem 500mg IV daily      Allergies  Allergies as of 02/28/2019 - Reviewed 02/28/2019   Allergen Reaction Noted   • Benadryl [diphenhydramine]  10/12/2016   • Diltiazem  10/12/2016   • Keflex [cephalexin] Unknown (See Comments) 02/20/2019   • Micardis [telmisartan]  10/12/2016   • Other  10/12/2016   • Ultram [tramadol] Unknown (See Comments) 02/20/2019       Labs    Results from last 7 days   Lab Units 03/01/19  0545 02/25/19  0507   BUN mg/dL 57* 32*   CREATININE mg/dL 4.45* 3.29*       Results from last 7 days   Lab Units 03/01/19  0545 02/25/19  0507   WBC 10*3/mm3 35.75* 19.02*       Evaluation of Dosing     Is Patient on Dialysis or Renal Replacement: HD    Ht - 154.9 cm (61\")  Wt -      Estimated Creatinine Clearance: 7.7 mL/min (A) (by C-G formula based on SCr of 4.45 mg/dL (H)).    Intake & Output (last 3 days)       02/26 0701 - 02/27 0700 02/27 0701 - 02/28 0700 02/28 0701 - 03/01 0700 03/01 0701 - 03/02 0700    I.V.   500 1000    IV Piggyback   500 100    Total Intake   1000 1100    Net   +1000 +1100                  Microbiology and Radiology  Microbiology Results (last 10 days)     Procedure Component Value - Date/Time    Wound Culture - Surgical Site, Chest, Left [[id number]] Collected:  02/28/19 2014    Lab Status:  Preliminary result Specimen:  Surgical Site from Chest, Left Updated:  03/01/19 0922     Wound Culture No growth    Blood Culture - Blood, Arm, Left [[id number]] Collected:  02/28/19 1850    Lab Status:  Preliminary result Specimen:  Blood from Arm, Left Updated:  03/01/19 0730     Blood Culture No growth at less than 24 hours    Respiratory Panel, PCR - Swab, Nasopharynx [[id number]]  (Abnormal) Collected:  " 02/20/19 1921    Lab Status:  Final result Specimen:  Swab from Nasopharynx Updated:  02/20/19 2122     ADENOVIRUS, PCR Not Detected     Coronavirus 229E Not Detected     Coronavirus HKU1 Not Detected     Coronavirus NL63 Not Detected     Coronavirus OC43 Detected     Human Metapneumovirus Not Detected     Human Rhinovirus/Enterovirus Not Detected     Influenza B PCR Not Detected     Parainfluenza Virus 1 Not Detected     Parainfluenza Virus 2 Not Detected     Parainfluenza Virus 3 Not Detected     Parainfluenza Virus 4 Not Detected     Bordetella pertussis pcr Not Detected     Influenza A H1 2009 PCR Not Detected     Chlamydophila pneumoniae PCR Not Detected     Mycoplasma pneumo by PCR Not Detected     Influenza A PCR Not Detected     Influenza A H3 Not Detected     Influenza A H1 Not Detected     RSV, PCR Not Detected     Bordetella parapertussis PCR Not Detected    Blood Culture - Blood, Hand, Right [[id number]] Collected:  02/20/19 1113    Lab Status:  Final result Specimen:  Blood from Hand, Right Updated:  02/25/19 1132     Blood Culture No growth at 5 days          Evaluation of Level    Lab Results   Component Value Date    VANCORANDOM 14.80 02/21/2019       Assessment/Plan:  1. Pharmacy to dose vancomycin and meropenem for sepsis in HD.   2. Patient recently admitted and discharged on vancomycin 500 mg IV post-HD on Tuesday, Thursday, Saturday. Last dose was given at readmission on 2/28.  3. Will not give a dose today but will continue vancomycin 500mg IV to be given after HD starting tomorrow.   4. Vancomycin random scheduled for Saturday 3/2 AM prior to HD to ensure within re-dosing range. (Goal trough = 15-20 mcg/mL)  5. Will also start on Meropenem 500mg IV Q24H per [facility] extended infusion protocol based on indication and renal function.   6. Monitor dialysis plan, cultures and sensitivities, and clinical status, and adjust regimen as necessary.    Pharmacy will continue to follow.  Jessica Woodward,  PharmD  Pharmacy Resident  3/1/2019  4:01 PM

## 2019-03-01 NOTE — THERAPY EVALUATION
Acute Care - Occupational Therapy Initial Evaluation  [facility]     Patient Name: Fabiola Pimentel  : 1934  MRN: [id number]  Today's Date: 3/1/2019  Onset of Illness/Injury or Date of Surgery: 19  Date of Referral to OT: 19  Referring Physician: NBA Carvajal    Admit Date: 2019       ICD-10-CM ICD-9-CM   1. Impaired mobility and ADLs Z74.09 799.89   2. Impaired functional mobility, balance, gait, and endurance Z74.09 V49.89     Patient Active Problem List   Diagnosis   • Palpitations   • Premature atrial contractions   • Hypertension   • Pulmonary hypertension (CMS/[facility])   • DJD (degenerative joint disease)   • Raynaud's phenomenon (secondary)   • Diverticular disease   • CKD (chronic kidney disease) stage 5, GFR less than 15 ml/min (CMS/[facility])   • Leukocytosis   • Anemia due to chronic kidney disease   • Debility   • Hx of renal cell carcinoma   • Chronic diastolic CHF (congestive heart failure) (CMS/[facility])   • Falls frequently   • Acute renal failure (ARF) (CMS/[facility])   • Huge hiatal hernia with intrathoracic stomach   • Mitral valve mass   • ESRD (end stage renal disease) (CMS/[facility])   • Hemodialysis patient (CMS/[facility])   • Sacral decubitus ulcer   • Hemodialysis-associated hypotension   • History of recent pneumonia   • Hypotension due to hypovolemia   • Impaired mobility and ADLs     Past Medical History:   Diagnosis Date   • Chronic ITP (idiopathic thrombocytopenia) (CMS/[facility])    • Chronic renal insufficiency    • CKD (chronic kidney disease)    • Diverticular disease    • Dizziness     Dizziness with increase of propafenone to t.i.d.; however, palpitations improved, patient wishes to stay on current dose.   • DJD (degenerative joint disease)    • Hip fracture (CMS/HCC)     Recent hip and pelvic fracture.   • History of uterine cancer     Initial diagnosis, , status post SALVATORE/BSO. Recurrence documented , status post radiation therapy and implants.   • Hypertension    • Lower extremity  edema     Lower extremity venous duplex, 09/23/2013:  No evidence of deep venous thrombosis. Negative VQ scan for pulmonary embolus, findings suggest congestive heart failure, 10/11/2013.No evidence of deep venous thrombosis by bilateral duplex, 03/25/2015.Mild   • Palpitations    • Pelvic fracture (CMS/HCC)     Recent hip and pelvic fracture.   • Premature atrial contractions     Premature atrial contractions, postoperative from incisional hernia repair:A 2D echocardiogram 01/03/2008:  Mild MR, mild to moderate TR, small pericardial effusion, EF 65%.   • Pulmonary hypertension (CMS/HCC)    • Raynaud's phenomenon (secondary)     Raynaud’s phenomenon involving the left upper extremity.     • Stroke (CMS/HCC)     Right optic nerve stroke.     Past Surgical History:   Procedure Laterality Date   • APPENDECTOMY     • ARTERIOVENOUS FISTULA/SHUNT SURGERY Left 1/16/2019    Procedure: LEFT UPPER EXTREMITY ARTERIOVENOUS FISTULA FORMATION, BRACHIO-AXILLARY SHUNT WITH ARTEGRAFT;  Surgeon: Dale Ramirez MD;  Location: [facility] OR;  Service: Vascular   • CATARACT EXTRACTION WITH INTRAOCULAR LENS IMPLANT      Cataract surgery with lens implantation.   • CHOLECYSTECTOMY  1958   • COLON RESECTION  10/2005    Colon resection, October 2005, with associated splenectomy.   • DILATATION AND CURETTAGE     • INCISIONAL HERNIA REPAIR  01/02/2008    With mesh   • INSERTION HEMODIALYSIS CATHETER N/A 2/1/2019    Procedure: HEMODIALYSIS CATHETER INSERTION;  Surgeon: Raymond Elise MD;  Location: [facility] OR;  Service: General   • NEPHRECTOMY Right 2001   • OTHER SURGICAL HISTORY      Recurrence documented 2003, status post radiation therapy and implants.   • SPLENECTOMY     • SYMPATHECTOMY Left     Left axilla sympathetectomy secondary to Raynaud’s phenomenon.   • TOTAL ABDOMINAL HYSTERECTOMY WITH SALPINGO OOPHORECTOMY  2001          OT ASSESSMENT FLOWSHEET (last 72 hours)      Occupational Therapy Evaluation     Row Name 03/01/19  1147                   OT Evaluation Time/Intention    Subjective Information  complains of;weakness;fatigue;pain  -TB        Document Type  evaluation  -TB        Mode of Treatment  occupational therapy  -TB        Patient Effort  poor  -TB        Symptoms Noted During/After Treatment  fatigue;increased pain  -TB        Comment  Reluctant to participate  -TB           General Information    Patient Profile Reviewed?  yes  -TB        Onset of Illness/Injury or Date of Surgery  02/28/19  -TB        Referring Physician  NBA Carvajal  -TB        Patient Observations  lethargic;poorly cooperative  -TB        Patient/Family Observations  No family present  -TB        General Observations of Patient  Pt supine in bed - RN requesting therapy assist to transition pt to specialty bed  -TB        Prior Level of Function  -- Pt is poor historian, no family present to clarify; from SNF  -TB        Equipment Currently Used at Home  rollator;commode, bedside;shower chair;power chair, (recliner lift)  -TB        Pertinent History of Current Functional Problem  Pt readmitted after d/c to SNF d/t intolerance to out pt dialysis. Dialysis stopped abruptly d/t hypotension and pt transfered [facility] for HLOC. CXR(+) small L pleural effusion.   -TB        Existing Precautions/Restrictions  fall;oxygen therapy device and L/min exit alarms  -TB        Limitations/Impairments  safety/cognitive  -TB        Risks Reviewed  patient:;LOB;increased discomfort;change in vital signs;lines disloged  -TB        Benefits Reviewed  patient:;improve function;decrease pain;increase knowledge  -TB        Barriers to Rehab  medically complex;previous functional deficit  -TB           Relationship/Environment    Primary Source of Support/Comfort  child(sadie)  -TB        Lives With  alone  -TB        Concerns About Impact on Relationships  Pt d/c'd to SNF x1 day and readmitted  -TB           Resource/Environmental Concerns    Current Living Arrangements  extended  care facility  -TB           Cognitive Assessment/Interventions    Additional Documentation  Cognitive Assessment/Intervention (Group)  -TB           Cognitive Assessment/Intervention- PT/OT    Affect/Mental Status (Cognitive)  low arousal/lethargic  -TB        Behavioral Issues (Cognitive)  verbal outbursts  -TB        Orientation Status (Cognition)  oriented to;person  -TB        Follows Commands (Cognition)  follows one step commands;0-24% accuracy;verbal cues/prompting required;repetition of directions required;physical/tactile prompts required  -TB        Cognitive Function (Cognitive)  attention deficit;safety deficit  -TB        Attention Deficit (Cognitive)  arousal/alertness  -TB        Safety Deficit (Cognitive)  severe deficit  -TB        Personal Safety Interventions  fall prevention program maintained;gait belt;nonskid shoes/slippers when out of bed exit alarms  -TB           Safety Issues, Functional Mobility    Safety Issues Affecting Function (Mobility)  ability to follow commands;awareness of need for assistance;insight into deficits/self awareness;safety precaution awareness;safety precautions follow-through/compliance;sequencing abilities  -TB        Impairments Affecting Function (Mobility)  balance;cognition;endurance/activity tolerance;pain;postural/trunk control;strength  -TB           Bed Mobility Assessment/Treatment    Bed Mobility Assessment/Treatment  bed mobility (all) activities  -TB        [place] Level (Bed Mobility)  dependent (less than 25% patient effort);2 person assist  -TB        Bed Mobility, Safety Issues  cognitive deficits limit understanding;decreased use of arms for pushing/pulling;decreased use of legs for bridging/pushing;impaired trunk control for bed mobility  -TB        Assistive Device (Bed Mobility)  draw sheet  -TB           Functional Mobility    Functional Mobility- Ind. Level  not appropriate to assess  -TB           Transfer Assessment/Treatment     Transfer Assessment/Treatment  sit-stand transfer;stand-sit transfer;bed-chair transfer;chair-bed transfer  -TB           Bed-Chair Transfer    Bed-Chair [place] (Transfers)  dependent (less than 25% patient effort);2 person assist  -TB        Assistive Device (Bed-Chair Transfers)  -- gait belt  -TB           Chair-Bed Transfer    Chair-Bed [place] (Transfers)  dependent (less than 25% patient effort);2 person assist  -TB           Sit-Stand Transfer    Sit-Stand [place] (Transfers)  dependent (less than 25% patient effort);2 person assist  -TB           Stand-Sit Transfer    Stand-Sit [place] (Transfers)  dependent (less than 25% patient effort);2 person assist  -TB           ADL Assessment/Intervention    BADL Assessment/Intervention  lower body dressing;toileting  -TB           Lower Body Dressing Assessment/Training    Lower Body Dressing [place] Level  don;socks;dependent (less than 25% patient effort)  -TB           Toileting Assessment/Training    [place] Level (Toileting)  dependent (less than 25% patient effort)  -TB        Comment (Toileting)  incontinent  -TB           BADL Safety/Performance    Impairments, BADL Safety/Performance  balance;cognition;endurance/activity tolerance;pain;strength;trunk/postural control  -TB        Cognitive Impairments, BADL Safety/Performance  attention;awareness, need for assistance;insight into deficits/self awareness;safety precaution awareness;safety precaution follow-through;sequencing abilities  -TB           General ROM    GENERAL ROM COMMENTS  B UE AAROM/PROM WFL  -TB           MMT (Manual Muscle Testing)    General MMT Comments  Unable/difficult to assess d/t low arousal  -TB           Motor Assessment/Interventions    Additional Documentation  Balance (Group);Therapeutic Exercise (Group)  -TB           Therapeutic Exercise    Therapeutic Exercise  supine, upper extremities  -TB        Additional Documentation  Therapeutic Exercise  (Row)  -TB           Upper Extremity Supine Therapeutic Exercise    Performed, Supine Upper Extremity (Therapeutic Exercise)  shoulder flexion/extension;shoulder abduction/adduction;shoulder horizontal abduction/adduction;elbow flexion/extension  -TB        Exercise Type, Supine Upper Extremity (Therapeutic Exercise)  AAROM (active assistive range of motion);PROM (passive range of motion)  -TB           Balance    Balance  static sitting balance;static standing balance  -TB           Static Sitting Balance    Level of [place] (Unsupported Sitting, Static Balance)  dependent, less than 25% patient effort;1 person assist  -TB        Sitting Position (Unsupported Sitting, Static Balance)  sitting on edge of bed;sitting in chair  -TB        Time Able to Maintain Position (Unsupported Sitting, Static Balance)  4 to 5 minutes  -TB        Comment (Unsupported Sitting, Static Balance)  progressed to Max A intermittently; strong R side lean  -TB           Static Standing Balance    Level of [place] (Supported Standing, Static Balance)  dependent, less than 25% patient effort;2 person assist  -TB        Time Able to Maintain Position (Supported Standing, Static Balance)  less than 15 seconds  -TB        Assistive Device Utilized (Supported Standing, Static Balance)  -- gait belt  -TB           Positioning and Restraints    Pre-Treatment Position  in bed  -TB        Post Treatment Position  bed  -TB        In Bed  supine;call light within reach;encouraged to call for assist;exit alarm on;with nsg  -TB           Pain Assessment    Additional Documentation  Pain Scale: FACES Pre/Post-Treatment (Group)  -TB           Pain Scale: Numbers Pre/Post-Treatment    Pain Location - Orientation  generalized  -TB        Pain Intervention(s)  Repositioned  -TB           Pain Scale: FACES Pre/Post-Treatment    Pain: FACES Scale, Pretreatment  2-->hurts little bit  -TB        Pain: FACES Scale, Post-Treatment  4-->hurts little more   -TB        Pre/Post Treatment Pain Comment  Pt agitated by activity/stimulation  -TB           Wound 02/28/19 1730 coccyx pressure injury    Wound - Properties Group Date first assessed: 02/28/19  -AM Time first assessed: 1730  -AM Present On Admission : yes  -AM Location: coccyx  -AM Type: pressure injury  -AM Stage, Pressure Injury: Stage 3  -AM       Wound 02/28/19 1730 Left shoulder skin tear    Wound - Properties Group Date first assessed: 02/28/19  -AM Time first assessed: 1730  -AM Present On Admission : yes  -AM Side: Left  -AM Location: shoulder  -AM Type: skin tear  -AM       [REMOVED] Wound 02/04/19 2000 Right upper chest puncture    Wound - Properties Group Date first assessed: 02/04/19  -RS Time first assessed: 2000  -RS Present On Admission : no  -RS Side: Right  -RS Orientation: upper  -RS Location: chest  -RS Type: puncture  -RS, HD cath with pigtail site (removed)  Resolution Date: 02/28/19  -AM Resolution Time: 1830  -AM       Coping    Observed Emotional State  irritable  -TB           Plan of Care Review    Plan of Care Reviewed With  patient  -TB           Clinical Impression (OT)    Date of Referral to OT  02/28/19  -TB        OT Diagnosis  Impaired mobility and ADL  -TB        Patient/Family Goals Statement (OT Eval)  (unable)  -TB        Criteria for Skilled Therapeutic Interventions Met (OT Eval)  yes;treatment indicated  -TB        Rehab Potential (OT Eval)  fair, will monitor progress closely  -TB        Therapy Frequency (OT Eval)  3 times/wk MWF  -TB        Care Plan Review (OT)  evaluation/treatment results reviewed;care plan/treatment goals reviewed  -TB        Anticipated Equipment Needs at Discharge (OT)  -- TBD; anticipate return to SNF  -TB        Anticipated Discharge Disposition (OT)  skilled nursing facility  -TB           Vital Signs    Pre Systolic BP Rehab  -- RN cleared OT  -TB        O2 Delivery Post Treatment  supplemental O2  -TB        Pre Patient Position  Supine  -TB         Intra Patient Position  Sitting  -TB        Post Patient Position  Supine  -TB           Planned OT Interventions    Planned Therapy Interventions (OT Eval)  transfer/mobility retraining;strengthening exercise;occupation/activity based interventions;BADL retraining  -TB           OT Goals    Transfer Goal Selection (OT)  transfer, OT goal 1  -TB        Grooming Goal Selection (OT)  grooming, OT goal 1  -TB        Self-Feeding Goal Selection (OT)  self feeding, OT goal 1  -TB        Strength Goal Selection (OT)  strength, OT goal 1  -TB        Balance Goal Selection (OT)  balance, OT goal 1  -TB           Transfer Goal 1 (OT)    Activity/Assistive Device (Transfer Goal 1, OT)  sit-to-stand/stand-to-sit;bed-to-chair/chair-to-bed;toilet;commode, bedside without drop arms  -TB        [place] Level/Cues Needed (Transfer Goal 1, OT)  moderate assist (50-74% patient effort)  -TB        Time Frame (Transfer Goal 1, OT)  by discharge  -TB        Barriers (Transfers Goal 1, OT)  strength, endurance  -TB        Progress/Outcome (Transfer Goal 1, OT)  goal ongoing  -TB           Grooming Goal 1 (OT)    Activity/Device (Grooming Goal 1, OT)  wash face, hands;oral care;hair care sitting EOB or unsupported in recliner  -TB        [place] (Grooming Goal 1, OT)  minimum assist (75% or more patient effort);verbal cues required  -TB        Time Frame (Grooming Goal 1, OT)  by discharge  -TB        Progress/Outcome (Grooming Goal 1, OT)  goal ongoing  -TB           Self-Feeding Goal 1 (OT)    Activity/Assistive Device (Self-Feeding Goal 1, OT)  scoop food and bring to mouth;liquids to mouth UIC  -TB        [place] Level/Cues Needed (Self-Feeding Goal 1, OT)  set-up required;verbal cues required  -TB        Time Frame (Self-Feeding Goal 1, OT)  by discharge  -TB        Progress/Outcomes (Self-Feeding Goal 1, OT)  goal ongoing  -TB           Strength Goal 1 (OT)    Strength Goal 1 (OT)  Pt actively participates in  written HEP to support ADLs; 3x10-12 reps  -TB        Time Frame (Strength Goal 1, OT)  by discharge  -TB        Progress/Outcome (Strength Goal 1, OT)  goal ongoing  -TB           Balance Goal 1 (OT)    Activity/Assistive Device (Balance Goal 1, OT)  sitting, dynamic to support ADLs  -TB        [place] Level/Cues Needed (Balance Goal 1, OT)  minimum assist (75% or more patient effort);verbal cues required  -TB        Time Frame (Balance Goal 1, OT)  by discharge  -TB        Progress/Outcomes (Balance Goal 1, OT)  goal ongoing  -TB           Living Environment    Home Accessibility  -- No family present to clarify; pt admitted from SNF  -TB          User Key  (r) = Recorded By, (t) = Taken By, (c) = Cosigned By    Initials Name Effective Dates     Nidia Duarte OT 06/08/18 -     Rodriguez Pittman RN 06/30/16 -     Rosina Butler RN 01/06/17 -          Occupational Therapy Education     Title: PT OT SLP Therapies (In Progress)     Topic: Occupational Therapy (In Progress)     Point: ADL training (In Progress)     Description: Instruct learner(s) on proper safety adaptation and remediation techniques during self care or transfers.   Instruct in proper use of assistive devices.    Learning Progress Summary           Patient Acceptance, E, NR by  at 3/1/2019  2:28 PM    Comment:  Education limited by low arousal                               User Key     Initials Effective Dates Name Provider Type Discipline     06/08/18 -  Nidia Duarte OT Occupational Therapist OT                  OT Recommendation and Plan  Outcome Summary/Treatment Plan (OT)  Anticipated Equipment Needs at Discharge (OT): (TBD; anticipate return to SNF)  Anticipated Discharge Disposition (OT): skilled nursing facility  Planned Therapy Interventions (OT Eval): transfer/mobility retraining, strengthening exercise, occupation/activity based interventions, BADL retraining  Therapy Frequency (OT Eval): 3  times/wk(MWF)  Plan of Care Review  Plan of Care Reviewed With: patient  Plan of Care Reviewed With: patient  Outcome Summary: OT IE completed. Pt presents with low arousal, poor strength and impaired balance/trunk control. Dependent for all bed mobility, transfers and self-care this session. OT to follow 3x/week MWF pending pt's ability to participate. Recommend return to SNF at d/c for best outcome.     Outcome Measures     Row Name 03/01/19 1147             How much help from another is currently needed...    Putting on and taking off regular lower body clothing?  1  -TB      Bathing (including washing, rinsing, and drying)  1  -TB      Toileting (which includes using toilet bed pan or urinal)  1  -TB      Putting on and taking off regular upper body clothing  1  -TB      Taking care of personal grooming (such as brushing teeth)  1  -TB      Eating meals  1  -TB      Score  6  -TB         Functional Assessment    Outcome Measure Options  AM-PAC 6 Clicks Daily Activity (OT)  -TB        User Key  (r) = Recorded By, (t) = Taken By, (c) = Cosigned By    Initials Name Provider Type    TB Nidia Duarte OT Occupational Therapist          Time Calculation:   Time Calculation- OT     Row Name 03/01/19 1430             Time Calculation- OT    OT Start Time  1147  -TB      OT Received On  03/01/19  -TB      OT Goal Re-Cert Due Date  03/11/19  -TB        User Key  (r) = Recorded By, (t) = Taken By, (c) = Cosigned By    Initials Name Provider Type    TB Nidia Duarte OT Occupational Therapist        Therapy Suggested Charges     Code   Minutes Charges    None           Therapy Charges for Today     Code Description Service Date Service Provider Modifiers Qty    [id number]  OT EVAL MOD COMPLEXITY 4 3/1/2019 Nidia Duarte OT GO 1               Nidia Duarte OT  3/1/2019

## 2019-03-01 NOTE — CONSULTS
INFECTIOUS DISEASE CONSULT NOTE    Fabiola Pimentel  1934  [id number]    Admit date: 2/28/2019   Consult: 2/20/19    Requesting Provider: Cruzito Solis MD  Evaluating physician: Luis Antonio Villanueva MD  Reason for Consultation: Leukocytosis, neutrophilic, echodense lesion on mitral valve, possible line infection, or hemodialysis site infection, stage 4 sacral decubiti/probable osteomyelitis  Chief Complaint: Above      Subjective   History of present illness:  Patient is a 84 y.o.  Yr old female with hypertension, DJD, diverticular disease, chronic diastolic CHF, hiatal hernia large, ITP, PVCs, Raynaud's phenomenon, pulmonary hypertension, renal cell carcinoma status post nephrectomy, hypertension, end-stage renal disease on hemodialysis (Tuesday, Thursday, Saturday), hyperlipidemia, chronic diarrhea, splenectomy who was transferred to [facility] for admission on 1/31/19 from [facility] for right arm fistula not working.  The patient had a dialysis fistula placed in the left forearm by Dr. Ramirez on 1/16/19.  Right IJ catheter was placed.  Patient had been treated with vancomycin and Zosyn for leukocytosis (30,000) and possible sepsis.  Recent fall with dislocation of right shoulder also impacted her ability to ambulate.  She has a history of multiple falls.  The patient had a PEA cardiac arrest on 2/1/19 that lasted less than 3 minutes.  She was transferred to the ICU.  The patient was treated with antibiotics including vancomycin (1/31 until last dose 2/7), fluconazole (for possible esophageal candidiasis with oral thrush, resolved, 2/7 to present), and meropenem (2/6 until last dose 2/12), Zosyn from 1/31 till 2/6.  White blood cell count was increasing and decreasing.  Recent peak was 27,000 on 2/7/19.  Left tunneled hemodialysis catheter chest exit site with some dermal necrosis (placed on 2/1/19).  Was followed by Dr. Raymond Elise from surgery and treated with Dakin's solution.  Blood cultures obtained on 2/6 negative, 2/14, and 2/20/19 negative.  The patient is a poor historian.  No known antecedent infectious disease exposures, TB, HIV, zoonotic exposures, or immunocompromised state.  The patient continued treatment with vancomycin and fortaz post HD until 2/28/19.    She was discharged 2/27/19, but readmitted 2/28/19 for hypotension without fever, chills, and minimal SOB.  No pain at dialysis.    I was reconsulted on 3/1/19 by Dr. Valle for further evaluation and treatment.  No localizing pain.  Left dialysis cath wound site healing.  Minimal chronic cough, with minimal sputum production.  Her chief complaint is worsened sacral pain.      Past Medical History:   Diagnosis Date   • Chronic ITP (idiopathic thrombocytopenia) (CMS/HCC)    • Chronic renal insufficiency    • CKD (chronic kidney disease)    • Diverticular disease    • Dizziness     Dizziness with increase of propafenone to t.i.d.; however, palpitations improved, patient wishes to stay on current dose.   • DJD (degenerative joint disease)    • Hip fracture (CMS/HCC)     Recent hip and pelvic fracture.   • History of uterine cancer     Initial diagnosis, 2001, status post SALVATORE/BSO. Recurrence documented 2003, status post radiation therapy and implants.   • Hypertension    • Lower extremity edema     Lower extremity venous duplex, 09/23/2013:  No evidence of deep venous thrombosis. Negative VQ scan for pulmonary embolus, findings suggest congestive heart failure, 10/11/2013.No evidence of deep venous thrombosis by bilateral duplex, 03/25/2015.Mild   • Palpitations    • Pelvic fracture (CMS/HCC)     Recent hip and pelvic fracture.   • Premature atrial contractions     Premature atrial contractions, postoperative from incisional hernia repair:A 2D echocardiogram 01/03/2008:  Mild MR, mild to moderate TR, small pericardial effusion, EF 65%.   • Pulmonary hypertension (CMS/HCC)    • Raynaud's phenomenon (secondary)     Raynaud’s  phenomenon involving the left upper extremity.     • Stroke (CMS/HCC)     Right optic nerve stroke.       Past Surgical History:   Procedure Laterality Date   • APPENDECTOMY     • ARTERIOVENOUS FISTULA/SHUNT SURGERY Left 1/16/2019    Procedure: LEFT UPPER EXTREMITY ARTERIOVENOUS FISTULA FORMATION, BRACHIO-AXILLARY SHUNT WITH ARTEGRAFT;  Surgeon: Dale Ramirez MD;  Location: [facility] OR;  Service: Vascular   • CATARACT EXTRACTION WITH INTRAOCULAR LENS IMPLANT      Cataract surgery with lens implantation.   • CHOLECYSTECTOMY  1958   • COLON RESECTION  10/2005    Colon resection, October 2005, with associated splenectomy.   • DILATATION AND CURETTAGE     • INCISIONAL HERNIA REPAIR  01/02/2008    With mesh   • INSERTION HEMODIALYSIS CATHETER N/A 2/1/2019    Procedure: HEMODIALYSIS CATHETER INSERTION;  Surgeon: Raymond Elise MD;  Location: [facility] OR;  Service: General   • NEPHRECTOMY Right 2001   • OTHER SURGICAL HISTORY      Recurrence documented 2003, status post radiation therapy and implants.   • SPLENECTOMY     • SYMPATHECTOMY Left     Left axilla sympathetectomy secondary to Raynaud’s phenomenon.   • TOTAL ABDOMINAL HYSTERECTOMY WITH SALPINGO OOPHORECTOMY  2001       Pediatric History   Patient Guardian Status   • Not on file     Other Topics Concern   • Not on file   Social History Narrative   • Not on file   , 2 children, son lives next door    family history includes Heart attack in her mother; No Known Problems in her father.    Allergies   Allergen Reactions   • Benadryl [Diphenhydramine]      Unknown reaction   • Diltiazem      Edema     • Keflex [Cephalexin] Unknown (See Comments)     Not Specified     • Micardis [Telmisartan]      Hyperkalemia     • Other      DIURETICS, worsening renal insufficiency.     • Ultram [Tramadol] Unknown (See Comments)     Unknown          There is no immunization history on file for this patient.    Medication:    Current Facility-Administered Medications:    •  acetaminophen (TYLENOL) tablet 650 mg, 650 mg, Oral, Q4H PRN, Neeta Carvajal APRN  •  b complex-vitamin c-folic acid (NEPHRO-THONG) tablet 1 tablet, 1 tablet, Oral, Daily, Nora Schmid MD, 1 tablet at 03/01/19 0909  •  bisacodyl (DULCOLAX) EC tablet 5 mg, 5 mg, Oral, Daily PRN, Neeta Carvajal APRN  •  bisacodyl (DULCOLAX) suppository 10 mg, 10 mg, Rectal, Daily PRN, Neeta Carvajal APRN  •  calcitriol (ROCALTROL) capsule 0.25 mcg, 0.25 mcg, Oral, Daily, Nora Schmid MD, 0.25 mcg at 03/01/19 0909  •  docusate sodium (COLACE) capsule 100 mg, 100 mg, Oral, BID, Neeta Carvajal APRN, 100 mg at 03/01/19 0909  •  epoetin alexus (EPOGEN,PROCRIT) injection 10,000 Units, 10,000 Units, Subcutaneous, Once per day on Tue Thu Sat, Neeta Carvajal APRN, 10,000 Units at 02/28/19 2133  •  ferrous sulfate tablet 325 mg, 325 mg, Oral, TID With Meals, Neeta Carvajal APRN, 325 mg at 03/01/19 0909  •  heparin (porcine) 5000 UNIT/ML injection 5,000 Units, 5,000 Units, Subcutaneous, Q12H, Neeta Carvajal APRN, 5,000 Units at 03/01/19 0909  •  HYDROcodone-acetaminophen (NORCO) 5-325 MG per tablet 1 tablet, 1 tablet, Oral, Q6H PRN, Neeta Carvajal APRN  •  ondansetron (ZOFRAN) injection 4 mg, 4 mg, Intravenous, Q6H PRN, Neeta Carvajal APRN  •  PRO-STAT 1 packet, 1 packet, Oral, BID, Neeta Carvajal APRN  •  sodium chloride 0.9 % flush 3 mL, 3 mL, Intravenous, Q12H, Neeta Carvajal APRN, 3 mL at 03/01/19 0910  •  sodium chloride 0.9 % flush 3-10 mL, 3-10 mL, Intravenous, PRN, Neeta Carvajal, NBA  •  sodium hypochlorite (DAKIN'S) 0.025 % topical solution solution, , Topical, BID, Neeta Carvajal, NBA  •  sodium hypochlorite (DAKIN'S) 0.025 % topical solution solution, , Topical, Q24H, Neeta Carvajal, NBA    Please refer to the medical record for a full medication list    Review of Systems:    Constitutional-- No Fever, chills or sweats.  Appetite fair, and no malaise. Some  "fatigue.  HEENT-- No new vision, hearing or throat complaints.  No epistaxis or oral sores.  Denies odynophagia or dysphagia.  No odynophagia or dysphagia. No headache, photophobia or neck stiffness.  CV-- No chest pain (but had chest pain 2/19), palpitation or syncope  Resp--Minimal SOB/min cough/minimal sputum production, no hemoptysis, or wheezing  GI- No nausea, vomiting, or diarrhea.  No hematochezia, melena, or hematemesis. Denies jaundice or chronic liver disease.  -- No dysuria, hematuria, or flank pain.  Denies hesitancy, urgency or flank pain.  Lymph- no swollen lymph nodes in neck/axilla or groin.   Heme- No active bruising or bleeding; no Hx of DVT or PE.  MS-- no swelling or pain in the bones or joints of arms/legs.  No new back pain.  Neuro-- No acute focal weakness or numbness in the arms or legs.  No seizures.  Skin--No rashes or lesions, except sacral wound and left chest wall catheter site exit wound as per history of present illness, pain associated with her left chest wall and sacral wounds    Physical Exam:   Vital Signs   Temp:  [97.4 °F (36.3 °C)-97.7 °F (36.5 °C)] 97.4 °F (36.3 °C)  Heart Rate:  [63-72] 65  Resp:  [16] 16  BP: ()/(37-51) 99/48    Temp  Min: 97.4 °F (36.3 °C)  Max: 97.7 °F (36.5 °C)  BP  Min: 97/51  Max: 112/37  Pulse  Min: 63  Max: 72  Resp  Min: 16  Max: 16  SpO2  Min: 98 %  Max: 100 %    Blood pressure 99/48, pulse 65, temperature 97.4 °F (36.3 °C), temperature source Oral, resp. rate 16, height 154.9 cm (61\"), SpO2 100 %, not currently breastfeeding.  GENERAL: Awake and alert, in moderate distress. Appears older than stated age.  HEENT:  Normocephalic, atraumatic.  Oropharynx without thrush. Dentition in good repair. No cervical adenopathy. No neck masses.  Ears externally normal, Nose externally normal.  EYES: PERRL. No conjunctival injection. No icterus. EOM full.  LYMPHATICS: No lymphadenopathy of the neck or axillary or inguinal regions.   HEART: No murmur, " gallop, or pericardial friction rub. Reg rate rhythm, No JVD at 45 degrees.  LUNGS: Few crackles left base. No respiratory distress, no use of accessory muscles.  ABDOMEN: Soft, nontender, nondistended. No appreciable HSM.  Bowel sounds normal.  GENITAL: No external lesions, breasts without masses, back straight, no CVAT, rectal external without lesions.   SKIN: Warm and dry without cutaneous eruptions.  No nodules.  Left chest tunneled catheter exit site with ulceration into the dermis and local skin necrosis approximately 4 x 2 cm without fluctuance, or crepitus.  Sacral wound with 5 x 4 cm x 1.5 cm into the subcutaneous tissue with slough without surrounding crepitus or bullae, but sacral bone palpable (new).  PSYCHIATRIC: Mental status lucid. No confusion.  EXT:  No cellulitic change.  NEURO: Oriented to name, nonfocal, cranial nerves II through XII intact, deep tendon reflexes 1+ and symmetric, motor 4/5 upper lower extremities, sensory intact to light touch    Results Review:   I reviewed the patient's new clinical results.  I reviewed the patient's new imaging results and agree with the interpretation.  I reviewed the patient's other test results and agree with the interpretation    Results from last 7 days   Lab Units 03/01/19  0545 02/25/19  0507   WBC 10*3/mm3 35.75* 19.02*   HEMOGLOBIN g/dL 7.9* 8.9*   HEMATOCRIT % 26.4* 28.2*   PLATELETS 10*3/mm3 213 313     Results from last 7 days   Lab Units 03/01/19  0545   SODIUM mmol/L 142   POTASSIUM mmol/L 5.2   CHLORIDE mmol/L 111*   CO2 mmol/L 16.0*   BUN mg/dL 57*   CREATININE mg/dL 4.45*   GLUCOSE mg/dL 82   CALCIUM mg/dL 9.0     Results from last 7 days   Lab Units 03/01/19  0545   ALK PHOS U/L 91   BILIRUBIN mg/dL 0.5   ALT (SGPT) U/L 12   AST (SGOT) U/L 25                 Results from last 7 days   Lab Units 02/28/19  1850   LACTATE mmol/L 1.8     Estimated Creatinine Clearance: 7.7 mL/min (A) (by C-G formula based on SCr of 4.45 mg/dL  "(H)).    Microbiology:  Microbiology Results Abnormal     Procedure Component Value - Date/Time    Wound Culture - Surgical Site, Chest, Left [[id number]] Collected:  02/28/19 2014    Lab Status:  Preliminary result Specimen:  Surgical Site from Chest, Left Updated:  03/01/19 0922     Wound Culture No growth    Blood Culture - Blood, Arm, Left [[id number]] Collected:  02/28/19 1850    Lab Status:  Preliminary result Specimen:  Blood from Arm, Left Updated:  03/01/19 0730     Blood Culture No growth at less than 24 hours          Radiology:  Imaging Results (last 72 hours)     ** No results found for the last 72 hours. **          IMPRESSION:     1.  Possible sepsis, POA, versus leukocytosis from stress from hypotension and fluid management issues.  Had been on vancomycin and fortaz which should be covering \"sepsis,\" unless fungal in nature.  Lactic acid 1.8, and PCT 1.68 on 2/28/19.  CT chest, abd, pelvis 2/20/19 with minimal findings except prob pneumonia.  CXR 3/1/19 with cardiomegaly, mild left lung base increased markings.  COPD.  Sacral pain represents worsening of her preexisting sacral decubitus to a stage 4, although doubt the source of \"sepsis.\"  2.  Leukocytosis, neutrophilic with multiple possibilities with antibiotic treatment off and on since 1/31/19.  Possible sites of infection include left chest wall and sacral wound, or pulmonary.  Also at risk for hospital associated infection.  Some of the leukocytosis may be stress related given procedures, and reported splenectomy.    3.  Stage 4 decubitus sacrum with increased risk for development of underlying osteomyelitis.  Serious sign that she is increasingly not doing well with her multiple comorbidities.  4.  PEA arrest 2/1/19 lasting less than 3 minutes.  5.  End-stage renal disease on hemodialysis Tuesday, Thursday, Saturday since approximately December 2018.  Previously difficult non-performing right IJ access removed.  New left IJ tunneled catheter " hemodialysis access placed 2/1/19 by Dr. Elise.  Left forearm AV fistula 1/16/19.  6.  Encephalopathy, metabolic off and on.  7.  Acute hypoxic respiratory failure.  8.  Anemia, chronic disease.  9.  Left chest wall catheter skin necrosis with increased risk for development of hemodialysis catheter site infection and dialysis catheter infection with sepsis.  Usual organisms include staphylococcal species and gram-negative rods.    10.  Treated aspiration pneumonia, with recent coronavirus positive 2/20/19 on PCR panel.    Recommendation:    1.  Diagnostically, continue to follow patient's physical exam, CBC, CMP, CRP, cultures from the left chest site (previous 2/14 was negative) and sacrum (previous culture 2/14 was unremarkable with Staphylococcus epidermidis), urinalysis and urine culture (previous 2/14 was negative).  Repeat lactic acid and pro calcitonin level.  Consider bone scan vs MRI pelvis for OM diagnosis.  2.  Therapeutically, consider post-hemodialysis antibiotics with vancomycin 500 mg IV, and merem 500 mg iv daily, and fluconazole 200 mg po daily, pending cultures.  Duration to be determined but likely 1-6 wks.  3.  Continue local wound care.    4.  Oxygen support therapy as needed.  5.  Dialysis continues Tuesday, Thursday, Saturday.  6.  Rehabilitation or nursing home placement eventually.  7.  Readdress goals of care.    I discussed the patients findings and my recommendations with patient, nursing staff, primary care team and consulting provider.    Our group would be pleased to follow this patient over the course of their hospitalization and assist with outpatient antimicrobial therapy, as indicated.  Further recommendations depend on the results of the cultures and clinical course.  Signs and symptoms of infection and side effects of antibiotics discussed with the patient.        D/w Dr. Valle, and Dr. Proctor.    Luis Antonio Villanueva MD  3/1/2019

## 2019-03-01 NOTE — PLAN OF CARE
Problem: Patient Care Overview  Goal: Plan of Care Review  Outcome: Ongoing (interventions implemented as appropriate)   03/01/19 1522   Coping/Psychosocial   Plan of Care Reviewed With patient   Plan of Care Review   Progress no change   OTHER   Outcome Summary PT eval completed. Pt presents w/ dec. strength, impaired balance, inability to ambulate, impaired cognition, and functional decline. Pt. required dep A x 2 for bed mobility, static standing balance, and transfers. Skilled IPPT indicated 3x/week to maximize patient's safety and ind. w/ mobility. Recommend SNF rehab at D/C.

## 2019-03-01 NOTE — CONSULTS
Referring Provider: Neeta Carvajal   Reason for Consultation: ESRD    Subjective     Chief complaint Dialysis hypotension    History of present illness:  This is 84 year old female with past medical hx of ESRD on dialysis on TTsat schedule who presented with intra dialytic hypotension . She was recently discharged from hospital after treatment for bronchitis/PNA. She is back for hypotension with dialysis. She has Stage IV decubitis ulcer. She has poor appetite. Nephrology service has been consulted for further evaluation.     History  Past Medical History:   Diagnosis Date   • Chronic ITP (idiopathic thrombocytopenia) (CMS/HCC)    • Chronic renal insufficiency    • CKD (chronic kidney disease)    • Diverticular disease    • Dizziness     Dizziness with increase of propafenone to t.i.d.; however, palpitations improved, patient wishes to stay on current dose.   • DJD (degenerative joint disease)    • Hip fracture (CMS/HCC)     Recent hip and pelvic fracture.   • History of uterine cancer     Initial diagnosis, 2001, status post SALVATORE/BSO. Recurrence documented 2003, status post radiation therapy and implants.   • Hypertension    • Lower extremity edema     Lower extremity venous duplex, 09/23/2013:  No evidence of deep venous thrombosis. Negative VQ scan for pulmonary embolus, findings suggest congestive heart failure, 10/11/2013.No evidence of deep venous thrombosis by bilateral duplex, 03/25/2015.Mild   • Palpitations    • Pelvic fracture (CMS/HCC)     Recent hip and pelvic fracture.   • Premature atrial contractions     Premature atrial contractions, postoperative from incisional hernia repair:A 2D echocardiogram 01/03/2008:  Mild MR, mild to moderate TR, small pericardial effusion, EF 65%.   • Pulmonary hypertension (CMS/HCC)    • Raynaud's phenomenon (secondary)     Raynaud’s phenomenon involving the left upper extremity.     • Stroke (CMS/HCC)     Right optic nerve stroke.   ,   Past Surgical History:    Procedure Laterality Date   • APPENDECTOMY     • ARTERIOVENOUS FISTULA/SHUNT SURGERY Left 1/16/2019    Procedure: LEFT UPPER EXTREMITY ARTERIOVENOUS FISTULA FORMATION, BRACHIO-AXILLARY SHUNT WITH ARTEGRAFT;  Surgeon: Dale Ramirez MD;  Location: [facility] OR;  Service: Vascular   • CATARACT EXTRACTION WITH INTRAOCULAR LENS IMPLANT      Cataract surgery with lens implantation.   • CHOLECYSTECTOMY  1958   • COLON RESECTION  10/2005    Colon resection, October 2005, with associated splenectomy.   • DILATATION AND CURETTAGE     • INCISIONAL HERNIA REPAIR  01/02/2008    With mesh   • INSERTION HEMODIALYSIS CATHETER N/A 2/1/2019    Procedure: HEMODIALYSIS CATHETER INSERTION;  Surgeon: Raymond Elise MD;  Location: [facility] OR;  Service: General   • NEPHRECTOMY Right 2001   • OTHER SURGICAL HISTORY      Recurrence documented 2003, status post radiation therapy and implants.   • SPLENECTOMY     • SYMPATHECTOMY Left     Left axilla sympathetectomy secondary to Raynaud’s phenomenon.   • TOTAL ABDOMINAL HYSTERECTOMY WITH SALPINGO OOPHORECTOMY  2001   ,   Family History   Problem Relation Age of Onset   • Heart attack Mother    • No Known Problems Father    ,   Social History     Tobacco Use   • Smoking status: Never Smoker   • Smokeless tobacco: Never Used   Substance Use Topics   • Alcohol use: No   • Drug use: No   ,   Medications Prior to Admission   Medication Sig Dispense Refill Last Dose   • Amino Acids-Protein Hydrolys (PRO-STAT) liquid Take 1 packet by mouth 2 (Two) Times a Day.   2/27/2019 at Unknown time   • b complex-vitamin c-folic acid (NEPHRO-THONG) 0.8 MG tablet tablet Take 1 tablet by mouth Daily. 30 tablet  2/27/2019 at Unknown time   • bisacodyl (DULCOLAX) 10 MG suppository Insert 1 suppository into the rectum Daily.   Past Week at Unknown time   • bisoprolol (ZEBeta) 5 MG tablet Take 0.5 tablets by mouth Every Night.   2/27/2019 at Unknown time   • calcitriol (ROCALTROL) 0.25 MCG capsule Take 0.25  mcg by mouth Daily.   2/27/2019 at Unknown time   • epoetin alexus (EPOGEN,PROCRIT) 76373 UNIT/ML injection Inject 1 mL under the skin into the appropriate area as directed 3 (Three) Times a Week.   Past Week at Unknown time   • famotidine (PEPCID) 20 MG tablet Take 1 tablet by mouth 2 (Two) Times a Day As Needed for Heartburn.   2/27/2019 at Unknown time   • ferrous sulfate 325 (65 FE) MG tablet Take 1 tablet by mouth 3 (Three) Times a Day With Meals.   2/27/2019 at Unknown time   • hydrALAZINE (APRESOLINE) 10 MG tablet Take 10 mg by mouth 2 (Two) Times a Day.   2/27/2019 at Unknown time   • melatonin 5 MG sublingual tablet sublingual tablet Place 1 tablet under the tongue At Night As Needed (Insomnia).   Past Week at Unknown time   • sennosides-docusate sodium (SENOKOT-S) 8.6-50 MG tablet Take 2 tablets by mouth 2 (Two) Times a Day.   2/27/2019 at Unknown time   • sodium hypochlorite (DAKIN'S) 0.025 % solution topical solution Apply 5 mL topically to the appropriate area as directed 2 (Two) Times a Day. Apply to gauze/ packing of sacrum bid and to chest site qd   2/27/2019 at Unknown time   • vancomycin 500 mg/100 mL 0.9% NS IVPB (mbp) Infuse 100 mL into a venous catheter 3 (Three) Times a Week. Please give 3 times a week with dialysis Tuesday, Thursday, Saturday.   Past Week at Unknown time   • acetaminophen (TYLENOL) 325 MG tablet Take 2 tablets by mouth Every 6 (Six) Hours As Needed for Mild Pain .   Unknown at Unknown time   • cefTAZidime (FORTAZ) 1 g/100 mL 0.9% NS IVPB (mpb) Infuse 100 mL into a venous catheter 3 (Three) Times a Week. Give Tuesday Thursday Saturday after dialysis.   Unknown at Unknown time   , Scheduled Meds:    b complex-vitamin c-folic acid 1 tablet Oral Daily   calcitriol 0.25 mcg Oral Daily   docusate sodium 100 mg Oral BID   epoetin alexus 10,000 Units Subcutaneous Once per day on Tue Thu Sat   ferrous sulfate 325 mg Oral TID With Meals   heparin (porcine) 5,000 Units Subcutaneous Q12H    meropenem 500 mg Intravenous Q24H   PRO-STAT 1 packet Oral BID   sodium chloride 3 mL Intravenous Q12H   sodium hypochlorite  Topical BID   sodium hypochlorite  Topical Q24H   , Continuous Infusions:    Pharmacy Consult - Pharmacy to dose    , PRN Meds:  •  acetaminophen  •  bisacodyl  •  bisacodyl  •  HYDROcodone-acetaminophen  •  ondansetron  •  Pharmacy Consult - Pharmacy to dose  •  sodium chloride and Allergies:  Benadryl [diphenhydramine]; Diltiazem; Keflex [cephalexin]; Micardis [telmisartan]; Other; and Ultram [tramadol]    Review of Systems  Pertinent items are noted in HPI    Objective     Vital Signs  Temp:  [97.4 °F (36.3 °C)-97.7 °F (36.5 °C)] 97.4 °F (36.3 °C)  Heart Rate:  [63-72] 65  Resp:  [16] 16  BP: ()/(37-51) 99/48    No intake/output data recorded.  I/O last 3 completed shifts:  In: 1000 [I.V.:500; IV Piggyback:500]  Out: -     Physical Exam:     General Appearance:    Alert, cooperative, in no acute distress   Head:    Normocephalic, without obvious abnormality, atraumatic   Eyes:            Lids and lashes normal, conjunctivae and sclerae normal, no   icterus, no pallor, corneas clear, PERRLA           Neck:   No adenopathy, supple, trachea midline, no thyromegaly, no     carotid bruit, no JVD       Lungs:     Clear to auscultation,respirations regular, even and                   unlabored    Heart:    Regular rhythm and normal rate, normal S1 and S2, no            murmur, no gallop, no rub, no click   Breast Exam:    Deferred   Abdomen:     Normal bowel sounds, no masses, no organomegaly, soft        non-tender, non-distended, no guarding, no rebound                 tenderness       Extremities:   Moves all extremities well, no edema, no cyanosis, no              redness   Pulses:   Pulses palpable and equal bilaterally   Skin:   No bleeding, bruising or rash   Lymph nodes:   No palpable adenopathy   Neurologic:   Cranial nerves 2 - 12 grossly intact, sensation intact, DTR         present and equal bilaterally       Results Review:   I reviewed the patient's new clinical results.    WBC WBC   Date Value Ref Range Status   03/01/2019 35.75 (H) 3.50 - 10.80 10*3/mm3 Final      HGB Hemoglobin   Date Value Ref Range Status   03/01/2019 7.9 (L) 11.5 - 15.5 g/dL Final      HCT Hematocrit   Date Value Ref Range Status   03/01/2019 26.4 (L) 34.5 - 44.0 % Final      Platlets No results found for: LABPLAT   MCV MCV   Date Value Ref Range Status   03/01/2019 102.3 (H) 80.0 - 99.0 fL Final          Sodium Sodium   Date Value Ref Range Status   03/01/2019 142 132 - 146 mmol/L Final      Potassium Potassium   Date Value Ref Range Status   03/01/2019 5.2 3.5 - 5.5 mmol/L Final      Chloride Chloride   Date Value Ref Range Status   03/01/2019 111 (H) 99 - 109 mmol/L Final      CO2 CO2   Date Value Ref Range Status   03/01/2019 16.0 (L) 20.0 - 31.0 mmol/L Final      BUN BUN   Date Value Ref Range Status   03/01/2019 57 (H) 9 - 23 mg/dL Final      Creatinine Creatinine   Date Value Ref Range Status   03/01/2019 4.45 (H) 0.60 - 1.30 mg/dL Final      Calcium Calcium   Date Value Ref Range Status   03/01/2019 9.0 8.7 - 10.4 mg/dL Final      PO4 No results found for: CAPO4   Albumin Albumin   Date Value Ref Range Status   03/01/2019 3.67 3.20 - 4.80 g/dL Final      Magnesium Magnesium   Date Value Ref Range Status   03/01/2019 2.6 1.3 - 2.7 mg/dL Final      Uric Acid No results found for: URICACID           b complex-vitamin c-folic acid 1 tablet Oral Daily   calcitriol 0.25 mcg Oral Daily   docusate sodium 100 mg Oral BID   epoetin alexus 10,000 Units Subcutaneous Once per day on Tue Thu Sat   ferrous sulfate 325 mg Oral TID With Meals   heparin (porcine) 5,000 Units Subcutaneous Q12H   PRO-STAT 1 packet Oral BID   sodium chloride 3 mL Intravenous Q12H   sodium hypochlorite  Topical BID   sodium hypochlorite  Topical Q24H       Pharmacy Consult - Pharmacy to dose        Assessment/Plan       Hemodialysis-associated  hypotension    Hypertension    Pulmonary hypertension (CMS/HCC)    Leukocytosis    Debility    Hx of renal cell carcinoma    Chronic diastolic CHF (congestive heart failure) (CMS/HCC)    ESRD (end stage renal disease) (CMS/[facility])    Sacral decubitus ulcer    History of recent pneumonia    Hypotension due to hypovolemia      1- ESRD - Ttsat   2- Intradialytic hypotension   3- Anemia of chronic disease.   4- Anemia of chronic disease  5- Sacral decubitus ulcer- stage IV   6- metabolic acidosis - will be corrected with HD    Plan:  - HD tomorrow   - UF as tolerated  - Renal diet   - Adjust meds per renal function   - Epo with HD   - Midodrine tid  - Bicarb drip 1 lit only.     I discussed the patients findings and my recommendations with patient and nursing staff    Isaac Proctor MD  03/01/19  @NOW

## 2019-03-01 NOTE — THERAPY EVALUATION
Acute Care - Physical Therapy Initial Evaluation  [facility]     Patient Name: Fabiola Pimentel  : 1934  MRN: [id number]  Today's Date: 3/1/2019   Onset of Illness/Injury or Date of Surgery: 19  Date of Referral to PT: 19  Referring Physician: NBA Carvajal      Admit Date: 2019    Visit Dx:     ICD-10-CM ICD-9-CM   1. Impaired mobility and ADLs Z74.09 799.89   2. Impaired functional mobility, balance, gait, and endurance Z74.09 V49.89     Patient Active Problem List   Diagnosis   • Palpitations   • Premature atrial contractions   • Hypertension   • Pulmonary hypertension (CMS/[facility])   • DJD (degenerative joint disease)   • Raynaud's phenomenon (secondary)   • Diverticular disease   • CKD (chronic kidney disease) stage 5, GFR less than 15 ml/min (CMS/[facility])   • Leukocytosis   • Anemia due to chronic kidney disease   • Debility   • Hx of renal cell carcinoma   • Chronic diastolic CHF (congestive heart failure) (CMS/[facility])   • Falls frequently   • Acute renal failure (ARF) (CMS/[facility])   • Huge hiatal hernia with intrathoracic stomach   • Mitral valve mass   • ESRD (end stage renal disease) (CMS/[facility])   • Hemodialysis patient (CMS/[facility])   • Sacral decubitus ulcer   • Hemodialysis-associated hypotension   • History of recent pneumonia   • Hypotension due to hypovolemia   • Impaired mobility and ADLs     Past Medical History:   Diagnosis Date   • Chronic ITP (idiopathic thrombocytopenia) (CMS/[facility])    • Chronic renal insufficiency    • CKD (chronic kidney disease)    • Diverticular disease    • Dizziness     Dizziness with increase of propafenone to t.i.d.; however, palpitations improved, patient wishes to stay on current dose.   • DJD (degenerative joint disease)    • Hip fracture (CMS/HCC)     Recent hip and pelvic fracture.   • History of uterine cancer     Initial diagnosis, , status post SALVATORE/BSO. Recurrence documented , status post radiation therapy and implants.   • Hypertension    • Lower  extremity edema     Lower extremity venous duplex, 09/23/2013:  No evidence of deep venous thrombosis. Negative VQ scan for pulmonary embolus, findings suggest congestive heart failure, 10/11/2013.No evidence of deep venous thrombosis by bilateral duplex, 03/25/2015.Mild   • Palpitations    • Pelvic fracture (CMS/HCC)     Recent hip and pelvic fracture.   • Premature atrial contractions     Premature atrial contractions, postoperative from incisional hernia repair:A 2D echocardiogram 01/03/2008:  Mild MR, mild to moderate TR, small pericardial effusion, EF 65%.   • Pulmonary hypertension (CMS/HCC)    • Raynaud's phenomenon (secondary)     Raynaud’s phenomenon involving the left upper extremity.     • Stroke (CMS/HCC)     Right optic nerve stroke.     Past Surgical History:   Procedure Laterality Date   • APPENDECTOMY     • ARTERIOVENOUS FISTULA/SHUNT SURGERY Left 1/16/2019    Procedure: LEFT UPPER EXTREMITY ARTERIOVENOUS FISTULA FORMATION, BRACHIO-AXILLARY SHUNT WITH ARTEGRAFT;  Surgeon: Dale Ramirez MD;  Location: [facility] OR;  Service: Vascular   • CATARACT EXTRACTION WITH INTRAOCULAR LENS IMPLANT      Cataract surgery with lens implantation.   • CHOLECYSTECTOMY  1958   • COLON RESECTION  10/2005    Colon resection, October 2005, with associated splenectomy.   • DILATATION AND CURETTAGE     • INCISIONAL HERNIA REPAIR  01/02/2008    With mesh   • INSERTION HEMODIALYSIS CATHETER N/A 2/1/2019    Procedure: HEMODIALYSIS CATHETER INSERTION;  Surgeon: Raymond Elise MD;  Location: [facility] OR;  Service: General   • NEPHRECTOMY Right 2001   • OTHER SURGICAL HISTORY      Recurrence documented 2003, status post radiation therapy and implants.   • SPLENECTOMY     • SYMPATHECTOMY Left     Left axilla sympathetectomy secondary to Raynaud’s phenomenon.   • TOTAL ABDOMINAL HYSTERECTOMY WITH SALPINGO OOPHORECTOMY  2001        PT ASSESSMENT (last 12 hours)      Physical Therapy Evaluation     Row Name 03/01/19 1138           PT Evaluation Time/Intention    Subjective Information  complains of;weakness;fatigue;pain  -MB     Document Type  evaluation  -MB     Mode of Treatment  physical therapy  -MB     Patient Effort  poor  -MB     Symptoms Noted During/After Treatment  fatigue;increased pain  -MB     Row Name 03/01/19 1130          General Information    Patient Profile Reviewed?  yes  -MB     Onset of Illness/Injury or Date of Surgery  02/28/19  -MB     Referring Physician  NBA Gamez  -MB     Patient Observations  poorly cooperative;lethargic  -MB     Patient/Family Observations  No family present.  -MB     General Observations of Patient  Pt. supine, on 2 L/min O2, IV intact LUE.  RN consent for PT; requested assistance transferring pt. to [place] bed.    -MB     Prior Level of Function  -- TBD. No family present; pt. poor historian.   -MB     Equipment Currently Used at Home  commode, bedside;power chair, (recliner lift);rollator;shower chair  -MB     Pertinent History of Current Functional Problem  Pt. is an 83 yo female readmitted after D/C to SNF, w/ hypotension during dialysis.  Pt. adm w/ pleural effusion, anemia, sacral decub Stage IV.  PMH: HTN, DJD, chronic diastolic heart failure, pulmonary HTN, remote renal cell carcinoma s/p nephrectomy, ESRD, recent hip and pelvic fx.    -MB     Existing Precautions/Restrictions  fall;oxygen therapy device and L/min exit alarms  -MB     Limitations/Impairments  safety/cognitive  -MB     Risks Reviewed  patient:;LOB;nausea/vomiting;dizziness;increased discomfort;change in vital signs;lines disloged  -MB     Benefits Reviewed  patient:;improve function;increase independence;increase strength;increase balance;decrease pain;decrease risk of DVT;improve skin integrity;increase knowledge  -MB     Barriers to Rehab  medically complex;previous functional deficit;cognitive status  -MB     Row Name 03/01/19 1134          Relationship/Environment    Primary Source of Support/Comfort   child(sadie)  -MB     Lives With  alone  -MB     Concerns About Impact on Relationships  Pt. D/C'd to SNF 1 day, and readmitted.   -MB     Row Name 03/01/19 1130          Resource/Environmental Concerns    Current Living Arrangements  extended care facility  -MB     Row Name 03/01/19 1130          Cognitive Assessment/Intervention- PT/OT    Affect/Mental Status (Cognitive)  low arousal/lethargic  -MB     Behavioral Issues (Cognitive)  difficulty managing stress  -MB     Orientation Status (Cognition)  oriented to;person  -MB     Follows Commands (Cognition)  follows one step commands;0-24% accuracy;delayed response/completion;physical/tactile prompts required;repetition of directions required;verbal cues/prompting required  -MB     Safety Deficit (Cognitive)  severe deficit;ability to follow commands;awareness of need for assistance;insight into deficits/self awareness;judgment;problem solving;safety precautions awareness  -MB     Personal Safety Interventions  fall prevention program maintained;gait belt;muscle strengthening facilitated;nonskid shoes/slippers when out of bed  -MB     Row Name 03/01/19 1130          Safety Issues, Functional Mobility    Safety Issues Affecting Function (Mobility)  ability to follow commands;awareness of need for assistance;friction/shear risk;insight into deficits/self awareness;judgment;safety precaution awareness;safety precautions follow-through/compliance;sequencing abilities  -MB     Impairments Affecting Function (Mobility)  balance;cognition;endurance/activity tolerance;pain;postural/trunk control;strength  -MB     Row Name 03/01/19 1130          Bed Mobility Assessment/Treatment    Bed Mobility Assessment/Treatment  bed mobility (all) activities  -MB     [place] Level (Bed Mobility)  dependent (less than 25% patient effort);2 person assist;verbal cues;nonverbal cues (demo/gesture)  -MB     Bed Mobility, Safety Issues  cognitive deficits limit understanding;decreased use  of arms for pushing/pulling;decreased use of legs for bridging/pushing;impaired trunk control for bed mobility  -MB     Assistive Device (Bed Mobility)  draw sheet  -MB     Comment (Bed Mobility)  Pt. u/a to assist w/ bed mobility; demo posterior lean to patient's R sitting EOB, unable to self-correct.   -MB     Row Name 03/01/19 1130          Transfer Assessment/Treatment    Transfer Assessment/Treatment  sit-stand transfer;stand-sit transfer;bed-chair transfer;chair-bed transfer  -MB     Comment (Transfers)  Pt. required hand-over-hand assist for safe hand placement and sequencing, assist for upright posture.  Pt. unable to take steps to chair; pt. pivoted via dependent assist x 2.  -MB     Bed-Chair Love (Transfers)  dependent (less than 25% patient effort);2 person assist;verbal cues;nonverbal cues (demo/gesture)  -MB     Chair-Bed Love (Transfers)  dependent (less than 25% patient effort);2 person assist;verbal cues;nonverbal cues (demo/gesture)  -MB     Assistive Device (Bed-Chair Transfers)  -- gait belt; BUE support  -MB     Sit-Stand Love (Transfers)  dependent (less than 25% patient effort);2 person assist;nonverbal cues (demo/gesture);contact guard  -MB     Stand-Sit Love (Transfers)  dependent (less than 25% patient effort);2 person assist;verbal cues;nonverbal cues (demo/gesture)  -MB     Row Name 03/01/19 1130          Chair-Bed Transfer    Assistive Device (Chair-Bed Transfers)  -- gait belt; BUE support  -MB     Row Name 03/01/19 1130          Sit-Stand Transfer    Assistive Device (Sit-Stand Transfers)  -- gait belt  -MB     Row Name 03/01/19 1130          Stand-Sit Transfer    Assistive Device (Stand-Sit Transfers)  -- gait belt, BUE support  -MB     Row Name 03/01/19 1130          Gait/Stairs Assessment/Training    [place] Level (Gait)  unable to assess  -MB     Row Name 03/01/19 1130          General ROM    GENERAL ROM COMMENTS  NACHO GREEN.  -MB     Row  Name 03/01/19 1130          MMT (Manual Muscle Testing)    General MMT Comments  Unable to formally test d/t cognition/low arousal.   -MB     Row Name 03/01/19 1130          Motor Assessment/Intervention    Additional Documentation  Balance (Group);Therapeutic Exercise (Group)  -MB     Row Name 03/01/19 1130          Therapeutic Exercise    Therapeutic Exercise  supine, lower extremities  -MB     Row Name 03/01/19 1130          Lower Extremity Supine Therapeutic Exercise    Performed, Supine Lower Extremity (Therapeutic Exercise)  hip flexion/extension;hip abduction/adduction;knee flexion/extension;ankle dorsiflexion/plantarflexion  -MB     Exercise Type, Supine Lower Extremity (Therapeutic Exercise)  PROM (passive range of motion);AAROM (active assistive range of motion)  -MB     Row Name 03/01/19 1130          Balance    Balance  static sitting balance;static standing balance  -MB     Row Name 03/01/19 1130          Static Sitting Balance    Level of [place] (Unsupported Sitting, Static Balance)  dependent, less than 25% patient effort  -MB     Sitting Position (Unsupported Sitting, Static Balance)  sitting on edge of bed  -MB     Time Able to Maintain Position (Unsupported Sitting, Static Balance)  2 to 3 minutes  -MB     Comment (Unsupported Sitting, Static Balance)  strong R lean;unable to self correct   -MB     Row Name 03/01/19 1130          Static Standing Balance    Level of [place] (Supported Standing, Static Balance)  dependent, less than 25% patient effort;2 person assist  -MB     Time Able to Maintain Position (Supported Standing, Static Balance)  less than 15 seconds  -MB     Assistive Device Utilized (Supported Standing, Static Balance)  -- gait belt  -MB     Row Name 03/01/19 1130          Pain Assessment    Additional Documentation  Pain Scale: FACES Pre/Post-Treatment (Group)  -MB     Row Name 03/01/19 1130          Pain Scale: Numbers Pre/Post-Treatment    Pain Location - Orientation   generalized  -MB     Pain Intervention(s)  Repositioned  -MB     Row Name 03/01/19 1130          Pain Scale: FACES Pre/Post-Treatment    Pain: FACES Scale, Pretreatment  2-->hurts little bit  -MB     Pain: FACES Scale, Post-Treatment  4-->hurts little more  -MB     Row Name             Wound 02/28/19 1730 coccyx pressure injury    Wound - Properties Group Date first assessed: 02/28/19  -AM Time first assessed: 1730  -AM Present On Admission : yes  -AM Location: coccyx  -AM Type: pressure injury  -AM Stage, Pressure Injury: Stage 3  -AM Additional Comments: unstageable  -MR    Row Name             Wound 02/28/19 1730 Left shoulder skin tear    Wound - Properties Group Date first assessed: 02/28/19  -AM Time first assessed: 1730  -AM Present On Admission : yes  -AM Side: Left  -AM Location: shoulder  -AM Type: skin tear  -AM    Row Name 03/01/19 1130          Plan of Care Review    Plan of Care Reviewed With  patient  -MB     Row Name 03/01/19 1130          Physical Therapy Clinical Impression    Date of Referral to PT  02/28/19  -MB     PT Diagnosis (PT Clinical Impression)  Functional decline; impaired mobility  -MB     Functional Level at Time of Evaluation (PT Clinical Impression)  dep A x 2  -MB     Patient/Family Goals Statement (PT Clinical Impression)  Pt. u/a to state.  -MB     Criteria for Skilled Interventions Met (PT Clinical Impression)  yes;treatment indicated  -MB     Rehab Potential (PT Clinical Summary)  fair, will monitor progress closely  -MB     Care Plan Review (PT)  evaluation/treatment results reviewed;care plan/treatment goals reviewed;risks/benefits reviewed;current/potential barriers reviewed;patient/other agree to care plan  -MB     Row Name 03/01/19 1130          Vital Signs    Pre Systolic BP Rehab  -- VSS.  RN cleared for PT.  -MB     Pre Patient Position  Supine  -MB     Intra Patient Position  Sitting  -MB     Post Patient Position  Supine  -MB     Row Name 03/01/19 1130           Physical Therapy Goals    Bed Mobility Goal Selection (PT)  bed mobility, PT goal 1  -MB     Transfer Goal Selection (PT)  transfer, PT goal 1  -MB     Row Name 03/01/19 1130          Bed Mobility Goal 1 (PT)    Activity/Assistive Device (Bed Mobility Goal 1, PT)  bed mobility activities, all  -MB     [place] Level/Cues Needed (Bed Mobility Goal 1, PT)  moderate assist (50-74% patient effort)  -MB     Time Frame (Bed Mobility Goal 1, PT)  10 days  -MB     Progress/Outcomes (Bed Mobility Goal 1, PT)  goal ongoing  -MB     Row Name 03/01/19 1130          Transfer Goal 1 (PT)    Activity/Assistive Device (Transfer Goal 1, PT)  sit-to-stand/stand-to-sit;bed-to-chair/chair-to-bed;walker, rolling  -MB     [place] Level/Cues Needed (Transfer Goal 1, PT)  moderate assist (50-74% patient effort)  -MB     Time Frame (Transfer Goal 1, PT)  10 days  -MB     Progress/Outcome (Transfer Goal 1, PT)  goal ongoing  -MB     Row Name 03/01/19 1130          Positioning and Restraints    Pre-Treatment Position  in bed  -MB     Post Treatment Position  bed  -MB     In Bed  notified nsg;supine;call light within reach;encouraged to call for assist;exit alarm on;with nsg  -MB     Row Name 03/01/19 1130          Living Environment    Home Accessibility  -- Pt. adm from SNF.   -MB       User Key  (r) = Recorded By, (t) = Taken By, (c) = Cosigned By    Initials Name Provider Type    Andie Baptiste, PT Physical Therapist    France Britt RN Registered Nurse    Rosina Butler RN Registered Nurse          PT Recommendation and Plan  Anticipated Discharge Disposition (PT): skilled nursing facility  Planned Therapy Interventions (PT Eval): balance training, bed mobility training, gait training, home exercise program, patient/family education, strengthening, transfer training, postural re-education  Therapy Frequency (PT Clinical Impression): 3 times/wk  Outcome Summary/Treatment Plan (PT)  Anticipated Equipment  Needs at Discharge (PT): (TBD)  Anticipated Discharge Disposition (PT): skilled nursing facility  Plan of Care Reviewed With: patient  Progress: no change  Outcome Summary: PT eval completed.  Pt presents w/ dec. strength, impaired balance, inability to ambulate, impaired cognition, and functional decline.  Pt. required dep A x 2 for bed mobility, static standing balance, and transfers.  Skilled IPPT indicated 3x/week to maximize patient's safety and ind. w/ mobility.  Recommend SNF rehab at D/C.    Outcome Measures     Row Name 03/01/19 1147 03/01/19 1130          How much help from another person do you currently need...    Turning from your back to your side while in flat bed without using bedrails?  --  1  -MB     Moving from lying on back to sitting on the side of a flat bed without bedrails?  --  1  -MB     Moving to and from a bed to a chair (including a wheelchair)?  --  1  -MB     Standing up from a chair using your arms (e.g., wheelchair, bedside chair)?  --  1  -MB     Climbing 3-5 steps with a railing?  --  1  -MB     To walk in hospital room?  --  1  -MB     AM-PAC 6 Clicks Score  --  6  -MB        How much help from another is currently needed...    Putting on and taking off regular lower body clothing?  1  -TB  --     Bathing (including washing, rinsing, and drying)  1  -TB  --     Toileting (which includes using toilet bed pan or urinal)  1  -TB  --     Putting on and taking off regular upper body clothing  1  -TB  --     Taking care of personal grooming (such as brushing teeth)  1  -TB  --     Eating meals  1  -TB  --     Score  6  -TB  --        Functional Assessment    Outcome Measure Options  AM-PAC 6 Clicks Daily Activity (OT)  -TB  AM-PAC 6 Clicks Basic Mobility (PT)  -MB       User Key  (r) = Recorded By, (t) = Taken By, (c) = Cosigned By    Initials Name Provider Type    TB Nidia Duarte, OT Occupational Therapist    Andie Baptiste, PT Physical Therapist         Time  Calculation:   PT Charges     Row Name 03/01/19 1527             Time Calculation    Start Time  1130  -MB      PT Received On  03/01/19  -MB      PT Goal Re-Cert Due Date  03/11/19  -MB        User Key  (r) = Recorded By, (t) = Taken By, (c) = Cosigned By    Initials Name Provider Type    Andie Baptiste, PT Physical Therapist        Therapy Suggested Charges     Code   Minutes Charges    None           Therapy Charges for Today     Code Description Service Date Service Provider Modifiers Qty    30623355829 HC PT EVAL MOD COMPLEXITY 4 3/1/2019 Andie Borja, PT GP 1          PT G-Codes  Outcome Measure Options: AM-PAC 6 Clicks Daily Activity (OT)  AM-PAC 6 Clicks Score: 6  Score: 6      Andie Borja, PT  3/1/2019

## 2019-03-01 NOTE — PLAN OF CARE
Problem: Patient Care Overview  Goal: Plan of Care Review  Outcome: Ongoing (interventions implemented as appropriate)   03/01/19 3730   Coping/Psychosocial   Plan of Care Reviewed With patient   OTHER   Outcome Summary OT IE completed. Pt presents with low arousal, poor strength and impaired balance/trunk control. Dependent for all bed mobility, transfers and self-care this session. OT to follow 3x/week MWF pending pt's ability to participate. Recommend return to SNF at d/c for best outcome.

## 2019-03-02 NOTE — PROGRESS NOTES
"Pharmacy Consult-Vancomycin Dosing  Fabiola Pimentel is a  84 y.o. female receiving vancomycin therapy.     Indication: Sepsis  Consulting Provider: Luis Antonio Villanueva MD  ID Consult: Y    Goal Trough: 15-20 mcg/mL    Current Antimicrobial Therapy  Vancomycin 500mg IV after HD  Meropenem 500mg IV daily      Allergies  Allergies as of 02/28/2019 - Reviewed 02/28/2019   Allergen Reaction Noted   • Benadryl [diphenhydramine]  10/12/2016   • Diltiazem  10/12/2016   • Keflex [cephalexin] Unknown (See Comments) 02/20/2019   • Micardis [telmisartan]  10/12/2016   • Other  10/12/2016   • Ultram [tramadol] Unknown (See Comments) 02/20/2019       Labs    Results from last 7 days   Lab Units 03/01/19  0545 02/25/19  0507   BUN mg/dL 57* 32*   CREATININE mg/dL 4.45* 3.29*       Results from last 7 days   Lab Units 03/01/19  0545 02/25/19  0507   WBC 10*3/mm3 35.75* 19.02*       Evaluation of Dosing     Is Patient on Dialysis or Renal Replacement: HD    Ht - 154.9 cm (61\")  Wt -      Estimated Creatinine Clearance: 7.7 mL/min (A) (by C-G formula based on SCr of 4.45 mg/dL (H)).    Intake & Output (last 3 days)         02/27 0701 - 02/28 0700 02/28 0701 - 03/01 0700 03/01 0701 - 03/02 0700 03/02 0701 - 03/03 0700    P.O.   240     I.V.  500 1000     IV Piggyback  500 100 200    Total Intake  1000 1340 200    Other    320    Total Output    320    Net  +1000 +1340 -120                    Microbiology and Radiology  Microbiology Results (last 10 days)       Procedure Component Value - Date/Time    Blood Culture - Blood, Hand, Right [[id number]] Collected:  03/01/19 1409    Lab Status:  Preliminary result Specimen:  Blood from Hand, Right Updated:  03/02/19 0230     Blood Culture No growth at less than 24 hours    Narrative:       Aerobic bottle only    Blood Culture - Blood, Arm, Right [[id number]] Collected:  03/01/19 1409    Lab Status:  Preliminary result Specimen:  Blood from Arm, Right Updated:  03/02/19 0230     Blood Culture No " growth at less than 24 hours    Wound Culture - Surgical Site, Chest, Left [[id number]] Collected:  02/28/19 2014    Lab Status:  Final result Specimen:  Surgical Site from Chest, Left Updated:  03/02/19 0839     Wound Culture No growth at 2 days     Gram Stain Occasional WBCs seen      No organisms seen    Blood Culture - Blood, Arm, Left [[id number]] Collected:  02/28/19 1850    Lab Status:  Preliminary result Specimen:  Blood from Arm, Left Updated:  03/01/19 1930     Blood Culture No growth at 24 hours    Respiratory Panel, PCR - Swab, Nasopharynx [[id number]]  (Abnormal) Collected:  02/20/19 1921    Lab Status:  Final result Specimen:  Swab from Nasopharynx Updated:  02/20/19 2122     ADENOVIRUS, PCR Not Detected     Coronavirus 229E Not Detected     Coronavirus HKU1 Not Detected     Coronavirus NL63 Not Detected     Coronavirus OC43 Detected     Human Metapneumovirus Not Detected     Human Rhinovirus/Enterovirus Not Detected     Influenza B PCR Not Detected     Parainfluenza Virus 1 Not Detected     Parainfluenza Virus 2 Not Detected     Parainfluenza Virus 3 Not Detected     Parainfluenza Virus 4 Not Detected     Bordetella pertussis pcr Not Detected     Influenza A H1 2009 PCR Not Detected     Chlamydophila pneumoniae PCR Not Detected     Mycoplasma pneumo by PCR Not Detected     Influenza A PCR Not Detected     Influenza A H3 Not Detected     Influenza A H1 Not Detected     RSV, PCR Not Detected     Bordetella parapertussis PCR Not Detected            Evaluation of Level    Lab Results   Component Value Date    VANCORANDOM 18.70 03/02/2019       Assessment/Plan:  Pharmacy to dose vancomycin and meropenem for sepsis in HD.   Currently on vancomycin 500 mg IV post-HD on Tuesday, Thursday, Saturday.   3/2 Vancomycin random - 18.7 mcg/ml during HD  Goal trough = 15-20 mcg/mL  Continue current regimen  Monitor dialysis plan, cultures and sensitivities, and clinical status, and adjust regimen as necessary.  Will  follow    thanks  [facility]   3/2/2019  1:38 PM

## 2019-03-02 NOTE — PROGRESS NOTES
"   LOS: 1 day    Patient Care Team:  Otilio Smith DO as PCP - General (Family Medicine)        Subjective     Interval History:     Weak and tired.No acute events overnight.     Review of Systems:   No CP or SOA    Objective     Vital Sign Min/Max for last 24 hours  Temp  Min: 97.4 °F (36.3 °C)  Max: 97.4 °F (36.3 °C)   BP  Min: 87/58  Max: 106/42   Pulse  Min: 70  Max: 76   Resp  Min: 16  Max: 16   SpO2  Min: 98 %  Max: 98 %   No Data Recorded   No Data Recorded     Flowsheet Rows      First Filed Value   Admission Height  154.9 cm (61\") Documented at 02/28/2019 1746   Admission Weight  No data          I/O this shift:  In: 200 [IV Piggyback:200]  Out: -   I/O last 3 completed shifts:  In: 2340 [P.O.:240; I.V.:1500; IV Piggyback:600]  Out: -     Physical Exam:     General Appearance:    Alert, cooperative, in no acute distress   Head:    Normocephalic, without obvious abnormality, atraumatic               Neck:   No adenopathy, supple, trachea midline, no thyromegaly, no     carotid bruit, no JVD       Lungs:     Clear to auscultation,respirations regular, even and                   unlabored    Heart:    Regular rhythm and normal rate, normal S1 and S2, no            murmur, no gallop, no rub, no click       Abdomen:     Normal bowel sounds, no masses, no organomegaly, soft        non-tender, non-distended, no guarding, no rebound                 tenderness       Extremities:   Moves all extremities well, no edema, no cyanosis, no              redness               Neurologic:   No focal deficit noted.        WBC WBC   Date Value Ref Range Status   03/01/2019 35.75 (H) 3.50 - 10.80 10*3/mm3 Final      HGB Hemoglobin   Date Value Ref Range Status   03/01/2019 7.9 (L) 11.5 - 15.5 g/dL Final      HCT Hematocrit   Date Value Ref Range Status   03/01/2019 26.4 (L) 34.5 - 44.0 % Final      Platlets No results found for: LABPLAT   MCV MCV   Date Value Ref Range Status   03/01/2019 102.3 (H) 80.0 - 99.0 fL Final "          Sodium Sodium   Date Value Ref Range Status   03/01/2019 142 132 - 146 mmol/L Final      Potassium Potassium   Date Value Ref Range Status   03/01/2019 5.2 3.5 - 5.5 mmol/L Final      Chloride Chloride   Date Value Ref Range Status   03/01/2019 111 (H) 99 - 109 mmol/L Final      CO2 CO2   Date Value Ref Range Status   03/01/2019 16.0 (L) 20.0 - 31.0 mmol/L Final      BUN BUN   Date Value Ref Range Status   03/01/2019 57 (H) 9 - 23 mg/dL Final      Creatinine Creatinine   Date Value Ref Range Status   03/01/2019 4.45 (H) 0.60 - 1.30 mg/dL Final      Calcium Calcium   Date Value Ref Range Status   03/01/2019 9.0 8.7 - 10.4 mg/dL Final      PO4 No results found for: CAPO4   Albumin Albumin   Date Value Ref Range Status   03/01/2019 3.67 3.20 - 4.80 g/dL Final      Magnesium Magnesium   Date Value Ref Range Status   03/01/2019 2.6 1.3 - 2.7 mg/dL Final      Uric Acid No results found for: URICACID        Results Review:     I reviewed the patient's new clinical results.      alteplase 2 mg Intracatheter Once   alteplase 2 mg Intracatheter Once   b complex-vitamin c-folic acid 1 tablet Oral Daily   calcitriol 0.25 mcg Oral Daily   castor oil-balsam peru  Topical Q12H   docusate sodium 100 mg Oral BID   epoetin alexus 10,000 Units Subcutaneous Once per day on Tue Thu Sat   ferrous sulfate 325 mg Oral TID With Meals   heparin (porcine) 5,000 Units Subcutaneous Q12H   meropenem 500 mg Intravenous Q24H   midodrine 5 mg Oral TID AC   PRO-STAT 1 packet Oral BID   sodium chloride 3 mL Intravenous Q12H   sodium hypochlorite  Topical BID   sodium hypochlorite  Topical Q24H   vancomycin 500 mg Intravenous Once per day on Tue Thu Sat       Pharmacy Consult - Pharmacy to dose        Medication Review:     Assessment/Plan       Hemodialysis-associated hypotension    Hypertension    Pulmonary hypertension (CMS/HCC)    Leukocytosis    Debility    Hx of renal cell carcinoma    Chronic diastolic CHF (congestive heart failure)  (CMS/[facility])    ESRD (end stage renal disease) (CMS/[facility])    Sacral decubitus ulcer    History of recent pneumonia    Hypotension due to hypovolemia    Impaired mobility and ADLs      1- ESRD - Ttsat   2- Intradialytic hypotension   3- Anemia of chronic disease.   4- Anemia of chronic disease  5- Sacral decubitus ulcer- stage IV   6- metabolic acidosis - will be corrected with HD     Plan:  - Patient seen on dialysis.   - Renal diet   - Adjust meds per renal function   - Epo with HD   - Continue with Midodrine tid     I discussed the patients findings and my recommendations with patient and nursing staff          Isaac Proctor MD  03/02/19  9:07 AM

## 2019-03-02 NOTE — PLAN OF CARE
Problem: Patient Care Overview  Goal: Plan of Care Review  Outcome: Ongoing (interventions implemented as appropriate)   03/02/19 0344   Coping/Psychosocial   Plan of Care Reviewed With patient   Plan of Care Review   Progress no change       Problem: Fall Risk (Adult)  Goal: Absence of Fall  Outcome: Ongoing (interventions implemented as appropriate)      Problem: Skin Injury Risk (Adult)  Goal: Skin Health and Integrity  Outcome: Ongoing (interventions implemented as appropriate)      Problem: Hemodialysis (Adult)  Goal: Signs and Symptoms of Listed Potential Problems Will be Absent, Minimized or Managed (Hemodialysis)  Outcome: Ongoing (interventions implemented as appropriate)

## 2019-03-02 NOTE — PLAN OF CARE
Problem: Patient Care Overview  Goal: Plan of Care Review  Outcome: Ongoing (interventions implemented as appropriate)   03/02/19 7168   Coping/Psychosocial   Plan of Care Reviewed With patient   Plan of Care Review   Progress no change   OTHER   Outcome Summary low bp, weak but labs improved. needs to beencouraged to eat and drink more.     Goal: Individualization and Mutuality  Outcome: Ongoing (interventions implemented as appropriate)    Goal: Discharge Needs Assessment  Outcome: Ongoing (interventions implemented as appropriate)    Goal: Interprofessional Rounds/Family Conf  Outcome: Ongoing (interventions implemented as appropriate)      Problem: Fall Risk (Adult)  Goal: Identify Related Risk Factors and Signs and Symptoms  Outcome: Ongoing (interventions implemented as appropriate)    Goal: Absence of Fall  Outcome: Ongoing (interventions implemented as appropriate)      Problem: Skin Injury Risk (Adult)  Goal: Identify Related Risk Factors and Signs and Symptoms  Outcome: Ongoing (interventions implemented as appropriate)    Goal: Skin Health and Integrity  Outcome: Ongoing (interventions implemented as appropriate)      Problem: Hemodialysis (Adult)  Goal: Signs and Symptoms of Listed Potential Problems Will be Absent, Minimized or Managed (Hemodialysis)  Outcome: Ongoing (interventions implemented as appropriate)

## 2019-03-02 NOTE — PROGRESS NOTES
Psychiatric Medicine Services  PROGRESS NOTE    Patient Name: Fabiola Pimentel  : 1934  MRN: [id number]    Date of Admission: 2019  Length of Stay: 0  Primary Care Physician: Otilio Smith,     Subjective   Subjective     CC:  hypotension    HPI:  Denies current pain    Review of Systems  Gen- No fevers, chills  CV- No chest pain, palpitations  Resp- No cough, dyspnea  GI- No N/V/D, abd pain      Otherwise ROS is negative except as mentioned in the HPI.    Objective   Objective     Vital Signs:   Temp:  [97.4 °F (36.3 °C)-97.6 °F (36.4 °C)] 97.4 °F (36.3 °C)  Heart Rate:  [63-74] 74  Resp:  [16] 16  BP: ()/(37-50) 90/44        Physical Exam:  Constitutional -frail, non toxic, in bed  HEENT-NCAT, mucous membranes moist  CV-RRR, S1 S2 normal, no m/r/g  Resp-CTAB, no wheezes, rhonchi or rales  Abd-soft, non-tender, non-distended, normo active bowel sounds  Ext-No lower extremity cyanosis, clubbing or edema bilaterally  Neuro-alert but some confusion, speech clear, moves all extremities   Psych-normal affect   Skin- cratered 4 cm decubitus ulcer over sacrum.      Results Reviewed:  I have personally reviewed current lab, radiology, and data and agree.    Results from last 7 days   Lab Units 19  0545 19  0507   WBC 10*3/mm3 35.75* 19.02*   HEMOGLOBIN g/dL 7.9* 8.9*   HEMATOCRIT % 26.4* 28.2*   PLATELETS 10*3/mm3 213 313     Results from last 7 days   Lab Units 19  0545 19  0507   SODIUM mmol/L 142 139   POTASSIUM mmol/L 5.2 5.2   CHLORIDE mmol/L 111* 107   CO2 mmol/L 16.0* 19.0*   BUN mg/dL 57* 32*   CREATININE mg/dL 4.45* 3.29*   GLUCOSE mg/dL 82 127*   CALCIUM mg/dL 9.0 9.2   ALT (SGPT) U/L 12 11   AST (SGOT) U/L 25 18     Estimated Creatinine Clearance: 7.7 mL/min (A) (by C-G formula based on SCr of 4.45 mg/dL (H)).    No results found for: BNP    Microbiology Results Abnormal     Procedure Component Value - Date/Time    Blood Culture - Blood,  Arm, Left [[id number]] Collected:  02/28/19 1850    Lab Status:  Preliminary result Specimen:  Blood from Arm, Left Updated:  03/01/19 1930     Blood Culture No growth at 24 hours    Wound Culture - Surgical Site, Chest, Left [[id number]] Collected:  02/28/19 2014    Lab Status:  Preliminary result Specimen:  Surgical Site from Chest, Left Updated:  03/01/19 0922     Wound Culture No growth          Imaging Results (last 24 hours)     Procedure Component Value Units Date/Time    XR Chest 1 View [[id number]] Collected:  03/01/19 1153     Updated:  03/01/19 1617    Narrative:       EXAMINATION: XR CHEST 1 VW- 03/01/2019      INDICATION: recent pneumonia      COMPARISON: 02/14/2019     FINDINGS: Portable chest reveals deep line catheter identified on the  left tip in the SVC. The heart is enlarged with mild increased markings  at the left lung base. The right lung is clear. Degenerative changes  seen within the spine. Small left pleural effusion. Underlying chronic  and emphysematous changes seen throughout the lung fields bilaterally.            Impression:       Stable chest as above with mild increased markings  identified left lung base and small left pleural effusion. No change in  the dialysis catheter.     D:  03/01/2019  E:  03/01/2019     This report was finalized on 3/1/2019 4:15 PM by Dr. Modesta Chris MD.             Results for orders placed during the hospital encounter of 01/31/19   Adult Transthoracic Echo Limited W/ Cont if Necessary Per Protocol    Narrative · Stable echodensity noted on chordal apparatus of anterior leaflet of   mitral valve.          I have reviewed the medications:    Current Facility-Administered Medications:   •  acetaminophen (TYLENOL) tablet 650 mg, 650 mg, Oral, Q4H PRN, Neeta Caravjal, APRN  •  b complex-vitamin c-folic acid (NEPHRO-THONG) tablet 1 tablet, 1 tablet, Oral, Daily, Nora Schmid MD, 1 tablet at 03/01/19 0909  •  bisacodyl (DULCOLAX) EC tablet 5 mg, 5  mg, Oral, Daily PRN, Neeta Carvajal APRN  •  bisacodyl (DULCOLAX) suppository 10 mg, 10 mg, Rectal, Daily PRN, Neeta Carvajal APRN  •  calcitriol (ROCALTROL) capsule 0.25 mcg, 0.25 mcg, Oral, Daily, Nora Schmid MD, 0.25 mcg at 03/01/19 0909  •  castor oil-balsam peru (VENELEX) ointment, , Topical, Q12H, Tony Valle MD  •  docusate sodium (COLACE) capsule 100 mg, 100 mg, Oral, BID, Neeta Carvajal APRN, 100 mg at 03/01/19 2016  •  epoetin alexus (EPOGEN,PROCRIT) injection 10,000 Units, 10,000 Units, Subcutaneous, Once per day on Tue Thu Sat, Neeta Carvajal APRN, 10,000 Units at 02/28/19 2133  •  ferrous sulfate tablet 325 mg, 325 mg, Oral, TID With Meals, Neeta Carvajal APRN, 325 mg at 03/01/19 1724  •  heparin (porcine) 5000 UNIT/ML injection 5,000 Units, 5,000 Units, Subcutaneous, Q12H, Neeta Carvajal APRN, 5,000 Units at 03/01/19 2016  •  HYDROcodone-acetaminophen (NORCO) 5-325 MG per tablet 1 tablet, 1 tablet, Oral, Q6H PRN, Neeta Carvajal APRN  •  meropenem (MERREM) 500mg/100 mL 0.9% NS IVPB (mbp), 500 mg, Intravenous, Q24H, Jessica Woodward, RPH, 500 mg at 03/01/19 1530  •  midodrine (PROAMATINE) tablet 5 mg, 5 mg, Oral, TID AC, Isaac Proctor MD, 5 mg at 03/01/19 1724  •  ondansetron (ZOFRAN) injection 4 mg, 4 mg, Intravenous, Q6H PRN, Neeta Carvajal APRN  •  Pharmacy Consult - Pharmacy to dose, , Does not apply, Continuous PRN, Luis Antonio Villanueva MD  •  PRO-STAT 1 packet, 1 packet, Oral, BID, Neeta Carvajal APRN  •  sodium bicarbonate 8.4 % 150 mEq in dextrose (D5W) 5 % 1,000 mL infusion (greater than 75 mEq), 75 mL/hr, Intravenous, Continuous, Hitesh, Isaac Huff MD, Last Rate: 75 mL/hr at 03/01/19 1531, 75 mL/hr at 03/01/19 1531  •  sodium chloride 0.9 % flush 3 mL, 3 mL, Intravenous, Q12H, Neeta Carvajal APRN, 3 mL at 03/01/19 2013  •  sodium chloride 0.9 % flush 3-10 mL, 3-10 mL, Intravenous, PRN, Neeta Carvajal APRN  •  sodium hypochlorite  (DAKIN'S) 0.025 % topical solution solution, , Topical, BID, Neeta Carvajal, APRN  •  sodium hypochlorite (DAKIN'S) 0.025 % topical solution solution, , Topical, Q24H, Neeta Carvajal, NBA  •  [START ON 3/2/2019] vancomycin 500 mg/100 mL 0.9% NS IVPB (mbp), 500 mg, Intravenous, Once per day on Tue Thu Sat, Jessica Woodward, RPH      b complex-vitamin c-folic acid 1 tablet Oral Daily   calcitriol 0.25 mcg Oral Daily   castor oil-balsam peru  Topical Q12H   docusate sodium 100 mg Oral BID   epoetin alexus 10,000 Units Subcutaneous Once per day on Tue Thu Sat   ferrous sulfate 325 mg Oral TID With Meals   heparin (porcine) 5,000 Units Subcutaneous Q12H   meropenem 500 mg Intravenous Q24H   midodrine 5 mg Oral TID AC   PRO-STAT 1 packet Oral BID   sodium chloride 3 mL Intravenous Q12H   sodium hypochlorite  Topical BID   sodium hypochlorite  Topical Q24H   [START ON 3/2/2019] vancomycin 500 mg Intravenous Once per day on Tue Thu Sat       Assessment/Plan   Assessment / Plan     Active Hospital Problems    Diagnosis Date Noted   • **Hemodialysis-associated hypotension [I95.3] 02/28/2019   • Impaired mobility and ADLs [Z74.09] 03/01/2019   • History of recent pneumonia [Z87.01] 02/28/2019   • Hypotension due to hypovolemia [I95.89, E86.1] 02/28/2019   • Sacral decubitus ulcer [L89.159] 02/19/2019   • ESRD (end stage renal disease) (CMS/[facility]) [N18.6] 02/15/2019   • Leukocytosis [D72.829] 01/31/2019   • Chronic diastolic CHF (congestive heart failure) (CMS/[facility]) [I50.32] 01/31/2019   • Debility [R53.81] 01/31/2019   • Hx of renal cell carcinoma [Z85.528] 01/31/2019   • Hypertension [I10]    • Pulmonary hypertension (CMS/[facility]) [I27.20]      · Echo (04/11/2015): Normal LV systolic function, wall motion with trace MR, moderate TR and moderate to severe pulmonary hypertension with an RVSP of 50 to 55 mmHg  · Echo (02/01/2018): Normal LVEF.  Moderate TR.  RVSP 45-55 mmHg.            Brief Hospital Course to date:  Fabiola Pimentel is  a 84 y.o. 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  - at one end of the spectrum, could consider evaluation by [facility], but surgical intervention could involve lengthy recovery time.   - discussed today with Son (who is a Pharmacist) by phone - he would prefer a more conservative approach with patient's comfort as a foremost consideration. Agreeable to Palliative Care consultation Monday.    ESRD  - on HD    Recent Pneumonia      DVT Prophylaxis:  heparin    Disposition: I expect the patient to be discharged TBD    Discussed antibiotics and decubitus ulcer with ID Dr Villanueva    CODE STATUS:   Code Status and Medical Interventions:   Ordered at: 02/28/19 1827     Limited Support to NOT Include:    Cardioversion/Defibrillation    Intubation     Level Of Support Discussed With:    Patient     Code Status:    No CPR     Medical Interventions (Level of Support Prior to Arrest):    Limited         Electronically signed by Tony Valle MD, 03/01/19, 8:23 PM.

## 2019-03-02 NOTE — PROGRESS NOTES
[facility] Medicine Services  PROGRESS NOTE    Patient Name: Fabiola Pimentel  : 1934  MRN: [id number]    Date of Admission: 2019  Length of Stay: 1  Primary Care Physician: Otilio Smith DO    Subjective   Subjective     CC:  hypotension    HPI:  Resting in bed in no acute distress however is drowsy and very listless.  Denies any pain and tells me that she is comfortable.    Review of Systems  Could not obtain          Objective   Objective     Vital Signs:   Temp:  [97.1 °F (36.2 °C)-97.4 °F (36.3 °C)] 97.1 °F (36.2 °C)  Heart Rate:  [69-77] 77  Resp:  [16-18] 18  BP: ()/(39-84) 99/41        Physical Exam:  Constitutional -frail, non toxic, in bed  HEENT-NCAT, mucous membranes moist  CV-RRR, S1 S2 normal, no m/r/g  Resp-CTAB, no wheezes, rhonchi or rales  Abd-soft, non-tender, non-distended, normo active bowel sounds  Ext-No lower extremity cyanosis, clubbing or edema bilaterally  Neuro-drowsy, arousable, becomes awake and alert but is confused although follows very simple commands.   Psych-normal affect   Skin- cratered 4 cm decubitus ulcer over sacrum.      Results Reviewed:  I have personally reviewed current lab, radiology, and data and agree.    Results from last 7 days   Lab Units 19  0545 19  0507   WBC 10*3/mm3 35.75* 19.02*   HEMOGLOBIN g/dL 7.9* 8.9*   HEMATOCRIT % 26.4* 28.2*   PLATELETS 10*3/mm3 213 313     Results from last 7 days   Lab Units 19  0545 19  0507   SODIUM mmol/L 142 139   POTASSIUM mmol/L 5.2 5.2   CHLORIDE mmol/L 111* 107   CO2 mmol/L 16.0* 19.0*   BUN mg/dL 57* 32*   CREATININE mg/dL 4.45* 3.29*   GLUCOSE mg/dL 82 127*   CALCIUM mg/dL 9.0 9.2   ALT (SGPT) U/L 12 11   AST (SGOT) U/L 25 18     Estimated Creatinine Clearance: 7.7 mL/min (A) (by C-G formula based on SCr of 4.45 mg/dL (H)).    No results found for: BNP    Microbiology Results Abnormal     Procedure Component Value - Date/Time    Blood Culture -  Blood, Hand, Right [[id number]] Collected:  03/01/19 1409    Lab Status:  Preliminary result Specimen:  Blood from Hand, Right Updated:  03/02/19 1430     Blood Culture No growth at 24 hours    Narrative:       Aerobic bottle only    Blood Culture - Blood, Arm, Right [[id number]] Collected:  03/01/19 1409    Lab Status:  Preliminary result Specimen:  Blood from Arm, Right Updated:  03/02/19 1430     Blood Culture No growth at 24 hours    Wound Culture - Surgical Site, Chest, Left [[id number]] Collected:  02/28/19 2014    Lab Status:  Final result Specimen:  Surgical Site from Chest, Left Updated:  03/02/19 0839     Wound Culture No growth at 2 days     Gram Stain Occasional WBCs seen      No organisms seen    Blood Culture - Blood, Arm, Left [[id number]] Collected:  02/28/19 1850    Lab Status:  Preliminary result Specimen:  Blood from Arm, Left Updated:  03/01/19 1930     Blood Culture No growth at 24 hours          Imaging Results (last 24 hours)     Procedure Component Value Units Date/Time    XR Chest 1 View [[id number]] Collected:  03/01/19 1153     Updated:  03/01/19 1617    Narrative:       EXAMINATION: XR CHEST 1 VW- 03/01/2019      INDICATION: recent pneumonia      COMPARISON: 02/14/2019     FINDINGS: Portable chest reveals deep line catheter identified on the  left tip in the SVC. The heart is enlarged with mild increased markings  at the left lung base. The right lung is clear. Degenerative changes  seen within the spine. Small left pleural effusion. Underlying chronic  and emphysematous changes seen throughout the lung fields bilaterally.            Impression:       Stable chest as above with mild increased markings  identified left lung base and small left pleural effusion. No change in  the dialysis catheter.     D:  03/01/2019  E:  03/01/2019     This report was finalized on 3/1/2019 4:15 PM by Dr. Modesta Chris MD.             Results for orders placed during the hospital encounter of 01/31/19    Adult Transthoracic Echo Limited W/ Cont if Necessary Per Protocol    Narrative · Stable echodensity noted on chordal apparatus of anterior leaflet of   mitral valve.          I have reviewed the medications:    Current Facility-Administered Medications:   •  acetaminophen (TYLENOL) tablet 650 mg, 650 mg, Oral, Q4H PRN, Neeta Carvajal APRN  •  albumin human 25 % IV SOLN 25 g, 25 g, Intravenous, PRN, HiteshIsaac MD, 25 g at 03/02/19 0945  •  b complex-vitamin c-folic acid (NEPHRO-THONG) tablet 1 tablet, 1 tablet, Oral, Daily, Nora Schmid MD, 1 tablet at 03/01/19 0909  •  bisacodyl (DULCOLAX) EC tablet 5 mg, 5 mg, Oral, Daily PRN, Neeta Carvajal APRN  •  bisacodyl (DULCOLAX) suppository 10 mg, 10 mg, Rectal, Daily PRN, Neeta Carvajal APRN  •  calcitriol (ROCALTROL) capsule 0.25 mcg, 0.25 mcg, Oral, Daily, Nora Schmid MD, 0.25 mcg at 03/01/19 0909  •  castor oil-balsam peru (VENELEX) ointment, , Topical, Q12H, Tony Valle MD  •  docusate sodium (COLACE) capsule 100 mg, 100 mg, Oral, BID, Neeta Carvajal APRN, 100 mg at 03/01/19 2016  •  epoetin alexus (EPOGEN,PROCRIT) injection 10,000 Units, 10,000 Units, Subcutaneous, Once per day on Tue Thu Sat, Neeta Carvajal APRN, 10,000 Units at 03/02/19 0941  •  ferrous sulfate tablet 325 mg, 325 mg, Oral, TID With Meals, Neeta Carvajal APRN, 325 mg at 03/02/19 1247  •  heparin (porcine) 5000 UNIT/ML injection 5,000 Units, 5,000 Units, Subcutaneous, Q12H, Neeta Carvajal APRN, 5,000 Units at 03/02/19 0941  •  HYDROcodone-acetaminophen (NORCO) 5-325 MG per tablet 1 tablet, 1 tablet, Oral, Q6H PRN, Neeta Carvajal R, APRN, 1 tablet at 03/02/19 0427  •  meropenem (MERREM) 500mg/100 mL 0.9% NS IVPB (mbp), 500 mg, Intravenous, Q24H, Jessica Woodward, RPH, 500 mg at 03/02/19 1430  •  midodrine (PROAMATINE) tablet 5 mg, 5 mg, Oral, TID Hitesh DAMIAN Muhammad Irfan, MD, 5 mg at 03/02/19 1247  •  ondansetron (ZOFRAN) injection 4 mg, 4 mg,  Intravenous, Q6H PRN, Neeta Carvajal APRN  •  Pharmacy Consult - Pharmacy to dose, , Does not apply, Continuous PRN, Luis Antonio Villanueva MD  •  PRO-STAT 1 packet, 1 packet, Oral, BID, Neeta Carvajal APRN  •  sodium chloride 0.9 % flush 3 mL, 3 mL, Intravenous, Q12H, Neeta Carvajal APRN, 3 mL at 03/01/19 2013  •  sodium chloride 0.9 % flush 3-10 mL, 3-10 mL, Intravenous, PRN, Neeta Carvajal APRN  •  sodium hypochlorite (DAKIN'S) 0.025 % topical solution solution, , Topical, BID, Neeta Carvajal APRN  •  sodium hypochlorite (DAKIN'S) 0.025 % topical solution solution, , Topical, Q24H, Neeta Carvajal APRN  •  vancomycin 500 mg/100 mL 0.9% NS IVPB (mbp), 500 mg, Intravenous, Once per day on Tue Thu Sat, Jessica Woodward, RPH, 500 mg at 03/02/19 1430      b complex-vitamin c-folic acid 1 tablet Oral Daily   calcitriol 0.25 mcg Oral Daily   castor oil-balsam peru  Topical Q12H   docusate sodium 100 mg Oral BID   epoetin alexus 10,000 Units Subcutaneous Once per day on Tue Thu Sat   ferrous sulfate 325 mg Oral TID With Meals   heparin (porcine) 5,000 Units Subcutaneous Q12H   meropenem 500 mg Intravenous Q24H   midodrine 5 mg Oral TID AC   PRO-STAT 1 packet Oral BID   sodium chloride 3 mL Intravenous Q12H   sodium hypochlorite  Topical BID   sodium hypochlorite  Topical Q24H   vancomycin 500 mg Intravenous Once per day on Tue Thu Sat       Assessment/Plan   Assessment / Plan     Active Hospital Problems    Diagnosis Date Noted   • **Hemodialysis-associated hypotension [I95.3] 02/28/2019   • Impaired mobility and ADLs [Z74.09] 03/01/2019   • History of recent pneumonia [Z87.01] 02/28/2019   • Hypotension due to hypovolemia [I95.89, E86.1] 02/28/2019   • Sacral decubitus ulcer [L89.159] 02/19/2019   • ESRD (end stage renal disease) (CMS/[facility]) [N18.6] 02/15/2019   • Leukocytosis [D72.829] 01/31/2019   • Chronic diastolic CHF (congestive heart failure) (CMS/[facility]) [I50.32] 01/31/2019   • Debility [R53.81]  01/31/2019   • Hx of renal cell carcinoma [Z85.528] 01/31/2019   • Hypertension [I10]    • Pulmonary hypertension (CMS/HCC) [I27.20]      · Echo (04/11/2015): Normal LV systolic function, wall motion with trace MR, moderate TR and moderate to severe pulmonary hypertension with an RVSP of 50 to 55 mmHg  · Echo (02/01/2018): Normal LVEF.  Moderate TR.  RVSP 45-55 mmHg.            Brief Hospital Course to date:  Fabiola Pimentel is a 84 y.o. 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  - at one end of the spectrum, could consider evaluation by [facility], but surgical intervention could involve lengthy recovery time.   - discussed today with Son (who is a Pharmacist) by phone - he would prefer a more conservative approach with patient's comfort as a foremost consideration. Agreeable to Palliative Care consultation Monday.    ESRD  - on HD    Recent Pneumonia  PLAN:  - get new labs  - need to consult palliative      DVT Prophylaxis:  heparin    Disposition: I expect the patient to be discharged TBD    Discussed antibiotics and decubitus ulcer with ID Dr Villanueva    CODE STATUS:   Code Status and Medical Interventions:   Ordered at: 02/28/19 1827     Limited Support to NOT Include:    Cardioversion/Defibrillation    Intubation     Level Of Support Discussed With:    Patient     Code Status:    No CPR     Medical  Interventions (Level of Support Prior to Arrest):    Limited         Electronically signed by Miki David MD, 03/02/19, 3:08 PM.

## 2019-03-03 PROBLEM — R41.82 ALTERED MENTAL STATUS: Status: ACTIVE | Noted: 2019-01-01

## 2019-03-03 NOTE — PROGRESS NOTES
[facility] Medicine Services  PROGRESS NOTE    Patient Name: Fabiola Pimentel  : 1934  MRN: [id number]    Date of Admission: 2019  Length of Stay: 2  Primary Care Physician: Otilio Smith DO    Subjective   Subjective     CC:  hypotension    HPI:  Nurse reports decline in last 2 days with increased confusion, refusing meds and no po intake.  Palliative consult pending    Review of Systems   Unable to perform ROS: Mental status change         Objective   Objective     Vital Signs:   Temp:  [97.4 °F (36.3 °C)-99.3 °F (37.4 °C)] 99.3 °F (37.4 °C)  Heart Rate:  [66-81] 80  Resp:  [16-18] 18  BP: ()/(41-47) 99/44        Physical Exam:  Constitutional: Sleeping, NAD  HENT: NCAT, mucous membranes moist  Respiratory: Clear to auscultation bilaterally, respiratory effort normal   Cardiovascular: RRR, no murmurs, rubs, or gallops, palpable pedal pulses bilaterally  Gastrointestinal: Positive bowel sounds, soft, nontender, nondistended  Musculoskeletal: No bilateral ankle edema  Neurologic: Some movement with verbal stimuli.  Does not open her eyes  Skin: No rashes        Results Reviewed:  I have personally reviewed current lab, radiology, and data and agree.    Results from last 7 days   Lab Units 19  1022 19  1622 19  0545   WBC 10*3/mm3 26.64* 24.59* 35.75*   HEMOGLOBIN g/dL 7.2* 6.4* 7.9*   HEMATOCRIT % 23.5* 19.7* 26.4*   PLATELETS 10*3/mm3 158 107* 213     Results from last 7 days   Lab Units 19  1507 19  0545 19  0507   SODIUM mmol/L 142 142 139   POTASSIUM mmol/L 4.0 5.2 5.2   CHLORIDE mmol/L 105 111* 107   CO2 mmol/L 26.0 16.0* 19.0*   BUN mg/dL 22 57* 32*   CREATININE mg/dL 1.97* 4.45* 3.29*   GLUCOSE mg/dL 82 82 127*   CALCIUM mg/dL 8.9 9.0 9.2   ALT (SGPT) U/L 8 12 11   AST (SGOT) U/L 22 25 18     Estimated Creatinine Clearance: 17.4 mL/min (A) (by C-G formula based on SCr of 1.97 mg/dL (H)).      Microbiology Results Abnormal      Procedure Component Value - Date/Time    Blood Culture - Blood, Hand, Right [[id number]] Collected:  03/01/19 1409    Lab Status:  Preliminary result Specimen:  Blood from Hand, Right Updated:  03/03/19 1430     Blood Culture No growth at 2 days    Narrative:       Aerobic bottle only    Blood Culture - Blood, Arm, Right [[id number]] Collected:  03/01/19 1409    Lab Status:  Preliminary result Specimen:  Blood from Arm, Right Updated:  03/03/19 1430     Blood Culture No growth at 2 days    Blood Culture - Blood, Arm, Left [[id number]] Collected:  02/28/19 1850    Lab Status:  Preliminary result Specimen:  Blood from Arm, Left Updated:  03/02/19 1930     Blood Culture No growth at 2 days    Wound Culture - Surgical Site, Chest, Left [[id number]] Collected:  02/28/19 2014    Lab Status:  Final result Specimen:  Surgical Site from Chest, Left Updated:  03/02/19 0839     Wound Culture No growth at 2 days     Gram Stain Occasional WBCs seen      No organisms seen          Imaging Results (last 24 hours)     ** No results found for the last 24 hours. **          Results for orders placed during the hospital encounter of 01/31/19   Adult Transthoracic Echo Limited W/ Cont if Necessary Per Protocol    Narrative · Stable echodensity noted on chordal apparatus of anterior leaflet of   mitral valve.          I have reviewed the medications:    Current Facility-Administered Medications:   •  acetaminophen (TYLENOL) tablet 650 mg, 650 mg, Oral, Q4H PRN, Neeta Carvajal, APRN  •  albumin human 25 % IV SOLN 25 g, 25 g, Intravenous, PRN, Isaac Proctor MD, 25 g at 03/02/19 0945  •  b complex-vitamin c-folic acid (NEPHRO-THONG) tablet 1 tablet, 1 tablet, Oral, Daily, Nora Schmid MD, 1 tablet at 03/01/19 0909  •  bisacodyl (DULCOLAX) EC tablet 5 mg, 5 mg, Oral, Daily PRN, Neeta Carvajal, APRN  •  bisacodyl (DULCOLAX) suppository 10 mg, 10 mg, Rectal, Daily PRN, Neeta Carvajal, APRN  •  calcitriol (ROCALTROL)  capsule 0.25 mcg, 0.25 mcg, Oral, Daily, Nora Schmid MD, 0.25 mcg at 03/01/19 0909  •  castor oil-balsam peru (VENELEX) ointment, , Topical, Q12H, Tony Valle MD  •  docusate sodium (COLACE) capsule 100 mg, 100 mg, Oral, BID, Neeta Carvajal APRN, 100 mg at 03/01/19 2016  •  epoetin alexus (EPOGEN,PROCRIT) injection 10,000 Units, 10,000 Units, Subcutaneous, Once per day on Tue Thu Sat, Neeta Carvajal APRN, 10,000 Units at 03/02/19 0941  •  ferrous sulfate tablet 325 mg, 325 mg, Oral, TID With Meals, Neeta Carvajal APRN, 325 mg at 03/02/19 1725  •  heparin (porcine) 5000 UNIT/ML injection 5,000 Units, 5,000 Units, Subcutaneous, Q12H, Neeta Carvajal APRN, 5,000 Units at 03/03/19 0914  •  HYDROcodone-acetaminophen (NORCO) 5-325 MG per tablet 1 tablet, 1 tablet, Oral, Q6H PRN, Neeta Carvajal APRN, 1 tablet at 03/02/19 0427  •  meropenem (MERREM) 500mg/100 mL 0.9% NS IVPB (mbp), 500 mg, Intravenous, Q24H, Jessica Woodward, RPH, 500 mg at 03/02/19 1430  •  midodrine (PROAMATINE) tablet 10 mg, 10 mg, Oral, TID Hitesh DAMIAN Muhammad Irfan, MD  •  ondansetron (ZOFRAN) injection 4 mg, 4 mg, Intravenous, Q6H PRN, Neeta Carvajal APRN  •  Pharmacy Consult - Pharmacy to dose, , Does not apply, Continuous PRN, Luis Antonio Villanueva MD  •  PRO-STAT 1 packet, 1 packet, Oral, BID, Neeta Carvajal APRCARLOS  •  sodium chloride 0.9 % flush 3 mL, 3 mL, Intravenous, Q12H, Neeta Carvajal APRN, 3 mL at 03/03/19 0921  •  sodium chloride 0.9 % flush 3-10 mL, 3-10 mL, Intravenous, PRN, Neeta Carvajal APRN  •  sodium hypochlorite (DAKIN'S) 0.025 % topical solution solution, , Topical, BID, Neeta Carvajal APRN  •  sodium hypochlorite (DAKIN'S) 0.025 % topical solution solution, , Topical, Q24H, Neeta Carvajal APRN  •  vancomycin 500 mg/100 mL 0.9% NS IVPB (mbp), 500 mg, Intravenous, Once per day on Tue Thu Sat, Jessica Woodward, RPH, 500 mg at 03/02/19 1430      b complex-vitamin c-folic acid 1 tablet Oral Daily    calcitriol 0.25 mcg Oral Daily   castor oil-balsam peru  Topical Q12H   docusate sodium 100 mg Oral BID   epoetin alexus 10,000 Units Subcutaneous Once per day on Tue Thu Sat   ferrous sulfate 325 mg Oral TID With Meals   heparin (porcine) 5,000 Units Subcutaneous Q12H   meropenem 500 mg Intravenous Q24H   midodrine 10 mg Oral TID AC   PRO-STAT 1 packet Oral BID   sodium chloride 3 mL Intravenous Q12H   sodium hypochlorite  Topical BID   sodium hypochlorite  Topical Q24H   vancomycin 500 mg Intravenous Once per day on Tue Thu Sat       Assessment/Plan   Assessment / Plan     Active Hospital Problems    Diagnosis Date Noted   • **Hemodialysis-associated hypotension [I95.3] 02/28/2019   • Impaired mobility and ADLs [Z74.09] 03/01/2019   • History of recent pneumonia [Z87.01] 02/28/2019   • Hypotension due to hypovolemia [I95.89, E86.1] 02/28/2019   • Sacral decubitus ulcer [L89.159] 02/19/2019   • ESRD (end stage renal disease) (CMS/[facility]) [N18.6] 02/15/2019   • Leukocytosis [D72.829] 01/31/2019   • Chronic diastolic CHF (congestive heart failure) (CMS/[facility]) [I50.32] 01/31/2019   • Debility [R53.81] 01/31/2019   • Hx of renal cell carcinoma [Z85.528] 01/31/2019   • Hypertension [I10]    • Pulmonary hypertension (CMS/[facility]) [I27.20]      · Echo (04/11/2015): Normal LV systolic function, wall motion with trace MR, moderate TR and moderate to severe pulmonary hypertension with an RVSP of 50 to 55 mmHg  · Echo (02/01/2018): Normal LVEF.  Moderate TR.  RVSP 45-55 mmHg.            Brief Hospital Course to date:  Fabiola Pimentel is a 84 y.o. female with ESRD, CHF, recent coronavirus pneumonia and a sacral decubitus ulcer returns after recent discharge with dialysis associated hypotension    Altered Mental Status  - MRI    Hypotension  - associated with dialysis  - may need albumin with HD  - midodrine TID    Leukocytosis, chronic  - may be related to sacral decubitis ulcer  - also element of stress response in setting of prior  splenectomy  - other potential sources include recent coronavirus URI and HD catheter insertion site necrosis, although the latter improving  - finished abx on arrival to [facility].  Last planned dose was 2.28  - supposed to see Dr. Villanueva 2 weeks after dc from last hospitalization    Sacral decubitus ulcer   - wound care, antibiotics  - healing will be extremely challenging, and with overall frailty and age this may be an insurmountable problem.  - at one end of the spectrum, could consider evaluation by [facility], but surgical intervention could involve lengthy recovery time.   - Family agreeable to palliative care consultation Monday-consult in computer.  Discussed with son by previous provider    ESRD  - on HD (TU/TH/SAT)    Recent Pneumonia      DVT Prophylaxis:  Heparin SQ    Disposition: I expect the patient to be discharged TBD    Discussed antibiotics and decubitus ulcer with ID Dr Villanueva    CODE STATUS:   Code Status and Medical Interventions:   Ordered at: 02/28/19 1827     Limited Support to NOT Include:    Cardioversion/Defibrillation    Intubation     Level Of Support Discussed With:    Patient     Code Status:    No CPR     Medical Interventions (Level of Support Prior to Arrest):    Limited         Electronically signed by NBA Roach, 03/03/19, 3:53 PM.

## 2019-03-03 NOTE — THERAPY EVALUATION
Acute Care - Wound/Debridement Initial Evaluation  [facility]     Patient Name: Fabiola Pimentel  : 1934  MRN: [id number]  Today's Date: 3/3/2019  Onset of Illness/Injury or Date of Surgery: 19  Date of Referral to PT: 19   Referring Physician: Dr. Valle      Admit Date: 2019    Visit Dx:    ICD-10-CM ICD-9-CM   1. Impaired mobility and ADLs Z74.09 799.89   2. Impaired functional mobility, balance, gait, and endurance Z74.09 V49.89       Patient Active Problem List   Diagnosis   • Palpitations   • Premature atrial contractions   • Hypertension   • Pulmonary hypertension (CMS/HCC)   • DJD (degenerative joint disease)   • Raynaud's phenomenon (secondary)   • Diverticular disease   • CKD (chronic kidney disease) stage 5, GFR less than 15 ml/min (CMS/[facility])   • Leukocytosis   • Anemia due to chronic kidney disease   • Debility   • Hx of renal cell carcinoma   • Chronic diastolic CHF (congestive heart failure) (CMS/[facility])   • Falls frequently   • Acute renal failure (ARF) (CMS/[facility])   • Huge hiatal hernia with intrathoracic stomach   • Mitral valve mass   • ESRD (end stage renal disease) (CMS/[facility])   • Hemodialysis patient (CMS/[facility])   • Sacral decubitus ulcer   • Hemodialysis-associated hypotension   • History of recent pneumonia   • Hypotension due to hypovolemia   • Impaired mobility and ADLs        Past Medical History:   Diagnosis Date   • Chronic ITP (idiopathic thrombocytopenia) (CMS/[facility])    • Chronic renal insufficiency    • CKD (chronic kidney disease)    • Diverticular disease    • Dizziness     Dizziness with increase of propafenone to t.i.d.; however, palpitations improved, patient wishes to stay on current dose.   • DJD (degenerative joint disease)    • Hip fracture (CMS/HCC)     Recent hip and pelvic fracture.   • History of uterine cancer     Initial diagnosis, , status post SALVATORE/BSO. Recurrence documented , status post radiation therapy and implants.   • Hypertension    • Lower  extremity edema     Lower extremity venous duplex, 09/23/2013:  No evidence of deep venous thrombosis. Negative VQ scan for pulmonary embolus, findings suggest congestive heart failure, 10/11/2013.No evidence of deep venous thrombosis by bilateral duplex, 03/25/2015.Mild   • Palpitations    • Pelvic fracture (CMS/HCC)     Recent hip and pelvic fracture.   • Premature atrial contractions     Premature atrial contractions, postoperative from incisional hernia repair:A 2D echocardiogram 01/03/2008:  Mild MR, mild to moderate TR, small pericardial effusion, EF 65%.   • Pulmonary hypertension (CMS/HCC)    • Raynaud's phenomenon (secondary)     Raynaud’s phenomenon involving the left upper extremity.     • Stroke (CMS/HCC)     Right optic nerve stroke.        Past Surgical History:   Procedure Laterality Date   • APPENDECTOMY     • ARTERIOVENOUS FISTULA/SHUNT SURGERY Left 1/16/2019    Procedure: LEFT UPPER EXTREMITY ARTERIOVENOUS FISTULA FORMATION, BRACHIO-AXILLARY SHUNT WITH ARTEGRAFT;  Surgeon: Dale Ramirez MD;  Location: [facility] OR;  Service: Vascular   • CATARACT EXTRACTION WITH INTRAOCULAR LENS IMPLANT      Cataract surgery with lens implantation.   • CHOLECYSTECTOMY  1958   • COLON RESECTION  10/2005    Colon resection, October 2005, with associated splenectomy.   • DILATATION AND CURETTAGE     • INCISIONAL HERNIA REPAIR  01/02/2008    With mesh   • INSERTION HEMODIALYSIS CATHETER N/A 2/1/2019    Procedure: HEMODIALYSIS CATHETER INSERTION;  Surgeon: Raymond Elise MD;  Location: [facility] OR;  Service: General   • NEPHRECTOMY Right 2001   • OTHER SURGICAL HISTORY      Recurrence documented 2003, status post radiation therapy and implants.   • SPLENECTOMY     • SYMPATHECTOMY Left     Left axilla sympathetectomy secondary to Raynaud’s phenomenon.   • TOTAL ABDOMINAL HYSTERECTOMY WITH SALPINGO OOPHORECTOMY  2001           Wound 02/28/19 1730 coccyx pressure injury (Active)   Wound Image    3/3/2019  2:00 PM    Dressing Appearance intact;moist drainage 3/3/2019  2:00 PM   Base exposed structure;necrotic;pink;yellow;red/granulating 3/3/2019  2:00 PM   Periwound intact;redness;non-blanchable 3/3/2019  2:00 PM   Periwound Temperature warm 3/3/2019  2:00 PM   Periwound Skin Turgor soft 3/3/2019  2:00 PM   Edges open 3/3/2019  2:00 PM   Wound Length (cm) 5 cm 3/3/2019  2:00 PM   Wound Width (cm) 4.5 cm 3/3/2019  2:00 PM   Wound Depth (cm) 1.2 cm 3/3/2019  2:00 PM   Undermining [Depth (cm)/Location] 2.0/10:00-2:00 3/3/2019  2:00 PM   Drainage Characteristics/Odor tan;yellow 3/3/2019  2:00 PM   Drainage Amount small 3/3/2019  2:00 PM   Care, Wound cleansed with;other (see comments);debrided;honey applied 3/3/2019  2:00 PM   Dressing Care, Wound gauze, [id number];low-adherent;foam 3/3/2019  2:00 PM   Periwound Care, Wound dry periwound area maintained 3/3/2019  2:00 PM       Wound 02/28/19 1730 Left shoulder skin tear (Active)   Dressing Appearance dry;intact 3/3/2019  8:00 AM   Base red/granulating;slough 3/3/2019  8:00 AM   Periwound intact;dry 3/3/2019  8:00 AM   Drainage Amount scant 3/3/2019  8:00 AM   Dressing Care, Wound petroleum-based;gauze;foam;low-adherent 3/3/2019  8:00 AM         WOUND DEBRIDEMENT  Total area of Debridement: 4 cmsq  Debridement Site 1  Location- Site 1: sacral wound  Selective Debridement- Site 1: Wound Surface <20cmsq  Instruments- Site 1: tweezers, scissors  Excised Tissue Description- Site 1: moderate, slough, necrotic  Bleeding- Site 1: none                  PT ASSESSMENT (last 12 hours)      Physical Therapy Evaluation     Row Name 03/03/19 1400          PT Evaluation Time/Intention    Subjective Information  -- Pt lethargic, RN consenting to tx  -JM     Document Type  wound care;evaluation  -     Mode of Treatment  physical therapy;individual therapy  -JM     Patient Effort  poor  -JM     Symptoms Noted During/After Treatment  none  -JM     Row Name 03/03/19 1400          General  Information    Patient Profile Reviewed?  yes  -     Onset of Illness/Injury or Date of Surgery  02/28/19  -     Referring Physician  Dr. Valle  -     Patient Observations  decreased LOC;lethargic  -     General Observations of Patient  Pt RSL in bed, lethargic, eyes open but not interacting with VCs.  -     Pertinent History of Current Functional Problem  Pt admitted with HD-associated hypotension, with ESRD.  PT wound consulted for sacral wound.  -     Existing Precautions/Restrictions  fall;oxygen therapy device and L/min;other (see comments) sacral wound  -     Risks Reviewed  patient:;increased discomfort  -     Benefits Reviewed  patient:;improve skin integrity  -     Barriers to Rehab  medically complex;previous functional deficit  -     Row Name 03/03/19 1400          Cognitive Assessment/Intervention- PT/OT    Affect/Mental Status (Cognitive)  unable/difficult to assess;low arousal/lethargic  -     Orientation Status (Cognition)  unable/difficult to assess  -     Personal Safety Interventions  fall prevention program maintained  -     Row Name 03/03/19 1400          Pain Scale: FACES Pre/Post-Treatment    Pain: FACES Scale, Pretreatment  2-->hurts little bit  -     Pain: FACES Scale, Post-Treatment  2-->hurts little bit  -     Pre/Post Treatment Pain Comment  Groaned 2-3 times during wound debridement.  -     Row Name 03/03/19 1400          Wound 02/28/19 1730 coccyx pressure injury    Wound - Properties Group Date first assessed: 02/28/19  -AM Time first assessed: 1730  -AM Present On Admission : yes  -AM Location: coccyx  -AM Type: pressure injury  -AM Stage, Pressure Injury: Stage 3  -AM Additional Comments: unstageable  -MR    Wound Image  Images linked: 2  -JM     Dressing Appearance  intact;moist drainage  -     Base  exposed structure;necrotic;pink;yellow;red/granulating fascia/bone central aspect  -     Periwound  intact;redness;non-blanchable purple  "non-blanchable edges  -     Periwound Temperature  warm  -     Periwound Skin Turgor  soft  -     Edges  open  -     Wound Length (cm)  5 cm  -     Wound Width (cm)  4.5 cm  -     Wound Depth (cm)  1.2 cm  -     Undermining [Depth (cm)/Location]  2.0/10:00-2:00  -     Drainage Characteristics/Odor  tan;yellow  -     Drainage Amount  small  -     Care, Wound  cleansed with;other (see comments);debrided;honey applied cleaned with dakins  -     Dressing Care, Wound  gauze, [id number];low-adherent;foam dakins-moist 4x4, 6\" optifoam gentle  -     Periwound Care, Wound  dry periwound area maintained  -     Row Name             Wound 02/28/19 1730 Left shoulder skin tear    Wound - Properties Group Date first assessed: 02/28/19  -AM Time first assessed: 1730  -AM Present On Admission : yes  -AM Side: Left  -AM Location: shoulder  -AM Type: skin tear  -AM    Row Name 03/03/19 1400          Physical Therapy Clinical Impression    Date of Referral to PT  03/01/19  -     PT Diagnosis (PT Clinical Impression)  impaired skin integrity  -     Row Name 03/03/19 1400          Physical Therapy Goals    Wound Care Goal Selection (PT)  wound care, PT goal 1  -     Additional Documentation  Wound Care Goal Selection (PT) (Row)  -     Row Name 03/03/19 1400          Wound Care Goal 1 (PT)    Wound Care Goal 1 (PT)  Reduce wound area by 10% as evidence of wound healing.  -     Time Frame (Wound Care Goal 1, PT)  long term goal (LTG);10 days  -     Row Name 03/03/19 1400          Positioning and Restraints    Pre-Treatment Position  in bed  -     Post Treatment Position  bed  -JM     In Bed  notified nsg;side lying right;call light within reach;encouraged to call for assist  -       User Key  (r) = Recorded By, (t) = Taken By, (c) = Cosigned By    Initials Name Provider Type    MR LaytonFrance RN Registered Nurse    Rosina Butler RN Registered Nurse    Ebony Davey, PT " Physical Therapist            Recommendation and Plan  Anticipated Equipment Needs at Discharge (PT): (TBD)  Anticipated Discharge Disposition (PT): skilled nursing facility  Planned Therapy Interventions (PT Eval): balance training, bed mobility training, gait training, home exercise program, patient/family education, strengthening, transfer training, postural re-education  Therapy Frequency (PT Clinical Impression): 3 times/wk  Plan of Care Reviewed With: patient           Outcome Summary: Pt with sacral ulcer with depth to fascia/bone.  Wound edges purple non-blanchable and will likely open up.  Pt very lethargic, poor PO intake limiting healing potential.  PT will follow pt 2x/week for debridement, with nursing to continue dressing changes.  Plan of Care Reviewed With: patient      Outcome Measures     Row Name 03/01/19 1147 03/01/19 1130          How much help from another person do you currently need...    Turning from your back to your side while in flat bed without using bedrails?  --  1  -MB     Moving from lying on back to sitting on the side of a flat bed without bedrails?  --  1  -MB     Moving to and from a bed to a chair (including a wheelchair)?  --  1  -MB     Standing up from a chair using your arms (e.g., wheelchair, bedside chair)?  --  1  -MB     Climbing 3-5 steps with a railing?  --  1  -MB     To walk in hospital room?  --  1  -MB     AM-PAC 6 Clicks Score  --  6  -MB        How much help from another is currently needed...    Putting on and taking off regular lower body clothing?  1  -TB  --     Bathing (including washing, rinsing, and drying)  1  -TB  --     Toileting (which includes using toilet bed pan or urinal)  1  -TB  --     Putting on and taking off regular upper body clothing  1  -TB  --     Taking care of personal grooming (such as brushing teeth)  1  -TB  --     Eating meals  1  -TB  --     Score  6  -TB  --        Functional Assessment    Outcome Measure Options  AM-PAC 6 Clicks  Daily Activity (OT)  -TB  AM-PAC 6 Clicks Basic Mobility (PT)  -MB       User Key  (r) = Recorded By, (t) = Taken By, (c) = Cosigned By    Initials Name Provider Type    TB Nidia Duarte, OT Occupational Therapist    MB Andie Borja, PT Physical Therapist            Time Calculation  PT Charges     Row Name 03/03/19 1400             Time Calculation    Start Time  1400  -JM      PT Goal Re-Cert Due Date  03/11/19  -        User Key  (r) = Recorded By, (t) = Taken By, (c) = Cosigned By    Initials Name Provider Type    Ebony Davey, PT Physical Therapist        Therapy Suggested Charges     Code   Minutes Charges    None             Therapy Charges for Today     Code Description Service Date Service Provider Modifiers Qty    19487410475 HC PAM DEBRIDE OPEN WOUND UP TO 20CM 3/3/2019 Ebony Amin, PT GP 1    [id number] HC PT RE-EVAL ESTABLISHED PLAN 2 3/3/2019 Ebony Amin, PT GP 1            PT G-Codes  Outcome Measure Options: AM-PAC 6 Clicks Daily Activity (OT)  AM-PAC 6 Clicks Score: 6  Score: 6       Ebony Amin, PT  3/3/2019

## 2019-03-03 NOTE — PLAN OF CARE
Problem: Patient Care Overview  Goal: Plan of Care Review  Outcome: Ongoing (interventions implemented as appropriate)   03/03/19 0535   Coping/Psychosocial   Plan of Care Reviewed With patient   Plan of Care Review   Progress improving   OTHER   Outcome Summary vss, continue to monitor       Problem: Skin Injury Risk (Adult)  Goal: Identify Related Risk Factors and Signs and Symptoms  Outcome: Ongoing (interventions implemented as appropriate)

## 2019-03-03 NOTE — PROGRESS NOTES
"   LOS: 2 days    Patient Care Team:  Otilio Smith DO as PCP - General (Family Medicine)        Subjective     Interval History:     Weak and tired.No acute events overnight.     Review of Systems:   No CP or SOA    Objective     Vital Sign Min/Max for last 24 hours  Temp  Min: 97.1 °F (36.2 °C)  Max: 98 °F (36.7 °C)   BP  Min: 90/39  Max: 107/47   Pulse  Min: 66  Max: 81   Resp  Min: 16  Max: 18   SpO2  Min: 94 %  Max: 97 %   No Data Recorded   Weight  Min: 58 kg (127 lb 13.9 oz)  Max: 58 kg (127 lb 13.9 oz)     Flowsheet Rows      First Filed Value   Admission Height  154.9 cm (61\") Documented at 02/28/2019 1746   Admission Weight  No data          No intake/output data recorded.  I/O last 3 completed shifts:  In: 200 [IV Piggyback:200]  Out: 320 [Other:320]    Physical Exam:     General Appearance:    Alert, cooperative, in no acute distress   Head:    Normocephalic, without obvious abnormality, atraumatic               Neck:   No adenopathy, supple, trachea midline, no thyromegaly, no     carotid bruit, no JVD       Lungs:     Clear to auscultation,respirations regular, even and                   unlabored    Heart:    Regular rhythm and normal rate, normal S1 and S2, no            murmur, no gallop, no rub, no click       Abdomen:     Normal bowel sounds, no masses, no organomegaly, soft        non-tender, non-distended, no guarding, no rebound                 tenderness       Extremities:   Moves all extremities well, no edema, no cyanosis, no              redness               Neurologic:   No focal deficit noted.        WBC WBC   Date Value Ref Range Status   03/02/2019 24.59 (H) 3.50 - 10.80 10*3/mm3 Final   03/01/2019 35.75 (H) 3.50 - 10.80 10*3/mm3 Final      HGB Hemoglobin   Date Value Ref Range Status   03/02/2019 6.4 (L) 11.5 - 15.5 g/dL Final   03/01/2019 7.9 (L) 11.5 - 15.5 g/dL Final      HCT Hematocrit   Date Value Ref Range Status   03/02/2019 19.7 (L) 34.5 - 44.0 % Final   03/01/2019 26.4 " (L) 34.5 - 44.0 % Final      Platlets No results found for: LABPLAT   MCV MCV   Date Value Ref Range Status   03/02/2019 96.6 80.0 - 99.0 fL Final   03/01/2019 102.3 (H) 80.0 - 99.0 fL Final          Sodium Sodium   Date Value Ref Range Status   03/02/2019 142 132 - 146 mmol/L Final   03/01/2019 142 132 - 146 mmol/L Final      Potassium Potassium   Date Value Ref Range Status   03/02/2019 4.0 3.5 - 5.5 mmol/L Final   03/01/2019 5.2 3.5 - 5.5 mmol/L Final      Chloride Chloride   Date Value Ref Range Status   03/02/2019 105 99 - 109 mmol/L Final   03/01/2019 111 (H) 99 - 109 mmol/L Final      CO2 CO2   Date Value Ref Range Status   03/02/2019 26.0 20.0 - 31.0 mmol/L Final   03/01/2019 16.0 (L) 20.0 - 31.0 mmol/L Final      BUN BUN   Date Value Ref Range Status   03/02/2019 22 9 - 23 mg/dL Final   03/01/2019 57 (H) 9 - 23 mg/dL Final      Creatinine Creatinine   Date Value Ref Range Status   03/02/2019 1.97 (H) 0.60 - 1.30 mg/dL Final   03/01/2019 4.45 (H) 0.60 - 1.30 mg/dL Final      Calcium Calcium   Date Value Ref Range Status   03/02/2019 8.9 8.7 - 10.4 mg/dL Final   03/01/2019 9.0 8.7 - 10.4 mg/dL Final      PO4 No results found for: CAPO4   Albumin Albumin   Date Value Ref Range Status   03/02/2019 4.67 3.20 - 4.80 g/dL Final   03/01/2019 3.67 3.20 - 4.80 g/dL Final      Magnesium Magnesium   Date Value Ref Range Status   03/01/2019 2.6 1.3 - 2.7 mg/dL Final      Uric Acid No results found for: URICACID        Results Review:     I reviewed the patient's new clinical results.      b complex-vitamin c-folic acid 1 tablet Oral Daily   calcitriol 0.25 mcg Oral Daily   castor oil-balsam peru  Topical Q12H   docusate sodium 100 mg Oral BID   epoetin alexus 10,000 Units Subcutaneous Once per day on Tue Thu Sat   ferrous sulfate 325 mg Oral TID With Meals   heparin (porcine) 5,000 Units Subcutaneous Q12H   meropenem 500 mg Intravenous Q24H   midodrine 10 mg Oral TID AC   PRO-STAT 1 packet Oral BID   sodium chloride 3 mL  Intravenous Q12H   sodium hypochlorite  Topical BID   sodium hypochlorite  Topical Q24H   vancomycin 500 mg Intravenous Once per day on Tue Thu Sat       Pharmacy Consult - Pharmacy to dose        Medication Review:     Assessment/Plan       Hemodialysis-associated hypotension    Hypertension    Pulmonary hypertension (CMS/HCC)    Leukocytosis    Debility    Hx of renal cell carcinoma    Chronic diastolic CHF (congestive heart failure) (CMS/HCC)    ESRD (end stage renal disease) (CMS/[facility])    Sacral decubitus ulcer    History of recent pneumonia    Hypotension due to hypovolemia    Impaired mobility and ADLs      1- ESRD - Ttsat   2- Intradialytic hypotension   3- Anemia of chronic disease.   4- metabolic acidosis - will be corrected with HD  5- Sacral decubitus ulcer- stage IV      Plan:  - Will increase midodrine to 10 tid.   - Renal diet   - Adjust meds per renal function   - Epo with HD     I discussed the patients findings and my recommendations with patient and nursing staff          Isaac Proctor MD  03/03/19  9:48 AM

## 2019-03-03 NOTE — PLAN OF CARE
Problem: Patient Care Overview  Goal: Plan of Care Review  Outcome: Ongoing (interventions implemented as appropriate)   03/03/19 1400   Coping/Psychosocial   Plan of Care Reviewed With patient   OTHER   Outcome Summary Pt with sacral ulcer with depth to fascia/bone. Wound edges purple non-blanchable and will likely open up. Pt very lethargic, poor PO intake limiting healing potential. PT will follow pt 2x/week for debridement, with nursing to continue dressing changes.

## 2019-03-04 NOTE — PLAN OF CARE
Problem: Patient Care Overview  Goal: Plan of Care Review  Outcome: Ongoing (interventions implemented as appropriate)   03/04/19 0234   Coping/Psychosocial   Plan of Care Reviewed With patient   Plan of Care Review   Progress no change   OTHER   Outcome Summary vss, pt rapidly declining refusing all meds and po intake minimal verbal communication, pallative consult in am       Problem: Skin Injury Risk (Adult)  Goal: Identify Related Risk Factors and Signs and Symptoms  Outcome: Ongoing (interventions implemented as appropriate)

## 2019-03-04 NOTE — SIGNIFICANT NOTE
Exam confirms with auscultation zero audible heart tones and zero audible respirations. Ms. Fabiola Pimentel was pronounced dead at 0410. MD notified by patient's RN.       Kathrine Stephenson RN  Clinical House Supervisor

## 2019-03-05 LAB — BACTERIA SPEC AEROBE CULT: NORMAL

## 2019-03-06 LAB
BACTERIA SPEC AEROBE CULT: NORMAL
BACTERIA SPEC AEROBE CULT: NORMAL

## 2019-03-31 NOTE — DISCHARGE SUMMARY
[facility] Medicine Services  DEATH SUMMARY    Patient Name: Fabiola Pimentel  : 1934  MRN: [id number]    Date of Admission: 2019  Date of Death:  3/04/2019  Time of Death:  0410    Primary Care Physician: Otilio Smith DO    Consults     Date and Time Order Name Status Description    3/1/2019 0941 Inpatient Infectious Diseases Consult Completed     2019 1827 Inpatient Nephrology Consult Completed     2019 2132 Inpatient Infectious Diseases Consult Completed     2019 2105 Inpatient Nephrology Consult Completed     2019 0756 Inpatient Infectious Diseases Consult Completed     2019 0855 Inpatient Cardiology Consult Completed     2019 0030 Inpatient General Surgery Consult Completed     2019 1655 Inpatient Nephrology Consult Completed           Summary of Hospital Events     Presenting Problem:   ESRD (end stage renal disease) (CMS/[facility]) [N18.6]  Leukocytosis [D72.829]  Hypotension due to hypovolemia [I95.89, E86.1]  Impaired mobility and ADLs [Z74.09]    Active Hospital Problems    Diagnosis  POA   • **Hemodialysis-associated hypotension [I95.3]  Unknown   • Altered mental status [R41.82]  No   • Impaired mobility and ADLs [Z74.09]  Yes   • History of recent pneumonia [Z87.01]  Not Applicable   • Hypotension due to hypovolemia [I95.89, E86.1]  Yes   • Sacral decubitus ulcer [L89.159]  Yes   • ESRD (end stage renal disease) (CMS/[facility]) [N18.6]  Yes   • Leukocytosis [D72.829]  Yes   • Chronic diastolic CHF (congestive heart failure) (CMS/[facility]) [I50.32]  Yes   • Debility [R53.81]  Yes   • Hx of renal cell carcinoma [Z85.528]  Not Applicable   • Hypertension [I10]  Yes   • Pulmonary hypertension (CMS/[facility]) [I27.20]  Yes      Resolved Hospital Problems   No resolved problems to display.          Hospital Course:  Fabiola Pimentel was a 84 y.o. female with past medical history significant for end-stage renal disease on hemodialysis, chronic diastolic  heart failure, chronic debility, history of renal cell carcinoma, hypertension, pulmonary hypertension.  Patient was admitted to [facility] from 2019 and was discharged on 2019.  On that admission patient had a cardiac arrest on 2019 that lasted about 3 minutes and patient was transferred to ICU.  On that admission patient also was treated with antibiotics for sacral decubitus ulcers.  Patient was readmitted to the hospital on 2019 as she developed hypotension during dialysis.  Only on this admission patient was very lethargic and her condition did not improve much.  Patient had blood cultures which remained negative.  Because of the lethargy and MRI of the brain was done on 3/3/2019.  Although there were abnormalities however no evidence of acute ischemic changes.  Patient did have leukocytosis however as mentioned patient did not have any positive cultures.  A chest x-ray that was done on 3/1/2019 and was compared to the chest x-ray done on 2019 did not find any significant changes.  Unfortunately patient  on 3/4/2019 at 0410.      Official Cause of Death and any directly related diagnoses:  End stage renal disease.       Electronically signed by Miki David MD, 19, 12:53 AM.

## 2021-06-28 NOTE — PLAN OF CARE
Problem: Patient Care Overview  Goal: Plan of Care Review   02/12/19 1174   OTHER   Outcome Summary VSS. Pt had hemodialysis today. Dressing change to dialysis access with Dakiins and gauze. VSS. BM x2 today. Nutrition continues to be a problem . Dietician visited today for food preferences . IV access gained on the left forearm. Pt refused to get out of bed due to being tired from dialysis.        Problem: Fall Risk (Adult)  Goal: Absence of Fall  Outcome: Ongoing (interventions implemented as appropriate)      Problem: Skin Injury Risk (Adult)  Goal: Skin Health and Integrity  Outcome: Ongoing (interventions implemented as appropriate)      Problem: Wound (Includes Pressure Injury) (Adult)  Goal: Signs and Symptoms of Listed Potential Problems Will be Absent, Minimized or Managed (Wound)  Outcome: Ongoing (interventions implemented as appropriate)         none

## (undated) DEVICE — SUT GUT PLN 3/0 FS2 27IN 1630H

## (undated) DEVICE — SUT PROLN 7/0 BV1 D/A 24IN 8702H

## (undated) DEVICE — ENCORE® LATEX MICRO SIZE 8, STERILE LATEX POWDER-FREE SURGICAL GLOVE: Brand: ENCORE

## (undated) DEVICE — SUT ETHLN 2/0 PS 18IN 585H

## (undated) DEVICE — GLV SURG SIGNATURE TOUCH PF LTX 7.5 STRL BX/50

## (undated) DEVICE — LUER-LOK 360°: Brand: CONNECTA, LUER-LOK

## (undated) DEVICE — OCCLUSIVE GAUZE PATCH,3% BISMUTH TRIBROMOPHENATE IN PETROLATUM BLEND: Brand: XEROFORM

## (undated) DEVICE — SUT SILK 3/0 TIES 18IN A184H

## (undated) DEVICE — SYR LUERLOK 20CC

## (undated) DEVICE — 3M™ IOBAN™ 2 ANTIMICROBIAL INCISE DRAPE 6650EZ: Brand: IOBAN™ 2

## (undated) DEVICE — DRSNG TELFA PAD NONADH STR 1S 3X8IN

## (undated) DEVICE — DECANT BG O JET

## (undated) DEVICE — GOWN,REINF,POLY,ECL,PP SLV,XL: Brand: MEDLINE

## (undated) DEVICE — SUT SILK 4/0 TIES 18IN A183H

## (undated) DEVICE — SUT MNCRYL PLS ANTIB UD 4/0 PC3 18IN

## (undated) DEVICE — PROXIMATE RH ROTATING HEAD SKIN STAPLERS (35 WIDE) CONTAINS 35 STAINLESS STEEL STAPLES: Brand: PROXIMATE

## (undated) DEVICE — 3M™ STERI-STRIP™ REINFORCED ADHESIVE SKIN CLOSURES, R1547, 1/2 IN X 4 IN (12 MM X 100 MM), 6 STRIPS/ENVELOPE: Brand: 3M™ STERI-STRIP™

## (undated) DEVICE — GLV SURG SENSICARE MICRO PF LF 6.5 STRL

## (undated) DEVICE — APPL DURAPREP IODOPHOR APL 26ML

## (undated) DEVICE — KT CATH TAL PALINDROME RUBY 14.5F23CM

## (undated) DEVICE — ADHS LIQ MASTISOL 2/3ML

## (undated) DEVICE — PK MINOR SPLT 10

## (undated) DEVICE — SYR CONTRL LUERLOK 10CC

## (undated) DEVICE — ST ACC MICROPUNCTURE .018 TRANSLSS/SS/TP 5F/10CM 21G/7CM

## (undated) DEVICE — OCCLUSIVE GAUZE STRIP,3% BISMUTH TRIBROMOPHENATE IN PETROLATUM BLEND: Brand: XEROFORM

## (undated) DEVICE — SPNG GZ WOVN 4X4IN 12PLY 10/BX STRL

## (undated) DEVICE — SYR LL 3CC

## (undated) DEVICE — SNAP KOVER: Brand: UNBRANDED

## (undated) DEVICE — SUT SILK 2/0 TIES 18IN A185H

## (undated) DEVICE — DRAPE,HAND,STERILE: Brand: MEDLINE

## (undated) DEVICE — SHEET,DRAPE,53X77,STERILE: Brand: MEDLINE

## (undated) DEVICE — ANTIBACTERIAL UNDYED BRAIDED (POLYGLACTIN 910), SYNTHETIC ABSORBABLE SUTURE: Brand: COATED VICRYL

## (undated) DEVICE — ANTIBACTERIAL UNDYED BRAIDED (POLYGLACTIN 910), SYNTHETIC ABSORBABLE SURGICAL SUTURE: Brand: COATED VICRYL

## (undated) DEVICE — PK VASC 10

## (undated) DEVICE — ENCORE® LATEX MICRO SIZE 7, STERILE LATEX POWDER-FREE SURGICAL GLOVE: Brand: ENCORE

## (undated) DEVICE — COVADERM: Brand: DEROYAL

## (undated) DEVICE — APPL CHLORAPREP W/TINT 26ML BLU

## (undated) DEVICE — SUT PROLN 6/0 BV1 D/A 30IN 8709H

## (undated) DEVICE — SYR LUERLOK 5CC

## (undated) DEVICE — ENCORE® LATEX MICRO SIZE 7.5, STERILE LATEX POWDER-FREE SURGICAL GLOVE: Brand: ENCORE

## (undated) DEVICE — COVER,LIGHT HANDLE,FLX,1/PK: Brand: MEDLINE INDUSTRIES, INC.

## (undated) DEVICE — SKIN AFFIX SURG ADHESIVE 72/CS 0.55ML: Brand: MEDLINE

## (undated) DEVICE — SPNG GZ STRL 2S 4X4 12PLY

## (undated) DEVICE — SUT SILK 2/0 SH 30IN K833H